# Patient Record
Sex: MALE | Race: WHITE | NOT HISPANIC OR LATINO | Employment: OTHER | URBAN - METROPOLITAN AREA
[De-identification: names, ages, dates, MRNs, and addresses within clinical notes are randomized per-mention and may not be internally consistent; named-entity substitution may affect disease eponyms.]

---

## 2017-01-04 DIAGNOSIS — F41.9 ANXIETY: ICD-10-CM

## 2017-01-04 RX ORDER — CITALOPRAM 20 MG/1
TABLET, FILM COATED ORAL
Qty: 90 TABLET | Refills: 3 | Status: SHIPPED | OUTPATIENT
Start: 2017-01-04 | End: 2017-07-17 | Stop reason: DRUGHIGH

## 2017-03-20 ENCOUNTER — OFFICE VISIT (OUTPATIENT)
Dept: CARDIOLOGY | Facility: CLINIC | Age: 62
End: 2017-03-20
Payer: COMMERCIAL

## 2017-03-20 VITALS
WEIGHT: 194 LBS | HEART RATE: 61 BPM | BODY MASS INDEX: 31.18 KG/M2 | DIASTOLIC BLOOD PRESSURE: 80 MMHG | SYSTOLIC BLOOD PRESSURE: 125 MMHG | HEIGHT: 66 IN

## 2017-03-20 DIAGNOSIS — Z95.5 HISTORY OF CORONARY ARTERY STENT PLACEMENT: ICD-10-CM

## 2017-03-20 DIAGNOSIS — R53.83 FATIGUE, UNSPECIFIED TYPE: ICD-10-CM

## 2017-03-20 DIAGNOSIS — G47.33 OSA (OBSTRUCTIVE SLEEP APNEA): ICD-10-CM

## 2017-03-20 DIAGNOSIS — Z79.82 LONG-TERM USE OF ASPIRIN THERAPY: ICD-10-CM

## 2017-03-20 DIAGNOSIS — R07.9 CHEST PAIN, UNSPECIFIED TYPE: ICD-10-CM

## 2017-03-20 DIAGNOSIS — I25.10 CORONARY ARTERY DISEASE INVOLVING NATIVE CORONARY ARTERY OF NATIVE HEART WITHOUT ANGINA PECTORIS: Primary | ICD-10-CM

## 2017-03-20 DIAGNOSIS — F17.201 TOBACCO DEPENDENCE IN REMISSION: ICD-10-CM

## 2017-03-20 PROCEDURE — 1160F RVW MEDS BY RX/DR IN RCRD: CPT | Mod: S$GLB,,, | Performed by: INTERNAL MEDICINE

## 2017-03-20 PROCEDURE — 99213 OFFICE O/P EST LOW 20 MIN: CPT | Mod: S$GLB,,, | Performed by: INTERNAL MEDICINE

## 2017-03-20 PROCEDURE — 99999 PR PBB SHADOW E&M-EST. PATIENT-LVL III: CPT | Mod: PBBFAC,,, | Performed by: INTERNAL MEDICINE

## 2017-03-20 NOTE — PATIENT INSTRUCTIONS
Heart Disease Education    The heart beats 60 to 100 times per minute, 24 hours a day. This equals almost 1000,000 times a day. It pumps blood with oxygen and nutrients to the tissues and organs of the body. But the heart is a muscle and needs its own supply of blood. Blood flow to the heart is supplied by the coronary arteries. Coronary artery disease (atherosclerosis) is a result of cholesterol, saturated fat, and calcium deposits (plaques) that build up inside the walls. This causes inflammation within the coronary arteries. These plaques narrow the artery and reduce blood flow to the heart muscle. The reduction in blood flow to the heart muscle decreases oxygen supply to the heart. If the narrowing is significant enough, the oxygen supply to one or more regions of the heart can be temporarily or permanently shut down. This can cause chest pain, and possibly death of heart tissue (heart attack).  Types of chest pain  Angina is the name for pain in the heart muscle. Angina is a warning sign of serious heart disease. When untreated it can lead to a heart attack, also known as acute myocardial infarction, or AMI. Angina occurs when there is not enough blood and oxygen flowing to the heart for the amount of work it is doing. This most often happens during physical exertion, when the heart is working hardest. It is usually relieved by rest or nitroglycerin. Angina may also occur after a large meal when extra blood is sent to the digestive organs and less goes to the heart. In the case of advanced or unstable heart disease, angina can occur at rest or awaken you from sleep. Angina usually lasts from a few minutes up to 20 minutes or more. When treated early, the effects of angina can be reversed without permanent damage to the heart. Angina is a serious condition and needs to be evaluated by a medical professional immediately.  There are two types of angina -- stable and unstable:  · Stable angina usually occurs  with a predictable level of activity. Being stable, its character, severity, and occurrence do not change much over time. It usually starts with activity, and resolves with rest or taking your medicine as instructed by your doctor. The symptoms usually do not last long.  · Unstable angina changes or gets worse over time. It is different from whatever you are used to. It may feel different or worse, begin without cause, occur with exercise or exertion, wake you up from sleep, and last longer. It may not respond in the same way as it does when you take your usual medicines for an attack. This type of angina can be a warning sign of an impending heart attack.     A heart attack is usually the result of a blood clot that suddenly forms in a coronary artery that has been narrowed with plaque. When this occurs, blood flow may be cut off to a part of the heart muscle, causing the cells to die. This weakens the pumping action of the heart, which affects the delivery of blood to all the other organs in the body including the brain. This damage is not reversible. However, early treatment can limit the amount of damage.  The pain you feel with angina and a heart attack may have a similar quality. However, it is usually different in intensity and duration. Here are some typical descriptions of a heart attack:  · It is most often experienced as a squeezing, crushing, pressure-like sensation in the center of the chest.  · It is sometimes described as something heavy sitting on my chest.  · It may feel more like a bad case of indigestion.  · The pain may spread from the chest to the arm, shoulder, throat or jaw.  · Sometimes the pain is not felt in the chest at all, but only in the arm, shoulder, throat or jaw.  · There may also be nausea, vomiting, dizziness or light-headedness, sweating and trouble breathing.  · Palpitations, or your heart beating rapidly  · A new, irregular heart beat  · Unexplained weakness  You may not be  "able to tell the difference between "bad" angina and a heart attack at home. Seek help if your symptoms are different than usual. Do not be in denial or just try to "tough it out."  Call 911  This is the fastest and safest way to get to the emergency department. The paramedics can also start treatment on the way to the hospital, saving valuable time for your heart.  · If the angina gets worse, if it continues, or if it stops and returns, call 911 immediately. Do not delay. You may be having a heart attack.  · After you call 911, take a second tablet or spray unless instructed otherwise. When repeating doses, sit down if possible, because it can make you feel lightheaded or dizzy. Wait another 5 minutes. If the angina still does not go away, take a third tablet or spray. Do not take more than 3 tablets or sprays within 15 minutes. Stay on the phone with 911 for further instruction.  · Your healthcare provider may give you slightly different instructions than those above. If so, follow them carefully.  Do not wait until symptoms become severe to call 911.  Other reasons to call 911 include:  · Trouble breathing  · Feeling lightheaded, faint, or dizzy  · Rapid heart beat  · Slower than usual heart rate compared to your normal  · Angina with weakness, dizziness, fainting, heavy sweating, nausea, or vomiting  · Extreme drowsiness, confusion  · Weakness of an arm or leg or one side of the face  · Difficulty with speech or vision  When to seek medical care  Remember, the signs and symptoms of a heart attack are not always like they are on TV. Sometimes they are not so obvious. You may only feel weak, or just not right. If it is not clear or if you have any doubt, call for advice.  · Seek help if there is a change in the type of pain, if it feels different, or if your symptoms are mild.  · Do not drive yourself. Have someone else drive you. If no one can drive, call 911.  · Do not delay. Fast diagnosis and treatment can " "prevent or limit the amount of heart damage during a heart attack.  · Do not go to your doctor's office or a clinic as they may not be able to provide all the testing and treatment required for this condition.  · If your doctor has given you medicine to take when symptoms occur, take them but don't delay getting help trying to locate medicines.  What happens in the emergency department  The emergency department is connected to your local emergency medical system (EMS) through 911. That's why during a cardiac emergency, calling 911 is the fastest way to get help. The goal of the emergency department is to rapidly screen, evaluate, and treat people.  Once you are there, an electrocardiogram (ECG or heart tracing) will be done. Blood samples may be taken to look for the presence of heart enzymes that leak from damaged heart cells and show if a heart attack is occurring. You will often be evaluated by a heart specialist (cardiologist) who decides the best course of action. In the case of severe angina or early heart attack, and depending on the circumstances, powerful "clot busting" medicines can be used to dissolve blood clots in the coronary artery. In other cases, you may be taken to a cardiac catheterization lab. Here, a tiny balloon-tipped catheter is advanced through blood vessels to the heart. There the balloon is inflated pushing open the blood vessel restoring blood flow.  Risk factors for heart disease  Risk factors for heart disease are a combination of genetic and lifestyle. Many risk factors work by either directly or indirectly damaging the blood vessels of the heart, or by increasing the risk of forming blood or cholesterol clots, which then clog up and block the arteries.     Examples of physical lifestyle risk factors:  · Cigarette smoking  · High blood pressure  · High blood cholesterol  · Use of stimulant drugs such as cocaine, crack, and amphetamines  · Eating a high-fat, high-cholesterol " meal  · Diabetes   · Obesity which increases risk for diabetes and high blood pressure  · Lack of regular physical activity     Examples of emotional lifestyle factors:  · Chronic high stress levels release stress hormones. These raise blood pressure and cholesterol level and makes blood clot more easily.  · Held-in anger, hostile or cynical attitude  · Social and emotional isolation, lack of intimacy  · Loss of relationship  · Depression  Other factors that increase the risk of heart attack that you cannot control :  · Age. The older you get beyond 40, the greater is your risk of significant coronary artery disease.  · Gender. More men than women get heart disease; but once past menopause, women who are not taking estrogen replacement have the same risk as men for a heart attack.  · Family history. If your mother, father, brother or sister has coronary artery disease, your risk of having it is higher than a person your age without this family history.  What can you do to decrease your risk  To reduce your risk of heart disease:  · Get regular checkups with your doctor.  · Take your medicines for blood pressure, cholesterol or diabetes as directed.  · Watch your diet. Eat a heart healthy diet choosing fresh foods, less salt, cholesterol, and fat  · Stop smoking. Get help if needed.  · Get regular exercise.  · Manage stress.  · Carry a list of medicines and doses in your wallet.  Date Last Reviewed: 12/30/2015  © 7902-4428 [employer]. [address] [place] [address], [place] [address]. All rights reserved. This information is not intended as a substitute for professional medical care. Always follow your healthcare professional's instructions.

## 2017-03-20 NOTE — PROGRESS NOTES
Subjective:   Patient ID:  Masoud Osorio is a 61 y.o. male who presents for follow up of Coronary Artery Disease; Hyperlipidemia; and Hypertension      HPI:     Masoud Osorio 61 y.o. male is here follow up and feeling well without any new complaints. He was admitted for NSTEMI that required PCI of RCA, PDA, and POBA of PLB with a preserved EF. He quit smoking. He is on aspirin and brilinta for DAPT witjh a DAPT score of 2. He recalls at least 2 episodes of chest discomfort of similar nature but less intensity, 3/10. Resolved spontaneously.         Patient Active Problem List    Diagnosis Date Noted    Fatigue 03/20/2017    History of colon polyps 10/24/2016    Overweight (BMI 25.0-29.9) 09/21/2016    Long-term use of aspirin therapy 09/21/2016    History of coronary artery stent placement 09/21/2016    Antiplatelet or antithrombotic long-term use 09/21/2016    Anxiety 09/21/2016     trial of ctialopram and follow up in 8 weeks      Coronary artery disease involving native coronary artery of native heart without angina pectoris 07/26/2016 7/26/2016 NSTEMI at [facility]     PCI of RCA with 4.0 x 15, 4.0 x 38, and 3.5 x 38 mm Resolute JACOB    PCI of PDA 2.75 x 15 mm JACOB dilated to 3.0  POBA of proximal PLB 2.0 x 12 mm balloon      Normal EF with LVEDP 15 mmHg                    History of heart attack 07/25/2016     NSTEMI      Tobacco dependence in remission 07/25/2016           Right Arm BP - Sitting: (P) 125/80  Left Arm BP - Sitting: (P) 124/80        LABS    LAST HbA1c  Lab Results   Component Value Date    HGBA1C 5.7 07/26/2016       Lipid panel  Lab Results   Component Value Date    CHOL 121 09/23/2016    CHOL 174 07/26/2016     Lab Results   Component Value Date    HDL 41 09/23/2016    HDL 32 (L) 07/26/2016     Lab Results   Component Value Date    LDLCALC 60.2 (L) 09/23/2016    LDLCALC 112.4 07/26/2016     Lab Results   Component Value Date    TRIG 99 09/23/2016    TRIG 148 07/26/2016     Lab  Results   Component Value Date    CHOLHDL 33.9 09/23/2016    CHOLHDL 18.4 (L) 07/26/2016            Review of Systems   Constitution: Negative for diaphoresis, weakness, night sweats, weight gain and weight loss.   HENT: Negative for congestion.    Eyes: Negative for blurred vision, discharge and double vision.   Cardiovascular: Negative for chest pain, claudication, cyanosis, dyspnea on exertion, irregular heartbeat, leg swelling, near-syncope, orthopnea, palpitations, paroxysmal nocturnal dyspnea and syncope.   Respiratory: Negative for cough, shortness of breath and wheezing.    Endocrine: Negative for cold intolerance, heat intolerance and polyphagia.   Hematologic/Lymphatic: Negative for adenopathy and bleeding problem. Does not bruise/bleed easily.   Skin: Negative for dry skin and nail changes.   Musculoskeletal: Negative for arthritis, back pain, falls, joint pain, myalgias and neck pain.   Gastrointestinal: Negative for bloating, abdominal pain, change in bowel habit and constipation.   Genitourinary: Negative for bladder incontinence, dysuria, flank pain, genital sores and missed menses.   Neurological: Negative for aphonia, brief paralysis, difficulty with concentration and dizziness.   Psychiatric/Behavioral: Negative for altered mental status and memory loss. The patient does not have insomnia.    Allergic/Immunologic: Negative for environmental allergies.       Objective:   Physical Exam   Constitutional: He is oriented to person, place, and time. He appears well-developed and well-nourished. He is not intubated.   HENT:   Head: Normocephalic and atraumatic.   Right Ear: External ear normal.   Left Ear: External ear normal.   Mouth/Throat: Oropharynx is clear and moist.   Eyes: Conjunctivae and EOM are normal. Pupils are equal, round, and reactive to light. Right eye exhibits no discharge. Left eye exhibits no discharge. No scleral icterus.   Neck: Normal range of motion. Neck supple. Normal carotid  pulses, no hepatojugular reflux and no JVD present. Carotid bruit is not present. No tracheal deviation present. No thyromegaly present.   Cardiovascular: Normal rate, regular rhythm, S1 normal and S2 normal.   No extrasystoles are present. PMI is not displaced.  Exam reveals no gallop, no S3, no distant heart sounds, no friction rub and no midsystolic click.    No murmur heard.  Pulses:       Carotid pulses are 2+ on the right side, and 2+ on the left side.       Radial pulses are 2+ on the right side, and 2+ on the left side.        Femoral pulses are 2+ on the right side, and 2+ on the left side.       Popliteal pulses are 2+ on the right side, and 2+ on the left side.        Dorsalis pedis pulses are 2+ on the right side, and 2+ on the left side.        Posterior tibial pulses are 2+ on the right side, and 2+ on the left side.   Pulmonary/Chest: Effort normal and breath sounds normal. No accessory muscle usage or stridor. No apnea, no tachypnea and no bradypnea. He is not intubated. No respiratory distress. He has no decreased breath sounds. He has no wheezes. He has no rales. He exhibits no tenderness and no bony tenderness.   Abdominal: He exhibits no distension, no pulsatile liver, no abdominal bruit, no ascites, no pulsatile midline mass and no mass. There is no tenderness. There is no rebound and no guarding.   Musculoskeletal: Normal range of motion. He exhibits no edema or tenderness.   Lymphadenopathy:     He has no cervical adenopathy.   Neurological: He is alert and oriented to person, place, and time. He has normal reflexes. No cranial nerve deficit. Coordination normal.   Skin: Skin is warm. No rash noted. No erythema. No pallor.   Psychiatric: He has a normal mood and affect. His behavior is normal. Judgment and thought content normal.       Assessment:     1. Coronary artery disease involving native coronary artery of native heart without angina pectoris    2. Tobacco dependence in remission    3.  History of coronary artery stent placement    4. Long-term use of aspirin therapy    5. Fatigue, unspecified type    6. NATALIE (obstructive sleep apnea)        Plan:             Continue with current medical plan and lifestyle changes.  Return sooner for concerns or questions. If symptoms persist go to the ED  I have reviewed all pertinent data on this patient         Stress test to rule out ischemia        DAPT for another year because of DAPT score of 2      Temporarily stop toprol xl in case it is causing fatigue   If in 2 weeks he is still tired he resume it    If there is in an improvement he will switch to another beta-blocker      Sleep study to rule out NATALIE          I have reviewed the patient's medical history in detail and updated the computerized patient record.    Orders Placed This Encounter   Procedures    Polysomnogram (CPAP will be added if patient meets diagnostic criteria.)     Standing Status:   Future     Standing Expiration Date:   3/20/2018       Follow up as scheduled. Return sooner for concerns or questions          He expressed verbal understanding and agreed with the plan        Patient's Medications   New Prescriptions    No medications on file   Previous Medications    ASPIRIN (ECOTRIN) 81 MG EC TABLET    Take 1 tablet (81 mg total) by mouth once daily.    ATORVASTATIN (LIPITOR) 80 MG TABLET    Take 1 tablet (80 mg total) by mouth once daily.    AZELASTINE (ASTELIN) 137 MCG (0.1 %) NASAL SPRAY    INSTILL 2 SPRAYS IN EACH NOSTRIL TWICE A DAY    CITALOPRAM (CELEXA) 20 MG TABLET    TAKE 1 TABLET BY MOUTH DAILY    IPRATROPIUM (ATROVENT) 0.06 % NASAL SPRAY    INSTILL 2 SPRAYS IN EACH NOSTRIL EVERY 12 HOURS    MULTIVITAMIN CAPSULE    Take 1 capsule by mouth once daily.    NASAL ALLERGY 55 MCG NASAL INHALER    INSTILL 2 SPRAYS IN [facility] NOSTRIL EVERY DAY    NITROGLYCERIN (NITROSTAT) 0.4 MG SL TABLET    Place 1 tablet (0.4 mg total) under the tongue every 5 (five) minutes as needed for Chest pain.     TICAGRELOR (BRILINTA) 90 MG TABLET    Take 1 tablet (90 mg total) by mouth 2 (two) times daily.   Modified Medications    No medications on file   Discontinued Medications    METOPROLOL SUCCINATE (TOPROL-XL) 25 MG 24 HR TABLET    Take 1 tablet (25 mg total) by mouth once daily.

## 2017-03-27 ENCOUNTER — HOSPITAL ENCOUNTER (OUTPATIENT)
Dept: CARDIOLOGY | Facility: HOSPITAL | Age: 62
Discharge: HOME | End: 2017-03-27
Attending: INTERNAL MEDICINE
Payer: COMMERCIAL

## 2017-03-27 ENCOUNTER — HOSPITAL ENCOUNTER (OUTPATIENT)
Dept: RADIOLOGY | Facility: HOSPITAL | Age: 62
Discharge: HOME | End: 2017-03-27
Attending: INTERNAL MEDICINE
Payer: COMMERCIAL

## 2017-03-27 DIAGNOSIS — R07.9 CHEST PAIN, UNSPECIFIED TYPE: ICD-10-CM

## 2017-03-27 DIAGNOSIS — I25.10 CORONARY ARTERY DISEASE INVOLVING NATIVE CORONARY ARTERY OF NATIVE HEART WITHOUT ANGINA PECTORIS: ICD-10-CM

## 2017-03-27 PROCEDURE — 78452 HT MUSCLE IMAGE SPECT MULT: CPT | Mod: TC

## 2017-03-27 PROCEDURE — 93018 CV STRESS TEST I&R ONLY: CPT | Mod: ,,, | Performed by: INTERNAL MEDICINE

## 2017-03-27 PROCEDURE — 78452 HT MUSCLE IMAGE SPECT MULT: CPT | Mod: 26,,, | Performed by: RADIOLOGY

## 2017-03-27 PROCEDURE — 93016 CV STRESS TEST SUPVJ ONLY: CPT | Mod: ,,, | Performed by: INTERNAL MEDICINE

## 2017-03-30 ENCOUNTER — TELEPHONE (OUTPATIENT)
Dept: CARDIOLOGY | Facility: CLINIC | Age: 62
End: 2017-03-30

## 2017-03-30 LAB — DIASTOLIC DYSFUNCTION: NO

## 2017-03-30 NOTE — PROGRESS NOTES
Stress test came back overall ok      No reversible ischemia noted      If you have any discomfort despite this result we should proceed with angiography      Thanks      ZN

## 2017-03-30 NOTE — TELEPHONE ENCOUNTER
----- Message from Nicky Mello sent at 3/30/2017  1:36 PM CDT -----  Contact: self/[phone]  Patient would like the results of his Nuclear stress test.  Please advise

## 2017-03-30 NOTE — TELEPHONE ENCOUNTER
----- Message from Nicky Mello sent at 3/30/2017  1:36 PM CDT -----  Contact: self/[phone]  Patient would like the results of his Nuclear stress test.  Please advise

## 2017-04-10 ENCOUNTER — TELEPHONE (OUTPATIENT)
Dept: SMOKING CESSATION | Facility: CLINIC | Age: 62
End: 2017-04-10

## 2017-04-11 ENCOUNTER — TELEPHONE (OUTPATIENT)
Dept: SMOKING CESSATION | Facility: CLINIC | Age: 62
End: 2017-04-11

## 2017-04-12 NOTE — TELEPHONE ENCOUNTER
----- Message from Natali Mattson sent at 4/12/2017 11:24 AM CDT -----  Contact: [phone]/ self   Pr its requesting a refill on rx ticagrelor (BRILINTA) 90 mg tablet sent to [place] [facility] [phone]. Please advise

## 2017-04-13 RX ORDER — TICAGRELOR 90 MG/1
TABLET ORAL
Qty: 60 TABLET | Refills: 11 | Status: SHIPPED | OUTPATIENT
Start: 2017-04-13 | End: 2017-07-17 | Stop reason: ALTCHOICE

## 2017-04-19 ENCOUNTER — TELEPHONE (OUTPATIENT)
Dept: SMOKING CESSATION | Facility: CLINIC | Age: 62
End: 2017-04-19

## 2017-04-19 RX ORDER — ATORVASTATIN CALCIUM 80 MG/1
80 TABLET, FILM COATED ORAL DAILY
Qty: 90 TABLET | Refills: 3 | Status: SHIPPED | OUTPATIENT
Start: 2017-04-19 | End: 2017-07-17 | Stop reason: SDUPTHER

## 2017-04-27 ENCOUNTER — TELEPHONE (OUTPATIENT)
Dept: SMOKING CESSATION | Facility: CLINIC | Age: 62
End: 2017-04-27

## 2017-05-30 ENCOUNTER — TELEPHONE (OUTPATIENT)
Dept: CARDIOLOGY | Facility: CLINIC | Age: 62
End: 2017-05-30

## 2017-05-30 ENCOUNTER — OFFICE VISIT (OUTPATIENT)
Dept: FAMILY MEDICINE | Facility: CLINIC | Age: 62
End: 2017-05-30
Payer: COMMERCIAL

## 2017-05-30 VITALS
HEART RATE: 61 BPM | BODY MASS INDEX: 31.34 KG/M2 | TEMPERATURE: 99 F | OXYGEN SATURATION: 97 % | WEIGHT: 195 LBS | DIASTOLIC BLOOD PRESSURE: 76 MMHG | HEIGHT: 66 IN | SYSTOLIC BLOOD PRESSURE: 128 MMHG | RESPIRATION RATE: 18 BRPM

## 2017-05-30 DIAGNOSIS — M25.462 SWELLING OF JOINT OF LEFT KNEE: Primary | ICD-10-CM

## 2017-05-30 PROCEDURE — 20605 DRAIN/INJ JOINT/BURSA W/O US: CPT | Mod: S$GLB,,, | Performed by: FAMILY MEDICINE

## 2017-05-30 PROCEDURE — 99999 PR PBB SHADOW E&M-EST. PATIENT-LVL III: CPT | Mod: PBBFAC,,, | Performed by: FAMILY MEDICINE

## 2017-05-30 PROCEDURE — 99215 OFFICE O/P EST HI 40 MIN: CPT | Mod: 25,S$GLB,, | Performed by: FAMILY MEDICINE

## 2017-05-30 NOTE — TELEPHONE ENCOUNTER
Spoke to patient, he states his knee has been swelling during the day, but decreases at night.     He will schedule an appt with his PCP, as he is going out of town tomorrow.

## 2017-05-30 NOTE — TELEPHONE ENCOUNTER
----- Message from Lia Villalobos sent at 5/29/2017  4:11 PM CDT -----  Contact: Greta/[phone]  Greta said patient is having trouble with his left leg with fluid, it is like the last time but worst. Please advise

## 2017-05-30 NOTE — PROCEDURES
Intermediate Joint Aspiration/Injection  Date/Time: 5/30/2017 6:44 PM  Performed by: WHITNEY HERNANDEZ  Authorized by: WHITNEY HERNANDEZ     Consent Done?:  Yes (Written)  Indications:  Joint swelling and diagnostic evaluation  Site marked: The procedure site was marked    Timeout: Prior to procedure the correct patient, procedure, and site was verified      Location: left knee.  Prep: Patient was prepped and draped in usual sterile fashion    Ultrasonic Guidance for needle placement: No  Needle size:  18 G  Approach:  Anteromedial  Aspirate amount (ml):  0  Patient tolerance:  Patient tolerated the procedure well with no immediate complications     Additional Comments: No fluid was found .

## 2017-05-30 NOTE — PROGRESS NOTES
HPI:  Masoud Osorio is a 62 y.o. year old male that  presents with concern about his left knee swelling for the last few days with no history of trauma. 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 He states that his knee does not hurt but feel very full. He states that he notices that it is decreasing in size but will not go al the way down when he goes to sleep at night. He is about to go on vacation with his wife tomorrow on a road trip in a camper.  Chief Complaint   Patient presents with    Knee Pain     left   .     HPI    Past Medical History:   Diagnosis Date    Depression     Gout      Social History     Social History    Marital status:      Spouse name: N/A    Number of children: N/A    Years of education: N/A     Occupational History    Not on file.     Social History Main Topics    Smoking status: Former Smoker     Packs/day: 0.50     Types: Cigarettes     Quit date: 7/25/2016    Smokeless tobacco: Not on file    Alcohol use Yes      Comment: rarely    Drug use: No    Sexual activity: Not on file     Other Topics Concern    Not on file     Social History Narrative    No narrative on file     Past Surgical History:   Procedure Laterality Date    CORONARY ANGIOPLASTY WITH STENT PLACEMENT      ROTATOR CUFF REPAIR       Family History   Problem Relation Age of Onset    Heart disease Mother     Arthritis Mother     Parkinsonism Mother     Congenital heart disease Father     No Known Problems Sister     No Known Problems Brother     No Known Problems Daughter     No Known Problems Sister     No Known Problems Sister            Review of Systems  General ROS: negative for chills, fever or weight loss  Psychological ROS: negative for hallucination, depression or suicidal ideation  Ophthalmic ROS: negative for blurry vision, photophobia or eye pain  ENT ROS: negative for epistaxis, sore throat or rhinorrhea  Respiratory ROS: no cough, shortness of  "breath, or wheezing  Cardiovascular ROS: no chest pain or dyspnea on exertion  Gastrointestinal ROS: no abdominal pain, change in bowel habits, or black/ bloody stools  Genito-Urinary ROS: no dysuria, trouble voiding, or hematuria  Musculoskeletal ROS: negative for gait disturbance or muscular weakness, left knee swelling  Neurological ROS: no syncope or seizures; no ataxia  Dermatological ROS: negative for pruritis, rash and jaundice      Physical Exam:  /76 (BP Location: Left arm, Patient Position: Sitting, BP Method: Manual)   Pulse 61   Temp 98.5 °F (36.9 °C) (Oral)   Resp 18   Ht 5' 6" (1.676 m)   Wt 88.5 kg (195 lb)   SpO2 97%   BMI 31.47 kg/m²   General appearance: alert, cooperative, no distress  Constitutional:Oriented to person, place, and time.appears well-developed and well-nourished.  HEENT: Normocephalic, atraumatic, neck symmetrical, no nasal discharge, TM - clear bilaterally   Eyes: conjunctivae/corneas clear, PERRL, EOM's intact  Lungs: clear to auscultation bilaterally, no dullness to percussion bilaterally  Heart: regular rate and rhythm without rub; no displacement of the PMI   Extremities: extremities symmetric; no clubbing, cyanosis, left knee swellinng with FROM, no point tenderness or warmth  Integument: Skin color, texture, turgor normal; no rashes; hair distrubution normal  Neurologic: Alert and oriented X 3, normal strength, normal coordination and gait  Psychiatric: no pressured speech; normal affect; no evidence of impaired cognition   Physical Exam     LABS:    Complete Blood Count  Lab Results   Component Value Date    RBC 5.48 09/23/2016    HGB 15.5 09/23/2016    HCT 46.2 09/23/2016    MCV 84 09/23/2016    MCH 28.3 09/23/2016    MCHC 33.5 09/23/2016    RDW 13.8 09/23/2016     09/23/2016    MPV 9.6 09/23/2016    GRAN 4.1 09/23/2016    GRAN 45.9 09/23/2016    LYMPH 3.5 09/23/2016    LYMPH 38.6 09/23/2016    MONO 1.1 (H) 09/23/2016    MONO 11.6 09/23/2016    EOS 0.3 " 09/23/2016    BASO 0.04 09/23/2016    EOSINOPHIL 3.1 09/23/2016    BASOPHIL 0.4 09/23/2016    DIFFMETHOD Automated 09/23/2016       Comprehensive Metabolic Panel  Lab Results   Component Value Date     09/23/2016    BUN 19 09/23/2016    CREATININE 0.9 09/23/2016     09/23/2016    K 3.7 09/23/2016     09/23/2016    PROT 6.8 09/23/2016    ALBUMIN 4.2 09/23/2016    BILITOT 1.0 09/23/2016    AST 34 09/23/2016    ALKPHOS 91 09/23/2016    CO2 28 09/23/2016    ALT 27 09/23/2016    ANIONGAP 5 (L) 09/23/2016    EGFRNONAA >60 09/23/2016    ESTGFRAFRICA >60 09/23/2016       LIPID  Lab Results   Component Value Date    CHOL 121 09/23/2016    HDL 41 09/23/2016       TSH  Lab Results   Component Value Date    TSH 1.447 09/23/2016       Current Outpatient Prescriptions   Medication Sig Dispense Refill    aspirin (ECOTRIN) 81 MG EC tablet Take 1 tablet (81 mg total) by mouth once daily.  0    atorvastatin (LIPITOR) 80 MG tablet Take 1 tablet (80 mg total) by mouth once daily. 90 tablet 3    BRILINTA 90 mg tablet Take 1 tablet by mouth 2 times daily. 60 tablet 11    citalopram (CELEXA) 20 MG tablet TAKE 1 TABLET BY MOUTH DAILY 90 tablet 3    ipratropium (ATROVENT) 0.06 % nasal spray INSTILL 2 SPRAYS IN EACH NOSTRIL EVERY 12 HOURS  5    multivitamin capsule Take 1 capsule by mouth once daily.      NASAL ALLERGY 55 mcg nasal inhaler INSTILL 2 SPRAYS IN [facility] NOSTRIL EVERY DAY  5    nitroGLYCERIN (NITROSTAT) 0.4 MG SL tablet Place 1 tablet (0.4 mg total) under the tongue every 5 (five) minutes as needed for Chest pain. 25 tablet 11    azelastine (ASTELIN) 137 mcg (0.1 %) nasal spray INSTILL 2 SPRAYS IN EACH NOSTRIL TWICE A DAY  5     No current facility-administered medications for this visit.        Assessment:    ICD-10-CM ICD-9-CM    1. Swelling of joint of left knee M25.462 719.06 Intermediate Joint Aspiration/Injection      Arthrocentesis         Plan:  Pt instructed that his knee was just inflamed and  he should use rest and ice and elevatio when possible.  Return if symptoms worsen or fail to improve.          Emilia Maria MD

## 2017-06-29 ENCOUNTER — TELEPHONE (OUTPATIENT)
Dept: CARDIOLOGY | Facility: CLINIC | Age: 62
End: 2017-06-29

## 2017-06-29 NOTE — TELEPHONE ENCOUNTER
----- Message from Lia Villalobos sent at 6/28/2017  3:05 PM CDT -----  Contact: [place]/[phone] or [phone]  [place] said patient needs to be scheduled for his yearly and the next available is on 9/25/17. He is on blood thinner medication. Please advise

## 2017-07-17 ENCOUNTER — OFFICE VISIT (OUTPATIENT)
Dept: CARDIOLOGY | Facility: CLINIC | Age: 62
End: 2017-07-17
Payer: COMMERCIAL

## 2017-07-17 ENCOUNTER — OFFICE VISIT (OUTPATIENT)
Dept: FAMILY MEDICINE | Facility: CLINIC | Age: 62
End: 2017-07-17
Payer: COMMERCIAL

## 2017-07-17 VITALS
WEIGHT: 198 LBS | BODY MASS INDEX: 30.01 KG/M2 | HEIGHT: 68 IN | DIASTOLIC BLOOD PRESSURE: 80 MMHG | HEART RATE: 76 BPM | SYSTOLIC BLOOD PRESSURE: 120 MMHG

## 2017-07-17 VITALS
HEIGHT: 69 IN | HEART RATE: 61 BPM | OXYGEN SATURATION: 97 % | DIASTOLIC BLOOD PRESSURE: 87 MMHG | SYSTOLIC BLOOD PRESSURE: 145 MMHG | WEIGHT: 197.06 LBS | BODY MASS INDEX: 29.19 KG/M2

## 2017-07-17 DIAGNOSIS — Z79.02 VISIT FOR MONITORING PLAVIX THERAPY: ICD-10-CM

## 2017-07-17 DIAGNOSIS — I25.10 CORONARY ARTERY DISEASE INVOLVING NATIVE CORONARY ARTERY OF NATIVE HEART WITHOUT ANGINA PECTORIS: Primary | ICD-10-CM

## 2017-07-17 DIAGNOSIS — Z95.5 HISTORY OF CORONARY ARTERY STENT PLACEMENT: ICD-10-CM

## 2017-07-17 DIAGNOSIS — Z12.5 ENCOUNTER FOR SCREENING FOR MALIGNANT NEOPLASM OF PROSTATE: ICD-10-CM

## 2017-07-17 DIAGNOSIS — Z86.010 HISTORY OF COLON POLYPS: ICD-10-CM

## 2017-07-17 DIAGNOSIS — Z11.59 NEED FOR HEPATITIS C SCREENING TEST: ICD-10-CM

## 2017-07-17 DIAGNOSIS — E78.2 MIXED HYPERLIPIDEMIA: ICD-10-CM

## 2017-07-17 DIAGNOSIS — Z51.81 VISIT FOR MONITORING PLAVIX THERAPY: ICD-10-CM

## 2017-07-17 DIAGNOSIS — F41.9 ANXIETY: ICD-10-CM

## 2017-07-17 DIAGNOSIS — Z12.11 COLON CANCER SCREENING: ICD-10-CM

## 2017-07-17 DIAGNOSIS — Z79.82 LONG-TERM USE OF ASPIRIN THERAPY: ICD-10-CM

## 2017-07-17 DIAGNOSIS — Z79.899 MEDICATION MANAGEMENT: ICD-10-CM

## 2017-07-17 DIAGNOSIS — F17.201 TOBACCO DEPENDENCE IN REMISSION: ICD-10-CM

## 2017-07-17 PROCEDURE — 99214 OFFICE O/P EST MOD 30 MIN: CPT | Mod: S$GLB,,, | Performed by: FAMILY MEDICINE

## 2017-07-17 PROCEDURE — 99999 PR PBB SHADOW E&M-EST. PATIENT-LVL IV: CPT | Mod: PBBFAC,,, | Performed by: FAMILY MEDICINE

## 2017-07-17 PROCEDURE — 99999 PR PBB SHADOW E&M-EST. PATIENT-LVL II: CPT | Mod: PBBFAC,,, | Performed by: INTERNAL MEDICINE

## 2017-07-17 PROCEDURE — 99214 OFFICE O/P EST MOD 30 MIN: CPT | Mod: S$GLB,,, | Performed by: INTERNAL MEDICINE

## 2017-07-17 RX ORDER — CITALOPRAM 40 MG/1
40 TABLET, FILM COATED ORAL DAILY
Qty: 90 TABLET | Refills: 3 | Status: SHIPPED | OUTPATIENT
Start: 2017-07-17 | End: 2017-10-11 | Stop reason: ALTCHOICE

## 2017-07-17 RX ORDER — ALPRAZOLAM 1 MG/1
1 TABLET ORAL DAILY PRN
Qty: 30 TABLET | Refills: 2 | Status: SHIPPED | OUTPATIENT
Start: 2017-07-17 | End: 2018-02-28 | Stop reason: SDUPTHER

## 2017-07-17 RX ORDER — CLOPIDOGREL BISULFATE 75 MG/1
75 TABLET ORAL DAILY
Qty: 90 TABLET | Refills: 3 | Status: SHIPPED | OUTPATIENT
Start: 2017-07-17 | End: 2018-05-05 | Stop reason: SDUPTHER

## 2017-07-17 RX ORDER — ATORVASTATIN CALCIUM 80 MG/1
80 TABLET, FILM COATED ORAL DAILY
Qty: 90 TABLET | Refills: 3 | Status: SHIPPED | OUTPATIENT
Start: 2017-07-17 | End: 2018-03-29 | Stop reason: SDUPTHER

## 2017-07-17 NOTE — PATIENT INSTRUCTIONS
Heart Disease Education    The heart beats 60 to 100 times per minute, 24 hours a day. This equals almost 1000,000 times a day. It pumps blood with oxygen and nutrients to the tissues and organs of the body. But the heart is a muscle and needs its own supply of blood. Blood flow to the heart is supplied by the coronary arteries. Coronary artery disease (atherosclerosis) is a result of cholesterol, saturated fat, and calcium deposits (plaques) that build up inside the walls. This causes inflammation within the coronary arteries. These plaques narrow the artery and reduce blood flow to the heart muscle. The reduction in blood flow to the heart muscle decreases oxygen supply to the heart. If the narrowing is significant enough, the oxygen supply to one or more regions of the heart can be temporarily or permanently shut down. This can cause chest pain, and possibly death of heart tissue (heart attack).  Types of chest pain  Angina is the name for pain in the heart muscle. Angina is a warning sign of serious heart disease. When untreated it can lead to a heart attack, also known as acute myocardial infarction, or AMI. Angina occurs when there is not enough blood and oxygen flowing to the heart for the amount of work it is doing. This most often happens during physical exertion, when the heart is working hardest. It is usually relieved by rest or nitroglycerin. Angina may also occur after a large meal when extra blood is sent to the digestive organs and less goes to the heart. In the case of advanced or unstable heart disease, angina can occur at rest or awaken you from sleep. Angina usually lasts from a few minutes up to 20 minutes or more. When treated early, the effects of angina can be reversed without permanent damage to the heart. Angina is a serious condition and needs to be evaluated by a medical professional immediately.  There are two types of angina -- stable and unstable:  · Stable angina usually occurs  with a predictable level of activity. Being stable, its character, severity, and occurrence do not change much over time. It usually starts with activity, and resolves with rest or taking your medicine as instructed by your doctor. The symptoms usually do not last long.  · Unstable angina changes or gets worse over time. It is different from whatever you are used to. It may feel different or worse, begin without cause, occur with exercise or exertion, wake you up from sleep, and last longer. It may not respond in the same way as it does when you take your usual medicines for an attack. This type of angina can be a warning sign of an impending heart attack.     A heart attack is usually the result of a blood clot that suddenly forms in a coronary artery that has been narrowed with plaque. When this occurs, blood flow may be cut off to a part of the heart muscle, causing the cells to die. This weakens the pumping action of the heart, which affects the delivery of blood to all the other organs in the body including the brain. This damage is not reversible. However, early treatment can limit the amount of damage.  The pain you feel with angina and a heart attack may have a similar quality. However, it is usually different in intensity and duration. Here are some typical descriptions of a heart attack:  · It is most often experienced as a squeezing, crushing, pressure-like sensation in the center of the chest.  · It is sometimes described as something heavy sitting on my chest.  · It may feel more like a bad case of indigestion.  · The pain may spread from the chest to the arm, shoulder, throat or jaw.  · Sometimes the pain is not felt in the chest at all, but only in the arm, shoulder, throat or jaw.  · There may also be nausea, vomiting, dizziness or light-headedness, sweating and trouble breathing.  · Palpitations, or your heart beating rapidly  · A new, irregular heart beat  · Unexplained weakness  You may not be  "able to tell the difference between "bad" angina and a heart attack at home. Seek help if your symptoms are different than usual. Do not be in denial or just try to "tough it out."  Call 911  This is the fastest and safest way to get to the emergency department. The paramedics can also start treatment on the way to the hospital, saving valuable time for your heart.  · If the angina gets worse, if it continues, or if it stops and returns, call 911 immediately. Do not delay. You may be having a heart attack.  · After you call 911, take a second tablet or spray unless instructed otherwise. When repeating doses, sit down if possible, because it can make you feel lightheaded or dizzy. Wait another 5 minutes. If the angina still does not go away, take a third tablet or spray. Do not take more than 3 tablets or sprays within 15 minutes. Stay on the phone with 911 for further instruction.  · Your healthcare provider may give you slightly different instructions than those above. If so, follow them carefully.  Do not wait until symptoms become severe to call 911.  Other reasons to call 911 include:  · Trouble breathing  · Feeling lightheaded, faint, or dizzy  · Rapid heart beat  · Slower than usual heart rate compared to your normal  · Angina with weakness, dizziness, fainting, heavy sweating, nausea, or vomiting  · Extreme drowsiness, confusion  · Weakness of an arm or leg or one side of the face  · Difficulty with speech or vision  When to seek medical care  Remember, the signs and symptoms of a heart attack are not always like they are on TV. Sometimes they are not so obvious. You may only feel weak, or just not right. If it is not clear or if you have any doubt, call for advice.  · Seek help if there is a change in the type of pain, if it feels different, or if your symptoms are mild.  · Do not drive yourself. Have someone else drive you. If no one can drive, call 911.  · Do not delay. Fast diagnosis and treatment can " "prevent or limit the amount of heart damage during a heart attack.  · Do not go to your doctor's office or a clinic as they may not be able to provide all the testing and treatment required for this condition.  · If your doctor has given you medicine to take when symptoms occur, take them but don't delay getting help trying to locate medicines.  What happens in the emergency department  The emergency department is connected to your local emergency medical system (EMS) through 911. That's why during a cardiac emergency, calling 911 is the fastest way to get help. The goal of the emergency department is to rapidly screen, evaluate, and treat people.  Once you are there, an electrocardiogram (ECG or heart tracing) will be done. Blood samples may be taken to look for the presence of heart enzymes that leak from damaged heart cells and show if a heart attack is occurring. You will often be evaluated by a heart specialist (cardiologist) who decides the best course of action. In the case of severe angina or early heart attack, and depending on the circumstances, powerful "clot busting" medicines can be used to dissolve blood clots in the coronary artery. In other cases, you may be taken to a cardiac catheterization lab. Here, a tiny balloon-tipped catheter is advanced through blood vessels to the heart. There the balloon is inflated pushing open the blood vessel restoring blood flow.  Risk factors for heart disease  Risk factors for heart disease are a combination of genetic and lifestyle. Many risk factors work by either directly or indirectly damaging the blood vessels of the heart, or by increasing the risk of forming blood or cholesterol clots, which then clog up and block the arteries.     Examples of physical lifestyle risk factors:  · Cigarette smoking  · High blood pressure  · High blood cholesterol  · Use of stimulant drugs such as cocaine, crack, and amphetamines  · Eating a high-fat, high-cholesterol " meal  · Diabetes   · Obesity which increases risk for diabetes and high blood pressure  · Lack of regular physical activity     Examples of emotional lifestyle factors:  · Chronic high stress levels release stress hormones. These raise blood pressure and cholesterol level and makes blood clot more easily.  · Held-in anger, hostile or cynical attitude  · Social and emotional isolation, lack of intimacy  · Loss of relationship  · Depression  Other factors that increase the risk of heart attack that you cannot control :  · Age. The older you get beyond 40, the greater is your risk of significant coronary artery disease.  · Gender. More men than women get heart disease; but once past menopause, women who are not taking estrogen replacement have the same risk as men for a heart attack.  · Family history. If your mother, father, brother or sister has coronary artery disease, your risk of having it is higher than a person your age without this family history.  What can you do to decrease your risk  To reduce your risk of heart disease:  · Get regular checkups with your doctor.  · Take your medicines for blood pressure, cholesterol or diabetes as directed.  · Watch your diet. Eat a heart healthy diet choosing fresh foods, less salt, cholesterol, and fat  · Stop smoking. Get help if needed.  · Get regular exercise.  · Manage stress.  · Carry a list of medicines and doses in your wallet.  Date Last Reviewed: 12/30/2015  © 1701-9517 [employer]. [address] [place] [address], [place] [address]. All rights reserved. This information is not intended as a substitute for professional medical care. Always follow your healthcare professional's instructions.

## 2017-07-17 NOTE — PROGRESS NOTES
Subjective:   Patient ID:  Masoud Osorio is a 62 y.o. male who presents for follow up of Coronary Artery Disease; Hyperlipidemia; and s/p RCA with PDA PCI      HPI:     Masoud Osorio 62 y.o. male is here follow up and feeling well without any new complaints. He was admitted for NSTEMI that required PCI of RCA, PDA, and POBA of PLB with a preserved EF in 2016. He quit smoking. He is on aspirin and brilinta for DAPT witjh a DAPT score of 2. Stress test in 3/2017 for atypical chest pain revealed no reversible ischemia. He has been pain free since except for occasional reflux after evening meals.         Patient Active Problem List    Diagnosis Date Noted    Mixed hyperlipidemia 2017    Fatigue 2017    Chest pain 2017    History of colon polyps 10/24/2016    Overweight (BMI 25.0-29.9) 2016    Long-term use of aspirin therapy 2016    History of coronary artery stent placement 2016    Antiplatelet or antithrombotic long-term use 2016    Anxiety 2016     trial of ctialopram and follow up in 8 weeks      Coronary artery disease involving native coronary artery of native heart without angina pectoris 2016 [facility]I at [facility]     PCI of RCA with 4.0 x 15, 4.0 x 38, and 3.5 x 38 mm Resolute JACOB    PCI of PDA 2.75 x 15 mm JACOB dilated to 3.0  POBA of proximal PLB 2.0 x 12 mm balloon      Normal EF with LVEDP 15 mmHg  DAPT score of 2                        History of heart attack 2016     NSTEMI      Tobacco dependence in remission 2016           Left Arm BP - Sittin/80        LABS    LAST HbA1c  Lab Results   Component Value Date    HGBA1C 5.7 2016       Lipid panel  Lab Results   Component Value Date    CHOL 121 2016    CHOL 174 2016     Lab Results   Component Value Date    HDL 41 2016    HDL 32 (L) 2016     Lab Results   Component Value Date    LDLCALC 60.2 (L) 2016    LDLCALC 112.4  07/26/2016     Lab Results   Component Value Date    TRIG 99 09/23/2016    TRIG 148 07/26/2016     Lab Results   Component Value Date    CHOLHDL 33.9 09/23/2016    CHOLHDL 18.4 (L) 07/26/2016            Review of Systems   Constitution: Negative for diaphoresis, weakness, night sweats, weight gain and weight loss.   HENT: Negative for congestion.    Eyes: Negative for blurred vision, discharge and double vision.   Cardiovascular: Negative for chest pain, claudication, cyanosis, dyspnea on exertion, irregular heartbeat, leg swelling, near-syncope, orthopnea, palpitations, paroxysmal nocturnal dyspnea and syncope.   Respiratory: Negative for cough, shortness of breath and wheezing.    Endocrine: Negative for cold intolerance, heat intolerance and polyphagia.   Hematologic/Lymphatic: Negative for adenopathy and bleeding problem. Does not bruise/bleed easily.   Skin: Negative for dry skin and nail changes.   Musculoskeletal: Negative for arthritis, back pain, falls, joint pain, myalgias and neck pain.   Gastrointestinal: Negative for bloating, abdominal pain, change in bowel habit and constipation.   Genitourinary: Negative for bladder incontinence, dysuria, flank pain, genital sores and missed menses.   Neurological: Negative for aphonia, brief paralysis, difficulty with concentration and dizziness.   Psychiatric/Behavioral: Negative for altered mental status and memory loss. The patient does not have insomnia.    Allergic/Immunologic: Negative for environmental allergies.       Objective:   Physical Exam   Constitutional: He is oriented to person, place, and time. He appears well-developed and well-nourished. He is not intubated.   HENT:   Head: Normocephalic and atraumatic.   Right Ear: External ear normal.   Left Ear: External ear normal.   Mouth/Throat: Oropharynx is clear and moist.   Eyes: Conjunctivae and EOM are normal. Pupils are equal, round, and reactive to light. Right eye exhibits no discharge. Left eye  exhibits no discharge. No scleral icterus.   Neck: Normal range of motion. Neck supple. Normal carotid pulses, no hepatojugular reflux and no JVD present. Carotid bruit is not present. No tracheal deviation present. No thyromegaly present.   Cardiovascular: Normal rate, regular rhythm, S1 normal and S2 normal.   No extrasystoles are present. PMI is not displaced.  Exam reveals no gallop, no S3, no distant heart sounds, no friction rub and no midsystolic click.    No murmur heard.  Pulses:       Carotid pulses are 2+ on the right side, and 2+ on the left side.       Radial pulses are 2+ on the right side, and 2+ on the left side.        Femoral pulses are 2+ on the right side, and 2+ on the left side.       Popliteal pulses are 2+ on the right side, and 2+ on the left side.        Dorsalis pedis pulses are 2+ on the right side, and 2+ on the left side.        Posterior tibial pulses are 2+ on the right side, and 2+ on the left side.   Pulmonary/Chest: Effort normal and breath sounds normal. No accessory muscle usage or stridor. No apnea, no tachypnea and no bradypnea. He is not intubated. No respiratory distress. He has no decreased breath sounds. He has no wheezes. He has no rales. He exhibits no tenderness and no bony tenderness.   Abdominal: He exhibits no distension, no pulsatile liver, no abdominal bruit, no ascites, no pulsatile midline mass and no mass. There is no tenderness. There is no rebound and no guarding.   Musculoskeletal: Normal range of motion. He exhibits no edema or tenderness.   Lymphadenopathy:     He has no cervical adenopathy.   Neurological: He is alert and oriented to person, place, and time. He has normal reflexes. No cranial nerve deficit. Coordination normal.   Skin: Skin is warm. No rash noted. No erythema. No pallor.   Psychiatric: He has a normal mood and affect. His behavior is normal. Judgment and thought content normal.       Assessment:     1. Coronary artery disease involving  native coronary artery of native heart without angina pectoris    2. History of coronary artery stent placement    3. Mixed hyperlipidemia        Plan:             Continue with current medical plan and lifestyle changes.  Return sooner for concerns or questions. If symptoms persist go to the ED  I have reviewed all pertinent data on this patient         Stress test to rule out ischemia        DAPT for another year because of DAPT score of 2   Plavix for the remaining 18 months         No beta-blocker because of fatigue        Follow up as scheduled. Return sooner for concerns or questions          He expressed verbal understanding and agreed with the plan        Patient's Medications   New Prescriptions    CLOPIDOGREL (PLAVIX) 75 MG TABLET    Take 1 tablet (75 mg total) by mouth once daily.   Previous Medications    ASPIRIN (ECOTRIN) 81 MG EC TABLET    Take 1 tablet (81 mg total) by mouth once daily.    AZELASTINE (ASTELIN) 137 MCG (0.1 %) NASAL SPRAY    INSTILL 2 SPRAYS IN EACH NOSTRIL TWICE A DAY    CITALOPRAM (CELEXA) 20 MG TABLET    TAKE 1 TABLET BY MOUTH DAILY    IPRATROPIUM (ATROVENT) 0.06 % NASAL SPRAY    INSTILL 2 SPRAYS IN EACH NOSTRIL EVERY 12 HOURS    MULTIVITAMIN CAPSULE    Take 1 capsule by mouth once daily.    NASAL ALLERGY 55 MCG NASAL INHALER    INSTILL 2 SPRAYS IN [facility] NOSTRIL EVERY DAY    NITROGLYCERIN (NITROSTAT) 0.4 MG SL TABLET    Place 1 tablet (0.4 mg total) under the tongue every 5 (five) minutes as needed for Chest pain.   Modified Medications    Modified Medication Previous Medication    ATORVASTATIN (LIPITOR) 80 MG TABLET atorvastatin (LIPITOR) 80 MG tablet       Take 1 tablet (80 mg total) by mouth once daily.    Take 1 tablet (80 mg total) by mouth once daily.   Discontinued Medications    BRILINTA 90 MG TABLET    Take 1 tablet by mouth 2 times daily.

## 2017-07-17 NOTE — PROGRESS NOTES
Office Visit    Patient Name: Masoud Osorio    : 1955  MRN: [id number]    Subjective:  Masoud is a 62 y.o. male who presents today for:    Follow-up (6mo f/u)    61 yo male with PMH of CAD, HTN, HLD, overweight last seen by me 2016 for new patient physical.  Started on citalopram 20 mg daily for treatment of anxiety/ depression. He has been on the medication since then and he reports increased motivation.  But the effects of the medications seem to be wearing off a little since when he first started it.  He and his wife report periods of increased anxiety, including thought racing of worrisome thoughts that do negatively impact his ability to function throughout the day.  He also gets on edge that time with some mild incidences of lashing out at coworkers and at home  This also was potentially a little bit better when he first started the Celexa, but they're no longer noticing benefit.  He also did previously have some Xanax that he uses very sparingly, and this helps on an as needed basis.      He was admitted 2016 for NSTEMI that required PCI with placement of 3 stents with a preserved EF. He quit smoking. He is on aspirin and plavix. He last saw Dr Bailey his cardiologist today.  He was advised to remain off of his beta blocker due to some fatigue and his blood pressure seems to be controlled off of it.  He had a stress test 3/2017 that was normal and was advised to obtain a sleep study to rule out sleep apnea as potential underlying causes for some of his fatigue.  He is also overdue for a repeat colonoscopy.  He has a history of colonic polyps.  He was not able to obtain his colonoscopy over the last year due to need for continuation of antiplatelet therapy for at least one year without cessation following placement of coronary artery stents.  He has been cleared by Dr. Bailey to stop his aspirin and Plavix for 5 days in order to obtain a follow-up colonoscopy at this time.        Past Medical  "History  Past Medical History:   Diagnosis Date    Depression     Gout        Past Surgical History  Past Surgical History:   Procedure Laterality Date    CORONARY ANGIOPLASTY WITH STENT PLACEMENT      ROTATOR CUFF REPAIR         Family History  Family History   Problem Relation Age of Onset    Heart disease Mother     Arthritis Mother     Parkinsonism Mother     Congenital heart disease Father     No Known Problems Sister     No Known Problems Brother     No Known Problems Daughter     No Known Problems Sister     No Known Problems Sister        Social History  Social History     Social History    Marital status:      Spouse name: N/A    Number of children: N/A    Years of education: N/A     Occupational History    Not on file.     Social History Main Topics    Smoking status: Former Smoker     Packs/day: 0.50     Types: Cigarettes     Quit date: 7/25/2016    Smokeless tobacco: Not on file    Alcohol use Yes      Comment: rarely    Drug use: No    Sexual activity: Not on file     Other Topics Concern    Not on file     Social History Narrative    No narrative on file       Current Medications  Medications reviewed and updated.     Allergies   Review of patient's allergies indicates:   Allergen Reactions    Penicillins Hives       Review of Systems (Pertinent positives)  Review of Systems   Constitutional: Positive for fatigue.   Cardiovascular: Negative for chest pain.   Psychiatric/Behavioral: Negative for dysphoric mood. The patient is nervous/anxious.        BP (!) 145/87   Pulse 61   Ht 5' 8.5" (1.74 m)   Wt 89.4 kg (197 lb 1.5 oz)   SpO2 97%   BMI 29.53 kg/m²     Physical Exam   Constitutional: He is oriented to person, place, and time. He appears well-developed and well-nourished. No distress.   HENT:   Head: Normocephalic and atraumatic.   Pulmonary/Chest: Effort normal.   Abdominal: Soft. No hernia.   Musculoskeletal: He exhibits no edema.   Neurological: He is alert and " oriented to person, place, and time.   Psychiatric: He has a normal mood and affect.   Vitals reviewed.        Assessment/Plan:  Masoud Osorio is a 62 y.o. male who presents today for :    Masoud was seen today for follow-up.    Diagnoses and all orders for this visit:    Coronary artery disease involving native coronary artery of native heart without angina pectoris  Comments:  continue medical managment, following with cardiolgy  Orders:  -     Comprehensive metabolic panel; Future  -     Lipid panel; Future  -     Hemoglobin A1c; Future  -     CBC auto differential; Future    History of coronary artery stent placement  -     CBC auto differential; Future    Visit for monitoring Plavix therapy  Comments:  Seen by cardiology 3 of 2017, advised on dual antiplatelet therapy with Plavix and aspirin for another year.    Long-term use of aspirin therapy    Mixed hyperlipidemia  -     Comprehensive metabolic panel; Future  -     Lipid panel; Future    Tobacco dependence in remission  Comments:  continuing not to smoke since heart attack    Medication management  -     Comprehensive metabolic panel; Future  -     Lipid panel; Future  -     Hemoglobin A1c; Future  -     CBC auto differential; Future  -     TSH; Future  -     Hepatitis C antibody; Future  -     Vitamin D; Future    Anxiety  -     TSH; Future  -     alprazolam (XANAX) 1 MG tablet; Take 1 tablet (1 mg total) by mouth daily as needed for Anxiety.  -     citalopram (CELEXA) 40 MG tablet; Take 1 tablet (40 mg total) by mouth once daily.    History of colon polyps  Comments:  Follow-up colonoscopy ordered.  Now clear to stop antiplatelet therapy for 5 days prior.  Orders:  -     Case request GI: COLONOSCOPY    Encounter for screening for malignant neoplasm of prostate    Need for hepatitis C screening test  -     Hepatitis C antibody; Future    Colon cancer screening  -     Case request GI: COLONOSCOPY    Anxiety  Comments:  Increase citalopram to 40 mg daily.   Continue Xanax as needed.  Follow-up in 8-10 weeks.  Orders:  -     TSH; Future  -     alprazolam (XANAX) 1 MG tablet; Take 1 tablet (1 mg total) by mouth daily as needed for Anxiety.  -     citalopram (CELEXA) 40 MG tablet; Take 1 tablet (40 mg total) by mouth once daily.            ICD-10-CM ICD-9-CM    1. Coronary artery disease involving native coronary artery of native heart without angina pectoris I25.10 414.01 Comprehensive metabolic panel      Lipid panel      Hemoglobin A1c      CBC auto differential    continue medical managment, following with cardiolgy   2. History of coronary artery stent placement Z95.5 V45.82 CBC auto differential   3. Visit for monitoring Plavix therapy Z51.81 V58.83     Z79.02 V58.63     Seen by cardiology 3 of 2017, advised on dual antiplatelet therapy with Plavix and aspirin for another year.   4. Long-term use of aspirin therapy Z79.82 V58.66    5. Mixed hyperlipidemia E78.2 272.2 Comprehensive metabolic panel      Lipid panel   6. Tobacco dependence in remission F17.201 V15.82     continuing not to smoke since heart attack   7. Medication management Z79.899 V58.69 Comprehensive metabolic panel      Lipid panel      Hemoglobin A1c      CBC auto differential      TSH      Hepatitis C antibody      Vitamin D   8. Anxiety F41.9 300.00 TSH      alprazolam (XANAX) 1 MG tablet      citalopram (CELEXA) 40 MG tablet   9. History of colon polyps Z86.010 V12.72 Case request GI: COLONOSCOPY    Follow-up colonoscopy ordered.  Now clear to stop antiplatelet therapy for 5 days prior.   10. Encounter for screening for malignant neoplasm of prostate Z12.5 V76.44    11. Need for hepatitis C screening test Z11.59 V73.89 Hepatitis C antibody   12. Colon cancer screening Z12.11 V76.51 Case request GI: COLONOSCOPY   13. Anxiety F41.9 300.00 TSH      alprazolam (XANAX) 1 MG tablet      citalopram (CELEXA) 40 MG tablet    Increase citalopram to 40 mg daily.  Continue Xanax as needed.  Follow-up in 8-10  weeks.       Return in about 10 weeks (around 9/25/2017) for Annual physical with fasting labs prior in about 10 weeks, follow up sooner if concerns.

## 2017-07-19 ENCOUNTER — TELEPHONE (OUTPATIENT)
Dept: CARDIOLOGY | Facility: CLINIC | Age: 62
End: 2017-07-19

## 2017-07-19 NOTE — TELEPHONE ENCOUNTER
----- Message from Brianna Linder sent at 7/19/2017  1:12 PM CDT -----  Contact: Pt's wife- Josefina Cole afternoon,    Pt's wife would like a call back regarding clopidogrel (PLAVIX) 75 mg tablet. Wife stated she needs to know how/when to take medication.    Wife can be reached at [phone]    Thank you!

## 2017-07-20 ENCOUNTER — TELEPHONE (OUTPATIENT)
Dept: GASTROENTEROLOGY | Facility: CLINIC | Age: 62
End: 2017-07-20

## 2017-07-20 NOTE — TELEPHONE ENCOUNTER
Referral was sent from Dr. Ayala to schedule an Colonoscopy. Patient was scheduled an OV with NP on 08/01/2017.

## 2017-08-01 ENCOUNTER — OFFICE VISIT (OUTPATIENT)
Dept: GASTROENTEROLOGY | Facility: CLINIC | Age: 62
End: 2017-08-01
Payer: COMMERCIAL

## 2017-08-01 VITALS
BODY MASS INDEX: 29.48 KG/M2 | DIASTOLIC BLOOD PRESSURE: 70 MMHG | SYSTOLIC BLOOD PRESSURE: 123 MMHG | HEIGHT: 69 IN | WEIGHT: 199.06 LBS

## 2017-08-01 DIAGNOSIS — Z12.11 COLON CANCER SCREENING: ICD-10-CM

## 2017-08-01 DIAGNOSIS — Z86.010 HISTORY OF COLON POLYPS: Primary | ICD-10-CM

## 2017-08-01 DIAGNOSIS — Z95.5 HISTORY OF CORONARY ARTERY STENT PLACEMENT: ICD-10-CM

## 2017-08-01 DIAGNOSIS — Z79.01 CURRENT USE OF LONG TERM ANTICOAGULATION: ICD-10-CM

## 2017-08-01 PROCEDURE — 99214 OFFICE O/P EST MOD 30 MIN: CPT | Mod: S$GLB,,, | Performed by: NURSE PRACTITIONER

## 2017-08-01 PROCEDURE — 99999 PR PBB SHADOW E&M-EST. PATIENT-LVL II: CPT | Mod: PBBFAC,,, | Performed by: NURSE PRACTITIONER

## 2017-08-01 NOTE — PROGRESS NOTES
Subjective:       Patient ID: Masoud Osorio is a 62 y.o. male.    Chief Complaint: Colonoscopy (On Blood Thinners Plavix)    HPI  Presents to discuss colonoscopy due to a history of colon polyps 8 years ago. Denies family history of colon cancer or colon polyps.  Denies blood with bowel movements, black stools or change in bowel habits.    He had cardiac stents in July 2016 and was placed on brilinta which was changed this year to Plavix.  His cardiologist has ok'd holding plavix for repeat colonoscopy.  Denies abdominal pain, nausea or vomiting.  Reports rare heartburn.      Review of Systems   Constitutional: Negative.  Negative for activity change, appetite change, fatigue, fever and unexpected weight change.   HENT: Negative.  Negative for sore throat and trouble swallowing.    Respiratory: Negative.  Negative for cough, choking and shortness of breath.    Cardiovascular: Negative.  Negative for chest pain.   Gastrointestinal: Negative for abdominal distention, abdominal pain, blood in stool, constipation, diarrhea, nausea and vomiting.   Genitourinary: Negative.  Negative for difficulty urinating, dysuria and hematuria.   Musculoskeletal: Negative.  Negative for neck pain and neck stiffness.   Skin: Negative.    Neurological: Negative.  Negative for dizziness, syncope, weakness and light-headedness.   Psychiatric/Behavioral: Negative.        Objective:      Physical Exam   Constitutional: He is oriented to person, place, and time. He appears well-developed and well-nourished. No distress.   HENT:   Head: Normocephalic.   Eyes: No scleral icterus.   Neck: Neck supple.   Cardiovascular: Normal rate.    Pulmonary/Chest: Effort normal.   Musculoskeletal: Normal range of motion.   Neurological: He is alert and oriented to person, place, and time.   Skin: Skin is warm and dry. He is not diaphoretic.   Psychiatric: He has a normal mood and affect. His behavior is normal. Judgment and thought content normal.   Vitals  reviewed.      Assessment:       1. History of colon polyps    2. Colon cancer screening    3. History of coronary artery stent placement    4. Current use of long term anticoagulation        Plan:         Masoud was seen today for colonoscopy.    Diagnoses and all orders for this visit:    History of colon polyps  -     Case request GI: COLONOSCOPY Split Prep    Colon cancer screening  -     Case request GI: COLONOSCOPY Split Prep    History of coronary artery stent placement    Current use of long term anticoagulation         I have explained the planned procedures to the patient.The risks, benefits and alternatives of the procedure were also explained in detail. Patient verbalized understanding, all questions were answered. The patient agrees to proceed as planned.

## 2017-08-16 ENCOUNTER — ANESTHESIA EVENT (OUTPATIENT)
Dept: ENDOSCOPY | Facility: HOSPITAL | Age: 62
End: 2017-08-16
Payer: COMMERCIAL

## 2017-08-16 ENCOUNTER — HOSPITAL ENCOUNTER (OUTPATIENT)
Facility: HOSPITAL | Age: 62
Discharge: HOME | End: 2017-08-16
Attending: INTERNAL MEDICINE | Admitting: INTERNAL MEDICINE
Payer: COMMERCIAL

## 2017-08-16 ENCOUNTER — ANESTHESIA (OUTPATIENT)
Dept: ENDOSCOPY | Facility: HOSPITAL | Age: 62
End: 2017-08-16
Payer: COMMERCIAL

## 2017-08-16 VITALS
OXYGEN SATURATION: 95 % | SYSTOLIC BLOOD PRESSURE: 109 MMHG | WEIGHT: 190 LBS | DIASTOLIC BLOOD PRESSURE: 69 MMHG | HEIGHT: 69 IN | BODY MASS INDEX: 28.14 KG/M2 | RESPIRATION RATE: 15 BRPM | TEMPERATURE: 98 F | HEART RATE: 54 BPM

## 2017-08-16 DIAGNOSIS — Z12.11 SCREENING FOR COLORECTAL CANCER: ICD-10-CM

## 2017-08-16 DIAGNOSIS — Z86.010 HISTORY OF COLON POLYPS: Primary | ICD-10-CM

## 2017-08-16 DIAGNOSIS — Z12.12 SCREENING FOR COLORECTAL CANCER: ICD-10-CM

## 2017-08-16 PROCEDURE — 25000003 PHARM REV CODE 250: Performed by: INTERNAL MEDICINE

## 2017-08-16 PROCEDURE — 37000009 HC ANESTHESIA EA ADD 15 MINS: Performed by: INTERNAL MEDICINE

## 2017-08-16 PROCEDURE — 63600175 PHARM REV CODE 636 W HCPCS: Performed by: NURSE ANESTHETIST, CERTIFIED REGISTERED

## 2017-08-16 PROCEDURE — 45385 COLONOSCOPY W/LESION REMOVAL: CPT | Mod: 33,,, | Performed by: INTERNAL MEDICINE

## 2017-08-16 PROCEDURE — 88305 TISSUE EXAM BY PATHOLOGIST: CPT | Mod: 26,,, | Performed by: PATHOLOGY

## 2017-08-16 PROCEDURE — 88305 TISSUE EXAM BY PATHOLOGIST: CPT | Performed by: PATHOLOGY

## 2017-08-16 PROCEDURE — 37000008 HC ANESTHESIA 1ST 15 MINUTES: Performed by: INTERNAL MEDICINE

## 2017-08-16 PROCEDURE — 27201089 HC SNARE, DISP (ANY): Performed by: INTERNAL MEDICINE

## 2017-08-16 PROCEDURE — 45385 COLONOSCOPY W/LESION REMOVAL: CPT | Performed by: INTERNAL MEDICINE

## 2017-08-16 RX ORDER — PROPOFOL 10 MG/ML
VIAL (ML) INTRAVENOUS
Status: DISCONTINUED | OUTPATIENT
Start: 2017-08-16 | End: 2017-08-16

## 2017-08-16 RX ORDER — PROPOFOL 10 MG/ML
VIAL (ML) INTRAVENOUS CONTINUOUS PRN
Status: DISCONTINUED | OUTPATIENT
Start: 2017-08-16 | End: 2017-08-16

## 2017-08-16 RX ORDER — LIDOCAINE HCL/PF 100 MG/5ML
SYRINGE (ML) INTRAVENOUS
Status: DISCONTINUED | OUTPATIENT
Start: 2017-08-16 | End: 2017-08-16

## 2017-08-16 RX ORDER — SODIUM CHLORIDE 9 MG/ML
INJECTION, SOLUTION INTRAVENOUS CONTINUOUS
Status: DISCONTINUED | OUTPATIENT
Start: 2017-08-16 | End: 2017-08-16 | Stop reason: HOSPADM

## 2017-08-16 RX ADMIN — PROPOFOL 150 MCG/KG/MIN: 10 INJECTION, EMULSION INTRAVENOUS at 08:08

## 2017-08-16 RX ADMIN — SODIUM CHLORIDE: 0.9 INJECTION, SOLUTION INTRAVENOUS at 08:08

## 2017-08-16 RX ADMIN — PROPOFOL 60 MG: 10 INJECTION, EMULSION INTRAVENOUS at 08:08

## 2017-08-16 RX ADMIN — LIDOCAINE HYDROCHLORIDE 50 MG: 20 INJECTION, SOLUTION INTRAVENOUS at 08:08

## 2017-08-16 NOTE — ANESTHESIA POSTPROCEDURE EVALUATION
"Anesthesia Post Evaluation    Patient: Masoud Osorio    Procedure(s) Performed: Procedure(s) (LRB):  COLONOSCOPY Split Prep (N/A)    Final Anesthesia Type: MAC  Patient location during evaluation: GI PACU  Patient participation: Yes- Able to Participate  Level of consciousness: awake and alert and oriented  Post-procedure vital signs: reviewed and stable  Pain management: adequate  Airway patency: patent  PONV status at discharge: No PONV  Anesthetic complications: no      Cardiovascular status: blood pressure returned to baseline, hemodynamically stable and stable  Respiratory status: room air, unassisted and spontaneous ventilation  Hydration status: euvolemic  Follow-up not needed.        Visit Vitals  /81 (BP Location: Right arm, Patient Position: Lying)   Pulse (!) 57   Temp 36.9 °C (98.4 °F) (Oral)   Resp 17   Ht 5' 8.5" (1.74 m)   Wt 86.2 kg (190 lb)   SpO2 97%   BMI 28.47 kg/m²       Pain/Luna Score: Pain Assessment Performed: Yes (8/16/2017  8:34 AM)  Presence of Pain: denies (8/16/2017  8:34 AM)      "

## 2017-08-16 NOTE — LETTER
August 16, 2017         [address] [place] [address] [place] [address]  Phone: [phone]  Fax: [phone]       Patient: Masoud Osorio   YOB: 1955  Date of Visit: 08/16/2017    To Whom It May Concern:    Devonte Osorio  was at [facility] on 08/16/2017. He may return to work on  August 18, 2017 with no restrictions. Please excuse him for August 14-18, 2017.     If you have any questions or concerns, or if I can be of further assistance, please do not hesitate to contact me.    Sincerely,    Gwen Ugarte RN

## 2017-08-16 NOTE — TRANSFER OF CARE
"Anesthesia Transfer of Care Note    Patient: Masoud Osorio    Procedure(s) Performed: Procedure(s) (LRB):  COLONOSCOPY Split Prep (N/A)    Patient location: GI    Anesthesia Type: MAC    Transport from OR: Transported from OR on room air with adequate spontaneous ventilation    Post pain: adequate analgesia    Post assessment: no apparent anesthetic complications    Post vital signs: stable    Level of consciousness: awake, alert and oriented    Nausea/Vomiting: no nausea/vomiting    Complications: none          Last vitals:   Visit Vitals  /81 (BP Location: Right arm, Patient Position: Lying)   Pulse (!) 57   Temp 36.9 °C (98.4 °F) (Oral)   Resp 17   Ht 5' 8.5" (1.74 m)   Wt 86.2 kg (190 lb)   SpO2 97%   BMI 28.47 kg/m²     "

## 2017-08-16 NOTE — DISCHARGE INSTRUCTIONS
Discharge Summary/Instructions for after Colonoscopy with Biopsy/Polypectomy    Masoud Osorio  8/16/2017  Maykel Carcamo Jr., MD    Restrictions on Activity:    - Do not drive car or operate machinery until the day after the procedure.  - The following day: return to full activity including work.  - For 3 days: No heavy lifting, straining or running.  - Diet: Eat and drink normally unless instructed otherwise.    Treatment for Common Side Effects:  - Mild abdominal pain and bloating or excessive gas: rest, eat lightly and use a heating pad.     Symptoms to watch for and report to your physician:  1. Severe abdominal pain.  2. Fever within 24 hours after a procedure.  3. A large amount of rectal bleeding. (A small amount of blood from the rectum is not serious, especially if hemorrhoids are present.)  4. Because air was put into your colon during the procedure, expelling large amount of air from your rectum is normal.  5. You may not have a bowel movement for 1-3 days because of the colonoscopy prep. This is normal.  6. Go directly to the emergency room if you notice any of the following:     Chills and/or fever over 101   Persistent vomiting   Severe abdominal pain, other than gas cramps   Severe chest pain   Black, tarry stools   Any bleeding - exceeding one tablespoon    If you have any questions or problems, please call your Physician:    Maykel Carcamo Jr., MD      Lab Results: Contact Physician's Office      If a complication or emergency situation arises and you are unable to reach your Physician - GO TO THE EMERGENCY ROOM.

## 2017-08-16 NOTE — TRANSFER OF CARE
"Anesthesia Transfer of Care Note    Patient: Masoud Osorio    Procedure(s) Performed: Procedure(s) (LRB):  COLONOSCOPY Split Prep (N/A)    Patient location: GI    Anesthesia Type: MAC    Transport from OR: Transported from OR on room air with adequate spontaneous ventilation    Post pain: adequate analgesia    Post assessment: no apparent anesthetic complications    Post vital signs: stable    Level of consciousness: awake, alert and oriented    Nausea/Vomiting: no nausea/vomiting    Complications: none    Transfer of care protocol was followed      Last vitals:   Visit Vitals  /81 (BP Location: Right arm, Patient Position: Lying)   Pulse (!) 57   Temp 36.9 °C (98.4 °F) (Oral)   Resp 17   Ht 5' 8.5" (1.74 m)   Wt 86.2 kg (190 lb)   SpO2 97%   BMI 28.47 kg/m²     "

## 2017-08-16 NOTE — H&P (VIEW-ONLY)
Subjective:       Patient ID: Masoud sOorio is a 62 y.o. male.    Chief Complaint: Colonoscopy (On Blood Thinners Plavix)    HPI  Presents to discuss colonoscopy due to a history of colon polyps 8 years ago. Denies family history of colon cancer or colon polyps.  Denies blood with bowel movements, black stools or change in bowel habits.    He had cardiac stents in July 2016 and was placed on brilinta which was changed this year to Plavix.  His cardiologist has ok'd holding plavix for repeat colonoscopy.  Denies abdominal pain, nausea or vomiting.  Reports rare heartburn.      Review of Systems   Constitutional: Negative.  Negative for activity change, appetite change, fatigue, fever and unexpected weight change.   HENT: Negative.  Negative for sore throat and trouble swallowing.    Respiratory: Negative.  Negative for cough, choking and shortness of breath.    Cardiovascular: Negative.  Negative for chest pain.   Gastrointestinal: Negative for abdominal distention, abdominal pain, blood in stool, constipation, diarrhea, nausea and vomiting.   Genitourinary: Negative.  Negative for difficulty urinating, dysuria and hematuria.   Musculoskeletal: Negative.  Negative for neck pain and neck stiffness.   Skin: Negative.    Neurological: Negative.  Negative for dizziness, syncope, weakness and light-headedness.   Psychiatric/Behavioral: Negative.        Objective:      Physical Exam   Constitutional: He is oriented to person, place, and time. He appears well-developed and well-nourished. No distress.   HENT:   Head: Normocephalic.   Eyes: No scleral icterus.   Neck: Neck supple.   Cardiovascular: Normal rate.    Pulmonary/Chest: Effort normal.   Musculoskeletal: Normal range of motion.   Neurological: He is alert and oriented to person, place, and time.   Skin: Skin is warm and dry. He is not diaphoretic.   Psychiatric: He has a normal mood and affect. His behavior is normal. Judgment and thought content normal.   Vitals  reviewed.      Assessment:       1. History of colon polyps    2. Colon cancer screening    3. History of coronary artery stent placement    4. Current use of long term anticoagulation        Plan:         Masoud was seen today for colonoscopy.    Diagnoses and all orders for this visit:    History of colon polyps  -     Case request GI: COLONOSCOPY Split Prep    Colon cancer screening  -     Case request GI: COLONOSCOPY Split Prep    History of coronary artery stent placement    Current use of long term anticoagulation         I have explained the planned procedures to the patient.The risks, benefits and alternatives of the procedure were also explained in detail. Patient verbalized understanding, all questions were answered. The patient agrees to proceed as planned.

## 2017-08-16 NOTE — ANESTHESIA PREPROCEDURE EVALUATION
08/16/2017  Masoud Osorio is a 62 y.o., male.    Anesthesia Evaluation     I have reviewed the Nursing Notes.   I have reviewed the Medications.     Review of Systems  Anesthesia Hx:  No problems with previous Anesthesia Denies Hx of Anesthetic complications Denies Family Hx of Anesthesia complications.    Social:  Non-Smoker, No Alcohol Use    Hematology/Oncology:  Hematology Normal   Oncology Normal     EENT/Dental:EENT/Dental Normal   Cardiovascular:  Cardiovascular Normal Exercise tolerance: good CAD     Functional Capacity good / => 4 METS    Pulmonary:  Pulmonary Normal    Renal/:  Renal/ Normal     Hepatic/GI:  Hepatic/GI Normal    Musculoskeletal:  Musculoskeletal Normal    Neurological:  Neurology Normal    Endocrine:  Endocrine Normal    Psych:   Psychiatric History          Physical Exam  General:  Well nourished    Airway/Jaw/Neck:  Airway Findings: Mouth Opening: Normal Tongue: Normal  General Airway Assessment: Adult  Mallampati: II  TM Distance: Normal, at least 6 cm  Jaw/Neck Findings:     Neck ROM: Normal ROM      Dental:  Dental Findings: In tact        Mental Status:  Mental Status Findings:  Cooperative, Alert and Oriented         Anesthesia Plan  Type of Anesthesia, risks & benefits discussed:  Anesthesia Type:  MAC  Patient's Preference: MAC  Intra-op Monitoring Plan:   Intra-op Monitoring Plan Comments:   Post Op Pain Control Plan:   Post Op Pain Control Plan Comments:   Induction:   IV  Beta Blocker:         Informed Consent: Patient understands risks and agrees with Anesthesia plan.  Questions answered. Anesthesia consent signed with patient.  ASA Score: 2     Day of Surgery Review of History & Physical:        Anesthesia Plan Notes: Stents placed 7/2016. At baseline no CP, SOB        Ready For Surgery From Anesthesia Perspective.

## 2017-08-23 ENCOUNTER — TELEPHONE (OUTPATIENT)
Dept: GASTROENTEROLOGY | Facility: CLINIC | Age: 62
End: 2017-08-23

## 2017-08-23 NOTE — TELEPHONE ENCOUNTER
----- Message from Liv Ayala sent at 8/23/2017 11:54 AM CDT -----  Contact: [phone]/self  Patient called in returning your call. Please advise.

## 2017-08-23 NOTE — TELEPHONE ENCOUNTER
----- Message from Maykel Carcamo Jr., MD sent at 8/23/2017  7:41 AM CDT -----  Polyps are adenoma.  Follow-up colonoscopy in 3 years.

## 2017-08-24 ENCOUNTER — PATIENT MESSAGE (OUTPATIENT)
Dept: GASTROENTEROLOGY | Facility: CLINIC | Age: 62
End: 2017-08-24

## 2017-08-24 ENCOUNTER — TELEPHONE (OUTPATIENT)
Dept: GASTROENTEROLOGY | Facility: CLINIC | Age: 62
End: 2017-08-24

## 2017-08-29 ENCOUNTER — TELEPHONE (OUTPATIENT)
Dept: GASTROENTEROLOGY | Facility: CLINIC | Age: 62
End: 2017-08-29

## 2017-08-29 NOTE — TELEPHONE ENCOUNTER
Spoke with patient in regards to Pathology Report results, recall was put in for 3 years follow up Colonoscopy.

## 2017-09-12 ENCOUNTER — TELEPHONE (OUTPATIENT)
Dept: SMOKING CESSATION | Facility: CLINIC | Age: 62
End: 2017-09-12

## 2017-09-13 ENCOUNTER — CLINICAL SUPPORT (OUTPATIENT)
Dept: SMOKING CESSATION | Facility: CLINIC | Age: 62
End: 2017-09-13
Payer: COMMERCIAL

## 2017-09-13 DIAGNOSIS — F17.200 NICOTINE DEPENDENCE: Primary | ICD-10-CM

## 2017-09-13 PROCEDURE — 99407 BEHAV CHNG SMOKING > 10 MIN: CPT | Mod: S$GLB,,,

## 2017-09-28 ENCOUNTER — LAB VISIT (OUTPATIENT)
Dept: LAB | Facility: HOSPITAL | Age: 62
End: 2017-09-28
Attending: FAMILY MEDICINE
Payer: COMMERCIAL

## 2017-09-28 DIAGNOSIS — E78.2 MIXED HYPERLIPIDEMIA: ICD-10-CM

## 2017-09-28 DIAGNOSIS — Z79.899 MEDICATION MANAGEMENT: ICD-10-CM

## 2017-09-28 DIAGNOSIS — I25.10 CORONARY ARTERY DISEASE INVOLVING NATIVE CORONARY ARTERY OF NATIVE HEART WITHOUT ANGINA PECTORIS: ICD-10-CM

## 2017-09-28 DIAGNOSIS — Z11.59 NEED FOR HEPATITIS C SCREENING TEST: ICD-10-CM

## 2017-09-28 DIAGNOSIS — F41.9 ANXIETY: ICD-10-CM

## 2017-09-28 DIAGNOSIS — Z95.5 HISTORY OF CORONARY ARTERY STENT PLACEMENT: ICD-10-CM

## 2017-09-28 LAB
25(OH)D3+25(OH)D2 SERPL-MCNC: 34 NG/ML
ALBUMIN SERPL BCP-MCNC: 3.6 G/DL
ALP SERPL-CCNC: 85 U/L
ALT SERPL W/O P-5'-P-CCNC: 23 U/L
ANION GAP SERPL CALC-SCNC: 8 MMOL/L
AST SERPL-CCNC: 33 U/L
BASOPHILS # BLD AUTO: 0.02 K/UL
BASOPHILS NFR BLD: 0.3 %
BILIRUB SERPL-MCNC: 0.5 MG/DL
BUN SERPL-MCNC: 18 MG/DL
CALCIUM SERPL-MCNC: 9.4 MG/DL
CHLORIDE SERPL-SCNC: 110 MMOL/L
CHOLEST SERPL-MCNC: 121 MG/DL
CHOLEST/HDLC SERPL: 3.4 {RATIO}
CO2 SERPL-SCNC: 26 MMOL/L
CREAT SERPL-MCNC: 0.9 MG/DL
DIFFERENTIAL METHOD: NORMAL
EOSINOPHIL # BLD AUTO: 0.3 K/UL
EOSINOPHIL NFR BLD: 4.3 %
ERYTHROCYTE [DISTWIDTH] IN BLOOD BY AUTOMATED COUNT: 13.5 %
EST. GFR  (AFRICAN AMERICAN): >60 ML/MIN/1.73 M^2
EST. GFR  (NON AFRICAN AMERICAN): >60 ML/MIN/1.73 M^2
ESTIMATED AVG GLUCOSE: 114 MG/DL
GLUCOSE SERPL-MCNC: 103 MG/DL
HBA1C MFR BLD HPLC: 5.6 %
HCT VFR BLD AUTO: 44.6 %
HCV AB SERPL QL IA: NEGATIVE
HDLC SERPL-MCNC: 36 MG/DL
HDLC SERPL: 29.8 %
HGB BLD-MCNC: 14.9 G/DL
LDLC SERPL CALC-MCNC: 62.8 MG/DL
LYMPHOCYTES # BLD AUTO: 2.7 K/UL
LYMPHOCYTES NFR BLD: 37.3 %
MCH RBC QN AUTO: 28.4 PG
MCHC RBC AUTO-ENTMCNC: 33.4 G/DL
MCV RBC AUTO: 85 FL
MONOCYTES # BLD AUTO: 0.8 K/UL
MONOCYTES NFR BLD: 10.9 %
NEUTROPHILS # BLD AUTO: 3.3 K/UL
NEUTROPHILS NFR BLD: 46.6 %
NONHDLC SERPL-MCNC: 85 MG/DL
PLATELET # BLD AUTO: 252 K/UL
PMV BLD AUTO: 9.3 FL
POTASSIUM SERPL-SCNC: 4.4 MMOL/L
PROT SERPL-MCNC: 6.6 G/DL
RBC # BLD AUTO: 5.24 M/UL
SODIUM SERPL-SCNC: 144 MMOL/L
TRIGL SERPL-MCNC: 111 MG/DL
TSH SERPL DL<=0.005 MIU/L-ACNC: 1.57 UIU/ML
WBC # BLD AUTO: 7.14 K/UL

## 2017-09-28 PROCEDURE — 80061 LIPID PANEL: CPT

## 2017-09-28 PROCEDURE — 85025 COMPLETE CBC W/AUTO DIFF WBC: CPT

## 2017-09-28 PROCEDURE — 36415 COLL VENOUS BLD VENIPUNCTURE: CPT

## 2017-09-28 PROCEDURE — 86803 HEPATITIS C AB TEST: CPT

## 2017-09-28 PROCEDURE — 83036 HEMOGLOBIN GLYCOSYLATED A1C: CPT

## 2017-09-28 PROCEDURE — 82306 VITAMIN D 25 HYDROXY: CPT

## 2017-09-28 PROCEDURE — 80053 COMPREHEN METABOLIC PANEL: CPT

## 2017-09-28 PROCEDURE — 84443 ASSAY THYROID STIM HORMONE: CPT

## 2017-10-11 ENCOUNTER — OFFICE VISIT (OUTPATIENT)
Dept: FAMILY MEDICINE | Facility: CLINIC | Age: 62
End: 2017-10-11
Payer: COMMERCIAL

## 2017-10-11 ENCOUNTER — TELEPHONE (OUTPATIENT)
Dept: CARDIOLOGY | Facility: CLINIC | Age: 62
End: 2017-10-11

## 2017-10-11 VITALS
BODY MASS INDEX: 30.14 KG/M2 | HEIGHT: 68 IN | OXYGEN SATURATION: 96 % | DIASTOLIC BLOOD PRESSURE: 78 MMHG | HEART RATE: 62 BPM | SYSTOLIC BLOOD PRESSURE: 124 MMHG | WEIGHT: 198.88 LBS

## 2017-10-11 DIAGNOSIS — Z00.00 ROUTINE GENERAL MEDICAL EXAMINATION AT A HEALTH CARE FACILITY: Primary | ICD-10-CM

## 2017-10-11 DIAGNOSIS — M25.562 PAIN AND SWELLING OF LEFT KNEE: ICD-10-CM

## 2017-10-11 DIAGNOSIS — Z79.02 VISIT FOR MONITORING PLAVIX THERAPY: ICD-10-CM

## 2017-10-11 DIAGNOSIS — Z86.010 HISTORY OF COLON POLYPS: ICD-10-CM

## 2017-10-11 DIAGNOSIS — I25.2 HISTORY OF HEART ATTACK: ICD-10-CM

## 2017-10-11 DIAGNOSIS — Z79.82 LONG-TERM USE OF ASPIRIN THERAPY: ICD-10-CM

## 2017-10-11 DIAGNOSIS — Z51.81 VISIT FOR MONITORING PLAVIX THERAPY: ICD-10-CM

## 2017-10-11 DIAGNOSIS — E66.3 OVERWEIGHT (BMI 25.0-29.9): ICD-10-CM

## 2017-10-11 DIAGNOSIS — Z79.899 MEDICATION MANAGEMENT: ICD-10-CM

## 2017-10-11 DIAGNOSIS — F32.A ANXIETY AND DEPRESSION: ICD-10-CM

## 2017-10-11 DIAGNOSIS — M25.462 PAIN AND SWELLING OF LEFT KNEE: ICD-10-CM

## 2017-10-11 DIAGNOSIS — F41.9 ANXIETY AND DEPRESSION: ICD-10-CM

## 2017-10-11 DIAGNOSIS — E78.2 MIXED HYPERLIPIDEMIA: ICD-10-CM

## 2017-10-11 DIAGNOSIS — I25.10 CORONARY ARTERY DISEASE INVOLVING NATIVE CORONARY ARTERY OF NATIVE HEART WITHOUT ANGINA PECTORIS: ICD-10-CM

## 2017-10-11 DIAGNOSIS — Z95.5 HISTORY OF CORONARY ARTERY STENT PLACEMENT: ICD-10-CM

## 2017-10-11 DIAGNOSIS — F17.201 TOBACCO DEPENDENCE IN REMISSION: ICD-10-CM

## 2017-10-11 PROBLEM — R07.9 CHEST PAIN: Status: RESOLVED | Noted: 2017-03-20 | Resolved: 2017-10-11

## 2017-10-11 PROCEDURE — 99396 PREV VISIT EST AGE 40-64: CPT | Mod: S$GLB,,, | Performed by: FAMILY MEDICINE

## 2017-10-11 PROCEDURE — 99999 PR PBB SHADOW E&M-EST. PATIENT-LVL III: CPT | Mod: PBBFAC,,, | Performed by: FAMILY MEDICINE

## 2017-10-11 RX ORDER — SERTRALINE HYDROCHLORIDE 100 MG/1
100 TABLET, FILM COATED ORAL DAILY
Qty: 180 TABLET | Refills: 5 | Status: SHIPPED | OUTPATIENT
Start: 2017-10-11 | End: 2018-04-24 | Stop reason: ALTCHOICE

## 2017-10-11 NOTE — TELEPHONE ENCOUNTER
----- Message from Tomas Salazar sent at 10/11/2017 11:05 AM CDT -----  Contact: patient [phone]  Patient needs clearance for dental surgery in November, 2017. He needs to get stop taking his rx for Plavix. Please call.

## 2017-10-11 NOTE — PATIENT INSTRUCTIONS
direct switch from Citalopram to Zoloft 200 mg daily.  Check records for date of tetanus (ie TDaP) booster.

## 2017-10-12 NOTE — TELEPHONE ENCOUNTER
He can stop aspirin and plavix 5 days before his procedure        He can proceed with his dental procedure with minimal cardiac risks        Thanks          ZN

## 2017-10-19 ENCOUNTER — HOSPITAL ENCOUNTER (OUTPATIENT)
Facility: HOSPITAL | Age: 62
Discharge: HOME | End: 2017-10-20
Attending: EMERGENCY MEDICINE | Admitting: INTERNAL MEDICINE
Payer: COMMERCIAL

## 2017-10-19 DIAGNOSIS — I25.10 CORONARY ARTERY DISEASE INVOLVING NATIVE CORONARY ARTERY OF NATIVE HEART WITHOUT ANGINA PECTORIS: Primary | ICD-10-CM

## 2017-10-19 DIAGNOSIS — R07.9 CHEST PAIN: ICD-10-CM

## 2017-10-19 DIAGNOSIS — F17.201 TOBACCO DEPENDENCE IN REMISSION: ICD-10-CM

## 2017-10-19 DIAGNOSIS — I25.10 CAD (CORONARY ARTERY DISEASE): ICD-10-CM

## 2017-10-19 DIAGNOSIS — E78.2 MIXED HYPERLIPIDEMIA: ICD-10-CM

## 2017-10-19 LAB
ALBUMIN SERPL BCP-MCNC: 3.8 G/DL
ALP SERPL-CCNC: 84 U/L
ALT SERPL W/O P-5'-P-CCNC: 28 U/L
ANION GAP SERPL CALC-SCNC: 7 MMOL/L
AST SERPL-CCNC: 41 U/L
BASOPHILS # BLD AUTO: 0.01 K/UL
BASOPHILS NFR BLD: 0.1 %
BILIRUB SERPL-MCNC: 0.7 MG/DL
BILIRUB UR QL STRIP: NEGATIVE
BUN SERPL-MCNC: 18 MG/DL
CALCIUM SERPL-MCNC: 9.2 MG/DL
CHLORIDE SERPL-SCNC: 108 MMOL/L
CLARITY UR: CLEAR
CO2 SERPL-SCNC: 25 MMOL/L
COLOR UR: YELLOW
CREAT SERPL-MCNC: 0.9 MG/DL
DIFFERENTIAL METHOD: NORMAL
EOSINOPHIL # BLD AUTO: 0.1 K/UL
EOSINOPHIL NFR BLD: 1.8 %
ERYTHROCYTE [DISTWIDTH] IN BLOOD BY AUTOMATED COUNT: 13.4 %
EST. GFR  (AFRICAN AMERICAN): >60 ML/MIN/1.73 M^2
EST. GFR  (NON AFRICAN AMERICAN): >60 ML/MIN/1.73 M^2
GLUCOSE SERPL-MCNC: 102 MG/DL
GLUCOSE UR QL STRIP: NEGATIVE
HCT VFR BLD AUTO: 42.2 %
HGB BLD-MCNC: 14.1 G/DL
HGB UR QL STRIP: NEGATIVE
INR PPP: 1
KETONES UR QL STRIP: NEGATIVE
LEUKOCYTE ESTERASE UR QL STRIP: NEGATIVE
LYMPHOCYTES # BLD AUTO: 2.6 K/UL
LYMPHOCYTES NFR BLD: 33.3 %
MAGNESIUM SERPL-MCNC: 2.1 MG/DL
MCH RBC QN AUTO: 28.3 PG
MCHC RBC AUTO-ENTMCNC: 33.4 G/DL
MCV RBC AUTO: 85 FL
MONOCYTES # BLD AUTO: 0.6 K/UL
MONOCYTES NFR BLD: 7.9 %
NEUTROPHILS # BLD AUTO: 4.4 K/UL
NEUTROPHILS NFR BLD: 56.5 %
NITRITE UR QL STRIP: NEGATIVE
PH UR STRIP: 6 [PH] (ref 5–8)
PLATELET # BLD AUTO: 239 K/UL
PMV BLD AUTO: 9.3 FL
POTASSIUM SERPL-SCNC: 3.8 MMOL/L
PROT SERPL-MCNC: 6.4 G/DL
PROT UR QL STRIP: NEGATIVE
PROTHROMBIN TIME: 10.8 SEC
RBC # BLD AUTO: 4.98 M/UL
SODIUM SERPL-SCNC: 140 MMOL/L
SP GR UR STRIP: >=1.03 (ref 1–1.03)
TROPONIN I SERPL DL<=0.01 NG/ML-MCNC: 0.01 NG/ML
TROPONIN I SERPL DL<=0.01 NG/ML-MCNC: 0.01 NG/ML
URN SPEC COLLECT METH UR: ABNORMAL
UROBILINOGEN UR STRIP-ACNC: NEGATIVE EU/DL
WBC # BLD AUTO: 7.86 K/UL

## 2017-10-19 PROCEDURE — 84484 ASSAY OF TROPONIN QUANT: CPT

## 2017-10-19 PROCEDURE — 85025 COMPLETE CBC W/AUTO DIFF WBC: CPT

## 2017-10-19 PROCEDURE — 93005 ELECTROCARDIOGRAM TRACING: CPT

## 2017-10-19 PROCEDURE — 36415 COLL VENOUS BLD VENIPUNCTURE: CPT

## 2017-10-19 PROCEDURE — 99220 PR INITIAL OBSERVATION CARE,LEVL III: CPT | Mod: ,,, | Performed by: INTERNAL MEDICINE

## 2017-10-19 PROCEDURE — 99284 EMERGENCY DEPT VISIT MOD MDM: CPT

## 2017-10-19 PROCEDURE — G0378 HOSPITAL OBSERVATION PER HR: HCPCS

## 2017-10-19 PROCEDURE — 85610 PROTHROMBIN TIME: CPT

## 2017-10-19 PROCEDURE — 80053 COMPREHEN METABOLIC PANEL: CPT

## 2017-10-19 PROCEDURE — 81003 URINALYSIS AUTO W/O SCOPE: CPT

## 2017-10-19 PROCEDURE — 25000003 PHARM REV CODE 250: Performed by: INTERNAL MEDICINE

## 2017-10-19 PROCEDURE — 83735 ASSAY OF MAGNESIUM: CPT

## 2017-10-19 PROCEDURE — 94761 N-INVAS EAR/PLS OXIMETRY MLT: CPT

## 2017-10-19 RX ORDER — NITROGLYCERIN 0.4 MG/1
0.4 TABLET SUBLINGUAL EVERY 5 MIN PRN
Status: DISCONTINUED | OUTPATIENT
Start: 2017-10-19 | End: 2017-10-20 | Stop reason: HOSPADM

## 2017-10-19 RX ORDER — DIPHENHYDRAMINE HCL 25 MG
50 CAPSULE ORAL ONCE
Status: DISCONTINUED | OUTPATIENT
Start: 2017-10-20 | End: 2017-10-20 | Stop reason: HOSPADM

## 2017-10-19 RX ORDER — CLOPIDOGREL BISULFATE 75 MG/1
75 TABLET ORAL DAILY
Status: DISCONTINUED | OUTPATIENT
Start: 2017-10-20 | End: 2017-10-20 | Stop reason: HOSPADM

## 2017-10-19 RX ORDER — ASPIRIN 81 MG/1
81 TABLET ORAL DAILY
Status: DISCONTINUED | OUTPATIENT
Start: 2017-10-20 | End: 2017-10-20 | Stop reason: HOSPADM

## 2017-10-19 RX ORDER — CLOPIDOGREL BISULFATE 75 MG/1
75 TABLET ORAL ONCE
Status: DISCONTINUED | OUTPATIENT
Start: 2017-10-19 | End: 2017-10-20 | Stop reason: HOSPADM

## 2017-10-19 RX ORDER — SERTRALINE HYDROCHLORIDE 100 MG/1
100 TABLET, FILM COATED ORAL DAILY
Status: DISCONTINUED | OUTPATIENT
Start: 2017-10-20 | End: 2017-10-20 | Stop reason: HOSPADM

## 2017-10-19 RX ORDER — AZELASTINE 1 MG/ML
2 SPRAY, METERED NASAL 2 TIMES DAILY
Status: DISCONTINUED | OUTPATIENT
Start: 2017-10-19 | End: 2017-10-20 | Stop reason: HOSPADM

## 2017-10-19 RX ORDER — ATORVASTATIN CALCIUM 40 MG/1
80 TABLET, FILM COATED ORAL ONCE
Status: COMPLETED | OUTPATIENT
Start: 2017-10-19 | End: 2017-10-19

## 2017-10-19 RX ORDER — MORPHINE SULFATE ORAL SOLUTION 10 MG/5ML
2.5 SOLUTION ORAL EVERY 4 HOURS PRN
Status: DISCONTINUED | OUTPATIENT
Start: 2017-10-19 | End: 2017-10-20 | Stop reason: HOSPADM

## 2017-10-19 RX ORDER — ZOLPIDEM TARTRATE 5 MG/1
5 TABLET ORAL NIGHTLY PRN
Status: DISCONTINUED | OUTPATIENT
Start: 2017-10-19 | End: 2017-10-20 | Stop reason: HOSPADM

## 2017-10-19 RX ORDER — SERTRALINE HYDROCHLORIDE 100 MG/1
100 TABLET, FILM COATED ORAL ONCE
Status: COMPLETED | OUTPATIENT
Start: 2017-10-19 | End: 2017-10-19

## 2017-10-19 RX ORDER — ASPIRIN 81 MG/1
81 TABLET ORAL ONCE
Status: DISCONTINUED | OUTPATIENT
Start: 2017-10-19 | End: 2017-10-20 | Stop reason: HOSPADM

## 2017-10-19 RX ORDER — IPRATROPIUM BROMIDE 42 UG/1
2 SPRAY, METERED NASAL DAILY
Status: DISCONTINUED | OUTPATIENT
Start: 2017-10-20 | End: 2017-10-20 | Stop reason: HOSPADM

## 2017-10-19 RX ORDER — SODIUM CHLORIDE 9 MG/ML
3 INJECTION, SOLUTION INTRAVENOUS CONTINUOUS
Status: ACTIVE | OUTPATIENT
Start: 2017-10-20 | End: 2017-10-20

## 2017-10-19 RX ORDER — ATORVASTATIN CALCIUM 40 MG/1
80 TABLET, FILM COATED ORAL DAILY
Status: DISCONTINUED | OUTPATIENT
Start: 2017-10-20 | End: 2017-10-20 | Stop reason: HOSPADM

## 2017-10-19 RX ADMIN — ATORVASTATIN CALCIUM 80 MG: 40 TABLET, FILM COATED ORAL at 08:10

## 2017-10-19 RX ADMIN — SERTRALINE HYDROCHLORIDE 100 MG: 100 TABLET, FILM COATED ORAL at 08:10

## 2017-10-19 NOTE — HPI
Masoud Osorio is a 62 y.o. male with CAD, HLD, anxiety, and gout who presented to the ED with complaints of chest pain. Pain began last night and is located mid sternally, worse with deep breath and relieved by nitro. Pain does not radiate and has no associated SOB, diaphoresis, NV, lightheadedness or palpitations. Chest pain recurred this morning and was relieved by nitro. Each episode lasting less than 10 minutes.  He describes the pain as similar to previous MI pain. He has been compliant with DAPT and smoking cessation.  He has a history of  NSTEMI that required PCI of RCA, PDA, and POBA of PLB with a preserved EF in 7/2016.  Stress test in 3/2017 for atypical chest pain revealed no reversible ischemia.  Initial troponin negative. No acute ischemic changes on ECG.

## 2017-10-19 NOTE — HOSPITAL COURSE
Patient with history of CAD presented to the ED with complaints of chest pain similar to previous MI. DAPT continued. Initial troponin 0.006, ECG without acute ischemic changes. Given his history and symptoms he will be admitted to telemetry and NPO after MN for [facility]. Trend troponin, ECG, and Echo.   Date of Procedure:  10/20/2017    A. Indication/Pre-Operative Diagnosis     The patient is a 62 year old male that was referred for catheterization by Aaareferral Self for angina (CCS III) and chest discomfort.     B. Summary/Post-Operative Diagnosis       Patent RCA and PDA stents.    60% distal RCA lesion.    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    Normal EF with LVEDP 15 mmHg.    C. HPI     I have reviewed the history and physical completed on 10/20/2017. The patient has been examined and the following changes have been noted:        Patient ID:  Masoud Osorio is a 62 y.o. male who presents for follow up of Coronary Artery Disease; Hyperlipidemia; and s/p RCA with PDA PCI        HPI:      Masoud Osorio 62 y.o. male is here follow up and feeling well without any new complaints. He was admitted for NSTEMI that required PCI of RCA, PDA, and POBA of PLB with a preserved EF in 7/2016. He quit smoking. He is on aspirin and brilinta for DAPT   witjh a DAPT score of 2. Stress test in 3/2017 for atypical chest pain revealed no reversible ischemia. He has been pain free since except for occasional reflux after evening meals.     Patient history was obtained from the patient.     Counseling: The patient was counseled regarding the potential risks and benefits of this procedure, as well as possible alternative approaches to the problem and gave informed consent.    The ASA Score was Class II.     [facility] Risk Score for MACE is 1.20%. [facility] Risk Score for Death is 0.10%.     The patient had a previous angiogram at an outside facility. The films were available for review.     Height: 68 in.      Weight: 196 lbs.       BMI: 29.80 kg/m2    OUTPATIENT MEDICATIONS: Medications were reviewed.  ALLERGIES: Allergies were reviewed.    Laboratory data revealed:     10/20/2017 BUN  16, CREAT  0.8, GLU  91, HCT  43.6, HGB  14.4, INR  1.0, K  3.6, NA  139, PLT  247, PTI  10.7, WBCIR  9.14, APTT  26.4            ACS Enzymes:     10/19/2017 TROP  0.008   10/19/2017 TROP  0.006   10/20/2017 TROP  0.013              D. Hemodynamic Results     LVEDP (Pre): 15 mmHg  LVEDP (Post): 15 mmHg  Ejection Fraction: 55%  Global LV Function: normal    AIR REST:  LV: 112  LVEDP: 15       AIR REST (10/20/2017 13:22:33):  AO: 112/72 (91)    E. Angiographic Results     Diagnostic:          Patient has a right dominant coronary artery.        - Left Main Coronary Artery:             The LM has luminal irregularities. There is MILAGROS 3 flow.     - Left Anterior Descending Artery:             The LAD has luminal irregularities. There is MILAGROS 3 flow.     - Left Circumflex Artery:             The LCX has luminal irregularities. 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 There is MILAGROS 3 flow.     - Right Coronary Artery:             The RCA has luminal irregularities. There is MILAGROS 3 flow. Patent stents             The distal RCA has a 60% stenosis. There is MILAGROS 3 flow.     - Posterior Lateral Branch:             The PLB has luminal irregularities. There is MILAGROS 3 flow. The remaining portion of the vessel is of small caliber.     - Posterior Descending Artery:             The PDA has luminal irregularities. There is MILAGROS 3 flow. Patent stent     - Radial:             The radial is normal.      Intervention          Radial:              The following items were used: Us Probe Cover.    F. Details of Procedure     PROCEDURES PERFORMED: Coronary Angio, LHC and Left Ventriculogram    ANESTHESIA: Conscious sedation was achieved with 50 mcg of FENTANYL 100MCG/2ML and 2 mg of Versed. Local anesthesia was achieved with 20 ml of Lidocaine 1%. Moderate  conscious sedation was performed and cardiorespiratory functions were monitored the   entire procedure by Pratibha García RN. Sedation began at 01:38 PM and concluded at 02:00 PM, totalling 22.0 minutes.     PRIMARY SURGEON: Keon Bailey MD    COMPLICATIONS: There were no complications.    Medications given on sterile field: Lidocaine 1% (20 ml).    Medications given during procedure: Nitroglycerine (tridil) 5mg/ml (1250 mcg), Verapamil Inj 2ml / 5 Mg (1.25 mg), Heparin 1000u/500cc Bag (2 bags), Heparin 1000u/ml Inj (5750 units), Diphenhydramine 1ml Inj / 50 Mg (50 mg), Versed (2 mg) and Fentanyl   100mcg/2ml (50 mcg).    The patient was brought to the catheterization laboratory after premedication with oral Benadryl 50 mg. Bilateral groin prepped and draped. Right radial prepped and draped. The Kit, Manifold was flushed and connected to contrast. A Us Probe Cover was   applied to the right radial. After local anesthesia, a Sheath 5/6 Fr Glide Slender was inserted into the right Radial artery.     AORTA    A Wire Bentson 035 X 260 was inserted into the Aorta.     LM    A Cath 5fr Jorgito was inserted into the ostial LM. The Wire Bentson 035 X 260 was removed. Angiography performed in multiple views in the left coronary arteries.     RCA    The Cath 5fr Jorgito was repositioned into the ostial RCA. Angiography performed in multiple views in the right coronary arteries. 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 The Cath 5fr Jorgito was removed. A Cath 6fr Ar 1.0 was inserted   into the ostial RCA. Angiography performed in multiple views in the right coronary arteries. The Cath 6fr Ar 1.0 was removed.     Left  VENTRICLE    A Cath 5fr Pigtail 125cm was inserted into the left ventricle. Hemodynamics recorded in the left ventricle. Angiography performed in the left ventricle.     The Cath 5fr Pigtail 125cm was removed. The Sheath 5/6 Fr Glide Slender was removed. A Closure Vascband Radial R was applied to  the right radial. A Closure Vascband Radial R was deployed into the right radial. Total Visipaque injected was 190.0 ml. Total   Visipaque used was 300.0 ml.       Fluoroscopy Time 11 minutes   Radiation Dose 693.7 mGy   Contrast Injected 190 ml Visipaque   Contrast Used  300 ml Visipaque            Procedure log documented by Pratibha García RN and verified by Keon Bailey MD    ESTIMATED BLOOD LOSS is < 50 cc.    SPECIMEN: No specimen.     G. Recommendations     1. Routine post-cath care.  2. Maximize medical management.  3. Risk factor reductions.  4. ASA 81mg.  5. Evaluate and treat non cardiac chest pain    I certify that I was present for catheter insertion, catheter manipulation, angiography, angiographic interpretation, and all interventional procedures performed on this patient.     This document has been electronically    SIGNED BY: Keon Bailey MD On: 10/24/2017 10:32  Patient recovered without incident and found to be tolerating ADLs at baseline- appropriate for discharge.

## 2017-10-19 NOTE — NURSING
Pt on 4 floor, on tele box [id number].  Informed. Safety measures maintained. Bed is in the lowest position and locked. Call bell is within reach. Instructed pt to call for assistance. Pt verbalized understanding. Will continue to monitor.

## 2017-10-19 NOTE — H&P
[facility]  Cardiology  History and Physical     Patient Name: Masoud Osorio  MRN: [id number]  Admission Date: 10/19/2017  Code Status: Full Code   Attending Provider: Keon Bailey MD   Primary Care Physician: Rosemarie Ayala MD  Principal Problem:Chest pain    Patient information was obtained from patient, past medical records and ER records.     Subjective:     Chief Complaint:  Chest pain      HPI:  Masoud Osorio  is a 62 y.o. male  with CAD, HLD, anxiety, and gout who presented to the ED with complaints of chest pain. Pain began last night and is located mid sternally, worse with deep breath and relieved by nitro. Pain does not radiate and has no associated SOB, diaphoresis, NV, lightheadedness or palpitations. Chest pain recurred this morning and was relieved by nitro. Each episode lasting less than 10 minutes.  He describes the pain as similar to previous MI pain. He has been compliant with DAPT and smoking cessation.  He has a history of  NSTEMI that required PCI of RCA, PDA, and POBA of PLB with a preserved EF in 7/2016.  Stress test in 3/2017 for atypical chest pain revealed no reversible ischemia.  Initial troponin negative. No acute ischemic changes on ECG.     Past Medical History:   Diagnosis Date    Anxiety and depression 9/21/2016    trial of ctialopram and follow up in 8 weeks    Coronary artery disease involving native coronary artery of native heart without angina pectoris 7/26/2016 7/26/2016 [facility]   PCI of RCA with 4.0 x 15, 4.0 x 38, and 3.5 x 38 mm Resolute JACOB   PCI of PDA 2.75 x 15 mm JACOB dilated to 3.0 POBA of proximal PLB 2.0 x 12 mm balloon   Normal EF with LVEDP 15 mmHg DAPT score of 2               Gout     History of coronary artery stent placement 9/21/2016    History of heart attack 7/25/2016    NSTEMI    Mixed hyperlipidemia 7/17/2017    Overweight (BMI 25.0-29.9) 9/21/2016    Tobacco dependence in remission 7/25/2016       Past  Surgical History:   Procedure Laterality Date    COLONOSCOPY N/A 8/16/2017    Procedure: COLONOSCOPY Split Prep;  Surgeon: Maykel Carcamo Jr., MD;  Location[facility];  Service: Endoscopy;  Laterality: N/A;    CORONARY ANGIOPLASTY WITH STENT PLACEMENT      ROTATOR CUFF REPAIR         Review of patient's allergies indicates:   Allergen Reactions    Penicillins Hives       No current facility-administered medications on file prior to encounter.      Current Outpatient Prescriptions on File Prior to Encounter   Medication Sig    alprazolam (XANAX) 1 MG tablet Take 1 tablet (1 mg total) by mouth daily as needed for Anxiety.    aspirin (ECOTRIN) 81 MG EC tablet Take 1 tablet (81 mg total) by mouth once daily.    atorvastatin (LIPITOR) 80 MG tablet Take 1 tablet (80 mg total) by mouth once daily.    azelastine (ASTELIN) 137 mcg (0.1 %) nasal spray INSTILL 2 SPRAYS IN EACH NOSTRIL TWICE A DAY    clopidogrel (PLAVIX) 75 mg tablet Take 1 tablet (75 mg total) by mouth once daily.    ipratropium (ATROVENT) 0.06 % nasal spray INSTILL 2 SPRAYS IN EACH NOSTRIL EVERY 12 HOURS    multivitamin capsule Take 1 capsule by mouth once daily.    NASAL ALLERGY 55 mcg nasal inhaler INSTILL 2 SPRAYS IN [facility] NOSTRIL EVERY DAY    nitroGLYCERIN (NITROSTAT) 0.4 MG SL tablet Place 1 tablet (0.4 mg total) under the tongue every 5 (five) minutes as needed for Chest pain.    sertraline (ZOLOFT) 100 MG tablet Take 1 tablet (100 mg total) by mouth once daily.     Family History     Problem Relation (Age of Onset)    Arthritis Mother    Congenital heart disease Father    Heart disease Mother    No Known Problems Sister, Brother, Daughter, Sister, Sister    Parkinsonism Mother        Social History Main Topics    Smoking status: Former Smoker     Packs/day: 0.50     Types: Cigarettes     Quit date: 7/25/2016    Smokeless tobacco: Never Used    Alcohol use Yes      Comment: rarely    Drug use: No    Sexual activity: Not on file      Review of Systems   Constitution: Negative for diaphoresis, weakness and malaise/fatigue.   HENT: Negative.    Eyes: Negative.    Cardiovascular: Positive for chest pain. Negative for dyspnea on exertion, leg swelling, near-syncope, orthopnea, palpitations, paroxysmal nocturnal dyspnea and syncope.   Respiratory: Negative for cough, shortness of breath and sputum production.    Endocrine: Negative.    Hematologic/Lymphatic: Negative.    Skin: Negative.    Musculoskeletal: Negative.    Gastrointestinal: Negative.  Negative for heartburn, nausea and vomiting.   Genitourinary: Negative.    Neurological: Negative.  Negative for dizziness and light-headedness.   Psychiatric/Behavioral: Negative.    Allergic/Immunologic: Negative.      Objective:     Vital Signs (Most Recent):  Temp: 98 °F (36.7 °C) (10/19/17 1200)  Pulse: 65 (10/19/17 1200)  Resp: 12 (10/19/17 1200)  BP: 127/81 (10/19/17 1200)  SpO2: 97 % (10/19/17 1543) Vital Signs (24h Range):  Temp:  [98 °F (36.7 °C)] 98 °F (36.7 °C)  Pulse:  [56-71] 65  Resp:  [12-19] 12  SpO2:  [93 %-99 %] 97 %  BP: (118-133)/(72-81) 127/81     Weight: 89 kg (196 lb 3.4 oz)  Body mass index is 29.83 kg/m².    SpO2: 97 %  O2 Device (Oxygen Therapy): room air    No intake or output data in the 24 hours ending 10/19/17 1633    Lines/Drains/Airways          No matching active lines, drains, or airways          Physical Exam   Constitutional: He is oriented to person, place, and time. He appears well-developed and well-nourished. No distress.   HENT:   Head: Normocephalic and atraumatic.   Eyes: Right eye exhibits no discharge. Left eye exhibits no discharge.   Neck: No JVD present.   Cardiovascular: Regular rhythm.  Bradycardia present.  Exam reveals no gallop.    No murmur heard.  Pulmonary/Chest: Effort normal and breath sounds normal. No respiratory distress. He has no wheezes. He has no rales. He exhibits no tenderness.   Abdominal: Soft. Bowel sounds are normal.    Musculoskeletal: He exhibits no edema.   Neurological: He is alert and oriented to person, place, and time.   Skin: Skin is warm and dry. He is not diaphoretic.   Psychiatric: He has a normal mood and affect. His behavior is normal. Judgment and thought content normal.       Significant Labs:   BMP:   Recent Labs  Lab 10/19/17  1004         K 3.8      CO2 25   BUN 18   CREATININE 0.9   CALCIUM 9.2   MG 2.1   , CMP   Recent Labs  Lab 10/19/17  1004      K 3.8      CO2 25      BUN 18   CREATININE 0.9   CALCIUM 9.2   PROT 6.4   ALBUMIN 3.8   BILITOT 0.7   ALKPHOS 84   AST 41*   ALT 28   ANIONGAP 7*   ESTGFRAFRICA >60   EGFRNONAA >60   , CBC   Recent Labs  Lab 10/19/17  1004   WBC 7.86   HGB 14.1   HCT 42.2      , INR   Recent Labs  Lab 10/19/17  1004   INR 1.0   , Lipid Panel No results for input(s): CHOL, HDL, LDLCALC, TRIG, CHOLHDL in the last 48 hours., Troponin   Recent Labs  Lab 10/19/17  1004 10/19/17  1355   TROPONINI 0.008 0.006    and All pertinent lab results from the last 24 hours have been reviewed.    Significant Imaging: Echocardiogram:   2D echo with color flow doppler:   Results for orders placed or performed during the hospital encounter of 07/25/16   2D echo with color flow doppler   Result Value Ref Range    EF 60 55 - 65    Diastolic Dysfunction No     Mitral Valve Mobility NORMAL      Assessment and Plan:     * Chest pain    Per CAD         Mixed hyperlipidemia    FLP in AM; Continue high intensity statin        Coronary artery disease involving native coronary artery of native heart without angina pectoris    NSTEMI that required PCI of RCA, PDA, and POBA of PLB with a preserved EF in 7/2016.  Stress test in 3/2017 for atypical chest pain revealed no reversible ischemia  Presented to the ED with chest pain relieved by NTG with recurrence this AM again relieved by NTG  Symptoms concerning for ischemia  Initial troponin 0.006, 0.008  ECG SR with q waves  to inferior leads; no acute ischemic changes   DAPT continued; no BB 2/2 bradycardia- will monitor and initiate if HR and BP tolerate  No ACEI 2/2 marginal BP- will monitor and initiate if BP will tolerate  LVEF normal per 2016 echo; repeat pending  NPO p MN for LHC in AM  Statin continued; FLP in AM            VTE Risk Mitigation         Ordered     Low Risk of VTE  Once      10/19/17 1236          Benjamin Monsalve NP  Cardiology   [facility]

## 2017-10-19 NOTE — ASSESSMENT & PLAN NOTE
NSTEMI that required PCI of RCA, PDA, and POBA of PLB with a preserved EF in 7/2016.  Stress test in 3/2017 for atypical chest pain revealed no reversible ischemia  Presented to the ED with chest pain relieved by NTG with recurrence this AM again relieved by NTG  Symptoms concerning for ischemia  Initial troponin 0.006, 0.008  ECG SR with q waves to inferior leads; no acute ischemic changes   DAPT continued; no BB 2/2 bradycardia- will monitor and initiate if HR and BP tolerate  No ACEI 2/2 marginal BP- will monitor and initiate if BP will tolerate  LVEF normal per 2016 echo; repeat pending  NPO p MN for LHC in AM  Statin continued; FLP in AM

## 2017-10-19 NOTE — SUBJECTIVE & OBJECTIVE
Past Medical History:   Diagnosis Date    Anxiety and depression 9/21/2016    trial of ctialopram and follow up in 8 weeks    Coronary artery disease involving native coronary artery of native heart without angina pectoris 7/26/2016 7/26/2016 NSTEMI at [facility]   PCI of RCA with 4.0 x 15, 4.0 x 38, and 3.5 x 38 mm Resolute JACOB   PCI of PDA 2.75 x 15 mm JACOB dilated to 3.0 POBA of proximal PLB 2.0 x 12 mm balloon   Normal EF with LVEDP 15 mmHg DAPT score of 2               Gout     History of coronary artery stent placement 9/21/2016    History of heart attack 7/25/2016    NSTEMI    Mixed hyperlipidemia 7/17/2017    Overweight (BMI 25.0-29.9) 9/21/2016    Tobacco dependence in remission 7/25/2016       Past Surgical History:   Procedure Laterality Date    COLONOSCOPY N/A 8/16/2017    Procedure: COLONOSCOPY Split Prep;  Surgeon: Maykel Carcamo Jr., MD;  Location[facility];  Service: Endoscopy;  Laterality: N/A;    CORONARY ANGIOPLASTY WITH STENT PLACEMENT      ROTATOR CUFF REPAIR         Review of patient's allergies indicates:   Allergen Reactions    Penicillins Hives       No current facility-administered medications on file prior to encounter.      Current Outpatient Prescriptions on File Prior to Encounter   Medication Sig    alprazolam (XANAX) 1 MG tablet Take 1 tablet (1 mg total) by mouth daily as needed for Anxiety.    aspirin (ECOTRIN) 81 MG EC tablet Take 1 tablet (81 mg total) by mouth once daily.    atorvastatin (LIPITOR) 80 MG tablet Take 1 tablet (80 mg total) by mouth once daily.    azelastine (ASTELIN) 137 mcg (0.1 %) nasal spray INSTILL 2 SPRAYS IN EACH NOSTRIL TWICE A DAY    clopidogrel (PLAVIX) 75 mg tablet Take 1 tablet (75 mg total) by mouth once daily.    ipratropium (ATROVENT) 0.06 % nasal spray INSTILL 2 SPRAYS IN EACH NOSTRIL EVERY 12 HOURS    multivitamin capsule Take 1 capsule by mouth once daily.    NASAL ALLERGY 55 mcg nasal inhaler INSTILL 2 SPRAYS IN EA  NOSTRIL EVERY DAY    nitroGLYCERIN (NITROSTAT) 0.4 MG SL tablet Place 1 tablet (0.4 mg total) under the tongue every 5 (five) minutes as needed for Chest pain.    sertraline (ZOLOFT) 100 MG tablet Take 1 tablet (100 mg total) by mouth once daily.     Family History     Problem Relation (Age of Onset)    Arthritis Mother    Congenital heart disease Father    Heart disease Mother    No Known Problems Sister, Brother, Daughter, Sister, Sister    Parkinsonism Mother        Social History Main Topics    Smoking status: Former Smoker     Packs/day: 0.50     Types: Cigarettes     Quit date: 7/25/2016    Smokeless tobacco: Never Used    Alcohol use Yes      Comment: rarely    Drug use: No    Sexual activity: Not on file     Review of Systems   Constitution: Negative for diaphoresis, weakness and malaise/fatigue.   HENT: Negative.    Eyes: Negative.    Cardiovascular: Positive for chest pain. Negative for dyspnea on exertion, leg swelling, near-syncope, orthopnea, palpitations, paroxysmal nocturnal dyspnea and syncope.   Respiratory: Negative for cough, shortness of breath and sputum production.    Endocrine: Negative.    Hematologic/Lymphatic: Negative.    Skin: Negative.    Musculoskeletal: Negative.    Gastrointestinal: Negative.  Negative for heartburn, nausea and vomiting.   Genitourinary: Negative.    Neurological: Negative.  Negative for dizziness and light-headedness.   Psychiatric/Behavioral: Negative.    Allergic/Immunologic: Negative.      Objective:     Vital Signs (Most Recent):  Temp: 98 °F (36.7 °C) (10/19/17 1200)  Pulse: 65 (10/19/17 1200)  Resp: 12 (10/19/17 1200)  BP: 127/81 (10/19/17 1200)  SpO2: 97 % (10/19/17 1543) Vital Signs (24h Range):  Temp:  [98 °F (36.7 °C)] 98 °F (36.7 °C)  Pulse:  [56-71] 65  Resp:  [12-19] 12  SpO2:  [93 %-99 %] 97 %  BP: (118-133)/(72-81) 127/81     Weight: 89 kg (196 lb 3.4 oz)  Body mass index is 29.83 kg/m².    SpO2: 97 %  O2 Device (Oxygen Therapy): room air    No  intake or output data in the 24 hours ending 10/19/17 1633    Lines/Drains/Airways          No matching active lines, drains, or airways          Physical Exam   Constitutional: He is oriented to person, place, and time. He appears well-developed and well-nourished. No distress.   HENT:   Head: Normocephalic and atraumatic.   Eyes: Right eye exhibits no discharge. Left eye exhibits no discharge.   Neck: No JVD present.   Cardiovascular: Regular rhythm.  Bradycardia present.  Exam reveals no gallop.    No murmur heard.  Pulmonary/Chest: Effort normal and breath sounds normal. No respiratory distress. He has no wheezes. He has no rales. He exhibits no tenderness.   Abdominal: Soft. Bowel sounds are normal.   Musculoskeletal: He exhibits no edema.   Neurological: He is alert and oriented to person, place, and time.   Skin: Skin is warm and dry. He is not diaphoretic.   Psychiatric: He has a normal mood and affect. His behavior is normal. Judgment and thought content normal.       Significant Labs:   BMP:   Recent Labs  Lab 10/19/17  1004         K 3.8      CO2 25   BUN 18   CREATININE 0.9   CALCIUM 9.2   MG 2.1   , CMP   Recent Labs  Lab 10/19/17  1004      K 3.8      CO2 25      BUN 18   CREATININE 0.9   CALCIUM 9.2   PROT 6.4   ALBUMIN 3.8   BILITOT 0.7   ALKPHOS 84   AST 41*   ALT 28   ANIONGAP 7*   ESTGFRAFRICA >60   EGFRNONAA >60   , CBC   Recent Labs  Lab 10/19/17  1004   WBC 7.86   HGB 14.1   HCT 42.2      , INR   Recent Labs  Lab 10/19/17  1004   INR 1.0   , Lipid Panel No results for input(s): CHOL, HDL, LDLCALC, TRIG, CHOLHDL in the last 48 hours., Troponin   Recent Labs  Lab 10/19/17  1004 10/19/17  1355   TROPONINI 0.008 0.006    and All pertinent lab results from the last 24 hours have been reviewed.    Significant Imaging: Echocardiogram:   2D echo with color flow doppler:   Results for orders placed or performed during the hospital encounter of 07/25/16   2D  echo with color flow doppler   Result Value Ref Range    EF 60 55 - 65    Diastolic Dysfunction No     Mitral Valve Mobility NORMAL

## 2017-10-19 NOTE — ED PROVIDER NOTES
Encounter Date: 10/19/2017       History     Chief Complaint   Patient presents with    Chest Pain     chest pain since last  night, went to work pain  started again , took 325 asa, 1 nitro pta, + relief. Pain similar to last heart attack.     CHIEF COMPLAINT: Patient presents with: chest pain     HISTORY OF PRESENT ILLNESS: Masoud Osorio who is a 62 y.o. presents to the emergency department today with complaint of chest pain.  The patient's chest pain started last night.  Went to work when away, came back this morning.  He reports the pain feels similar to his last heart attack.  The pain is in the midsternal region.  It's pressure-like.  The pain did not radiate.  He did not have any shortness of breath.  No palpitations.  No nausea no diaphoresis.  Each episode lasts about 10 minutes.  He has needed stents in the past.  No cough no sputum production.  No hemoptysis.  No unilateral leg swelling.  No recent long travel.    REVIEW OF SYSTEMS:  Constitutional: No fever, no chills.  Eyes: No discharge. No pain.  HENT: No nasal drainage. No ear ache. No sore throat.   Cardiovascular: See above.  Respiratory: No cough, no shortness of breath.  Gastrointestinal: No abdominal pain, no vomiting. No diarrhea  Genitourinary: No hematuria, dysuria, urgency.  Musculoskeletal: No back pain.  Skin: No rashes, no lesions.  Neurological: No headache, no focal weakness.    Otherwise remaining ROS negative     ALLERGIES REVIEWED  MEDICATIONS REVIEWED  PMH/PSH/SOC/FH REVIEWED     The history is provided by the patient.    Nursing/Ancillary staff note reviewed.                  Review of patient's allergies indicates:   Allergen Reactions    Penicillins Hives     Past Medical History:   Diagnosis Date    Anxiety and depression 9/21/2016    trial of ctialopram and follow up in 8 weeks    Coronary artery disease involving native coronary artery of native heart without angina pectoris 7/26/2016 [facility]    PCI of RCA with 4.0 x 15, 4.0 x 38, and 3.5 x 38 mm Resolute JACOB   PCI of PDA 2.75 x 15 mm JACBO dilated to 3.0 POBA of proximal PLB 2.0 x 12 mm balloon   Normal EF with LVEDP 15 mmHg DAPT score of 2               Gout     History of coronary artery stent placement 9/21/2016    History of heart attack 7/25/2016    NSTEMI    Mixed hyperlipidemia 7/17/2017    Overweight (BMI 25.0-29.9) 9/21/2016    Tobacco dependence in remission 7/25/2016     Past Surgical History:   Procedure Laterality Date    COLONOSCOPY N/A 8/16/2017    Procedure: COLONOSCOPY Split Prep;  Surgeon: Maykel Carcamo Jr., MD;  Location: [facility] ENDO;  Service: Endoscopy;  Laterality: N/A;    CORONARY ANGIOPLASTY WITH STENT PLACEMENT      ROTATOR CUFF REPAIR       Family History   Problem Relation Age of Onset    Heart disease Mother     Arthritis Mother     Parkinsonism Mother     Congenital heart disease Father     No Known Problems Sister     No Known Problems Brother     No Known Problems Daughter     No Known Problems Sister     No Known Problems Sister      Social History   Substance Use Topics    Smoking status: Former Smoker     Packs/day: 0.50     Types: Cigarettes     Quit date: 7/25/2016    Smokeless tobacco: Never Used    Alcohol use Yes      Comment: rarely     Review of Systems   All other systems reviewed and are negative.      Physical Exam     Initial Vitals   BP Pulse Resp Temp SpO2   -- -- -- -- --      MAP       --         Physical Exam    Nursing note and vitals reviewed.  Constitutional: He appears well-developed and well-nourished. He is not diaphoretic. No distress.   HENT:   Head: Normocephalic and atraumatic.   Nose: Nose normal.   Mouth/Throat: Oropharynx is clear and moist.   Eyes: Conjunctivae and EOM are normal. Pupils are equal, round, and reactive to light. No scleral icterus.   Neck: Normal range of motion. Neck supple. No JVD present.   Cardiovascular: Normal rate, regular rhythm and normal heart  sounds. Exam reveals no gallop and no friction rub.    No murmur heard.  Pulmonary/Chest: Breath sounds normal. No stridor. No respiratory distress. He has no wheezes. He exhibits no tenderness.   Abdominal: Soft. Bowel sounds are normal. He exhibits no distension and no mass. There is no tenderness. There is no rebound and no guarding.   Musculoskeletal: Normal range of motion. He exhibits no edema or tenderness.   Lymphadenopathy:     He has no cervical adenopathy.   Neurological: He is alert and oriented to person, place, and time. He has normal strength. No cranial nerve deficit.   Skin: Skin is warm and dry. No rash noted. No pallor.   Psychiatric: He has a normal mood and affect. Thought content normal.         ED Course   Procedures  Labs Reviewed   CBC W/ AUTO DIFFERENTIAL   COMPREHENSIVE METABOLIC PANEL   MAGNESIUM   PROTIME-INR   TROPONIN I   URINALYSIS     EKG Readings: (Independently Interpreted)   Vent. Rate : 063 BPM     Atrial Rate : 063 BPM     P-R Int : 206 ms          QRS Dur : 096 ms      QT Int : 438 ms       P-R-T Axes : 024 001 026 degrees     QTc Int : 448 ms    Normal sinus rhythm  Tiny q waves noted in inferior leads may represent an inferior scar  Borderline ECG  When compared with ECG of 27-MAR-2017 12:52,  No significant change was found            Medical Decision Making:   Differential Diagnosis:   ACS, aortic dissection, myocarditis, pericarditis, pulmonary embolism, pneumothorax, Boerhaave syndrome, biliary colic, GERD, peptic ulcer disease, pneumonia, pleurisy, musculoskeletal pain, costochondritis, thoracic radiculopathy, anxiety, trauma, fracture                   ED Course    Pt has remained CP free here in the ED.     I spoke with Cardiology re:the pts presentation and given his history they accept for admission.   Clinical Impression:   The encounter diagnosis was Chest pain.                           Darya Gabriel MD  11/03/17 0421

## 2017-10-20 VITALS
HEART RATE: 53 BPM | RESPIRATION RATE: 17 BRPM | HEIGHT: 68 IN | TEMPERATURE: 98 F | OXYGEN SATURATION: 95 % | DIASTOLIC BLOOD PRESSURE: 86 MMHG | BODY MASS INDEX: 29.73 KG/M2 | WEIGHT: 196.19 LBS | SYSTOLIC BLOOD PRESSURE: 157 MMHG

## 2017-10-20 LAB
ANION GAP SERPL CALC-SCNC: 4 MMOL/L
APTT BLDCRRT: 26.4 SEC
BASOPHILS # BLD AUTO: 0.02 K/UL
BASOPHILS NFR BLD: 0.2 %
BUN SERPL-MCNC: 16 MG/DL
CALCIUM SERPL-MCNC: 9 MG/DL
CHLORIDE SERPL-SCNC: 106 MMOL/L
CHOLEST SERPL-MCNC: 116 MG/DL
CHOLEST/HDLC SERPL: 3.1 {RATIO}
CO2 SERPL-SCNC: 29 MMOL/L
CREAT SERPL-MCNC: 0.8 MG/DL
DIFFERENTIAL METHOD: NORMAL
EOSINOPHIL # BLD AUTO: 0.2 K/UL
EOSINOPHIL NFR BLD: 2.5 %
ERYTHROCYTE [DISTWIDTH] IN BLOOD BY AUTOMATED COUNT: 13.4 %
EST. GFR  (AFRICAN AMERICAN): >60 ML/MIN/1.73 M^2
EST. GFR  (NON AFRICAN AMERICAN): >60 ML/MIN/1.73 M^2
GLUCOSE SERPL-MCNC: 91 MG/DL
HCT VFR BLD AUTO: 43.6 %
HDLC SERPL-MCNC: 38 MG/DL
HDLC SERPL: 32.8 %
HGB BLD-MCNC: 14.4 G/DL
INR PPP: 1
LDLC SERPL CALC-MCNC: 60.6 MG/DL
LYMPHOCYTES # BLD AUTO: 2.7 K/UL
LYMPHOCYTES NFR BLD: 29.5 %
MCH RBC QN AUTO: 28.2 PG
MCHC RBC AUTO-ENTMCNC: 33 G/DL
MCV RBC AUTO: 86 FL
MONOCYTES # BLD AUTO: 0.9 K/UL
MONOCYTES NFR BLD: 9.6 %
NEUTROPHILS # BLD AUTO: 5.3 K/UL
NEUTROPHILS NFR BLD: 58 %
NONHDLC SERPL-MCNC: 78 MG/DL
PLATELET # BLD AUTO: 247 K/UL
PMV BLD AUTO: 9.8 FL
POTASSIUM SERPL-SCNC: 3.6 MMOL/L
PROTHROMBIN TIME: 10.7 SEC
RBC # BLD AUTO: 5.1 M/UL
SODIUM SERPL-SCNC: 139 MMOL/L
TRIGL SERPL-MCNC: 87 MG/DL
TROPONIN I SERPL DL<=0.01 NG/ML-MCNC: 0.01 NG/ML
TSH SERPL DL<=0.005 MIU/L-ACNC: 0.89 UIU/ML
WBC # BLD AUTO: 9.14 K/UL

## 2017-10-20 PROCEDURE — 93306 TTE W/DOPPLER COMPLETE: CPT | Mod: 26,,, | Performed by: INTERNAL MEDICINE

## 2017-10-20 PROCEDURE — 99152 MOD SED SAME PHYS/QHP 5/>YRS: CPT | Mod: ,,, | Performed by: INTERNAL MEDICINE

## 2017-10-20 PROCEDURE — 36415 COLL VENOUS BLD VENIPUNCTURE: CPT

## 2017-10-20 PROCEDURE — 93005 ELECTROCARDIOGRAM TRACING: CPT

## 2017-10-20 PROCEDURE — 25000003 PHARM REV CODE 250: Performed by: NURSE PRACTITIONER

## 2017-10-20 PROCEDURE — 63600175 PHARM REV CODE 636 W HCPCS

## 2017-10-20 PROCEDURE — 84443 ASSAY THYROID STIM HORMONE: CPT

## 2017-10-20 PROCEDURE — 25000003 PHARM REV CODE 250

## 2017-10-20 PROCEDURE — 84484 ASSAY OF TROPONIN QUANT: CPT

## 2017-10-20 PROCEDURE — 94761 N-INVAS EAR/PLS OXIMETRY MLT: CPT | Mod: 59

## 2017-10-20 PROCEDURE — 80048 BASIC METABOLIC PNL TOTAL CA: CPT

## 2017-10-20 PROCEDURE — 85730 THROMBOPLASTIN TIME PARTIAL: CPT

## 2017-10-20 PROCEDURE — 93306 TTE W/DOPPLER COMPLETE: CPT

## 2017-10-20 PROCEDURE — 93458 L HRT ARTERY/VENTRICLE ANGIO: CPT | Mod: 26,,, | Performed by: INTERNAL MEDICINE

## 2017-10-20 PROCEDURE — 27200085 CATH LAB PROCEDURE

## 2017-10-20 PROCEDURE — 25500020 PHARM REV CODE 255

## 2017-10-20 PROCEDURE — 85610 PROTHROMBIN TIME: CPT

## 2017-10-20 PROCEDURE — G0378 HOSPITAL OBSERVATION PER HR: HCPCS

## 2017-10-20 PROCEDURE — 80061 LIPID PANEL: CPT

## 2017-10-20 PROCEDURE — 85025 COMPLETE CBC W/AUTO DIFF WBC: CPT

## 2017-10-20 RX ADMIN — SERTRALINE HYDROCHLORIDE 100 MG: 100 TABLET, FILM COATED ORAL at 09:10

## 2017-10-20 RX ADMIN — CLOPIDOGREL BISULFATE 75 MG: 75 TABLET ORAL at 09:10

## 2017-10-20 RX ADMIN — IPRATROPIUM BROMIDE 2 SPRAY: 42 SPRAY, METERED NASAL at 09:10

## 2017-10-20 RX ADMIN — AZELASTINE HYDROCHLORIDE 274 MCG: 137 SPRAY, METERED NASAL at 09:10

## 2017-10-20 RX ADMIN — ASPIRIN 81 MG: 81 TABLET, COATED ORAL at 09:10

## 2017-10-20 RX ADMIN — ATORVASTATIN CALCIUM 80 MG: 40 TABLET, FILM COATED ORAL at 09:10

## 2017-10-20 RX ADMIN — SODIUM CHLORIDE 3 ML/KG/HR: 0.9 INJECTION, SOLUTION INTRAVENOUS at 09:10

## 2017-10-20 NOTE — DISCHARGE INSTRUCTIONS
Cardiac Catheterization, Discharge Instructions for (English) View Edit Remove  Angina, Discharge Instructions for (English) View Edit Remove

## 2017-10-20 NOTE — PLAN OF CARE
Patient is independent and drives.  Spouse is in room with patient and she is also independent and drives, lives with patient.  Kenya Osorio Spouse [phone]     Patient is going for [facility] today and is able to make f/u appt that has been scheduled for him.  Future Appointments  Date Time Provider Department Center   11/9/2017 2:00 PM Benjamin Monsalve NP [facility] C[facility]IO [facility] broch[facility], patient would like bedside delivery on meds if this is possible given his likely DC tomorrow vs late today.     10/20/17 0922   Discharge Assessment   Assessment Type Discharge Planning Assessment   Confirmed/corrected address and phone number on facesheet? Yes   Assessment information obtained from? Patient   Expected Length of Stay (days) 1   Communicated expected length of stay with patient/caregiver yes   Prior to hospitilization cognitive status: Alert/Oriented   Prior to hospitalization functional status: Independent   Current cognitive status: Alert/Oriented   Current Functional Status: Independent   Facility Arrived From: n/a   Lives With spouse   Able to Return to Prior Arrangements yes   Is patient able to care for self after discharge? Yes   Who are your caregiver(s) and their phone number(s)? Kenya Osorio Spouse [phone]    Patient's perception of discharge disposition home or selfcare   Readmission Within The Last 30 Days no previous admission in last 30 days   Patient currently being followed by outpatient case management? No   Patient currently receives any other outside agency services? No   Equipment Currently Used at Home none   Do you have any problems affording any of your prescribed medications? No   Is the patient taking medications as prescribed? yes   Does the patient have transportation home? Yes   Transportation Available family or friend will provide   Dialysis Name and Scheduled days n/a   Does the patient receive services at the [facility]? No    Discharge Plan A Home with family   Patient/Family In Agreement With Plan yes

## 2017-10-20 NOTE — NURSING
3ml's of air removed from TR band. Percutaneous arterial site free from hemorrhage, hematoma, and ecchymosis.

## 2017-10-20 NOTE — NURSING
LAC PIV infiltrated. Edema noted to bicep, antecubital, and upper forearm areas. LAC PIV D/C'd intact, warm compresses applied and extremity elevated. Bilateral Radial Pulses 2+ Denies numbness/tingling, C/O mild tightness to the LUE. New PIV 22g inserted to RAC.

## 2017-10-20 NOTE — NURSING TRANSFER
Transferred back to room 455 via stretcher, AA&OX3, VSS W/O S/S of distress nor cardiopulmonary instability. Transported on portable monitor, SB, no ectopy no aberrancy noted. Denies pain, shortness of breath nor N/V. R radial percutaneous site clean, dry and intact free from hemorrhage, hematoma, and ecchymosis. Thus far 3ml's of air have been removed from the TR band. The LUE extremity remains elevated with warm compresses in place.

## 2017-10-20 NOTE — PLAN OF CARE
Discharge orders noted, no HH or HME ordered.    Future Appointments  Date Time Provider Department Center   11/9/2017 2:00 PM Benjamin Monsalve, [facility] CARDIO Evaristo Cai       Pt's nurse will go over medications/signs and symptoms prior to discharge       10/20/17 5037   Final Note   Assessment Type Final Discharge Note   Discharge Disposition Home   What phone number can be called within the next 1-3 days to see how you are doing after discharge? [phone]   Hospital Follow Up  Appt(s) scheduled? Yes   Right Care Referral Info   Post Acute Recommendation No Care

## 2017-10-20 NOTE — PLAN OF CARE
Problem: Patient Care Overview  Goal: Plan of Care Review  Outcome: Ongoing (interventions implemented as appropriate)  Plan of care reviewed with patient and family. Currently NPO for L. Heart cath today. Patient clipped and prepped. Patient denies chest pain or SOB throughout shift. Tele recap: NSR/Sinus leah HR 50-60s.

## 2017-10-20 NOTE — NURSING
Reviewed percutaneous R radial site with YUNIEL Culver, 3ml's of air total out of the TR band, site clean, dry, and intact w/o hemorrhage, hematoma, and ecchymosis. Mr. Osorio denies numbness/tingling to R hand. Reviewed presentation of LUE IV infiltration associated edema which appears a bit reduced now. Mr. Osorio states that the tight sensation of the LUE is reduced and the arm feels better. LUE continues to remain elevated on pillows with heat packs applied. Radial pulses bilaterally are 2+. He denies numbness & tingling to the LUE as well.

## 2017-10-20 NOTE — NURSING
Received from cath lab AA&OX3, VSS W/O S/S of distress nor cardiopulmonary instability. No ectopy nor abberancy noted on ECG. R radial percutaneous arterial site is clean, dry, and intact, with TR band inflated. Site free from hemorrhage, hematoma, and ecchymosis. Radial pulse to right hand is 2+, denies numbness and tingling to the hand.

## 2017-10-21 ENCOUNTER — NURSE TRIAGE (OUTPATIENT)
Dept: ADMINISTRATIVE | Facility: CLINIC | Age: 62
End: 2017-10-21

## 2017-10-21 NOTE — TELEPHONE ENCOUNTER
Reason for Disposition   Message left on identified answering machine.    Protocols used: ST NO CONTACT OR DUPLICATE CONTACT CALL-A-AH    No contact made x 2 called VM left on machine

## 2017-10-23 LAB
DIASTOLIC DYSFUNCTION: NO
ESTIMATED PA SYSTOLIC PRESSURE: 9.55
MITRAL VALVE MOBILITY: NORMAL
RETIRED EF AND QEF - SEE NOTES: 55 (ref 55–65)
TRICUSPID VALVE REGURGITATION: NORMAL

## 2017-10-24 ENCOUNTER — TELEPHONE (OUTPATIENT)
Dept: CARDIOLOGY | Facility: CLINIC | Age: 62
End: 2017-10-24

## 2017-10-24 LAB — CORONARY STENOSIS: ABNORMAL

## 2017-10-24 NOTE — TELEPHONE ENCOUNTER
----- Message from Marcia Dawson sent at 10/24/2017  2:05 PM CDT -----  Contact: [phone]  Patient is requesting to speak lana he has questions

## 2017-10-24 NOTE — DISCHARGE SUMMARY
[facility]  Cardiology  Discharge Summary      Patient Name: Masoud Osorio  MRN: [id number]  Admission Date: 10/19/2017  Hospital Length of Stay: 0 days  Discharge Date and Time: 10/20/2017  6:45 PM  Attending Physician: No att. providers found    Discharging Provider: Benjamin Monsalve NP  Primary Care Physician: Rosemarie Ayala MD    HPI:   Masoud Osorio  is a 62 y.o. male  with CAD, HLD, anxiety, and gout who presented to the ED with complaints of chest pain. Pain began last night and is located mid sternally, worse with deep breath and relieved by nitro. Pain does not radiate and has no associated SOB, diaphoresis, NV, lightheadedness or palpitations. Chest pain recurred this morning and was relieved by nitro. Each episode lasting less than 10 minutes.  He describes the pain as similar to previous MI pain. He has been compliant with DAPT and smoking cessation.  He has a history of  NSTEMI that required PCI of RCA, PDA, and POBA of PLB with a preserved EF in 7/2016.  Stress test in 3/2017 for atypical chest pain revealed no reversible ischemia.  Initial troponin negative. No acute ischemic changes on ECG.     Procedure(s) (LRB):  Left heart cath (Left)     Indwelling Lines/Drains at time of discharge:  Lines/Drains/Airways          No matching active lines, drains, or airways          Hospital Course:  Patient with history of CAD presented to the ED with complaints of chest pain similar to previous MI. DAPT continued. Initial troponin 0.006, ECG without acute ischemic changes. Given his history and symptoms he will be admitted to telemetry and NPO after MN for [facility]. Trend troponin, ECG, and Echo.   Date of Procedure:  10/20/2017    A. Indication/Pre-Operative Diagnosis     The patient is a 62 year old male that was referred for catheterization by Aaareferral Self for angina (CCS III) and chest discomfort.     B. Summary/Post-Operative Diagnosis       Patent RCA and PDA stents.    60% distal  RCA lesion.    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    Normal EF with LVEDP 15 mmHg.    C. HPI     I have reviewed the history and physical completed on 10/20/2017. The patient has been examined and the following changes have been noted:        Patient ID:  Masoud Osorio is a 62 y.o. male who presents for follow up of Coronary Artery Disease; Hyperlipidemia; and s/p RCA with PDA PCI        HPI:      Masoud Osorio 62 y.o. male is here follow up and feeling well without any new complaints. He was admitted for NSTEMI that required PCI of RCA, PDA, and POBA of PLB with a preserved EF in 7/2016. He quit smoking. He is on aspirin and brilinta for DAPT   witjh a DAPT score of 2. Stress test in 3/2017 for atypical chest pain revealed no reversible ischemia. He has been pain free since except for occasional reflux after evening meals.     Patient history was obtained from the patient.     Counseling: The patient was counseled regarding the potential risks and benefits of this procedure, as well as possible alternative approaches to the problem and gave informed consent.    The ASA Score was Class II.     [facility] Risk Score for MACE is 1.20%. [facility] Risk Score for Death is 0.10%.     The patient had a previous angiogram at an outside facility. The films were available for review.     Height: 68 in.      Weight: 196 lbs.      BMI: 29.80 kg/m2    OUTPATIENT MEDICATIONS: Medications were reviewed.  ALLERGIES: Allergies were reviewed.    Laboratory data revealed:     10/20/2017 BUN  16, CREAT  0.8, GLU  91, HCT  43.6, HGB  14.4, INR  1.0, K  3.6, NA  139, PLT  247, PTI  10.7, WBCIR  9.14, APTT  26.4            ACS Enzymes:     10/19/2017 TROP  0.008   10/19/2017 TROP  0.006   10/20/2017 TROP  0.013              D. Hemodynamic Results     LVEDP (Pre): 15 mmHg  LVEDP (Post): 15 mmHg  Ejection Fraction: 55%  Global LV Function: normal    AIR REST:  LV: 112  LVEDP: 15       AIR REST (10/20/2017 13:22:33):  AO:  112/72 (91)    E. Angiographic Results     Diagnostic:          Patient has a right dominant coronary artery.        - Left Main Coronary Artery:             The LM has luminal irregularities. There is MILAGROS 3 flow.     - Left Anterior Descending Artery:             The LAD has luminal irregularities. There is MILAGROS 3 flow.     - Left Circumflex Artery:             The LCX has luminal irregularities. 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 There is MILAGROS 3 flow.     - Right Coronary Artery:             The RCA has luminal irregularities. There is MILAGROS 3 flow. Patent stents             The distal RCA has a 60% stenosis. There is MILAGROS 3 flow.     - Posterior Lateral Branch:             The PLB has luminal irregularities. There is MILAGROS 3 flow. The remaining portion of the vessel is of small caliber.     - Posterior Descending Artery:             The PDA has luminal irregularities. There is MILAGROS 3 flow. Patent stent     - Radial:             The radial is normal.      Intervention          Radial:              The following items were used: Us Probe Cover.    F. Details of Procedure     PROCEDURES PERFORMED: Coronary Angio, LHC and Left Ventriculogram    ANESTHESIA: Conscious sedation was achieved with 50 mcg of FENTANYL 100MCG/2ML and 2 mg of Versed. Local anesthesia was achieved with 20 ml of Lidocaine 1%. Moderate conscious sedation was performed and cardiorespiratory functions were monitored the   entire procedure by Pratibha García RN. Sedation began at 01:38 PM and concluded at 02:00 PM, totalling 22.0 minutes.     PRIMARY SURGEON: Keon Bailey MD    COMPLICATIONS: There were no complications.    Medications given on sterile field: Lidocaine 1% (20 ml).    Medications given during procedure: Nitroglycerine (tridil) 5mg/ml (1250 mcg), Verapamil Inj 2ml / 5 Mg (1.25 mg), Heparin 1000u/500cc Bag (2 bags), Heparin 1000u/ml Inj (5750 units), Diphenhydramine 1ml Inj / 50 Mg (50 mg), Versed (2 mg) and  Fentanyl   100mcg/2ml (50 mcg).    The patient was brought to the catheterization laboratory after premedication with oral Benadryl 50 mg. Bilateral groin prepped and draped. Right radial prepped and draped. The Kit, Manifold was flushed and connected to contrast. A Us Probe Cover was   applied to the right radial. After local anesthesia, a Sheath 5/6 Fr Glide Slender was inserted into the right Radial artery.     AORTA    A Wire Bentson 035 X 260 was inserted into the Aorta.     LM    A Cath 5fr Jorgito was inserted into the ostial LM. The Wire Bentson 035 X 260 was removed. Angiography performed in multiple views in the left coronary arteries.     RCA    The Cath 5fr Jorgito was repositioned into the ostial RCA. Angiography performed in multiple views in the right coronary arteries. 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 The Cath 5fr Jorgito was removed. A Cath 6fr Ar 1.0 was inserted   into the ostial RCA. Angiography performed in multiple views in the right coronary arteries. The Cath 6fr Ar 1.0 was removed.     Left  VENTRICLE    A Cath 5fr Pigtail 125cm was inserted into the left ventricle. Hemodynamics recorded in the left ventricle. Angiography performed in the left ventricle.     The Cath 5fr Pigtail 125cm was removed. The Sheath 5/6 Fr Glide Slender was removed. A Closure Vascband Radial R was applied to the right radial. A Closure Vascband Radial R was deployed into the right radial. Total Visipaque injected was 190.0 ml. Total   Visipaque used was 300.0 ml.       Fluoroscopy Time 11 minutes   Radiation Dose 693.7 mGy   Contrast Injected 190 ml Visipaque   Contrast Used  300 ml Visipaque            Procedure log documented by Pratibha García RN and verified by Keon Bailey MD    ESTIMATED BLOOD LOSS is < 50 cc.    SPECIMEN: No specimen.     G. Recommendations     1. Routine post-cath care.  2. Maximize medical management.  3. Risk factor reductions.  4. ASA 81mg.  5. Evaluate and treat  non cardiac chest pain    I certify that I was present for catheter insertion, catheter manipulation, angiography, angiographic interpretation, and all interventional procedures performed on this patient.     This document has been electronically    SIGNED BY: Keon Bailey MD On: 10/24/2017 10:32  Patient recovered without incident and found to be tolerating ADLs at baseline- appropriate for discharge.     Consults:     Significant Diagnostic Studies: Labs:   BMP: No results for input(s): GLU, NA, K, CL, CO2, BUN, CREATININE, CALCIUM, MG in the last 48 hours., CMP No results for input(s): NA, K, CL, CO2, GLU, BUN, CREATININE, CALCIUM, PROT, ALBUMIN, BILITOT, ALKPHOS, AST, ALT, ANIONGAP, ESTGFRAFRICA, EGFRNONAA in the last 48 hours., CBC No results for input(s): WBC, HGB, HCT, PLT in the last 48 hours., INR   Lab Results   Component Value Date    INR 1.0 10/20/2017    INR 1.0 10/19/2017    INR 1.0 07/25/2016   , Lipid Panel   Lab Results   Component Value Date    CHOL 116 (L) 10/20/2017    HDL 38 (L) 10/20/2017    LDLCALC 60.6 (L) 10/20/2017    TRIG 87 10/20/2017    CHOLHDL 32.8 10/20/2017   , Troponin   Recent Labs  Lab 10/20/17  0414   TROPONINI 0.013   , A1C:   Recent Labs  Lab 09/28/17  0756   HGBA1C 5.6    and All labs within the past 24 hours have been reviewed  Cardiac Graphics: Echocardiogram:   2D echo with color flow doppler:   Results for orders placed or performed during the hospital encounter of 10/19/17   2D echo with color flow doppler   Result Value Ref Range    EF 55 55 - 65    Diastolic Dysfunction No     Est. PA Systolic Pressure 9.55     Mitral Valve Mobility NORMAL     Tricuspid Valve Regurgitation TRIVIAL        Pending Diagnostic Studies:     None          Final Active Diagnoses:    Diagnosis Date Noted POA    PRINCIPAL PROBLEM:  Chest pain [R07.9] 10/19/2017 Yes    Mixed hyperlipidemia [E78.2] 07/17/2017 Yes    Coronary artery disease involving native coronary artery of native heart  without angina pectoris [I25.10] 07/26/2016 Yes      Problems Resolved During this Admission:    Diagnosis Date Noted Date Resolved POA     No new Assessment & Plan notes have been filed under this hospital service since the last note was generated.  Service: Cardiology      Discharged Condition: good    Disposition: Home or Self Care    Follow Up:  Follow-up Information     Benjamin Monsalve NP. Go on 11/9/2017.    Specialty:  Internal Medicine  Why:  @ 2pm  Contact information:  [address] [place] [address] [phone]                 Patient Instructions:     Diet general   Order Specific Question Answer Comments   Additional restrictions: Cardiac (Low Na/Chol)      Activity as tolerated     Lifting restrictions   Order Comments: No lifting over 10 pounds for 1 week     Call MD for:  severe uncontrolled pain     Call MD for:  redness, tenderness, or signs of infection (pain, swelling, redness, odor or green/yellow discharge around incision site)       Medications:  Reconciled Home Medications:   Discharge Medication List as of 10/20/2017  4:20 PM      CONTINUE these medications which have NOT CHANGED    Details   alprazolam (XANAX) 1 MG tablet Take 1 tablet (1 mg total) by mouth daily as needed for Anxiety., Starting Mon 7/17/2017, Until Wed 10/11/2017, Print      aspirin (ECOTRIN) 81 MG EC tablet Take 1 tablet (81 mg total) by mouth once daily., Starting Wed 7/27/2016, Until Wed 10/11/2017, OTC      atorvastatin (LIPITOR) 80 MG tablet Take 1 tablet (80 mg total) by mouth once daily., Starting Mon 7/17/2017, Until Tue 7/17/2018, Normal      azelastine (ASTELIN) 137 mcg (0.1 %) nasal spray INSTILL 2 SPRAYS IN EACH NOSTRIL TWICE A DAY, Historical Med      clopidogrel (PLAVIX) 75 mg tablet Take 1 tablet (75 mg total) by mouth once daily., Starting Mon 7/17/2017, Normal      ipratropium (ATROVENT) 0.06 % nasal spray INSTILL 2 SPRAYS IN EACH NOSTRIL EVERY 12 HOURS, Historical Med      multivitamin capsule  Take 1 capsule by mouth once daily., Until Discontinued, Historical Med      NASAL ALLERGY 55 mcg nasal inhaler INSTILL 2 SPRAYS IN [facility] NOSTRIL EVERY DAY, Historical Med      nitroGLYCERIN (NITROSTAT) 0.4 MG SL tablet Place 1 tablet (0.4 mg total) under the tongue every 5 (five) minutes as needed for Chest pain., Starting Wed 7/27/2016, Until Wed 10/11/2017, Normal      sertraline (ZOLOFT) 100 MG tablet Take 1 tablet (100 mg total) by mouth once daily., Starting Wed 10/11/2017, Until Thu 10/11/2018, Normal             Time spent on the discharge of patient: 30 minutes    Benjamin Monsalve NP  Cardiology  [facility]

## 2017-10-26 ENCOUNTER — TELEPHONE (OUTPATIENT)
Dept: CARDIOLOGY | Facility: CLINIC | Age: 62
End: 2017-10-26

## 2017-10-26 NOTE — TELEPHONE ENCOUNTER
----- Message from Natty Akbar sent at 10/26/2017  9:43 AM CDT -----  Contact: [phone] self  Patient called in requesting to speak with you. Patient prefers to speak with a nurse. Please advise.

## 2017-10-30 ENCOUNTER — TELEPHONE (OUTPATIENT)
Dept: FAMILY MEDICINE | Facility: CLINIC | Age: 62
End: 2017-10-30

## 2017-10-30 NOTE — TELEPHONE ENCOUNTER
----- Message from Milind Maria sent at 10/30/2017  8:33 AM CDT -----  Contact: self/[phone]  Patient called to speak with nurse about pains in his side.  Please call and advise.

## 2017-10-31 ENCOUNTER — TELEPHONE (OUTPATIENT)
Dept: FAMILY MEDICINE | Facility: CLINIC | Age: 62
End: 2017-10-31

## 2017-10-31 NOTE — TELEPHONE ENCOUNTER
Spoke with patient. He stated he is having lower abdominal pain and problems urinating. He would like to be seen today. He is now scheduled for an urgent visit at [place].

## 2017-10-31 NOTE — TELEPHONE ENCOUNTER
----- Message from Joyce Huang sent at 10/31/2017  9:26 AM CDT -----  Contact: Ms Rosa/   wife  Patient experiencing abdomen pain on left side need appointment today.   Please call Ms Rosa [phone]

## 2017-11-03 ENCOUNTER — OFFICE VISIT (OUTPATIENT)
Dept: FAMILY MEDICINE | Facility: CLINIC | Age: 62
End: 2017-11-03
Payer: COMMERCIAL

## 2017-11-03 ENCOUNTER — LAB VISIT (OUTPATIENT)
Dept: LAB | Facility: HOSPITAL | Age: 62
End: 2017-11-03
Attending: FAMILY MEDICINE
Payer: COMMERCIAL

## 2017-11-03 ENCOUNTER — TELEPHONE (OUTPATIENT)
Dept: FAMILY MEDICINE | Facility: CLINIC | Age: 62
End: 2017-11-03

## 2017-11-03 VITALS
DIASTOLIC BLOOD PRESSURE: 73 MMHG | SYSTOLIC BLOOD PRESSURE: 115 MMHG | BODY MASS INDEX: 29.64 KG/M2 | WEIGHT: 195.56 LBS | OXYGEN SATURATION: 98 % | HEART RATE: 70 BPM | TEMPERATURE: 98 F | HEIGHT: 68 IN

## 2017-11-03 DIAGNOSIS — R10.32 LEFT LOWER QUADRANT PAIN: ICD-10-CM

## 2017-11-03 DIAGNOSIS — R35.0 URINARY FREQUENCY: ICD-10-CM

## 2017-11-03 DIAGNOSIS — R35.0 URINARY FREQUENCY: Primary | ICD-10-CM

## 2017-11-03 DIAGNOSIS — N41.0 ACUTE PROSTATITIS: ICD-10-CM

## 2017-11-03 DIAGNOSIS — I25.10 CORONARY ARTERY DISEASE INVOLVING NATIVE CORONARY ARTERY OF NATIVE HEART WITHOUT ANGINA PECTORIS: ICD-10-CM

## 2017-11-03 LAB
BILIRUB UR QL STRIP: NEGATIVE
CLARITY UR: CLEAR
COLOR UR: YELLOW
GLUCOSE UR QL STRIP: NEGATIVE
HGB UR QL STRIP: ABNORMAL
KETONES UR QL STRIP: NEGATIVE
LEUKOCYTE ESTERASE UR QL STRIP: NEGATIVE
NITRITE UR QL STRIP: NEGATIVE
PH UR STRIP: 6 [PH] (ref 5–8)
PROT UR QL STRIP: NEGATIVE
SP GR UR STRIP: 1.01 (ref 1–1.03)
URN SPEC COLLECT METH UR: ABNORMAL
UROBILINOGEN UR STRIP-ACNC: NEGATIVE EU/DL

## 2017-11-03 PROCEDURE — 99214 OFFICE O/P EST MOD 30 MIN: CPT | Mod: S$GLB,,, | Performed by: FAMILY MEDICINE

## 2017-11-03 PROCEDURE — 87086 URINE CULTURE/COLONY COUNT: CPT

## 2017-11-03 PROCEDURE — 99999 PR PBB SHADOW E&M-EST. PATIENT-LVL IV: CPT | Mod: PBBFAC,,, | Performed by: FAMILY MEDICINE

## 2017-11-03 PROCEDURE — 81003 URINALYSIS AUTO W/O SCOPE: CPT

## 2017-11-03 RX ORDER — CIPROFLOXACIN 500 MG/1
500 TABLET ORAL EVERY 12 HOURS
Qty: 28 TABLET | Refills: 1 | Status: SHIPPED | OUTPATIENT
Start: 2017-11-03 | End: 2018-04-24

## 2017-11-03 RX ORDER — HYDROXYZINE HYDROCHLORIDE 25 MG/1
TABLET, FILM COATED ORAL
Refills: 0 | COMMUNITY
Start: 2017-10-17 | End: 2018-04-24 | Stop reason: SDUPTHER

## 2017-11-03 NOTE — PROGRESS NOTES
Office Visit    Patient Name: Masoud Osorio    : 1955  MRN: [id number]    Subjective:  Masoud is a 62 y.o. male who presents today for:    Abdominal Pain (better now 2/10 pain, 4/10 beginning of weak) and Urinary Frequency    62-year-old male patient of mine recently seen by me 10/11/2017 for annual exam.  Has stress level history significant for coronary artery disease with history of previous heart attack.  At the time of his annual exam chronic medical conditions were deemed stable and there were no concerns on labs.    Admitted 10/19/-10/20/2017 for chest pain-- fortunately left heart cath overall unremarkable showing patent previous stents and 60%  max occlusion of the distal RCA.  He remains on aspirin and plavix and lipitor mor med management. His cardiologist is Dr Bailey. Had upcoming appointment 2017 with OMKAR Pena.     Today he complains of abdominal pain that has actually improved throughout the course of the week. Pain started about 6 days ago in the left lower abdomen-- came on around mid morning and no relation to food. Pain came and went through out the week and is now almost resolved.  No n/v/d/f/c. Also has some issues with urinary frequency-- waking now eery night to urinate which is new. Having trouble with bladder emptying. No burning or blood with urination. Using the bathroom frequently about every 2 hours then will have to go again.       Past Medical History  Past Medical History:   Diagnosis Date    Anxiety and depression 2016    trial of ctialopram and follow up in 8 weeks    Coronary artery disease involving native coronary artery of native heart without angina pectoris 2016 NSTEMI at [facility] [place] [place]   PCI of RCA with 4.0 x 15, 4.0 x 38, and 3.5 x 38 mm Resolute JACOB   PCI of PDA 2.75 x 15 mm JACOB dilated to 3.0 POBA of proximal PLB 2.0 x 12 mm balloon   Normal EF with LVEDP 15 mmHg DAPT score of 2               Gout     History of coronary artery  "stent placement 9/21/2016    History of heart attack 7/25/2016    NSTEMI    Mixed hyperlipidemia 7/17/2017    Overweight (BMI 25.0-29.9) 9/21/2016    Tobacco dependence in remission 7/25/2016       Past Surgical History  Past Surgical History:   Procedure Laterality Date    COLONOSCOPY N/A 8/16/2017    Procedure: COLONOSCOPY Split Prep;  Surgeon: Maykel Carcamo Jr., MD;  Location: [facility];  Service: Endoscopy;  Laterality: N/A;    CORONARY ANGIOPLASTY WITH STENT PLACEMENT      ROTATOR CUFF REPAIR         Family History  Family History   Problem Relation Age of Onset    Heart disease Mother     Arthritis Mother     Parkinsonism Mother     Congenital heart disease Father     No Known Problems Sister     No Known Problems Brother     No Known Problems Daughter     No Known Problems Sister     No Known Problems Sister        Social History  Social History     Social History    Marital status:      Spouse name: N/A    Number of children: N/A    Years of education: N/A     Occupational History    Not on file.     Social History Main Topics    Smoking status: Former Smoker     Packs/day: 0.50     Types: Cigarettes     Quit date: 7/25/2016    Smokeless tobacco: Never Used    Alcohol use Yes      Comment: rarely    Drug use: No    Sexual activity: Not on file     Other Topics Concern    Not on file     Social History Narrative    No narrative on file       Current Medications  Medications reviewed and updated.     Allergies   Review of patient's allergies indicates:   Allergen Reactions    Penicillins Hives       Review of Systems (Pertinent positives)  Review of Systems   Constitutional: Negative for chills and fever.   Gastrointestinal: Negative for constipation, diarrhea, nausea and vomiting.   Genitourinary: Positive for frequency and urgency. Negative for hematuria.       /73   Pulse 70   Temp 97.9 °F (36.6 °C)   Ht 5' 8" (1.727 m)   Wt 88.7 kg (195 lb 8.8 oz)   SpO2 " 98%   BMI 29.73 kg/m²     Physical Exam   Constitutional: He is oriented to person, place, and time. He appears well-developed and well-nourished. No distress.   HENT:   Head: Normocephalic and atraumatic.   Cardiovascular: Normal rate, regular rhythm and normal heart sounds.    Pulmonary/Chest: Effort normal and breath sounds normal.   Abdominal: Soft. Bowel sounds are normal. There is tenderness in the left lower quadrant. There is no rigidity, no rebound and no guarding.   Genitourinary: Prostate is enlarged (boggy =) and tender.   Musculoskeletal: He exhibits no edema.   Neurological: He is alert and oriented to person, place, and time.   Psychiatric: He has a normal mood and affect.   Vitals reviewed.        Assessment/Plan:  Masoud Osorio is a 62 y.o. male who presents today for :    Masoud was seen today for abdominal pain and urinary frequency.    Diagnoses and all orders for this visit:    Urinary frequency  -     Urinalysis; Future  -     Urine culture; Future    Acute prostatitis  -     ciprofloxacin HCl (CIPRO) 500 MG tablet; Take 1 tablet (500 mg total) by mouth every 12 (twelve) hours.    Left lower quadrant pain    Coronary artery disease involving native coronary artery of native heart without angina pectoris            ICD-10-CM ICD-9-CM    1. Urinary frequency R35.0 788.41 Urinalysis      Urine culture   2. Acute prostatitis N41.0 601.0 ciprofloxacin HCl (CIPRO) 500 MG tablet   3. Left lower quadrant pain R10.32 789.04    4. Coronary artery disease involving native coronary artery of native heart without angina pectoris I25.10 414.01        Patient Instructions   Acute issues with urinary frequency and trouble emptying bladder accompanied by dull left lower quadrant pain.  Prostate boggy on exam and tender, therefore suspect acute prostatitis.  Will treat with ciprofloxacin 500 mg twice daily for 2 weeks as this is an acute episode.  Send urine for urinalysis and culture.  Send my office or  message in one week with update on symptoms.  Drink plenty of fluids and empty bladder regularly.        Return for return as needed for new concerns.

## 2017-11-04 LAB — BACTERIA UR CULT: NO GROWTH

## 2017-11-07 ENCOUNTER — TELEPHONE (OUTPATIENT)
Dept: CARDIOLOGY | Facility: CLINIC | Age: 62
End: 2017-11-07

## 2017-11-07 NOTE — TELEPHONE ENCOUNTER
He can proceed with his intervention with minimal cardiac risks      Hold plavix 5 days before the case      Thanks      ZN

## 2017-11-07 NOTE — TELEPHONE ENCOUNTER
Dr. Maria office is calling for Cardiac Clearance and instructions for him to hold Plavix prior to parodontal sx( upper right osseous )       Fax : [phone]

## 2017-11-07 NOTE — TELEPHONE ENCOUNTER
----- Message from Kimberly Dash sent at 11/7/2017  8:55 AM CST -----  Contact: Kelsey BAUTISTA/ Dr. Maria office  [phone]  Kelsey states she need a medical clearance faxed to [phone].  The pt is having a procedure on 11/9/2017.   Please call and advise

## 2017-11-10 ENCOUNTER — PATIENT MESSAGE (OUTPATIENT)
Dept: FAMILY MEDICINE | Facility: CLINIC | Age: 62
End: 2017-11-10

## 2017-11-14 ENCOUNTER — PATIENT MESSAGE (OUTPATIENT)
Dept: CARDIOLOGY | Facility: CLINIC | Age: 62
End: 2017-11-14

## 2017-11-14 ENCOUNTER — OFFICE VISIT (OUTPATIENT)
Dept: CARDIOLOGY | Facility: CLINIC | Age: 62
End: 2017-11-14
Payer: COMMERCIAL

## 2017-11-14 VITALS
OXYGEN SATURATION: 97 % | BODY MASS INDEX: 29.7 KG/M2 | HEIGHT: 68 IN | SYSTOLIC BLOOD PRESSURE: 134 MMHG | DIASTOLIC BLOOD PRESSURE: 88 MMHG | HEART RATE: 68 BPM | WEIGHT: 196 LBS

## 2017-11-14 DIAGNOSIS — I25.10 CORONARY ARTERY DISEASE INVOLVING NATIVE CORONARY ARTERY OF NATIVE HEART WITHOUT ANGINA PECTORIS: Primary | ICD-10-CM

## 2017-11-14 DIAGNOSIS — E78.2 MIXED HYPERLIPIDEMIA: ICD-10-CM

## 2017-11-14 DIAGNOSIS — R07.9 CHEST PAIN, UNSPECIFIED TYPE: ICD-10-CM

## 2017-11-14 PROCEDURE — 99999 PR PBB SHADOW E&M-EST. PATIENT-LVL III: CPT | Mod: PBBFAC,,, | Performed by: NURSE PRACTITIONER

## 2017-11-14 PROCEDURE — 99214 OFFICE O/P EST MOD 30 MIN: CPT | Mod: S$GLB,,, | Performed by: NURSE PRACTITIONER

## 2017-11-14 RX ORDER — DOXYCYCLINE HYCLATE 100 MG/1
100 TABLET, DELAYED RELEASE ORAL 3 TIMES DAILY
COMMUNITY
End: 2018-04-24

## 2017-11-14 RX ORDER — ISOSORBIDE MONONITRATE 30 MG/1
30 TABLET, EXTENDED RELEASE ORAL DAILY
Qty: 30 TABLET | Refills: 11 | Status: SHIPPED | OUTPATIENT
Start: 2017-11-14 | End: 2018-06-04 | Stop reason: ALTCHOICE

## 2017-11-14 NOTE — PROGRESS NOTES
Subjective:       Patient ID: Masoud Osorio is a 62 y.o. male  who presents for follow up of Coronary Artery Disease; Hyperlipidemia; and s/p RCA with PDA PCI    Chief Complaint: Hospital Follow up   Patient with history of CAD presented to the ED with complaints of chest pain similar to previous MI.   Date of Procedure:  10/20/2017  Summary/Post-Operative Diagnosis     Patent RCA and PDA stents.    60% distal RCA lesion.    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    Normal EF with LVEDP 15 mmHg.  Patient was recovered without incident and found appropriate for discharge to follow up in clinic.  He presents today for follow up. He continues to have chest pain from time to time. He is feeling fatigued. Recently changed antidepressants.       Review of Systems   Constitutional: Positive for fatigue. Negative for diaphoresis.   HENT: Negative.    Eyes: Negative.    Respiratory: Negative for cough, chest tightness, shortness of breath and wheezing.    Cardiovascular: Negative for chest pain, palpitations and leg swelling.   Gastrointestinal: Negative.    Endocrine: Negative.    Genitourinary: Negative.    Musculoskeletal: Negative.    Skin: Negative.    Allergic/Immunologic: Negative.    Neurological: Negative.    Hematological: Negative.    Psychiatric/Behavioral: Negative.        Objective:      Physical Exam   Constitutional: He is oriented to person, place, and time. He appears well-developed and well-nourished. No distress.   HENT:   Head: Normocephalic and atraumatic.   Eyes: Right eye exhibits no discharge. Left eye exhibits no discharge.   Neck: No JVD present.   Cardiovascular: Normal rate, regular rhythm, normal heart sounds and intact distal pulses.  Exam reveals no gallop and no friction rub.    No murmur heard.  Pulmonary/Chest: Effort normal and breath sounds normal. No respiratory distress. He has no wheezes. He has no rales. He exhibits no tenderness.   Abdominal: Soft. Bowel sounds are normal.  "  Musculoskeletal: He exhibits no edema or tenderness.   Neurological: He is alert and oriented to person, place, and time.   Skin: Skin is warm and dry. Capillary refill takes less than 2 seconds. He is not diaphoretic.   Psychiatric: He has a normal mood and affect. His behavior is normal. Judgment and thought content normal.       Assessment:       1. Coronary artery disease involving native coronary artery of native heart without angina pectoris    2. Mixed hyperlipidemia    3. Chest pain, unspecified type        Plan:       Diagnoses and all orders for this visit:    Coronary artery disease involving native coronary artery of native heart without angina pectoris  -     isosorbide mononitrate (IMDUR) 30 MG 24 hr tablet; Take 1 tablet (30 mg total) by mouth once daily.    Mixed hyperlipidemia    Chest pain, unspecified type      Recent C reviewed with patient and wife; all questions answered to satisfaction  Add imdur   Encouraged to talk to PCP re depression and fatigue  Continue current medications, cardiac diet and encouraged regular CV exercise  Stress reduction   Continued smoking cessation   RTC 6 mo, sooner for any concerns     Vitals:    11/14/17 1508   BP: 134/88   BP Location: Left arm   Patient Position: Sitting   BP Method: Medium (Automatic)   Pulse: 68   SpO2: 97%   Weight: 88.9 kg (196 lb)   Height: 5' 8" (1.727 m)     Current Outpatient Prescriptions on File Prior to Visit   Medication Sig Dispense Refill    alprazolam (XANAX) 1 MG tablet Take 1 tablet (1 mg total) by mouth daily as needed for Anxiety. 30 tablet 2    aspirin (ECOTRIN) 81 MG EC tablet Take 1 tablet (81 mg total) by mouth once daily.  0    azelastine (ASTELIN) 137 mcg (0.1 %) nasal spray INSTILL 2 SPRAYS IN EACH NOSTRIL TWICE A DAY  5    ciprofloxacin HCl (CIPRO) 500 MG tablet Take 1 tablet (500 mg total) by mouth every 12 (twelve) hours. 28 tablet 1    clopidogrel (PLAVIX) 75 mg tablet Take 1 tablet (75 mg total) by mouth " once daily. 90 tablet 3    hydrOXYzine HCl (ATARAX) 25 MG tablet TAKE 1/2 TO 1 TABLET BY MOUTH AT BEDTIME  0    ipratropium (ATROVENT) 0.06 % nasal spray INSTILL 2 SPRAYS IN EACH NOSTRIL EVERY 12 HOURS  5    multivitamin capsule Take 1 capsule by mouth once daily.      NASAL ALLERGY 55 mcg nasal inhaler INSTILL 2 SPRAYS IN [facility] NOSTRIL EVERY DAY  5    nitroGLYCERIN (NITROSTAT) 0.4 MG SL tablet Place 1 tablet (0.4 mg total) under the tongue every 5 (five) minutes as needed for Chest pain. 25 tablet 11    sertraline (ZOLOFT) 100 MG tablet Take 1 tablet (100 mg total) by mouth once daily. 180 tablet 5    atorvastatin (LIPITOR) 80 MG tablet Take 1 tablet (80 mg total) by mouth once daily. 90 tablet 3     No current facility-administered medications on file prior to visit.      Results for orders placed or performed in visit on 11/03/17   Urine culture   Result Value Ref Range    Urine Culture, Routine No growth    Urinalysis   Result Value Ref Range    Specimen UA Urine, Clean Catch     Color, UA Yellow Yellow, Straw, Aracelis    Appearance, UA Clear Clear    pH, UA 6.0 5.0 - 8.0    Specific Gravity, UA 1.015 1.005 - 1.030    Protein, UA Negative Negative    Glucose, UA Negative Negative    Ketones, UA Negative Negative    Bilirubin (UA) Negative Negative    Occult Blood UA Trace (A) Negative    Nitrite, UA Negative Negative    Urobilinogen, UA Negative <2.0 EU/dL    Leukocytes, UA Negative Negative

## 2017-11-16 ENCOUNTER — PATIENT MESSAGE (OUTPATIENT)
Dept: FAMILY MEDICINE | Facility: CLINIC | Age: 62
End: 2017-11-16

## 2017-12-27 ENCOUNTER — PATIENT MESSAGE (OUTPATIENT)
Dept: FAMILY MEDICINE | Facility: CLINIC | Age: 62
End: 2017-12-27

## 2018-01-15 ENCOUNTER — TELEPHONE (OUTPATIENT)
Dept: CARDIOLOGY | Facility: CLINIC | Age: 63
End: 2018-01-15

## 2018-01-15 NOTE — TELEPHONE ENCOUNTER
----- Message from Birdie Peters sent at 1/15/2018  3:32 PM CST -----  Contact: [facility] [phone]  Pharmacy would like to speak with you about getting prescription isosorbide mononitrate (IMDUR) 30 MG 24 hr tablet changed to 90 day supply because the insurance is not covering it. Please call and advise.

## 2018-02-09 ENCOUNTER — PATIENT MESSAGE (OUTPATIENT)
Dept: FAMILY MEDICINE | Facility: CLINIC | Age: 63
End: 2018-02-09

## 2018-02-12 ENCOUNTER — PATIENT MESSAGE (OUTPATIENT)
Dept: FAMILY MEDICINE | Facility: CLINIC | Age: 63
End: 2018-02-12

## 2018-02-28 DIAGNOSIS — F41.9 ANXIETY: ICD-10-CM

## 2018-03-02 RX ORDER — ALPRAZOLAM 1 MG/1
TABLET ORAL
Qty: 30 TABLET | Refills: 2 | Status: SHIPPED | OUTPATIENT
Start: 2018-03-02 | End: 2018-12-07 | Stop reason: SDUPTHER

## 2018-03-29 DIAGNOSIS — I25.10 CORONARY ARTERY DISEASE INVOLVING NATIVE CORONARY ARTERY OF NATIVE HEART WITHOUT ANGINA PECTORIS: ICD-10-CM

## 2018-03-29 RX ORDER — ATORVASTATIN CALCIUM 80 MG/1
TABLET, FILM COATED ORAL
Qty: 90 TABLET | Refills: 3 | Status: SHIPPED | OUTPATIENT
Start: 2018-03-29 | End: 2019-04-15

## 2018-04-24 ENCOUNTER — LAB VISIT (OUTPATIENT)
Dept: LAB | Facility: HOSPITAL | Age: 63
End: 2018-04-24
Attending: FAMILY MEDICINE
Payer: COMMERCIAL

## 2018-04-24 ENCOUNTER — OFFICE VISIT (OUTPATIENT)
Dept: FAMILY MEDICINE | Facility: CLINIC | Age: 63
End: 2018-04-24
Payer: COMMERCIAL

## 2018-04-24 VITALS
SYSTOLIC BLOOD PRESSURE: 131 MMHG | TEMPERATURE: 98 F | WEIGHT: 197.56 LBS | BODY MASS INDEX: 29.94 KG/M2 | HEART RATE: 69 BPM | HEIGHT: 68 IN | DIASTOLIC BLOOD PRESSURE: 86 MMHG | OXYGEN SATURATION: 96 %

## 2018-04-24 DIAGNOSIS — F32.A ANXIETY AND DEPRESSION: ICD-10-CM

## 2018-04-24 DIAGNOSIS — M54.50 ACUTE RIGHT-SIDED LOW BACK PAIN WITHOUT SCIATICA: ICD-10-CM

## 2018-04-24 DIAGNOSIS — E66.3 OVERWEIGHT (BMI 25.0-29.9): ICD-10-CM

## 2018-04-24 DIAGNOSIS — E78.2 MIXED HYPERLIPIDEMIA: ICD-10-CM

## 2018-04-24 DIAGNOSIS — Z79.82 LONG-TERM USE OF ASPIRIN THERAPY: ICD-10-CM

## 2018-04-24 DIAGNOSIS — I25.2 HISTORY OF HEART ATTACK: ICD-10-CM

## 2018-04-24 DIAGNOSIS — R45.4 IRRITABILITY: Primary | ICD-10-CM

## 2018-04-24 DIAGNOSIS — F41.9 ANXIETY AND DEPRESSION: ICD-10-CM

## 2018-04-24 DIAGNOSIS — I25.10 CORONARY ARTERY DISEASE INVOLVING NATIVE CORONARY ARTERY OF NATIVE HEART WITHOUT ANGINA PECTORIS: ICD-10-CM

## 2018-04-24 DIAGNOSIS — Z51.81 VISIT FOR MONITORING PLAVIX THERAPY: ICD-10-CM

## 2018-04-24 DIAGNOSIS — Z79.02 VISIT FOR MONITORING PLAVIX THERAPY: ICD-10-CM

## 2018-04-24 LAB
ALBUMIN SERPL BCP-MCNC: 3.8 G/DL
ALP SERPL-CCNC: 84 U/L
ALT SERPL W/O P-5'-P-CCNC: 27 U/L
ANION GAP SERPL CALC-SCNC: 6 MMOL/L
AST SERPL-CCNC: 38 U/L
BILIRUB SERPL-MCNC: 0.6 MG/DL
BUN SERPL-MCNC: 16 MG/DL
CALCIUM SERPL-MCNC: 9.4 MG/DL
CHLORIDE SERPL-SCNC: 111 MMOL/L
CHOLEST SERPL-MCNC: 116 MG/DL
CHOLEST/HDLC SERPL: 2.8 {RATIO}
CO2 SERPL-SCNC: 26 MMOL/L
CREAT SERPL-MCNC: 0.8 MG/DL
EST. GFR  (AFRICAN AMERICAN): >60 ML/MIN/1.73 M^2
EST. GFR  (NON AFRICAN AMERICAN): >60 ML/MIN/1.73 M^2
GLUCOSE SERPL-MCNC: 99 MG/DL
HDLC SERPL-MCNC: 41 MG/DL
HDLC SERPL: 35.3 %
LDLC SERPL CALC-MCNC: 64.8 MG/DL
NONHDLC SERPL-MCNC: 75 MG/DL
POTASSIUM SERPL-SCNC: 4.6 MMOL/L
PROT SERPL-MCNC: 6.7 G/DL
SODIUM SERPL-SCNC: 143 MMOL/L
TRIGL SERPL-MCNC: 51 MG/DL

## 2018-04-24 PROCEDURE — 36415 COLL VENOUS BLD VENIPUNCTURE: CPT

## 2018-04-24 PROCEDURE — 99214 OFFICE O/P EST MOD 30 MIN: CPT | Mod: S$GLB,,, | Performed by: FAMILY MEDICINE

## 2018-04-24 PROCEDURE — 80053 COMPREHEN METABOLIC PANEL: CPT

## 2018-04-24 PROCEDURE — 99999 PR PBB SHADOW E&M-EST. PATIENT-LVL V: CPT | Mod: PBBFAC,,, | Performed by: FAMILY MEDICINE

## 2018-04-24 PROCEDURE — 80061 LIPID PANEL: CPT

## 2018-04-24 RX ORDER — VENLAFAXINE HYDROCHLORIDE 75 MG/1
75 CAPSULE, EXTENDED RELEASE ORAL
Qty: 30 CAPSULE | Refills: 2 | Status: SHIPPED | OUTPATIENT
Start: 2018-04-24 | End: 2018-08-02 | Stop reason: SDUPTHER

## 2018-04-24 RX ORDER — HYDROXYZINE HYDROCHLORIDE 25 MG/1
TABLET, FILM COATED ORAL
Qty: 45 TABLET | Refills: 5 | Status: SHIPPED | OUTPATIENT
Start: 2018-04-24 | End: 2019-12-20 | Stop reason: ALTCHOICE

## 2018-04-24 RX ORDER — CYCLOBENZAPRINE HCL 10 MG
10 TABLET ORAL 3 TIMES DAILY PRN
Qty: 30 TABLET | Refills: 5 | Status: SHIPPED | OUTPATIENT
Start: 2018-04-24 | End: 2018-05-04

## 2018-04-24 NOTE — PROGRESS NOTES
Office Visit    Patient Name: Masoud Osorio    : 1955  MRN: [id number]    Subjective:  Masoud is a 63 y.o. male who presents today for:    Back Pain (lower back) and Anxiety    64 yo male with h/o NSTEMI and anxiety and depression here today to discuss some worsening irritability.  He has previously been on citalopram and then more recently on Zoloft. Atarax and Xanax as needed. He has actually weaned himself off of Zoloft, feeling that it has not been recently effective.  He feels that it does improve his overall mood but he did not notice any improvement in his irritability.  He has Xanax the most helpful in terms of improving this issue, however, he knows that it can be addictive and therefore does not take anything more than about 1/2 mg daily.  He feels that in addition to improving irritability, the Xanax decreases his anxiety and also helps him to sleep.  He has in the past used Atarax for this, previously prescribed by his ENT Dr. Christie.  It has helped him in the past but he has not taken it recently.  In addition to increased irritability, he has noticed that he does feel down and depressed on more days than not.  He is able to function despite this and some decreased interest.  He is very active with house projects since retiring.  His irritability and anxiety are such that he would take more Xanax if it were a safer medication.  He is interested in resuming a daily medication to help with this that may help better than the citalopram and Zoloft he has previously tried.    Secondly, he c/o some increased low back pain in the last few weeks.  Has been doing more work around the house, including installing a fence.  Yesterday evening when he had finished his work for the day and taken a shower and was relaxing he noticed a tension in his right low back region.  The pain and tightness increases when he bends over to put on his shoes or pick something off the floor.  He has not had any radiating pain  into his legs, leg weakness, numbness or tingling.    Past Medical History  Past Medical History:   Diagnosis Date    Anxiety and depression 9/21/2016    trial of ctialopram and follow up in 8 weeks    Coronary artery disease involving native coronary artery of native heart without angina pectoris 7/26/2016 7/26/2016 NSTEMI at [facility]   PCI of RCA with 4.0 x 15, 4.0 x 38, and 3.5 x 38 mm Resolute JACOB   PCI of PDA 2.75 x 15 mm JACOB dilated to 3.0 POBA of proximal PLB 2.0 x 12 mm balloon   Normal EF with LVEDP 15 mmHg DAPT score of 2               Gout     History of coronary artery stent placement 9/21/2016    History of heart attack 7/25/2016    NSTEMI    Mixed hyperlipidemia 7/17/2017    Overweight (BMI 25.0-29.9) 9/21/2016    Tobacco dependence in remission 7/25/2016       Past Surgical History  Past Surgical History:   Procedure Laterality Date    COLONOSCOPY N/A 8/16/2017    Procedure: COLONOSCOPY Split Prep;  Surgeon: Maykel Carcamo Jr., MD;  Location: [facility];  Service: Endoscopy;  Laterality: N/A;    CORONARY ANGIOPLASTY WITH STENT PLACEMENT      ROTATOR CUFF REPAIR         Family History  Family History   Problem Relation Age of Onset    Heart disease Mother     Arthritis Mother     Parkinsonism Mother     Congenital heart disease Father     No Known Problems Sister     No Known Problems Brother     No Known Problems Daughter     No Known Problems Sister     No Known Problems Sister        Social History  Social History     Social History    Marital status:      Spouse name: N/A    Number of children: N/A    Years of education: N/A     Occupational History    Not on file.     Social History Main Topics    Smoking status: Former Smoker     Packs/day: 0.50     Types: Cigarettes     Quit date: 7/25/2016    Smokeless tobacco: Never Used    Alcohol use Yes      Comment: rarely    Drug use: No    Sexual activity: Not on file     Other Topics Concern    Not on file  "    Social History Narrative    No narrative on file       Current Medications  Medications reviewed and updated.     Allergies   Review of patient's allergies indicates:   Allergen Reactions    Penicillins Hives       Review of Systems (Pertinent positives)  Review of Systems   Constitutional: Negative for unexpected weight change.   Respiratory: Negative for shortness of breath.    Cardiovascular: Negative for chest pain.   Musculoskeletal: Positive for back pain.   Neurological: Negative for dizziness.       /86 (BP Location: Right arm, Patient Position: Sitting)   Pulse 69   Temp 98.1 °F (36.7 °C) (Oral)   Ht 5' 8" (1.727 m)   Wt 89.6 kg (197 lb 8.5 oz)   SpO2 96%   BMI 30.03 kg/m²     Physical Exam   Constitutional: He is oriented to person, place, and time. He appears well-developed and well-nourished. No distress.   Cardiovascular: Normal rate, regular rhythm and normal heart sounds.    Pulmonary/Chest: Effort normal and breath sounds normal.   Musculoskeletal: He exhibits no edema.        Lumbar back: He exhibits tenderness, pain and spasm. He exhibits no bony tenderness.        Back:    Neurological: He is alert and oriented to person, place, and time.   Psychiatric: He has a normal mood and affect.   Vitals reviewed.        Assessment/Plan:  Masoud Osorio is a 63 y.o. male who presents today for :    Masoud was seen today for back pain and anxiety.    Diagnoses and all orders for this visit:    Irritability    Anxiety and depression  -     venlafaxine (EFFEXOR-XR) 75 MG 24 hr capsule; Take 1 capsule (75 mg total) by mouth daily with breakfast.  -     hydrOXYzine HCl (ATARAX) 25 MG tablet; TAKE 1/2 TO 1 TABLET BY MOUTH AT BEDTIME and also during the day as needed for anxiety    Coronary artery disease involving native coronary artery of native heart without angina pectoris    Overweight (BMI 25.0-29.9)    Mixed hyperlipidemia  -     Lipid panel; Future  -     Comprehensive metabolic panel; " Future    Visit for monitoring Plavix therapy    Long-term use of aspirin therapy    History of heart attack    Acute right-sided low back pain without sciatica  -     cyclobenzaprine (FLEXERIL) 10 MG tablet; Take 1 tablet (10 mg total) by mouth 3 (three) times daily as needed for Muscle spasms.  -     Ambulatory Referral to Physical/Occupational Therapy            ICD-10-CM ICD-9-CM    1. Irritability R45.4 799.22    2. Anxiety and depression F41.9 300.00 venlafaxine (EFFEXOR-XR) 75 MG 24 hr capsule    F32.9 311 hydrOXYzine HCl (ATARAX) 25 MG tablet   3. Coronary artery disease involving native coronary artery of native heart without angina pectoris I25.10 414.01    4. Overweight (BMI 25.0-29.9) E66.3 278.02    5. Mixed hyperlipidemia E78.2 272.2 Lipid panel      Comprehensive metabolic panel   6. Visit for monitoring Plavix therapy Z51.81 V58.83     Z79.02 V58.63    7. Long-term use of aspirin therapy Z79.82 V58.66    8. History of heart attack I25.2 412    9. Acute right-sided low back pain without sciatica M54.5 724.2 cyclobenzaprine (FLEXERIL) 10 MG tablet      Ambulatory Referral to Physical/Occupational Therapy       Patient Instructions   MESSAGE IN 4 WEEKS WITH HOW THE EFFEXOR IS WORKING FOR MOOD/ANXIETY/ IRRITABILITY    TRY PHYSICAL THERAPY AND MUSCLE RELAXER FOR LOW BACK PAIN        Follow-up for MESSAGE IN 4 WEEKS WITH UPDATE, NEXT ANNUAL EXAM 10/2018.

## 2018-04-24 NOTE — PATIENT INSTRUCTIONS
MESSAGE IN 4 WEEKS WITH HOW THE EFFEXOR IS WORKING FOR MOOD/ANXIETY/ IRRITABILITY    TRY PHYSICAL THERAPY AND MUSCLE RELAXER FOR LOW BACK PAIN

## 2018-04-26 ENCOUNTER — PATIENT MESSAGE (OUTPATIENT)
Dept: FAMILY MEDICINE | Facility: CLINIC | Age: 63
End: 2018-04-26

## 2018-04-26 ENCOUNTER — TELEPHONE (OUTPATIENT)
Dept: FAMILY MEDICINE | Facility: CLINIC | Age: 63
End: 2018-04-26

## 2018-04-26 DIAGNOSIS — I25.10 CORONARY ARTERY DISEASE INVOLVING NATIVE CORONARY ARTERY OF NATIVE HEART WITHOUT ANGINA PECTORIS: Primary | ICD-10-CM

## 2018-04-26 DIAGNOSIS — Z79.899 MEDICATION MANAGEMENT: ICD-10-CM

## 2018-04-26 DIAGNOSIS — Z12.5 ENCOUNTER FOR PROSTATE CANCER SCREENING: ICD-10-CM

## 2018-04-26 DIAGNOSIS — E78.2 MIXED HYPERLIPIDEMIA: ICD-10-CM

## 2018-05-05 DIAGNOSIS — I25.10 CORONARY ARTERY DISEASE INVOLVING NATIVE CORONARY ARTERY OF NATIVE HEART WITHOUT ANGINA PECTORIS: ICD-10-CM

## 2018-05-08 RX ORDER — CLOPIDOGREL BISULFATE 75 MG/1
TABLET ORAL
Qty: 90 TABLET | Refills: 2 | Status: SHIPPED | OUTPATIENT
Start: 2018-05-08 | End: 2018-06-05 | Stop reason: SDUPTHER

## 2018-05-24 ENCOUNTER — PATIENT MESSAGE (OUTPATIENT)
Dept: FAMILY MEDICINE | Facility: CLINIC | Age: 63
End: 2018-05-24

## 2018-06-04 ENCOUNTER — OFFICE VISIT (OUTPATIENT)
Dept: CARDIOLOGY | Facility: CLINIC | Age: 63
End: 2018-06-04
Payer: COMMERCIAL

## 2018-06-04 VITALS
DIASTOLIC BLOOD PRESSURE: 77 MMHG | BODY MASS INDEX: 29.45 KG/M2 | SYSTOLIC BLOOD PRESSURE: 126 MMHG | WEIGHT: 194.31 LBS | HEART RATE: 75 BPM | HEIGHT: 68 IN

## 2018-06-04 DIAGNOSIS — F17.201 TOBACCO DEPENDENCE IN REMISSION: ICD-10-CM

## 2018-06-04 DIAGNOSIS — E78.2 MIXED HYPERLIPIDEMIA: ICD-10-CM

## 2018-06-04 DIAGNOSIS — I25.10 CORONARY ARTERY DISEASE INVOLVING NATIVE CORONARY ARTERY OF NATIVE HEART WITHOUT ANGINA PECTORIS: ICD-10-CM

## 2018-06-04 DIAGNOSIS — N52.9 ERECTILE DYSFUNCTION, UNSPECIFIED ERECTILE DYSFUNCTION TYPE: Primary | ICD-10-CM

## 2018-06-04 PROCEDURE — 99999 PR PBB SHADOW E&M-EST. PATIENT-LVL III: CPT | Mod: PBBFAC,,, | Performed by: INTERNAL MEDICINE

## 2018-06-04 PROCEDURE — 99214 OFFICE O/P EST MOD 30 MIN: CPT | Mod: S$GLB,,, | Performed by: INTERNAL MEDICINE

## 2018-06-04 PROCEDURE — 3008F BODY MASS INDEX DOCD: CPT | Mod: CPTII,S$GLB,, | Performed by: INTERNAL MEDICINE

## 2018-06-04 PROCEDURE — 93000 ELECTROCARDIOGRAM COMPLETE: CPT | Mod: S$GLB,,, | Performed by: INTERNAL MEDICINE

## 2018-06-04 NOTE — PATIENT INSTRUCTIONS
Heart Disease Education    The heart beats 60 to 100 times per minute, 24 hours a day. This equals almost 1000,000 times a day. It pumps blood with oxygen and nutrients to the tissues and organs of the body. But the heart is a muscle and needs its own supply of blood. Blood flow to the heart is supplied by the coronary arteries. Coronary artery disease (atherosclerosis) is a result of cholesterol, saturated fat, and calcium deposits (plaques) that build up inside the walls. This causes inflammation within the coronary arteries. These plaques narrow the artery and reduce blood flow to the heart muscle. The reduction in blood flow to the heart muscle decreases oxygen supply to the heart. If the narrowing is significant enough, the oxygen supply to one or more regions of the heart can be temporarily or permanently shut down. This can cause chest pain, and possibly death of heart tissue (heart attack).  Types of chest pain  Angina is the name for pain in the heart muscle. Angina is a warning sign of serious heart disease. When untreated it can lead to a heart attack, also known as acute myocardial infarction, or AMI. Angina occurs when there is not enough blood and oxygen flowing to the heart for the amount of work it is doing. This most often happens during physical exertion, when the heart is working hardest. It is usually relieved by rest or nitroglycerin. Angina may also occur after a large meal when extra blood is sent to the digestive organs and less goes to the heart. In the case of advanced or unstable heart disease, angina can occur at rest or awaken you from sleep. Angina usually lasts from a few minutes up to 20 minutes or more. When treated early, the effects of angina can be reversed without permanent damage to the heart. Angina is a serious condition and needs to be evaluated by a medical professional immediately.  There are two types of angina -- stable and unstable:  · Stable angina usually occurs  with a predictable level of activity. Being stable, its character, severity, and occurrence do not change much over time. It usually starts with activity, and resolves with rest or taking your medicine as instructed by your doctor. The symptoms usually do not last long.  · Unstable angina changes or gets worse over time. It is different from whatever you are used to. It may feel different or worse, begin without cause, occur with exercise or exertion, wake you up from sleep, and last longer. It may not respond in the same way as it does when you take your usual medicines for an attack. This type of angina can be a warning sign of an impending heart attack.     A heart attack is usually the result of a blood clot that suddenly forms in a coronary artery that has been narrowed with plaque. When this occurs, blood flow may be cut off to a part of the heart muscle, causing the cells to die. This weakens the pumping action of the heart, which affects the delivery of blood to all the other organs in the body including the brain. This damage is not reversible. However, early treatment can limit the amount of damage.  The pain you feel with angina and a heart attack may have a similar quality. However, it is usually different in intensity and duration. Here are some typical descriptions of a heart attack:  · It is most often experienced as a squeezing, crushing, pressure-like sensation in the center of the chest.  · It is sometimes described as something heavy sitting on my chest.  · It may feel more like a bad case of indigestion.  · The pain may spread from the chest to the arm, shoulder, throat or jaw.  · Sometimes the pain is not felt in the chest at all, but only in the arm, shoulder, throat or jaw.  · There may also be nausea, vomiting, dizziness or light-headedness, sweating and trouble breathing.  · Palpitations, or your heart beating rapidly  · A new, irregular heart beat  · Unexplained weakness  You may not be  "able to tell the difference between "bad" angina and a heart attack at home. Seek help if your symptoms are different than usual. Do not be in denial or just try to "tough it out."  Call 911  This is the fastest and safest way to get to the emergency department. The paramedics can also start treatment on the way to the hospital, saving valuable time for your heart.  · If the angina gets worse, if it continues, or if it stops and returns, call 911 immediately. Do not delay. You may be having a heart attack.  · After you call 911, take a second tablet or spray unless instructed otherwise. When repeating doses, sit down if possible, because it can make you feel lightheaded or dizzy. Wait another 5 minutes. If the angina still does not go away, take a third tablet or spray. Do not take more than 3 tablets or sprays within 15 minutes. Stay on the phone with 911 for further instruction.  · Your healthcare provider may give you slightly different instructions than those above. If so, follow them carefully.  Do not wait until symptoms become severe to call 911.  Other reasons to call 911 include:  · Trouble breathing  · Feeling lightheaded, faint, or dizzy  · Rapid heart beat  · Slower than usual heart rate compared to your normal  · Angina with weakness, dizziness, fainting, heavy sweating, nausea, or vomiting  · Extreme drowsiness, confusion  · Weakness of an arm or leg or one side of the face  · Difficulty with speech or vision  When to seek medical care  Remember, the signs and symptoms of a heart attack are not always like they are on TV. Sometimes they are not so obvious. You may only feel weak, or just not right. If it is not clear or if you have any doubt, call for advice.  · Seek help if there is a change in the type of pain, if it feels different, or if your symptoms are mild.  · Do not drive yourself. Have someone else drive you. If no one can drive, call 911.  · Do not delay. Fast diagnosis and treatment can " "prevent or limit the amount of heart damage during a heart attack.  · Do not go to your doctor's office or a clinic as they may not be able to provide all the testing and treatment required for this condition.  · If your doctor has given you medicine to take when symptoms occur, take them but don't delay getting help trying to locate medicines.  What happens in the emergency department  The emergency department is connected to your local emergency medical system (EMS) through 911. That's why during a cardiac emergency, calling 911 is the fastest way to get help. The goal of the emergency department is to rapidly screen, evaluate, and treat people.  Once you are there, an electrocardiogram (ECG or heart tracing) will be done. Blood samples may be taken to look for the presence of heart enzymes that leak from damaged heart cells and show if a heart attack is occurring. You will often be evaluated by a heart specialist (cardiologist) who decides the best course of action. In the case of severe angina or early heart attack, and depending on the circumstances, powerful "clot busting" medicines can be used to dissolve blood clots in the coronary artery. In other cases, you may be taken to a cardiac catheterization lab. Here, a tiny balloon-tipped catheter is advanced through blood vessels to the heart. There the balloon is inflated pushing open the blood vessel restoring blood flow.  Risk factors for heart disease  Risk factors for heart disease are a combination of genetic and lifestyle. Many risk factors work by either directly or indirectly damaging the blood vessels of the heart, or by increasing the risk of forming blood or cholesterol clots, which then clog up and block the arteries.     Examples of physical lifestyle risk factors:  · Cigarette smoking  · High blood pressure  · High blood cholesterol  · Use of stimulant drugs such as cocaine, crack, and amphetamines  · Eating a high-fat, high-cholesterol " meal  · Diabetes   · Obesity which increases risk for diabetes and high blood pressure  · Lack of regular physical activity     Examples of emotional lifestyle factors:  · Chronic high stress levels release stress hormones. These raise blood pressure and cholesterol level and makes blood clot more easily.  · Held-in anger, hostile or cynical attitude  · Social and emotional isolation, lack of intimacy  · Loss of relationship  · Depression  Other factors that increase the risk of heart attack that you cannot control :  · Age. The older you get beyond 40, the greater is your risk of significant coronary artery disease.  · Gender. More men than women get heart disease; but once past menopause, women who are not taking estrogen replacement have the same risk as men for a heart attack.  · Family history. If your mother, father, brother or sister has coronary artery disease, your risk of having it is higher than a person your age without this family history.  What can you do to decrease your risk  To reduce your risk of heart disease:  · Get regular checkups with your doctor.  · Take your medicines for blood pressure, cholesterol or diabetes as directed.  · Watch your diet. Eat a heart healthy diet choosing fresh foods, less salt, cholesterol, and fat  · Stop smoking. Get help if needed.  · Get regular exercise.  · Manage stress.  · Carry a list of medicines and doses in your wallet.  Date Last Reviewed: 12/30/2015  © 4681-0157 [employer]. [address] [place] [address], [place] [address]. All rights reserved. This information is not intended as a substitute for professional medical care. Always follow your healthcare professional's instructions.

## 2018-06-04 NOTE — PROGRESS NOTES
Subjective:   Patient ID:  Masoud Osorio is a 63 y.o. male who presents for follow up of Coronary Artery Disease; Hyperlipidemia; and Hypertension      HPI:     Masoud Osorio 63 y.o. male is here follow up and feeling well without any new complaints. He was admitted for NSTEMI that required PCI of RCA, PDA, and POBA of PLB with a preserved EF in 2016. He quit smoking. He is on aspirin and brilinta for DAPT witjh a DAPT score of 2. Stress test in 3/2017 for atypical chest pain revealed no reversible ischemia. He has been pain free since except for occasional reflux after evening meals. Admitted in 10/2017 with CP again. 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 Normal EF. ECG: NSR.               Patient Active Problem List    Diagnosis Date Noted    Chest pain 10/19/2017    Pain and swelling of left knee 10/11/2017    Mixed hyperlipidemia 2017    History of colon polyps 10/24/2016    Overweight (BMI 25.0-29.9) 2016    Long-term use of aspirin therapy 2016    History of coronary artery stent placement 2016    Visit for monitoring Plavix therapy 2016    Anxiety and depression 2016     trial of ctialopram and follow up in 8 weeks      Coronary artery disease involving native coronary artery of native heart without angina pectoris 2016 NSTEMI at [facility]     PCI of RCA with 4.0 x 15, 4.0 x 38, and 3.5 x 38 mm Resolute JACOB    PCI of PDA 2.75 x 15 mm JACOB dilated to 3.0  POBA of proximal PLB 2.0 x 12 mm balloon      Normal EF with LVEDP 15 mmHg  DAPT score of 2                        History of heart attack 2016     NSTEMI      Tobacco dependence in remission 2016           Right Arm BP - Sittin/77  Left Arm BP - Sittin/77        LABS    LAST HbA1c  Lab Results   Component Value Date    HGBA1C 5.6 2017       Lipid panel  Lab Results   Component Value Date    CHOL 116 (L)  04/24/2018    CHOL 116 (L) 10/20/2017    CHOL 121 09/28/2017     Lab Results   Component Value Date    HDL 41 04/24/2018    HDL 38 (L) 10/20/2017    HDL 36 (L) 09/28/2017     Lab Results   Component Value Date    LDLCALC 64.8 04/24/2018    LDLCALC 60.6 (L) 10/20/2017    LDLCALC 62.8 (L) 09/28/2017     Lab Results   Component Value Date    TRIG 51 04/24/2018    TRIG 87 10/20/2017    TRIG 111 09/28/2017     Lab Results   Component Value Date    CHOLHDL 35.3 04/24/2018    CHOLHDL 32.8 10/20/2017    CHOLHDL 29.8 09/28/2017            Review of Systems   Constitution: Negative for diaphoresis, weakness, night sweats, weight gain and weight loss.   HENT: Negative for congestion.    Eyes: Negative for blurred vision, discharge and double vision.   Cardiovascular: Negative for chest pain, claudication, cyanosis, dyspnea on exertion, irregular heartbeat, leg swelling, near-syncope, orthopnea, palpitations, paroxysmal nocturnal dyspnea and syncope.   Respiratory: Negative for cough, shortness of breath and wheezing.    Endocrine: Negative for cold intolerance, heat intolerance and polyphagia.   Hematologic/Lymphatic: Negative for adenopathy and bleeding problem. Does not bruise/bleed easily.   Skin: Negative for dry skin and nail changes.   Musculoskeletal: Negative for arthritis, back pain, falls, joint pain, myalgias and neck pain.   Gastrointestinal: Negative for bloating, abdominal pain, change in bowel habit and constipation.   Genitourinary: Negative for bladder incontinence, dysuria, flank pain, genital sores and missed menses.   Neurological: Negative for aphonia, brief paralysis, difficulty with concentration and dizziness.   Psychiatric/Behavioral: Negative for altered mental status and memory loss. The patient does not have insomnia.    Allergic/Immunologic: Negative for environmental allergies.       Objective:   Physical Exam   Constitutional: He is oriented to person, place, and time. He appears well-developed  and well-nourished. He is not intubated.   HENT:   Head: Normocephalic and atraumatic.   Right Ear: External ear normal.   Left Ear: External ear normal.   Mouth/Throat: Oropharynx is clear and moist.   Eyes: Conjunctivae and EOM are normal. Pupils are equal, round, and reactive to light. Right eye exhibits no discharge. Left eye exhibits no discharge. No scleral icterus.   Neck: Normal range of motion. Neck supple. Normal carotid pulses, no hepatojugular reflux and no JVD present. Carotid bruit is not present. No tracheal deviation present. No thyromegaly present.   Cardiovascular: Normal rate, regular rhythm, S1 normal and S2 normal.   No extrasystoles are present. PMI is not displaced.  Exam reveals no gallop, no S3, no distant heart sounds, no friction rub and no midsystolic click.    No murmur heard.  Pulses:       Carotid pulses are 2+ on the right side, and 2+ on the left side.       Radial pulses are 2+ on the right side, and 2+ on the left side.        Femoral pulses are 2+ on the right side, and 2+ on the left side.       Popliteal pulses are 2+ on the right side, and 2+ on the left side.        Dorsalis pedis pulses are 2+ on the right side, and 2+ on the left side.        Posterior tibial pulses are 2+ on the right side, and 2+ on the left side.   Pulmonary/Chest: Effort normal and breath sounds normal. No accessory muscle usage or stridor. No apnea, no tachypnea and no bradypnea. He is not intubated. No respiratory distress. He has no decreased breath sounds. He has no wheezes. He has no rales. He exhibits no tenderness and no bony tenderness.   Abdominal: He exhibits no distension, no pulsatile liver, no abdominal bruit, no ascites, no pulsatile midline mass and no mass. There is no tenderness. There is no rebound and no guarding.   Musculoskeletal: Normal range of motion. He exhibits no edema or tenderness.   Lymphadenopathy:     He has no cervical adenopathy.   Neurological: He is alert and oriented  to person, place, and time. He has normal reflexes. No cranial nerve deficit. Coordination normal.   Skin: Skin is warm. No rash noted. No erythema. No pallor.   Psychiatric: He has a normal mood and affect. His behavior is normal. Judgment and thought content normal.       Assessment:     1. Erectile dysfunction, unspecified erectile dysfunction type    2. Coronary artery disease involving native coronary artery of native heart without angina pectoris    3. Mixed hyperlipidemia    4. Tobacco dependence in remission        Plan:             Continue with current medical plan and lifestyle changes.  Return sooner for concerns or questions. If symptoms persist go to the ED  I have reviewed all pertinent data on this patient             DAPT for another year because of DAPT score of 2\  Plavix for the remaining 18 months   No beta-blocker because of fatigue  He can stop imdur for now  He can take medications for ED safely  If he has recurrent angina he will start low dose norvasc        Referral to urology for ED        Follow up as scheduled. Return sooner for concerns or questions        I have reviewed the patient's medical history in detail and updated the computerized patient record.    Orders Placed This Encounter   Procedures    US Abdominal Aorta     Standing Status:   Future     Standing Expiration Date:   6/4/2019     Order Specific Question:   May the Radiologist modify the order per protocol to meet the clinical needs of the patient?     Answer:   Yes    IN OFFICE EKG 12-LEAD (to Muse)     Order Specific Question:   Diagnosis     Answer:   CAD (coronary artery disease) [380330]       Follow up as scheduled. Return sooner for concerns or questions            He expressed verbal understanding and agreed with the plan        Patient's Medications   New Prescriptions    No medications on file   Previous Medications    ALPRAZOLAM (XANAX) 1 MG TABLET    TAKE 1 TABLET BY MOUTH DAILY AS NEEDED FOR ANXIETY     ASPIRIN (ECOTRIN) 81 MG EC TABLET    Take 1 tablet (81 mg total) by mouth once daily.    ATORVASTATIN (LIPITOR) 80 MG TABLET    TAKE 1 TABLET BY MOUTH DAILY    AZELASTINE (ASTELIN) 137 MCG (0.1 %) NASAL SPRAY    INSTILL 2 SPRAYS IN EACH NOSTRIL TWICE A DAY    CLOPIDOGREL (PLAVIX) 75 MG TABLET    TAKE 1 TABLET BY MOUTH DAILY    HYDROXYZINE HCL (ATARAX) 25 MG TABLET    TAKE 1/2 TO 1 TABLET BY MOUTH AT BEDTIME and also during the day as needed for anxiety    IPRATROPIUM (ATROVENT) 0.06 % NASAL SPRAY    INSTILL 2 SPRAYS IN EACH NOSTRIL EVERY 12 HOURS    MULTIVITAMIN CAPSULE    Take 1 capsule by mouth once daily.    NASAL ALLERGY 55 MCG NASAL INHALER    INSTILL 2 SPRAYS IN [facility] NOSTRIL EVERY DAY    NITROGLYCERIN (NITROSTAT) 0.4 MG SL TABLET    Place 1 tablet (0.4 mg total) under the tongue every 5 (five) minutes as needed for Chest pain.    VENLAFAXINE (EFFEXOR-XR) 75 MG 24 HR CAPSULE    Take 1 capsule (75 mg total) by mouth daily with breakfast.   Modified Medications    No medications on file   Discontinued Medications    ISOSORBIDE MONONITRATE (IMDUR) 30 MG 24 HR TABLET    Take 1 tablet (30 mg total) by mouth once daily.

## 2018-06-05 ENCOUNTER — OFFICE VISIT (OUTPATIENT)
Dept: UROLOGY | Facility: CLINIC | Age: 63
End: 2018-06-05
Payer: COMMERCIAL

## 2018-06-05 VITALS
HEART RATE: 74 BPM | HEIGHT: 68 IN | BODY MASS INDEX: 29.4 KG/M2 | WEIGHT: 194 LBS | DIASTOLIC BLOOD PRESSURE: 82 MMHG | SYSTOLIC BLOOD PRESSURE: 147 MMHG

## 2018-06-05 DIAGNOSIS — I25.10 CORONARY ARTERY DISEASE INVOLVING NATIVE CORONARY ARTERY OF NATIVE HEART WITHOUT ANGINA PECTORIS: ICD-10-CM

## 2018-06-05 DIAGNOSIS — I25.2 HISTORY OF HEART ATTACK: ICD-10-CM

## 2018-06-05 DIAGNOSIS — N13.8 BPH WITH OBSTRUCTION/LOWER URINARY TRACT SYMPTOMS: ICD-10-CM

## 2018-06-05 DIAGNOSIS — Z95.5 HISTORY OF CORONARY ARTERY STENT PLACEMENT: ICD-10-CM

## 2018-06-05 DIAGNOSIS — N40.1 BPH WITH OBSTRUCTION/LOWER URINARY TRACT SYMPTOMS: ICD-10-CM

## 2018-06-05 DIAGNOSIS — N52.9 ERECTILE DYSFUNCTION, UNSPECIFIED ERECTILE DYSFUNCTION TYPE: Primary | ICD-10-CM

## 2018-06-05 DIAGNOSIS — E66.3 OVERWEIGHT (BMI 25.0-29.9): ICD-10-CM

## 2018-06-05 PROCEDURE — 99999 PR PBB SHADOW E&M-EST. PATIENT-LVL III: CPT | Mod: PBBFAC,,, | Performed by: STUDENT IN AN ORGANIZED HEALTH CARE EDUCATION/TRAINING PROGRAM

## 2018-06-05 PROCEDURE — 99204 OFFICE O/P NEW MOD 45 MIN: CPT | Mod: 25,S$GLB,, | Performed by: STUDENT IN AN ORGANIZED HEALTH CARE EDUCATION/TRAINING PROGRAM

## 2018-06-05 PROCEDURE — 3008F BODY MASS INDEX DOCD: CPT | Mod: CPTII,S$GLB,, | Performed by: STUDENT IN AN ORGANIZED HEALTH CARE EDUCATION/TRAINING PROGRAM

## 2018-06-05 PROCEDURE — 81002 URINALYSIS NONAUTO W/O SCOPE: CPT | Mod: S$GLB,,, | Performed by: STUDENT IN AN ORGANIZED HEALTH CARE EDUCATION/TRAINING PROGRAM

## 2018-06-05 RX ORDER — TAMSULOSIN HYDROCHLORIDE 0.4 MG/1
0.4 CAPSULE ORAL DAILY
Qty: 90 CAPSULE | Refills: 3 | Status: SHIPPED | OUTPATIENT
Start: 2018-06-05 | End: 2019-06-06 | Stop reason: SDUPTHER

## 2018-06-05 NOTE — PROGRESS NOTES
"Subjective:       Patient ID: Masoud Osorio is a 63 y.o. male.    Chief Complaint:  Urinary symptoms and erections  This is a 63 y.o.  male patient that is new to me.  The patient is referred to me by his cardiologist Dr. Bailey  for erectile dysfunction. He does have a history of kidney stones which he passed kidney stones x2 about 10 years ago. He currently denies flank pain. He is here today mostly to discuss two issues - lower urinary tract symptoms and erectile dysfunction. He has a significant cardiologic history - he has a history of CAD as well as an NSTEMI 7/2016 requiring PCI. He takes plavix and also Nitroglycerin PRN.  Urinary symptoms-  About a few months ago he began to notice more straining with urination. He also notes that he has to strain more in order to "keep the urination going." he also notes occasional +intermittency of his urinary stream. He does feel like he is emptying well.  He has never been started on any medications for his prostate before.   Erections-  He notes that since he has been on cholesterol medications, plavix over the past year he has noticed a decrease in the quality of his erections. He does experience spontaneous erections. He state he is not sure if it is in his head but he was interested in options for erectile dysfunction. He is currently , he states they have a good relationship and her health is overall very good. After his MI in 2016 he did quit smoking in 2016.   Job - retired from [employer] earlier this year    POCT urinalysis today completely benign    LAST PSA  Lab Results   Component Value Date    PSA 1.5 09/23/2016     Lab Results   Component Value Date    CREATININE 0.8 04/24/2018     ---  Past Medical History:   Diagnosis Date    Anxiety and depression 9/21/2016    trial of ctialopram and follow up in 8 weeks    Coronary artery disease involving native coronary artery of native heart without angina pectoris 7/26/2016 7/26/2016 NSTEMI at [facility] " [facility]   PCI of RCA with 4.0 x 15, 4.0 x 38, and 3.5 x 38 mm Resolute JACOB   PCI of PDA 2.75 x 15 mm JACOB dilated to 3.0 POBA of proximal PLB 2.0 x 12 mm balloon   Normal EF with LVEDP 15 mmHg DAPT score of 2               Elevated PSA     Gout     History of coronary artery stent placement 9/21/2016    History of heart attack 7/25/2016    NSTEMI    Mixed hyperlipidemia 7/17/2017    Overweight (BMI 25.0-29.9) 9/21/2016    Tobacco dependence in remission 7/25/2016       Past Surgical History:   Procedure Laterality Date    COLONOSCOPY N/A 8/16/2017    Procedure: COLONOSCOPY Split Prep;  Surgeon: Maykel Carcamo Jr., MD;  Location: [facility];  Service: Endoscopy;  Laterality: N/A;    CORONARY ANGIOPLASTY WITH STENT PLACEMENT      HERNIA REPAIR      ROTATOR CUFF REPAIR      VASECTOMY         Family History   Problem Relation Age of Onset    Heart disease Mother     Arthritis Mother     Parkinsonism Mother     Congenital heart disease Father     No Known Problems Sister     No Known Problems Brother     No Known Problems Daughter     No Known Problems Sister     No Known Problems Sister     Prostate cancer Neg Hx     Kidney disease Neg Hx        Social History   Substance Use Topics    Smoking status: Former Smoker     Packs/day: 0.50     Types: Cigarettes     Quit date: 7/25/2016    Smokeless tobacco: Never Used    Alcohol use Yes      Comment: rarely       Current Outpatient Prescriptions on File Prior to Visit   Medication Sig Dispense Refill    ALPRAZolam (XANAX) 1 MG tablet TAKE 1 TABLET BY MOUTH DAILY AS NEEDED FOR ANXIETY 30 tablet 2    atorvastatin (LIPITOR) 80 MG tablet TAKE 1 TABLET BY MOUTH DAILY 90 tablet 3    azelastine (ASTELIN) 137 mcg (0.1 %) nasal spray INSTILL 2 SPRAYS IN EACH NOSTRIL TWICE A DAY  5    clopidogrel (PLAVIX) 75 mg tablet TAKE 1 TABLET BY MOUTH DAILY 90 tablet 2    hydrOXYzine HCl (ATARAX) 25 MG tablet TAKE 1/2 TO 1 TABLET BY MOUTH AT BEDTIME and also  during the day as needed for anxiety 45 tablet 5    ipratropium (ATROVENT) 0.06 % nasal spray INSTILL 2 SPRAYS IN EACH NOSTRIL EVERY 12 HOURS  5    multivitamin capsule Take 1 capsule by mouth once daily.      NASAL ALLERGY 55 mcg nasal inhaler INSTILL 2 SPRAYS IN [facility] NOSTRIL EVERY DAY  5    venlafaxine (EFFEXOR-XR) 75 MG 24 hr capsule Take 1 capsule (75 mg total) by mouth daily with breakfast. 30 capsule 2    aspirin (ECOTRIN) 81 MG EC tablet Take 1 tablet (81 mg total) by mouth once daily.  0    nitroGLYCERIN (NITROSTAT) 0.4 MG SL tablet Place 1 tablet (0.4 mg total) under the tongue every 5 (five) minutes as needed for Chest pain. 25 tablet 11     No current facility-administered medications on file prior to visit.        Review of patient's allergies indicates:   Allergen Reactions    Penicillins Hives       Review of Systems   Constitutional: Negative for chills.   HENT: Negative for congestion.    Eyes: Negative for visual disturbance.   Respiratory: Negative for shortness of breath.    Cardiovascular: Negative for chest pain.   Gastrointestinal: Negative for abdominal distention.   Musculoskeletal: Negative for gait problem.   Skin: Negative for color change.   Neurological: Negative for dizziness.   Psychiatric/Behavioral: Negative for agitation.       Objective:      Physical Exam   Constitutional: He appears well-developed and well-nourished.   HENT:   Head: Normocephalic.   Eyes: Pupils are equal, round, and reactive to light.   Neck: Normal range of motion.   Cardiovascular: Intact distal pulses.    Pulmonary/Chest: Effort normal.   Abdominal: Soft. He exhibits no distension. There is no tenderness.   Genitourinary:   Genitourinary Comments: Circumcised phallus, normal orthotopic meatus, bilaterally descended testicles.  SEBASTIAN - 35-40g smooth, no hard nodules   Musculoskeletal: Normal range of motion.   Neurological: He is alert.   Skin: Skin is warm and dry.   Psychiatric: He has a normal mood and  affect.       Assessment:       1. Erectile dysfunction, unspecified erectile dysfunction type    2. BPH with obstruction/lower urinary tract symptoms    3. History of coronary artery stent placement    4. Coronary artery disease involving native coronary artery of native heart without angina pectoris    5. History of heart attack    6. Overweight (BMI 25.0-29.9)        Plan:         1. BPH - LUTS - counseled about initiating a medication to help with his LUTS. He is amenable to starting flomax after discussing risks/benefits.    2. Erectile dysfunction - since he may need nitroglycerin PRN chest pain, PDE5 inhibitors are a contraindication. Also because of his blood thinner status, the external vacuum erection device would also not be an option. I counseled him that alprostadil is a medication that bypasses the phosphodiesterase inhibitor pathway and could be used. It comes in the form of alprostadil injections into the cavernosum of the penis or an intraurethral suppository. I counseled him that most men do not have successful results with the suppository. I would be very hesitant to recommend injections due to his blood thinner status. We will hold off on initiating any medications and concentrate on improving his voiding symptoms first.  3. RTC in 6 weeks - uroflow, PVR, AUA symptom score. PSA has been ordered by his PCP Dr. Ayala. Last PSA stable in 2016.      Erectile dysfunction, unspecified erectile dysfunction type    BPH with obstruction/lower urinary tract symptoms  -     tamsulosin (FLOMAX) 0.4 mg Cp24; Take 1 capsule (0.4 mg total) by mouth once daily.  Dispense: 90 capsule; Refill: 3    History of coronary artery stent placement    Coronary artery disease involving native coronary artery of native heart without angina pectoris    History of heart attack    Overweight (BMI 25.0-29.9)

## 2018-06-05 NOTE — LETTER
June 5, 2018      Keon Bailey MD  [address]  [address]  [facility] [address]           [place] [facility]  [address] [facility] [address]  Phone: [phone]          Patient: Masoud Osorio   MR Number: [id number]   YOB: 1955   Date of Visit: 6/5/2018       Dear Dr. Keon Bailey:    Thank you for referring Masoud Osorio to me for evaluation. Attached you will find relevant portions of my assessment and plan of care.    If you have questions, please do not hesitate to call me. I look forward to following Masoud Osorio along with you.    Sincerely,    Anny Kapoor MD    Enclosure  CC:  No Recipients    If you would like to receive this communication electronically, please contact [email or web address] or [phone] to request more information on [facility] access.    For providers and/or their staff who would like to refer a patient to [facility], please contact us through our one-stop-shop provider referral line, [facility] , at [phone].    If you feel you have received this communication in error or would no longer like to receive these types of communications, please e-mail [email or web address]

## 2018-06-05 NOTE — TELEPHONE ENCOUNTER
----- Message from Kajal Maria sent at 6/4/2018  4:40 PM CDT -----  Contact: [phone]/ self  Patient would like refill of clopidogrel (PLAVIX) 75 mg tablet sent to [facility] - [place], [place] - [address] . Please advise.

## 2018-06-08 RX ORDER — CLOPIDOGREL BISULFATE 75 MG/1
75 TABLET ORAL DAILY
Qty: 90 TABLET | Refills: 2 | Status: SHIPPED | OUTPATIENT
Start: 2018-06-08 | End: 2018-12-07 | Stop reason: SDUPTHER

## 2018-07-05 ENCOUNTER — PATIENT MESSAGE (OUTPATIENT)
Dept: UROLOGY | Facility: CLINIC | Age: 63
End: 2018-07-05

## 2018-07-05 ENCOUNTER — PATIENT MESSAGE (OUTPATIENT)
Dept: CARDIOLOGY | Facility: CLINIC | Age: 63
End: 2018-07-05

## 2018-08-02 DIAGNOSIS — F41.9 ANXIETY AND DEPRESSION: ICD-10-CM

## 2018-08-02 DIAGNOSIS — F32.A ANXIETY AND DEPRESSION: ICD-10-CM

## 2018-08-02 RX ORDER — VENLAFAXINE HYDROCHLORIDE 75 MG/1
75 CAPSULE, EXTENDED RELEASE ORAL
Qty: 30 CAPSULE | Refills: 0 | Status: SHIPPED | OUTPATIENT
Start: 2018-08-02 | End: 2018-08-24 | Stop reason: SDUPTHER

## 2018-08-09 ENCOUNTER — OFFICE VISIT (OUTPATIENT)
Dept: UROLOGY | Facility: CLINIC | Age: 63
End: 2018-08-09
Payer: COMMERCIAL

## 2018-08-09 VITALS
DIASTOLIC BLOOD PRESSURE: 95 MMHG | TEMPERATURE: 98 F | HEART RATE: 71 BPM | SYSTOLIC BLOOD PRESSURE: 156 MMHG | HEIGHT: 68 IN

## 2018-08-09 DIAGNOSIS — N13.8 BPH WITH OBSTRUCTION/LOWER URINARY TRACT SYMPTOMS: Primary | ICD-10-CM

## 2018-08-09 DIAGNOSIS — N40.1 BPH WITH OBSTRUCTION/LOWER URINARY TRACT SYMPTOMS: Primary | ICD-10-CM

## 2018-08-09 PROCEDURE — 51736 URINE FLOW MEASUREMENT: CPT | Mod: S$GLB,,, | Performed by: STUDENT IN AN ORGANIZED HEALTH CARE EDUCATION/TRAINING PROGRAM

## 2018-08-09 PROCEDURE — 99213 OFFICE O/P EST LOW 20 MIN: CPT | Mod: S$GLB,,, | Performed by: STUDENT IN AN ORGANIZED HEALTH CARE EDUCATION/TRAINING PROGRAM

## 2018-08-09 PROCEDURE — 51798 US URINE CAPACITY MEASURE: CPT | Mod: S$GLB,,, | Performed by: STUDENT IN AN ORGANIZED HEALTH CARE EDUCATION/TRAINING PROGRAM

## 2018-08-09 NOTE — PROGRESS NOTES
"Subjective:       Patient ID: Masoud Osorio is a 63 y.o. male.    Chief Complaint:  followup  This is a 63 y.o.  male patient that is an established patient of mine.  The patient is referred to me by his cardiologist Dr. Bailey  for erectile dysfunction. He does have a history of kidney stones which he passed kidney stones x2 about 10 years ago. He currently denies flank pain. He is here today mostly to discuss two issues - lower urinary tract symptoms and erectile dysfunction. He has a significant cardiologic history - he has a history of CAD as well as an NSTEMI 7/2016 requiring PCI. He takes plavix and also Nitroglycerin PRN.  Urinary symptoms-  About a few months ago he began to notice more straining with urination. He also notes that he has to strain more in order to "keep the urination going." he also notes occasional +intermittency of his urinary stream. He does feel like he is emptying well.  He has never been started on any medications for his prostate before.   Erections-  He notes that since he has been on cholesterol medications, plavix over the past year he has noticed a decrease in the quality of his erections. He does experience spontaneous erections. He state he is not sure if it is in his head but he was interested in options for erectile dysfunction. He is currently , he states they have a good relationship and her health is overall very good. After his MI in 2016 he did quit smoking in 2016.   Job - retired from [employer] earlier this year  POCT urinalysis at last visit was completely benign    8/9/18  At our last visit on 6/5/18 we started flomax daily to help with his straining, intermittency. He states he has been very happy with the flomax therapy. He has been taking it every morning without any adverse consequence. He and his wife are enjoying [employer]. They recently returned back from a roadtrip in their RV to multiple states in the [facility].   Uroflow today - low volume study " 87cc; qmax 5.9ml/s, qavg 2.7ml/s.  PVR 59cc    His AUA symptom score today was 2/1 pleased  Incomplete emptying 0  Frequency:  1  Intermittency:  0  Urgency:  0  Weak stream:  0  Strainin  Nocturia:  1  Bother:   1, Pleased    LAST PSA  Lab Results   Component Value Date    PSA 1.5 2016       Lab Results   Component Value Date    CREATININE 0.8 2018        ---  Past Medical History:   Diagnosis Date    Anxiety and depression 2016    trial of ctialopram and follow up in 8 weeks    Coronary artery disease involving native coronary artery of native heart without angina pectoris 2016 [facility]STEMI at [facility]   PCI of RCA with 4.0 x 15, 4.0 x 38, and 3.5 x 38 mm Resolute JACOB   PCI of PDA 2.75 x 15 mm JACOB dilated to 3.0 POBA of proximal PLB 2.0 x 12 mm balloon   Normal EF with LVEDP 15 mmHg DAPT score of 2               Elevated PSA     Gout     History of coronary artery stent placement 2016    History of heart attack 2016    NSTEMI    Mixed hyperlipidemia 2017    Overweight (BMI 25.0-29.9) 2016    Tobacco dependence in remission 2016     Past Surgical History:   Procedure Laterality Date    COLONOSCOPY N/A 2017    Procedure: COLONOSCOPY Split Prep;  Surgeon: Maykel Carcamo Jr., MD;  Location: [facility];  Service: Endoscopy;  Laterality: N/A;    CORONARY ANGIOPLASTY WITH STENT PLACEMENT      HERNIA REPAIR      ROTATOR CUFF REPAIR      VASECTOMY         Family History   Problem Relation Age of Onset    Heart disease Mother     Arthritis Mother     Parkinsonism Mother     Congenital heart disease Father     No Known Problems Sister     No Known Problems Brother     No Known Problems Daughter     No Known Problems Sister     No Known Problems Sister     Prostate cancer Neg Hx     Kidney disease Neg Hx        Social History   Substance Use Topics    Smoking status: Former Smoker     Packs/day: 0.50     Types: Cigarettes     Quit  date: 7/25/2016    Smokeless tobacco: Never Used    Alcohol use Yes      Comment: rarely       Current Outpatient Prescriptions on File Prior to Visit   Medication Sig Dispense Refill    ALPRAZolam (XANAX) 1 MG tablet TAKE 1 TABLET BY MOUTH DAILY AS NEEDED FOR ANXIETY 30 tablet 2    aspirin (ECOTRIN) 81 MG EC tablet Take 1 tablet (81 mg total) by mouth once daily.  0    atorvastatin (LIPITOR) 80 MG tablet TAKE 1 TABLET BY MOUTH DAILY 90 tablet 3    azelastine (ASTELIN) 137 mcg (0.1 %) nasal spray INSTILL 2 SPRAYS IN EACH NOSTRIL TWICE A DAY  5    clopidogrel (PLAVIX) 75 mg tablet Take 1 tablet (75 mg total) by mouth once daily. 90 tablet 2    hydrOXYzine HCl (ATARAX) 25 MG tablet TAKE 1/2 TO 1 TABLET BY MOUTH AT BEDTIME and also during the day as needed for anxiety 45 tablet 5    ipratropium (ATROVENT) 0.06 % nasal spray INSTILL 2 SPRAYS IN EACH NOSTRIL EVERY 12 HOURS  5    multivitamin capsule Take 1 capsule by mouth once daily.      NASAL ALLERGY 55 mcg nasal inhaler INSTILL 2 SPRAYS IN [facility] NOSTRIL EVERY DAY  5    nitroGLYCERIN (NITROSTAT) 0.4 MG SL tablet Place 1 tablet (0.4 mg total) under the tongue every 5 (five) minutes as needed for Chest pain. 25 tablet 11    tamsulosin (FLOMAX) 0.4 mg Cp24 Take 1 capsule (0.4 mg total) by mouth once daily. 90 capsule 3    venlafaxine (EFFEXOR-XR) 75 MG 24 hr capsule Take 1 capsule (75 mg total) by mouth daily with breakfast. 30 capsule 0     No current facility-administered medications on file prior to visit.        Review of patient's allergies indicates:   Allergen Reactions    Penicillins Hives       Review of Systems   Constitutional: Negative for chills.   HENT: Negative for congestion.    Eyes: Negative for visual disturbance.   Respiratory: Negative for shortness of breath.    Cardiovascular: Negative for chest pain.   Gastrointestinal: Negative for abdominal distention.   Musculoskeletal: Negative for gait problem.   Skin: Negative for color change.    Neurological: Negative for dizziness.   Psychiatric/Behavioral: Negative for agitation.       Objective:      Physical Exam   Constitutional: He appears well-developed and well-nourished.   HENT:   Head: Normocephalic.   Eyes: Pupils are equal, round, and reactive to light.   Neck: Normal range of motion.   Cardiovascular: Intact distal pulses.    Pulmonary/Chest: Effort normal.   Abdominal: Soft. He exhibits no distension.   Musculoskeletal: Normal range of motion.   Neurological: He is alert.   Skin: Skin is warm and dry.   Psychiatric: He has a normal mood and affect.       Assessment:       1. BPH with obstruction/lower urinary tract symptoms        Plan:         1. Patient is very satisfied with flomax therapy for BPH. His voiding patterns are very satisfactory to him now. Continue on flomax therapy, have 90 day supply with refills.   2. Patient is enjoying jail, getting to travel. I look forward to seeing him in 6 months for a uroflow urine study, bladder scan, symptom score survey, and more stories about his travels.      BPH with obstruction/lower urinary tract symptoms

## 2018-08-23 ENCOUNTER — TELEPHONE (OUTPATIENT)
Dept: FAMILY MEDICINE | Facility: CLINIC | Age: 63
End: 2018-08-23

## 2018-08-24 ENCOUNTER — OFFICE VISIT (OUTPATIENT)
Dept: FAMILY MEDICINE | Facility: CLINIC | Age: 63
End: 2018-08-24
Payer: COMMERCIAL

## 2018-08-24 VITALS
HEIGHT: 68 IN | TEMPERATURE: 99 F | HEART RATE: 76 BPM | WEIGHT: 195.13 LBS | OXYGEN SATURATION: 96 % | SYSTOLIC BLOOD PRESSURE: 117 MMHG | BODY MASS INDEX: 29.57 KG/M2 | DIASTOLIC BLOOD PRESSURE: 70 MMHG

## 2018-08-24 DIAGNOSIS — S61.239A PUNCTURE WOUND OF FINGER OF LEFT HAND, INITIAL ENCOUNTER: ICD-10-CM

## 2018-08-24 DIAGNOSIS — M77.12 LEFT LATERAL EPICONDYLITIS: Primary | ICD-10-CM

## 2018-08-24 DIAGNOSIS — F32.A ANXIETY AND DEPRESSION: ICD-10-CM

## 2018-08-24 DIAGNOSIS — F41.9 ANXIETY AND DEPRESSION: ICD-10-CM

## 2018-08-24 PROCEDURE — 99214 OFFICE O/P EST MOD 30 MIN: CPT | Mod: S$GLB,,, | Performed by: FAMILY MEDICINE

## 2018-08-24 PROCEDURE — 99999 PR PBB SHADOW E&M-EST. PATIENT-LVL IV: CPT | Mod: PBBFAC,,, | Performed by: FAMILY MEDICINE

## 2018-08-24 PROCEDURE — 3008F BODY MASS INDEX DOCD: CPT | Mod: CPTII,S$GLB,, | Performed by: FAMILY MEDICINE

## 2018-08-24 RX ORDER — LEVOFLOXACIN 500 MG/1
500 TABLET, FILM COATED ORAL DAILY
Refills: 0 | COMMUNITY
Start: 2018-08-16 | End: 2018-08-24

## 2018-08-24 RX ORDER — VENLAFAXINE HYDROCHLORIDE 75 MG/1
75 CAPSULE, EXTENDED RELEASE ORAL
Qty: 30 CAPSULE | Refills: 11 | Status: SHIPPED | OUTPATIENT
Start: 2018-08-24 | End: 2018-12-05

## 2018-08-24 NOTE — PATIENT INSTRUCTIONS
"Over the counter pain therapies (creams/patch):    1. Lidocaine patch    2. aspercreme    3. "2 old goats"    4. "blue emu"    5. salonpas     7. biofreeze      Treating Tennis Elbow    Your treatment will depend on how inflamed your tendon is. The goal is to relieve your symptoms and help you regain full use of your elbow.  Rest and medicine  Wearing a tennis elbow splint allows the inflamed tendon to rest. It must be worn properly. It should be placed down the arm past the painful area of the elbow. If it is directly over the inflamed tendon, it can worsen the symptoms. This brace can help the tendon heal. Using your other hand or changing your  also takes stress off the tendon. Oral nonsteroidal anti-inflammatory medicines (NSAIDs) and/or ice can relieve pain and reduce swelling.  Exercises and therapy  Your healthcare provider may give you an exercise program. He or she may refer you to a therapist. The therapist will teach you to gently stretch and then strengthen the muscles around your elbow.  Anti-inflammatory injections  Your healthcare provider may give you injections of an anti-inflammatory, such as cortisone. This helps reduce swelling. You may have more pain at first. But in a few days, your elbow should feel better.  If surgery is needed  If your symptoms persist for a long time, or other treatments dont work, your healthcare provider may recommend surgery. Surgery repairs the inflamed tendon.   Date Last Reviewed: 9/26/2015  © 2786-9358 [employer]. [address] [place] [address], [place] [address]. All rights reserved. This information is not intended as a substitute for professional medical care. Always follow your healthcare professional's instructions.        "

## 2018-08-24 NOTE — PROGRESS NOTES
(Portions of this note were dictated using voice recognition software and may contain dictation related errors in spelling/grammar/syntax not found on text review)    CC:   Chief Complaint   Patient presents with    Elbow Pain     Left Elbow    Medication Refill       HPI: 63 y.o. male   Pt of Rosemarie Ayala MD here for urgent care visit for elbow pain    Going on for the last couple of weeks.  He thinks he accidentally hit his left elbow on the surface and may have hit it again.  Pain on the lateral elbow, no other joint pain.  There is some bruising noted here.  He does take aspirin and Plavix secondary to CAD and JACOB placement.  He has not tried any medications or other therapies for the above pains.  He is right handed.    Also was doing some go to work yesterday and has a puncture wound left index finger distal aspect.  There was some swelling there which is going down.  He did have some very slight serous drainage. He states that he had tried superficially explore the area but did not see any foreign body.  Denies any severe pain or paresthesias.    Also has anxiety, had failed citalopram and Zoloft.  Dr. Ayala post recently start him on Effexor 75 mg daily.  He is satisfied with this medication and would like a refill.            Past Medical History:   Diagnosis Date    Anxiety and depression 9/21/2016    trial of ctialopram and follow up in 8 weeks    Coronary artery disease involving native coronary artery of native heart without angina pectoris 7/26/2016 7/26/2016 [facility]I at [facility]   PCI of RCA with 4.0 x 15, 4.0 x 38, and 3.5 x 38 mm Resolute JACOB   PCI of PDA 2.75 x 15 mm JACOB dilated to 3.0 POBA of proximal PLB 2.0 x 12 mm balloon   Normal EF with LVEDP 15 mmHg DAPT score of 2               Elevated PSA     Gout     History of coronary artery stent placement 9/21/2016    History of heart attack 7/25/2016    NSTEMI    Mixed hyperlipidemia 7/17/2017    Overweight (BMI 25.0-29.9)  2016    Tobacco dependence in remission 2016       Past Surgical History:   Procedure Laterality Date    CORONARY ANGIOPLASTY WITH STENT PLACEMENT      HERNIA REPAIR      ROTATOR CUFF REPAIR      VASECTOMY         Family History   Problem Relation Age of Onset    Heart disease Mother     Arthritis Mother     Parkinsonism Mother     Congenital heart disease Father     No Known Problems Sister     No Known Problems Brother     No Known Problems Daughter     No Known Problems Sister     No Known Problems Sister     Prostate cancer Neg Hx     Kidney disease Neg Hx        Social History     Socioeconomic History    Marital status:      Spouse name: Not on file    Number of children: Not on file    Years of education: Not on file    Highest education level: Not on file   Social Needs    Financial resource strain: Not on file    Food insecurity - worry: Not on file    Food insecurity - inability: Not on file    Transportation needs - medical: Not on file    Transportation needs - non-medical: Not on file   Occupational History    Not on file   Tobacco Use    Smoking status: Former Smoker     Packs/day: 0.50     Types: Cigarettes     Last attempt to quit: 2016     Years since quittin.0    Smokeless tobacco: Never Used   Substance and Sexual Activity    Alcohol use: Yes     Comment: rarely    Drug use: No    Sexual activity: Not on file   Other Topics Concern    Not on file   Social History Narrative    Not on file       ROS:  GENERAL: No fever, chills, fatigability or weight loss.  SKIN:  Above  HEAD: No headaches.  EYES: No visual changes  EARS: No ear pain or changes in hearing.  NOSE: No congestion or rhinorrhea.  MOUTH & THROAT: No hoarseness, change in voice, or sore throat.  NODES: Denies swollen glands.  CHEST: Denies MATTA, cyanosis, wheezing, cough and sputum production.  CARDIOVASCULAR: Denies chest pain, PND, orthopnea.  ABDOMEN: No nausea, vomiting, or  changes in bowel function.  URINARY: No flank pain, dysuria or hematuria.  PERIPHERAL VASCULAR: No claudication or cyanosis.  MUSCULOSKELETAL:  Above  NEUROLOGIC: No weakness or numbness.  PSYCHIATRIC:  Feels like his anxiety is stable    Vital signs reviewed  PE:   APPEARANCE: Well nourished, well developed, in no acute distress.    HEAD: Normocephalic, atraumatic.  EYES:    Conjunctivae noninjected.  MSK:  Pain at left lateral epicondyle, worse with hand gripping and resisted wrist extension but not flexion.  No other deformity although there are some very slight swelling noted at the lateral epicondyle.  There is some faint ecchymosis noted here as well.  SKIN:  As above some ecchymosis noted at the left lateral epicondyle.  Also puncture wound to left distal phalanx of index finger.  Slight swelling of the tuft but nontender to palpation.  Very sparse serous drainage on compression.  No exudate.  No induration.  No erythema  PSYCHIATRIC:  Affect appropriate, mood is euthymic    IMPRESSION  1. Left lateral epicondylitis     2. Puncture wound of finger of left hand, initial encounter     3. Anxiety and depression  venlafaxine (EFFEXOR-XR) 75 MG 24 hr capsule           PLAN  Left lateral epicondylitis:  Advise symptomatic management.  Avoid NSAIDs secondary to his health history and current medication usage.  Can try Tylenol as needed for pain control.  Advise local ice application.  Advise tennis elbow brace.  Advise slow wrist extensor muscle stretching.  Notify symptoms are significantly worsening.  Given trauma, could x-ray to rule out any bony injury.  Could consider trial of directed corticosteroid injection for persistent symptoms    Puncture wound of index finger on left hand.  No complicating factors at this time such as infection noted.  His symptoms do seem to be getting better.  He is using topical antibiotic ointment on this and can continue to do so.  Can also entertain ice application locally.  For  any worsening redness, drainage, swelling, pain of the index finger, he may need further evaluation    Anxiety:  Improved on venlafaxine XR 75 mg daily.  Medication has been refilled    Tdap ordered but not available in clinic today.  He will be sent down to pharmacy to have this administered

## 2018-11-08 ENCOUNTER — PATIENT MESSAGE (OUTPATIENT)
Dept: CARDIOLOGY | Facility: CLINIC | Age: 63
End: 2018-11-08

## 2018-11-08 ENCOUNTER — CLINICAL SUPPORT (OUTPATIENT)
Dept: FAMILY MEDICINE | Facility: CLINIC | Age: 63
End: 2018-11-08
Payer: COMMERCIAL

## 2018-11-08 PROCEDURE — 90686 IIV4 VACC NO PRSV 0.5 ML IM: CPT | Mod: S$GLB,,, | Performed by: FAMILY MEDICINE

## 2018-11-08 PROCEDURE — 90471 IMMUNIZATION ADMIN: CPT | Mod: S$GLB,,, | Performed by: FAMILY MEDICINE

## 2018-12-04 ENCOUNTER — PATIENT MESSAGE (OUTPATIENT)
Dept: FAMILY MEDICINE | Facility: CLINIC | Age: 63
End: 2018-12-04

## 2018-12-05 ENCOUNTER — OFFICE VISIT (OUTPATIENT)
Dept: FAMILY MEDICINE | Facility: CLINIC | Age: 63
End: 2018-12-05
Payer: COMMERCIAL

## 2018-12-05 ENCOUNTER — LAB VISIT (OUTPATIENT)
Dept: LAB | Facility: HOSPITAL | Age: 63
End: 2018-12-05
Attending: FAMILY MEDICINE
Payer: COMMERCIAL

## 2018-12-05 ENCOUNTER — TELEPHONE (OUTPATIENT)
Dept: FAMILY MEDICINE | Facility: CLINIC | Age: 63
End: 2018-12-05

## 2018-12-05 VITALS
OXYGEN SATURATION: 97 % | HEIGHT: 69 IN | BODY MASS INDEX: 29.55 KG/M2 | HEART RATE: 67 BPM | WEIGHT: 199.5 LBS | TEMPERATURE: 98 F | SYSTOLIC BLOOD PRESSURE: 151 MMHG | DIASTOLIC BLOOD PRESSURE: 87 MMHG

## 2018-12-05 DIAGNOSIS — N40.1 BENIGN PROSTATIC HYPERPLASIA (BPH) WITH STRAINING ON URINATION: ICD-10-CM

## 2018-12-05 DIAGNOSIS — Z79.899 MEDICATION MANAGEMENT: ICD-10-CM

## 2018-12-05 DIAGNOSIS — M54.50 CHRONIC BILATERAL LOW BACK PAIN WITHOUT SCIATICA: ICD-10-CM

## 2018-12-05 DIAGNOSIS — G89.29 CHRONIC BILATERAL LOW BACK PAIN WITHOUT SCIATICA: ICD-10-CM

## 2018-12-05 DIAGNOSIS — Z51.81 VISIT FOR MONITORING PLAVIX THERAPY: ICD-10-CM

## 2018-12-05 DIAGNOSIS — F41.9 ANXIETY AND DEPRESSION: ICD-10-CM

## 2018-12-05 DIAGNOSIS — F17.201 TOBACCO DEPENDENCE IN REMISSION: Primary | ICD-10-CM

## 2018-12-05 DIAGNOSIS — J30.89 NON-SEASONAL ALLERGIC RHINITIS, UNSPECIFIED TRIGGER: ICD-10-CM

## 2018-12-05 DIAGNOSIS — L57.8 CHRONIC ACTINIC DERMATITIS: ICD-10-CM

## 2018-12-05 DIAGNOSIS — I25.2 HISTORY OF HEART ATTACK: ICD-10-CM

## 2018-12-05 DIAGNOSIS — E66.3 OVERWEIGHT (BMI 25.0-29.9): ICD-10-CM

## 2018-12-05 DIAGNOSIS — Z79.82 LONG-TERM USE OF ASPIRIN THERAPY: ICD-10-CM

## 2018-12-05 DIAGNOSIS — E78.2 MIXED HYPERLIPIDEMIA: ICD-10-CM

## 2018-12-05 DIAGNOSIS — F17.201 TOBACCO DEPENDENCE IN REMISSION: ICD-10-CM

## 2018-12-05 DIAGNOSIS — I25.10 CORONARY ARTERY DISEASE INVOLVING NATIVE CORONARY ARTERY OF NATIVE HEART WITHOUT ANGINA PECTORIS: ICD-10-CM

## 2018-12-05 DIAGNOSIS — Z00.00 ROUTINE GENERAL MEDICAL EXAMINATION AT A HEALTH CARE FACILITY: Primary | ICD-10-CM

## 2018-12-05 DIAGNOSIS — Z86.010 HISTORY OF COLON POLYPS: ICD-10-CM

## 2018-12-05 DIAGNOSIS — Z79.02 VISIT FOR MONITORING PLAVIX THERAPY: ICD-10-CM

## 2018-12-05 DIAGNOSIS — F32.A ANXIETY AND DEPRESSION: ICD-10-CM

## 2018-12-05 DIAGNOSIS — R39.16 BENIGN PROSTATIC HYPERPLASIA (BPH) WITH STRAINING ON URINATION: ICD-10-CM

## 2018-12-05 DIAGNOSIS — Z95.5 HISTORY OF CORONARY ARTERY STENT PLACEMENT: ICD-10-CM

## 2018-12-05 DIAGNOSIS — Z12.5 ENCOUNTER FOR PROSTATE CANCER SCREENING: ICD-10-CM

## 2018-12-05 LAB
ALBUMIN SERPL BCP-MCNC: 3.9 G/DL
ALP SERPL-CCNC: 84 U/L
ALT SERPL W/O P-5'-P-CCNC: 31 U/L
ANION GAP SERPL CALC-SCNC: 8 MMOL/L
AST SERPL-CCNC: 43 U/L
BASOPHILS # BLD AUTO: 0.02 K/UL
BASOPHILS NFR BLD: 0.3 %
BILIRUB SERPL-MCNC: 0.8 MG/DL
BUN SERPL-MCNC: 19 MG/DL
CALCIUM SERPL-MCNC: 9.6 MG/DL
CHLORIDE SERPL-SCNC: 107 MMOL/L
CHOLEST SERPL-MCNC: 132 MG/DL
CHOLEST/HDLC SERPL: 3.1 {RATIO}
CO2 SERPL-SCNC: 26 MMOL/L
COMPLEXED PSA SERPL-MCNC: 1.3 NG/ML
CREAT SERPL-MCNC: 0.9 MG/DL
DIFFERENTIAL METHOD: NORMAL
EOSINOPHIL # BLD AUTO: 0.2 K/UL
EOSINOPHIL NFR BLD: 2.9 %
ERYTHROCYTE [DISTWIDTH] IN BLOOD BY AUTOMATED COUNT: 13.5 %
EST. GFR  (AFRICAN AMERICAN): >60 ML/MIN/1.73 M^2
EST. GFR  (NON AFRICAN AMERICAN): >60 ML/MIN/1.73 M^2
ESTIMATED AVG GLUCOSE: 114 MG/DL
GLUCOSE SERPL-MCNC: 96 MG/DL
HBA1C MFR BLD HPLC: 5.6 %
HCT VFR BLD AUTO: 48.1 %
HDLC SERPL-MCNC: 43 MG/DL
HDLC SERPL: 32.6 %
HGB BLD-MCNC: 15.8 G/DL
LDLC SERPL CALC-MCNC: 68 MG/DL
LYMPHOCYTES # BLD AUTO: 2.8 K/UL
LYMPHOCYTES NFR BLD: 40.3 %
MCH RBC QN AUTO: 28.4 PG
MCHC RBC AUTO-ENTMCNC: 32.8 G/DL
MCV RBC AUTO: 87 FL
MONOCYTES # BLD AUTO: 0.8 K/UL
MONOCYTES NFR BLD: 11.6 %
NEUTROPHILS # BLD AUTO: 3.1 K/UL
NEUTROPHILS NFR BLD: 44.6 %
NONHDLC SERPL-MCNC: 89 MG/DL
PLATELET # BLD AUTO: 279 K/UL
PMV BLD AUTO: 9.7 FL
POTASSIUM SERPL-SCNC: 3.7 MMOL/L
PROT SERPL-MCNC: 6.9 G/DL
RBC # BLD AUTO: 5.56 M/UL
SODIUM SERPL-SCNC: 141 MMOL/L
TRIGL SERPL-MCNC: 105 MG/DL
TSH SERPL DL<=0.005 MIU/L-ACNC: 2.37 UIU/ML
WBC # BLD AUTO: 6.89 K/UL

## 2018-12-05 PROCEDURE — 84153 ASSAY OF PSA TOTAL: CPT

## 2018-12-05 PROCEDURE — 80053 COMPREHEN METABOLIC PANEL: CPT

## 2018-12-05 PROCEDURE — 85025 COMPLETE CBC W/AUTO DIFF WBC: CPT

## 2018-12-05 PROCEDURE — 80061 LIPID PANEL: CPT

## 2018-12-05 PROCEDURE — 83036 HEMOGLOBIN GLYCOSYLATED A1C: CPT

## 2018-12-05 PROCEDURE — 84443 ASSAY THYROID STIM HORMONE: CPT

## 2018-12-05 PROCEDURE — 36415 COLL VENOUS BLD VENIPUNCTURE: CPT

## 2018-12-05 PROCEDURE — 99999 PR PBB SHADOW E&M-EST. PATIENT-LVL IV: CPT | Mod: PBBFAC,,, | Performed by: FAMILY MEDICINE

## 2018-12-05 PROCEDURE — 99396 PREV VISIT EST AGE 40-64: CPT | Mod: S$GLB,,, | Performed by: FAMILY MEDICINE

## 2018-12-05 RX ORDER — ESCITALOPRAM OXALATE 20 MG/1
20 TABLET ORAL DAILY
Qty: 90 TABLET | Refills: 3 | Status: SHIPPED | OUTPATIENT
Start: 2018-12-05 | End: 2019-12-24

## 2018-12-05 RX ORDER — ASCORBIC ACID 500 MG
500 TABLET ORAL DAILY
Status: ON HOLD | COMMUNITY
End: 2021-06-15 | Stop reason: CLARIF

## 2018-12-05 NOTE — PATIENT INSTRUCTIONS
"Keep blood pressure log and notify if readings consistently > 140/90.     Wear sun protection and advise on "ABCDEs." yearly skin eval.     Trial of Lexapro 20 mg daily for management of anxiety/outbursts.  Try 1/2 pill daily for 2 weeks then increase to 1 whole pill daily as tolerated  "

## 2018-12-05 NOTE — PROGRESS NOTES
Office Visit    Patient Name: Masoud Osorio    : 1955  MRN: [id number]    Subjective:  Masoud is a 63 y.o. male who presents today for:    Annual Exam    Masoud Osorio presents today for annual wellness exam and monitoring of chronic conditions: CAD s/p MI and stent, overweight BMI, h/o tobacco abuse, HLD, BPH improved with Flomax, anxiety/ depression managed with Effexor. Had labs this morning and all look good except very mildly AST 43-- all others WNL.     He also follows with Dr. Bailey cardiology (last seen 2018) due to history of NSTEMI in 2016.  Long-term medical management includes Lipitor 80/ aspirin 81 mg daily/ Plavix 75 mg daily      They have been feeling overall well.  Saw Dr Roa 18 for L lateral epicondylitis and these symptoms improved.  He continues to have significant outbursts along with intermittent feelings of anxiety-- would like to try something in place of Effexor. Also some mild low back-- worse with recent weight gain and decreased activity-- may be interested in muscle relaxer if symptoms persist.      General lifestyle habits are as follows:  Diet is described as heart healthy-- low sodium with plenty of fresh fruits and veggies, exercise is described as poor-- needs to try to ride bikes more like he was previously, sleep is described as fair-- overall improved with jail but still with some restlessness and on a better sleep schedule. Weight is up about 5 lbs in the last year.      Immunizations: TDaP 2018, shingles vaccine 2017, FLU shot 2018     Screening Tests: colonoscopy 17- repeat 3-5 years, PSA 1.5 2016 & repeat ordered (had SEBASTIAN w/ urology 18), needs AAA screening by age 65 secondary to smoking history-- reordered (prev ordered by Dr Bailey but not done), Hep C (-) 17     Eye/Dental: up to date with both- though needs to schedule eye exam    Past Medical History  Past Medical History:   Diagnosis Date    Anxiety and  depression 9/21/2016    trial of ctialopram and follow up in 8 weeks    Benign prostatic hyperplasia (BPH) with straining on urination 12/5/2018    Coronary artery disease involving native coronary artery of native heart without angina pectoris 7/26/2016 7/26/2016 [facility]STEMI at [facility]   PCI of RCA with 4.0 x 15, 4.0 x 38, and 3.5 x 38 mm Resolute JACOB   PCI of PDA 2.75 x 15 mm JACOB dilated to 3.0 POBA of proximal PLB 2.0 x 12 mm balloon   Normal EF with LVEDP 15 mmHg DAPT score of 2               Elevated PSA     Gout     History of coronary artery stent placement 9/21/2016    History of heart attack 7/25/2016    NSTEMI    Mixed hyperlipidemia 7/17/2017    Overweight (BMI 25.0-29.9) 9/21/2016    Tobacco dependence in remission 7/25/2016       Past Surgical History  Past Surgical History:   Procedure Laterality Date    COLONOSCOPY N/A 8/16/2017    Procedure: COLONOSCOPY Split Prep;  Surgeon: Maykel Carcamo Jr., MD;  Location: [facility] ENDO;  Service: Endoscopy;  Laterality: N/A;    COLONOSCOPY Split Prep N/A 8/16/2017    Performed by Maykel Carcamo Jr., MD at [facility]    CORONARY ANGIOPLASTY WITH STENT PLACEMENT      HERNIA REPAIR      ROTATOR CUFF REPAIR      VASECTOMY         Family History  Family History   Problem Relation Age of Onset    Heart disease Mother     Arthritis Mother     Parkinsonism Mother     Congenital heart disease Father     No Known Problems Sister     No Known Problems Brother     No Known Problems Daughter     No Known Problems Sister     No Known Problems Sister     Prostate cancer Neg Hx     Kidney disease Neg Hx        Social History  Social History     Socioeconomic History    Marital status:      Spouse name: Not on file    Number of children: Not on file    Years of education: Not on file    Highest education level: Not on file   Social Needs    Financial resource strain: Not on file    Food insecurity - worry: Not on file    Food insecurity  "- inability: Not on file    Transportation needs - medical: Not on file    Transportation needs - non-medical: Not on file   Occupational History    Not on file   Tobacco Use    Smoking status: Former Smoker     Packs/day: 0.50     Types: Cigarettes     Last attempt to quit: 2016     Years since quittin.3    Smokeless tobacco: Never Used   Substance and Sexual Activity    Alcohol use: Yes     Comment: rarely    Drug use: No    Sexual activity: Not on file   Other Topics Concern    Not on file   Social History Narrative    Not on file       Current Medications  Medications reviewed and updated.     Allergies   Review of patient's allergies indicates:   Allergen Reactions    Penicillins Hives       Review of Systems (Pertinent positives)  Review of Systems   Constitutional: Positive for unexpected weight change (some weight gain).   HENT: Positive for dental problem (following with periodontist).    Eyes: Negative for visual disturbance.   Respiratory: Negative for shortness of breath.    Cardiovascular: Negative for chest pain.   Gastrointestinal: Negative for constipation and diarrhea.   Genitourinary: Negative for dysuria, flank pain and hematuria.   Musculoskeletal: Positive for back pain.   Neurological: Negative for dizziness.   Psychiatric/Behavioral: Positive for agitation. Negative for sleep disturbance. The patient is nervous/anxious.        BP (!) 151/87   Pulse 67   Temp 98.1 °F (36.7 °C)   Ht 5' 8.5" (1.74 m)   Wt 90.5 kg (199 lb 8.3 oz)   SpO2 97%   BMI 29.90 kg/m²     Physical Exam   Constitutional: He is oriented to person, place, and time. He appears well-developed and well-nourished. No distress.   HENT:   Head: Normocephalic and atraumatic.   Right Ear: External ear normal.   Left Ear: External ear normal.   Nose: Nose normal.   Mouth/Throat: Oropharynx is clear and moist. No oropharyngeal exudate.   Eyes: Conjunctivae are normal. No scleral icterus.   Neck: No tracheal " deviation present. No thyromegaly present.   Cardiovascular: Normal rate, regular rhythm, normal heart sounds and intact distal pulses.   Pulmonary/Chest: Effort normal and breath sounds normal.   Abdominal: Soft. Bowel sounds are normal. He exhibits no distension and no mass. There is no tenderness. No hernia.   Musculoskeletal: Normal range of motion.   Lymphadenopathy:     He has no cervical adenopathy.   Neurological: He is alert and oriented to person, place, and time. He has normal reflexes.   Skin: Skin is warm and dry. No rash noted.   Patient asked that I evaluate some different spots on his chest and scalp.  He does have some underlying sun damage/actinic type dermatitis on his scalp but no overt actinic keratosis seen.  No concerns for melanomas.  He does have several seborrheic keratoses some of which are inflamed, but again no suspicious lesions   Psychiatric: He has a normal mood and affect.   Vitals reviewed.        Assessment/Plan:  Masoud Osorio is a 63 y.o. male who presents today for :    Masoud was seen today for annual exam.    Diagnoses and all orders for this visit:    Routine general medical examination at a health care facility  Comments:  health maintenance reviewed: needs to schedule AAA screenign ordered by cardiology, OTW UTD. advised on diet, exercise, eye/dental exams    Overweight (BMI 25.0-29.9)    Coronary artery disease involving native coronary artery of native heart without angina pectoris  Comments:  follows with Cardiology Dr Bailey. Continue ASA/Plavix/Lipitor    History of coronary artery stent placement  Comments:  remains on dual antiplatelet therapy-asa, plavix, no complications    History of heart attack    Long-term use of aspirin therapy    Visit for monitoring Plavix therapy    Mixed hyperlipidemia  Comments:  continue lipitor, lipids ordered    Tobacco dependence in remission  -     AAA Screening (Cupid Only); Future    Anxiety and depression  Comments:  trial of  LEXAPRO instead of Effexor.   Orders:  -     escitalopram oxalate (LEXAPRO) 20 MG tablet; Take 1 tablet (20 mg total) by mouth once daily.    Medication management    Benign prostatic hyperplasia (BPH) with straining on urination  Comments:  s/p eval by urology Dr Kapoor, symptoms improved on Flomax     History of colon polyps  Comments:  due for repeat colonoscopy 8/2020-8/2022 per 3-5 yr recall recs    Chronic bilateral low back pain without sciatica    Chronic actinic dermatitis  Comments:  Primarily of scalp, advised on wearing a hat, consistent use of sun protection    Non-seasonal allergic rhinitis, unspecified trigger  Comments:  Managed by outside ENT doctor Raya.  Continue follow-up and regular nasal spray            ICD-10-CM ICD-9-CM    1. Routine general medical examination at a health care facility Z00.00 V70.0     health maintenance reviewed: needs to schedule AAA screenign ordered by cardiology, OTW UTD. advised on diet, exercise, eye/dental exams   2. Overweight (BMI 25.0-29.9) E66.3 278.02    3. Coronary artery disease involving native coronary artery of native heart without angina pectoris I25.10 414.01     follows with Cardiology Dr Bailey. Continue ASA/Plavix/Lipitor   4. History of coronary artery stent placement Z95.5 V45.82     remains on dual antiplatelet therapy-asa, plavix, no complications   5. History of heart attack I25.2 412    6. Long-term use of aspirin therapy Z79.82 V58.66    7. Visit for monitoring Plavix therapy Z51.81 V58.83     Z79.02 V58.63    8. Mixed hyperlipidemia E78.2 272.2     continue lipitor, lipids ordered   9. Tobacco dependence in remission F17.201 V15.82 AAA Screening (Cupid Only)   10. Anxiety and depression F41.9 300.00 escitalopram oxalate (LEXAPRO) 20 MG tablet    F32.9 311     trial of LEXAPRO instead of Effexor.    11. Medication management Z79.899 V58.69    12. Benign prostatic hyperplasia (BPH) with straining on urination N40.1 600.01     R39.16 788.65      "s/p eval by urology Dr Kapoor, symptoms improved on Flomax    13. History of colon polyps Z86.010 V12.72     due for repeat colonoscopy 8/2020-8/2022 per 3-5 yr recall recs   14. Chronic bilateral low back pain without sciatica M54.5 724.2     G89.29 338.29    15. Chronic actinic dermatitis L57.8 692.74     Primarily of scalp, advised on wearing a hat, consistent use of sun protection   16. Non-seasonal allergic rhinitis, unspecified trigger J30.89 477.8     Managed by outside ENT doctor Raya.  Continue follow-up and regular nasal spray       Patient Instructions   Keep blood pressure log and notify if readings consistently > 140/90.     Wear sun protection and advise on "ABCDEs." yearly skin eval.     Trial of Lexapro 20 mg daily for management of anxiety/outbursts.  Try 1/2 pill daily for 2 weeks then increase to 1 whole pill daily as tolerated        Follow-up in about 6 months (around 6/5/2019) for return as needed for new concerns.  "

## 2018-12-05 NOTE — TELEPHONE ENCOUNTER
----- Message from Johanny Bartlett sent at 12/5/2018  3:19 PM CST -----  Regarding: AAA Ultasound  Please change AAA Ultrasound (cupid only) orders to regular AAA ultrasound.

## 2018-12-06 ENCOUNTER — HOSPITAL ENCOUNTER (OUTPATIENT)
Dept: RADIOLOGY | Facility: HOSPITAL | Age: 63
Discharge: HOME | End: 2018-12-06
Attending: FAMILY MEDICINE
Payer: COMMERCIAL

## 2018-12-06 DIAGNOSIS — F17.201 TOBACCO DEPENDENCE IN REMISSION: ICD-10-CM

## 2018-12-06 PROCEDURE — 76775 US EXAM ABDO BACK WALL LIM: CPT | Mod: TC

## 2018-12-06 PROCEDURE — 76775 US EXAM ABDO BACK WALL LIM: CPT | Mod: 26,,, | Performed by: RADIOLOGY

## 2018-12-07 DIAGNOSIS — F41.9 ANXIETY: ICD-10-CM

## 2018-12-07 DIAGNOSIS — I25.10 CORONARY ARTERY DISEASE INVOLVING NATIVE CORONARY ARTERY OF NATIVE HEART WITHOUT ANGINA PECTORIS: ICD-10-CM

## 2018-12-10 RX ORDER — ALPRAZOLAM 1 MG/1
TABLET ORAL
Qty: 30 TABLET | Refills: 5 | Status: SHIPPED | OUTPATIENT
Start: 2018-12-10 | End: 2019-12-20 | Stop reason: ALTCHOICE

## 2018-12-10 RX ORDER — CLOPIDOGREL BISULFATE 75 MG/1
TABLET ORAL
Qty: 90 TABLET | Refills: 2 | Status: SHIPPED | OUTPATIENT
Start: 2018-12-10 | End: 2019-04-29 | Stop reason: SDUPTHER

## 2019-01-15 ENCOUNTER — PATIENT MESSAGE (OUTPATIENT)
Dept: CARDIOLOGY | Facility: CLINIC | Age: 64
End: 2019-01-15

## 2019-01-15 ENCOUNTER — PATIENT MESSAGE (OUTPATIENT)
Dept: UROLOGY | Facility: CLINIC | Age: 64
End: 2019-01-15

## 2019-01-15 ENCOUNTER — PATIENT MESSAGE (OUTPATIENT)
Dept: FAMILY MEDICINE | Facility: CLINIC | Age: 64
End: 2019-01-15

## 2019-01-22 ENCOUNTER — PATIENT MESSAGE (OUTPATIENT)
Dept: CARDIOLOGY | Facility: CLINIC | Age: 64
End: 2019-01-22

## 2019-03-26 ENCOUNTER — TELEPHONE (OUTPATIENT)
Dept: CARDIOLOGY | Facility: CLINIC | Age: 64
End: 2019-03-26

## 2019-03-26 ENCOUNTER — PATIENT MESSAGE (OUTPATIENT)
Dept: CARDIOLOGY | Facility: CLINIC | Age: 64
End: 2019-03-26

## 2019-03-26 NOTE — TELEPHONE ENCOUNTER
----- Message from Benjamin Monsalve NP sent at 3/26/2019 12:07 PM CDT -----  Please schedule him with Dr Bailey

## 2019-04-15 ENCOUNTER — PATIENT MESSAGE (OUTPATIENT)
Dept: CARDIOLOGY | Facility: CLINIC | Age: 64
End: 2019-04-15

## 2019-04-15 DIAGNOSIS — I25.10 CORONARY ARTERY DISEASE INVOLVING NATIVE CORONARY ARTERY OF NATIVE HEART WITHOUT ANGINA PECTORIS: ICD-10-CM

## 2019-04-15 RX ORDER — ATORVASTATIN CALCIUM 80 MG/1
TABLET, FILM COATED ORAL
Qty: 90 TABLET | Refills: 3 | Status: CANCELLED | OUTPATIENT
Start: 2019-04-15

## 2019-04-15 NOTE — TELEPHONE ENCOUNTER
----- Message from Milind Maria sent at 4/15/2019  9:09 AM CDT -----  Contact: self/[phone]  Type:  RX Refill Request    Who Called: Masoud  Refill or New Rx: refill  RX Name and Strength: atorvastatin (LIPITOR) 80 MG tablet  How is the patient currently taking it? (ex. 1XDay): 1Xday  Is this a 30 day or 90 day RX: 90  Preferred Pharmacy with phone number: [facility] [place] [facility]  Local or Mail Order: Local  Ordering Provider: Dr. Bailey  Would the patient rather a call back or a response via [facility]? Call back  Best Call Back Number: [phone]  Additional Information: none

## 2019-04-16 DIAGNOSIS — I25.10 CORONARY ARTERY DISEASE INVOLVING NATIVE CORONARY ARTERY OF NATIVE HEART WITHOUT ANGINA PECTORIS: ICD-10-CM

## 2019-04-16 RX ORDER — ATORVASTATIN CALCIUM 80 MG/1
TABLET, FILM COATED ORAL
Qty: 90 TABLET | Refills: 3 | Status: CANCELLED | OUTPATIENT
Start: 2019-04-15

## 2019-04-17 ENCOUNTER — PATIENT MESSAGE (OUTPATIENT)
Dept: CARDIOLOGY | Facility: CLINIC | Age: 64
End: 2019-04-17

## 2019-04-17 DIAGNOSIS — I25.10 CORONARY ARTERY DISEASE INVOLVING NATIVE CORONARY ARTERY OF NATIVE HEART WITHOUT ANGINA PECTORIS: ICD-10-CM

## 2019-04-17 RX ORDER — ATORVASTATIN CALCIUM 80 MG/1
TABLET, FILM COATED ORAL
Qty: 90 TABLET | Refills: 3 | Status: SHIPPED | OUTPATIENT
Start: 2019-04-17 | End: 2019-04-17 | Stop reason: SDUPTHER

## 2019-04-17 NOTE — TELEPHONE ENCOUNTER
----- Message from Constance Rangel sent at 4/17/2019 11:47 AM CDT -----  Contact: self / [phone]  Patient is requesting a refill on the below. Please advise        atorvastatin (LIPITOR) 80 MG tablet      atorvastatin (LIPITOR) 80 MG tablet

## 2019-04-18 RX ORDER — ATORVASTATIN CALCIUM 80 MG/1
TABLET, FILM COATED ORAL
Qty: 90 TABLET | Refills: 3 | Status: SHIPPED | OUTPATIENT
Start: 2019-04-18 | End: 2020-05-11 | Stop reason: SDUPTHER

## 2019-04-29 ENCOUNTER — OFFICE VISIT (OUTPATIENT)
Dept: CARDIOLOGY | Facility: CLINIC | Age: 64
End: 2019-04-29
Payer: COMMERCIAL

## 2019-04-29 VITALS
HEART RATE: 77 BPM | SYSTOLIC BLOOD PRESSURE: 126 MMHG | BODY MASS INDEX: 30.29 KG/M2 | WEIGHT: 199.88 LBS | DIASTOLIC BLOOD PRESSURE: 78 MMHG | HEIGHT: 68 IN

## 2019-04-29 DIAGNOSIS — I25.10 CORONARY ARTERY DISEASE INVOLVING NATIVE CORONARY ARTERY OF NATIVE HEART WITHOUT ANGINA PECTORIS: Primary | ICD-10-CM

## 2019-04-29 DIAGNOSIS — Z95.5 HISTORY OF CORONARY ARTERY STENT PLACEMENT: ICD-10-CM

## 2019-04-29 DIAGNOSIS — E78.2 MIXED HYPERLIPIDEMIA: ICD-10-CM

## 2019-04-29 PROCEDURE — 99999 PR PBB SHADOW E&M-EST. PATIENT-LVL III: CPT | Mod: PBBFAC,,, | Performed by: INTERNAL MEDICINE

## 2019-04-29 PROCEDURE — 3008F BODY MASS INDEX DOCD: CPT | Mod: CPTII,S$GLB,, | Performed by: INTERNAL MEDICINE

## 2019-04-29 PROCEDURE — 99999 PR PBB SHADOW E&M-EST. PATIENT-LVL III: ICD-10-PCS | Mod: PBBFAC,,, | Performed by: INTERNAL MEDICINE

## 2019-04-29 PROCEDURE — 3008F PR BODY MASS INDEX (BMI) DOCUMENTED: ICD-10-PCS | Mod: CPTII,S$GLB,, | Performed by: INTERNAL MEDICINE

## 2019-04-29 PROCEDURE — 99214 PR OFFICE/OUTPT VISIT, EST, LEVL IV, 30-39 MIN: ICD-10-PCS | Mod: S$GLB,,, | Performed by: INTERNAL MEDICINE

## 2019-04-29 PROCEDURE — 99214 OFFICE O/P EST MOD 30 MIN: CPT | Mod: S$GLB,,, | Performed by: INTERNAL MEDICINE

## 2019-04-29 RX ORDER — CLOPIDOGREL BISULFATE 75 MG/1
TABLET ORAL
Qty: 90 TABLET | Refills: 3 | Status: SHIPPED | OUTPATIENT
Start: 2019-04-29 | End: 2019-12-20 | Stop reason: SDUPTHER

## 2019-04-29 RX ORDER — NITROGLYCERIN 0.4 MG/1
0.4 TABLET SUBLINGUAL EVERY 5 MIN PRN
Qty: 20 TABLET | Refills: 11 | Status: SHIPPED | OUTPATIENT
Start: 2019-04-29 | End: 2019-12-24 | Stop reason: SDUPTHER

## 2019-04-29 NOTE — PROGRESS NOTES
Subjective:   Patient ID:  Masoud Osorio is a 64 y.o. male who presents for follow up of Coronary Artery Disease; Hyperlipidemia; and yearly cardiac follow up      HPI:     Masoud Osorio 64 y.o. male is here follow up and feeling well without any new complaints. He was admitted for NSTEMI that required PCI of RCA, PDA, and POBA of PLB with a preserved EF in 7/2016. He quit smoking. He is on aspirin and brilinta for DAPT witjh a DAPT score of 2. Stress test in 3/2017 for atypical chest pain revealed no reversible ischemia. He has been pain free since except for occasional reflux after evening meals. Admitted in 10/2017 with CP again. 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 Normal EF. ECG 6/2018: NSR.       Reviewed his medications and labs from 12/2018                  Patient Active Problem List    Diagnosis Date Noted    Benign prostatic hyperplasia (BPH) with straining on urination 12/05/2018    Chronic actinic dermatitis 12/05/2018    Chronic bilateral low back pain without sciatica 12/05/2018    Non-seasonal allergic rhinitis 12/05/2018     Managed by outside ENT doctor Raya.  Continue follow-up and regular nasal spray      Pain and swelling of left knee 10/11/2017    Mixed hyperlipidemia 07/17/2017    History of colon polyps 10/24/2016    Overweight (BMI 25.0-29.9) 09/21/2016    Long-term use of aspirin therapy 09/21/2016    History of coronary artery stent placement 09/21/2016    Visit for monitoring Plavix therapy 09/21/2016    Anxiety and depression 09/21/2016     trial of ctialopram and follow up in 8 weeks      Coronary artery disease involving native coronary artery of native heart without angina pectoris 07/26/2016 7/26/2016 NSTEMI at [facility]     PCI of RCA with 4.0 x 15, 4.0 x 38, and 3.5 x 38 mm Resolute JACOB    PCI of PDA 2.75 x 15 mm JACOB dilated to 3.0  POBA of proximal PLB 2.0 x 12 mm balloon      Normal EF with LVEDP 15  mmHg  DAPT score of 2                        History of heart attack 2016     NSTEMI      Tobacco dependence in remission 2016           Right Arm BP - Sittin/78  Left Arm BP - Sittin/78        LABS    LAST HbA1c  Lab Results   Component Value Date    HGBA1C 5.6 2018       Lipid panel  Lab Results   Component Value Date    CHOL 132 2018    CHOL 116 (L) 2018    CHOL 116 (L) 10/20/2017     Lab Results   Component Value Date    HDL 43 2018    HDL 41 2018    HDL 38 (L) 10/20/2017     Lab Results   Component Value Date    LDLCALC 68.0 2018    LDLCALC 64.8 2018    LDLCALC 60.6 (L) 10/20/2017     Lab Results   Component Value Date    TRIG 105 2018    TRIG 51 2018    TRIG 87 10/20/2017     Lab Results   Component Value Date    CHOLHDL 32.6 2018    CHOLHDL 35.3 2018    CHOLHDL 32.8 10/20/2017            Review of Systems   Constitution: Negative for diaphoresis, night sweats, weight gain and weight loss.   HENT: Negative for congestion.    Eyes: Negative for blurred vision, discharge and double vision.   Cardiovascular: Negative for chest pain, claudication, cyanosis, dyspnea on exertion, irregular heartbeat, leg swelling, near-syncope, orthopnea, palpitations, paroxysmal nocturnal dyspnea and syncope.   Respiratory: Negative for cough, shortness of breath and wheezing.    Endocrine: Negative for cold intolerance, heat intolerance and polyphagia.   Hematologic/Lymphatic: Negative for adenopathy and bleeding problem. Does not bruise/bleed easily.   Skin: Negative for dry skin and nail changes.   Musculoskeletal: Negative for arthritis, back pain, falls, joint pain, myalgias and neck pain.   Gastrointestinal: Negative for bloating, abdominal pain, change in bowel habit and constipation.   Genitourinary: Negative for bladder incontinence, dysuria, flank pain, genital sores and missed menses.   Neurological: Negative for aphonia, brief  paralysis, difficulty with concentration, dizziness and weakness.   Psychiatric/Behavioral: Negative for altered mental status and memory loss. The patient does not have insomnia.    Allergic/Immunologic: Negative for environmental allergies.       Objective:   Physical Exam   Constitutional: He is oriented to person, place, and time. He appears well-developed and well-nourished. He is not intubated.   HENT:   Head: Normocephalic and atraumatic.   Right Ear: External ear normal.   Left Ear: External ear normal.   Mouth/Throat: Oropharynx is clear and moist.   Eyes: Pupils are equal, round, and reactive to light. Conjunctivae and EOM are normal. Right eye exhibits no discharge. Left eye exhibits no discharge. No scleral icterus.   Neck: Normal range of motion. Neck supple. Normal carotid pulses, no hepatojugular reflux and no JVD present. Carotid bruit is not present. No tracheal deviation present. No thyromegaly present.   Cardiovascular: Normal rate, regular rhythm, S1 normal and S2 normal.  No extrasystoles are present. PMI is not displaced. Exam reveals no gallop, no S3, no distant heart sounds, no friction rub and no midsystolic click.   No murmur heard.  Pulses:       Carotid pulses are 2+ on the right side, and 2+ on the left side.       Radial pulses are 2+ on the right side, and 2+ on the left side.        Femoral pulses are 2+ on the right side, and 2+ on the left side.       Popliteal pulses are 2+ on the right side, and 2+ on the left side.        Dorsalis pedis pulses are 2+ on the right side, and 2+ on the left side.        Posterior tibial pulses are 2+ on the right side, and 2+ on the left side.   Pulmonary/Chest: Effort normal and breath sounds normal. No accessory muscle usage or stridor. No apnea, no tachypnea and no bradypnea. He is not intubated. No respiratory distress. He has no decreased breath sounds. He has no wheezes. He has no rales. He exhibits no tenderness and no bony tenderness.    Abdominal: He exhibits no distension, no pulsatile liver, no abdominal bruit, no ascites, no pulsatile midline mass and no mass. There is no tenderness. There is no rebound and no guarding.   Musculoskeletal: Normal range of motion. He exhibits no edema or tenderness.   Lymphadenopathy:     He has no cervical adenopathy.   Neurological: He is alert and oriented to person, place, and time. He has normal reflexes. No cranial nerve deficit. Coordination normal.   Skin: Skin is warm. No rash noted. No erythema. No pallor.   Psychiatric: He has a normal mood and affect. His behavior is normal. Judgment and thought content normal.       Assessment:     1. Coronary artery disease involving native coronary artery of native heart without angina pectoris    2. Mixed hyperlipidemia    3. History of coronary artery stent placement        Plan:             Continue with current medical plan and lifestyle changes.  Return sooner for concerns or questions. If symptoms persist go to the ED  I have reviewed all pertinent data on this patient         DAPT for another year because of DAPT score of 2  No beta-blocker because of fatigue    He can take medications for ED safely  If he has recurrent angina he will start low dose norvasc        Referral to urology for ED  Consider sleep study to rule out NATALIE  Adjust sleeping pattern  Reduce nocturnal stimuli-no phone or tv in the bedroom       Follow up as scheduled. Return sooner for concerns or questions        I have reviewed the patient's medical history in detail and updated the computerized patient record.      Follow up as scheduled. Return sooner for concerns or questions        He expressed verbal understanding and agreed with the plan        Patient's Medications   New Prescriptions    PREDNISONE (DELTASONE) 20 MG TABLET    On days 1-3 take 1 and 1/2  tablet by mouth once daily, on days 4 and 5 take 1 tablet once daily. On day 6 take 1/2 tablet once.   Previous Medications     ALPRAZOLAM (XANAX) 1 MG TABLET    TAKE 1 TABLET BY MOUTH DAILY AS NEEDED FOR ANXIETY    ASCORBIC ACID, VITAMIN C, (VITAMIN C) 500 MG TABLET    Take 500 mg by mouth once daily.    ASPIRIN (ECOTRIN) 81 MG EC TABLET    Take 1 tablet (81 mg total) by mouth once daily.    ATORVASTATIN (LIPITOR) 80 MG TABLET    TAKE 1 TABLET BY MOUTH DAILY    AZELASTINE (ASTELIN) 137 MCG (0.1 %) NASAL SPRAY    INSTILL 2 SPRAYS IN EACH NOSTRIL TWICE A DAY    AZELASTINE (ASTELIN) 137 MCG (0.1 %) NASAL SPRAY    ADMINISTER TWO SPRAYS IN EACH NOSTRIL TWICE A DAY    CEPHALEXIN (KEFLEX) 500 MG CAPSULE    Take 1 capsule (500 mg total) by mouth 2 (two) times daily.    ESCITALOPRAM OXALATE (LEXAPRO) 20 MG TABLET    Take 1 tablet (20 mg total) by mouth once daily.    GARLIC EXTRACT ORAL    Take by mouth.    HYDROCODONE-ACETAMINOPHEN (NORCO) 7.5-325 MG PER TABLET    Take 1 tablet by mouth every 6 (six) hours.    HYDROXYZINE HCL (ATARAX) 25 MG TABLET    TAKE 1/2 TO 1 TABLET BY MOUTH AT BEDTIME and also during the day as needed for anxiety    IPRATROPIUM (ATROVENT) 0.06 % NASAL SPRAY    INSTILL 2 SPRAYS IN EACH NOSTRIL EVERY 12 HOURS    IPRATROPIUM (ATROVENT) 42 MCG (0.06 %) NASAL SPRAY    ADMINISTER TWO SPRAYS IN EACH NOSTRIL EVERY 12 HOURS    LEVOFLOXACIN (LEVAQUIN) 500 MG TABLET    TAKE ONE TABLET BY MOUTH DAILY    LEVOFLOXACIN (LEVAQUIN) 500 MG TABLET    Take 1 tablet (500 mg total) by mouth once daily.    MULTIVITAMIN CAPSULE    Take 1 capsule by mouth once daily.    NASAL ALLERGY 55 MCG NASAL INHALER    INSTILL 2 SPRAYS IN [facility] NOSTRIL EVERY DAY    OSELTAMIVIR (TAMIFLU) 75 MG CAPSULE    Take 1 capsule by mouth twice daily.    PROMETHAZINE-CODEINE 6.25-10 MG/5 ML (PHENERGAN WITH CODEINE) 6.25-10 MG/5 ML SYRUP    Take 5 to 10 ml's by mouth esdras 8 hours    TAMSULOSIN (FLOMAX) 0.4 MG CAP    Take 1 capsule (0.4 mg total) by mouth once daily.   Modified Medications    Modified Medication Previous Medication    CLOPIDOGREL (PLAVIX) 75 MG TABLET  clopidogrel (PLAVIX) 75 mg tablet       TAKE ONE TABLET BY MOUTH DAILY    TAKE ONE TABLET BY MOUTH DAILY    NITROGLYCERIN (NITROSTAT) 0.4 MG SL TABLET nitroGLYCERIN (NITROSTAT) 0.4 MG SL tablet       Place 1 tablet (0.4 mg total) under the tongue every 5 (five) minutes as needed for Chest pain.    Place 1 tablet (0.4 mg total) under the tongue every 5 (five) minutes as needed for Chest pain.   Discontinued Medications

## 2019-06-06 ENCOUNTER — OFFICE VISIT (OUTPATIENT)
Dept: UROLOGY | Facility: CLINIC | Age: 64
End: 2019-06-06
Payer: COMMERCIAL

## 2019-06-06 VITALS
TEMPERATURE: 98 F | HEART RATE: 69 BPM | SYSTOLIC BLOOD PRESSURE: 144 MMHG | DIASTOLIC BLOOD PRESSURE: 84 MMHG | BODY MASS INDEX: 30.16 KG/M2 | HEIGHT: 68 IN | WEIGHT: 199 LBS

## 2019-06-06 DIAGNOSIS — N40.1 BPH WITH OBSTRUCTION/LOWER URINARY TRACT SYMPTOMS: Primary | ICD-10-CM

## 2019-06-06 DIAGNOSIS — Z12.5 ENCOUNTER FOR PROSTATE CANCER SCREENING: ICD-10-CM

## 2019-06-06 DIAGNOSIS — N13.8 BPH WITH OBSTRUCTION/LOWER URINARY TRACT SYMPTOMS: Primary | ICD-10-CM

## 2019-06-06 LAB
BILIRUB SERPL-MCNC: NORMAL MG/DL
BLOOD URINE, POC: NORMAL
COLOR, POC UA: YELLOW
GLUCOSE UR QL STRIP: NORMAL
KETONES UR QL STRIP: NORMAL
LEUKOCYTE ESTERASE URINE, POC: NORMAL
NITRITE, POC UA: NORMAL
PH, POC UA: 5
POC RESIDUAL URINE VOLUME: 84 ML (ref 0–100)
PROTEIN, POC: NORMAL
SPECIFIC GRAVITY, POC UA: 1
UROBILINOGEN, POC UA: NORMAL

## 2019-06-06 PROCEDURE — 81002 PR URINALYSIS NONAUTO W/O SCOPE: ICD-10-PCS | Mod: S$GLB,,, | Performed by: STUDENT IN AN ORGANIZED HEALTH CARE EDUCATION/TRAINING PROGRAM

## 2019-06-06 PROCEDURE — 99999 PR PBB SHADOW E&M-EST. PATIENT-LVL V: CPT | Mod: PBBFAC,,, | Performed by: STUDENT IN AN ORGANIZED HEALTH CARE EDUCATION/TRAINING PROGRAM

## 2019-06-06 PROCEDURE — 99214 OFFICE O/P EST MOD 30 MIN: CPT | Mod: 25,S$GLB,, | Performed by: STUDENT IN AN ORGANIZED HEALTH CARE EDUCATION/TRAINING PROGRAM

## 2019-06-06 PROCEDURE — 81002 URINALYSIS NONAUTO W/O SCOPE: CPT | Mod: S$GLB,,, | Performed by: STUDENT IN AN ORGANIZED HEALTH CARE EDUCATION/TRAINING PROGRAM

## 2019-06-06 PROCEDURE — 51741 ELECTRO-UROFLOWMETRY FIRST: CPT | Mod: S$GLB,,, | Performed by: STUDENT IN AN ORGANIZED HEALTH CARE EDUCATION/TRAINING PROGRAM

## 2019-06-06 PROCEDURE — 99999 PR PBB SHADOW E&M-EST. PATIENT-LVL V: ICD-10-PCS | Mod: PBBFAC,,, | Performed by: STUDENT IN AN ORGANIZED HEALTH CARE EDUCATION/TRAINING PROGRAM

## 2019-06-06 PROCEDURE — 99214 PR OFFICE/OUTPT VISIT, EST, LEVL IV, 30-39 MIN: ICD-10-PCS | Mod: 25,S$GLB,, | Performed by: STUDENT IN AN ORGANIZED HEALTH CARE EDUCATION/TRAINING PROGRAM

## 2019-06-06 PROCEDURE — 3008F PR BODY MASS INDEX (BMI) DOCUMENTED: ICD-10-PCS | Mod: CPTII,S$GLB,, | Performed by: STUDENT IN AN ORGANIZED HEALTH CARE EDUCATION/TRAINING PROGRAM

## 2019-06-06 PROCEDURE — 51798 PR MEAS,POST-VOID RES,US,NON-IMAGING: ICD-10-PCS | Mod: S$GLB,,, | Performed by: STUDENT IN AN ORGANIZED HEALTH CARE EDUCATION/TRAINING PROGRAM

## 2019-06-06 PROCEDURE — 3008F BODY MASS INDEX DOCD: CPT | Mod: CPTII,S$GLB,, | Performed by: STUDENT IN AN ORGANIZED HEALTH CARE EDUCATION/TRAINING PROGRAM

## 2019-06-06 PROCEDURE — 51741 PR UROFLOWMETRY, COMPLEX: ICD-10-PCS | Mod: S$GLB,,, | Performed by: STUDENT IN AN ORGANIZED HEALTH CARE EDUCATION/TRAINING PROGRAM

## 2019-06-06 PROCEDURE — 51798 US URINE CAPACITY MEASURE: CPT | Mod: S$GLB,,, | Performed by: STUDENT IN AN ORGANIZED HEALTH CARE EDUCATION/TRAINING PROGRAM

## 2019-06-06 RX ORDER — TAMSULOSIN HYDROCHLORIDE 0.4 MG/1
0.4 CAPSULE ORAL DAILY
Qty: 90 CAPSULE | Refills: 3 | Status: SHIPPED | OUTPATIENT
Start: 2019-06-06 | End: 2019-12-20 | Stop reason: ALTCHOICE

## 2019-06-06 NOTE — Clinical Note
Gregorio Patel, a mutual patient of ours was in the office today and wanted me to double check with you if you could refill his atorvastatin if it hasn't already been done. He prefers the mail order (it's input in the system as his preferred pharmacy). Sincerely,Anny

## 2019-06-06 NOTE — PROGRESS NOTES
"Subjective:       Patient ID: Masoud Osorio is a 64 y.o. male.    Chief Complaint: Benign Prostatic Hypertrophy  This is a 64 y.o.  male patient that is an established patient of mine.  .  The patient is referred to me by his cardiologist Dr. Bailey  for erectile dysfunction. He does have a history of kidney stones which he passed kidney stones x2 about 10 years ago. He currently denies flank pain. He is here today mostly to discuss two issues - lower urinary tract symptoms and erectile dysfunction. He has a significant cardiologic history - he has a history of CAD as well as an NSTEMI 7/2016 requiring PCI. He takes plavix and also Nitroglycerin PRN.  Urinary symptoms-  About a few months ago he began to notice more straining with urination. He also notes that he has to strain more in order to "keep the urination going." he also notes occasional +intermittency of his urinary stream. He does feel like he is emptying well.  He has never been started on any medications for his prostate before.   Erections-  He notes that since he has been on cholesterol medications, plavix over the past year he has noticed a decrease in the quality of his erections. He does experience spontaneous erections. He state he is not sure if it is in his head but he was interested in options for erectile dysfunction. He is currently , he states they have a good relationship and her health is overall very good. After his MI in 2016 he did quit smoking in 2016.   Job - retired from [employer] earlier this year  POCT urinalysis at last visit was completely benign    8/9/18  At our last visit on 6/5/18 we started flomax daily to help with his straining, intermittency. He states he has been very happy with the flomax therapy. He has been taking it every morning without any adverse consequence. He and his wife are enjoying [facility]. They recently returned back from a roadtrip in [place]   Uroflow " today - low volume study 87cc; qmax 5.9ml/s, qavg 2.7ml/s.  PVR 59cc    His AUA symptom score today was 2/1 pleased  Incomplete emptying 0  Frequency:  1  Intermittency:  0  Urgency:  0  Weak stream:  0  Strainin  Nocturia:  1  Bother:   1, Pleased    19  The patient returns back today for a followup. He is still satisfied with his medical management of BPH. He needs a flomax refill. He has been doing well during [facility], bought a house and he is doing some fixes in the house before he and his wife moves in. His PSAs are checked by his PCP Dr. Ayala. Last PSA 2018 1.3 stable.     Uroflow - low volume study 39cc  Bladder scan - 84cc  POCT urinalysis completely benign      LAST PSA  Lab Results   Component Value Date    PSA 1.3 2018    PSA 1.5 2016       Lab Results   Component Value Date    CREATININE 0.9 2018      ---  Past Medical History:   Diagnosis Date    Anxiety and depression 2016    trial of ctialopram and follow up in 8 weeks    Benign prostatic hyperplasia (BPH) with straining on urination 2018    Coronary artery disease involving native coronary artery of native heart without angina pectoris 2016 NSTEMI at [facility]   PCI of RCA with 4.0 x 15, 4.0 x 38, and 3.5 x 38 mm Resolute JACOB   PCI of PDA 2.75 x 15 mm JACOB dilated to 3.0 POBA of proximal PLB 2.0 x 12 mm balloon   Normal EF with LVEDP 15 mmHg DAPT score of 2               Elevated PSA     Gout     History of coronary artery stent placement 2016    History of heart attack 2016    NSTEMI    Mixed hyperlipidemia 2017    Overweight (BMI 25.0-29.9) 2016    Tobacco dependence in remission 2016       Past Surgical History:   Procedure Laterality Date    COLONOSCOPY Split Prep N/A 2017    Performed by [facility]kel [facility]cam[facility] Jr[facility] at [facility]    CORONARY ANGIOPLASTY WITH STENT PLACEMENT      HERNIA REPAIR      ROTATOR CUFF REPAIR      VASECTOMY          Family History   Problem Relation Age of Onset    Heart disease Mother     Arthritis Mother     Parkinsonism Mother     Congenital heart disease Father     No Known Problems Sister     No Known Problems Brother     No Known Problems Daughter     No Known Problems Sister     No Known Problems Sister     Prostate cancer Neg Hx     Kidney disease Neg Hx        Social History     Tobacco Use    Smoking status: Former Smoker     Packs/day: 0.50     Types: Cigarettes     Last attempt to quit: 2016     Years since quittin.8    Smokeless tobacco: Never Used   Substance Use Topics    Alcohol use: Yes     Comment: rarely    Drug use: No       Current Outpatient Medications on File Prior to Visit   Medication Sig Dispense Refill    ALPRAZolam (XANAX) 1 MG tablet TAKE 1 TABLET BY MOUTH DAILY AS NEEDED FOR ANXIETY 30 tablet 5    ascorbic acid, vitamin C, (VITAMIN C) 500 MG tablet Take 500 mg by mouth once daily.      atorvastatin (LIPITOR) 80 MG tablet TAKE 1 TABLET BY MOUTH DAILY 90 tablet 3    azelastine (ASTELIN) 137 mcg (0.1 %) nasal spray INSTILL 2 SPRAYS IN EACH NOSTRIL TWICE A DAY  5    azelastine (ASTELIN) 137 mcg (0.1 %) nasal spray ADMINISTER TWO SPRAYS IN EACH NOSTRIL TWICE A DAY 30 mL PRN    cephALEXin (KEFLEX) 500 MG capsule Take 1 capsule (500 mg total) by mouth 2 (two) times daily. 10 capsule 0    clopidogrel (PLAVIX) 75 mg tablet TAKE ONE TABLET BY MOUTH DAILY 90 tablet 2    clopidogrel (PLAVIX) 75 mg tablet TAKE ONE TABLET BY MOUTH DAILY 90 tablet 3    escitalopram oxalate (LEXAPRO) 20 MG tablet Take 1 tablet (20 mg total) by mouth once daily. 90 tablet 3    GARLIC EXTRACT ORAL Take by mouth.      HYDROcodone-acetaminophen (NORCO) 7.5-325 mg per tablet Take 1 tablet by mouth every 6 (six) hours. 15 tablet 0    hydrOXYzine HCl (ATARAX) 25 MG tablet TAKE 1/2 TO 1 TABLET BY MOUTH AT BEDTIME and also during the day as needed for anxiety 45 tablet 5    ipratropium (ATROVENT)  0.06 % nasal spray INSTILL 2 SPRAYS IN EACH NOSTRIL EVERY 12 HOURS  5    ipratropium (ATROVENT) 42 mcg (0.06 %) nasal spray ADMINISTER TWO SPRAYS IN EACH NOSTRIL EVERY 12 HOURS 15 mL PRN    levoFLOXacin (LEVAQUIN) 500 MG tablet TAKE ONE TABLET BY MOUTH DAILY 14 tablet 1    levoFLOXacin (LEVAQUIN) 500 MG tablet Take 1 tablet (500 mg total) by mouth once daily. 7 tablet 0    multivitamin capsule Take 1 capsule by mouth once daily.      NASAL ALLERGY 55 mcg nasal inhaler INSTILL 2 SPRAYS IN [facility] NOSTRIL EVERY DAY  5    nitroGLYCERIN (NITROSTAT) 0.4 MG SL tablet Place 1 tablet (0.4 mg total) under the tongue every 5 (five) minutes as needed for Chest pain. 20 tablet 11    oseltamivir (TAMIFLU) 75 MG capsule Take 1 capsule by mouth twice daily. 10 capsule 0    predniSONE (DELTASONE) 20 MG tablet On days 1-3 take 1 and 1/2  tablet by mouth once daily, on days 4 and 5 take 1 tablet once daily. On day 6 take 1/2 tablet once. 7 tablet 0    promethazine-codeine 6.25-10 mg/5 ml (PHENERGAN WITH CODEINE) 6.25-10 mg/5 mL syrup Take 5 to 10 ml's by mouth esdras 8 hours 240 mL 0    [DISCONTINUED] tamsulosin (FLOMAX) 0.4 mg Cap Take 1 capsule (0.4 mg total) by mouth once daily. 90 capsule 3    aspirin (ECOTRIN) 81 MG EC tablet Take 1 tablet (81 mg total) by mouth once daily.  0     No current facility-administered medications on file prior to visit.        Review of patient's allergies indicates:   Allergen Reactions    Penicillins Hives       Review of Systems   Constitutional: Negative for chills.   HENT: Negative for congestion.    Eyes: Negative for visual disturbance.   Respiratory: Negative for shortness of breath.    Cardiovascular: Negative for chest pain.   Gastrointestinal: Negative for abdominal distention.   Musculoskeletal: Negative for gait problem.   Skin: Negative for color change.   Neurological: Negative for dizziness.   Psychiatric/Behavioral: Negative for agitation.       Objective:      Physical Exam    Constitutional: He appears well-developed and well-nourished.   HENT:   Head: Normocephalic.   Eyes: Pupils are equal, round, and reactive to light.   Neck: Normal range of motion.   Cardiovascular: Intact distal pulses.   Pulmonary/Chest: Effort normal.   Abdominal: Soft. He exhibits no distension. There is no tenderness.   Genitourinary:   Genitourinary Comments: SEBASTIAN - 35g smooth, benign, no hard nodules, nonboggy. Benign gland.   Musculoskeletal: Normal range of motion.   Neurological: He is alert.   Skin: Skin is warm and dry.   Psychiatric: He has a normal mood and affect.       Assessment:       1. BPH with obstruction/lower urinary tract symptoms    2. Encounter for prostate cancer screening        Plan:       1. Flomax refilled today. SEBASTIAN benign.  2. Last PSA 12/2018 1.3 stable, can get checked with Dr. Ayaal yearly next check 12/2019  3. Will message Dr. Bailey about atorvastatin refill per patient request.  4. RTC in 1 year for uroflow, bladder scan, SEBASTIAN and checkup.    BPH with obstruction/lower urinary tract symptoms  -     POCT Bladder Scan  -     POCT urine dipstick without microscope  -     Complex uroflowmetry  -     tamsulosin (FLOMAX) 0.4 mg Cap; Take 1 capsule (0.4 mg total) by mouth once daily.  Dispense: 90 capsule; Refill: 3    Encounter for prostate cancer screening

## 2019-06-14 ENCOUNTER — TELEPHONE (OUTPATIENT)
Dept: UROLOGY | Facility: CLINIC | Age: 64
End: 2019-06-14

## 2019-06-14 ENCOUNTER — PATIENT MESSAGE (OUTPATIENT)
Dept: UROLOGY | Facility: CLINIC | Age: 64
End: 2019-06-14

## 2019-06-14 DIAGNOSIS — N40.1 BPH WITH OBSTRUCTION/LOWER URINARY TRACT SYMPTOMS: ICD-10-CM

## 2019-06-14 DIAGNOSIS — N13.8 BPH WITH OBSTRUCTION/LOWER URINARY TRACT SYMPTOMS: ICD-10-CM

## 2019-06-14 RX ORDER — TAMSULOSIN HYDROCHLORIDE 0.4 MG/1
0.4 CAPSULE ORAL DAILY
Qty: 90 CAPSULE | Refills: 3 | Status: SHIPPED | OUTPATIENT
Start: 2019-06-14 | End: 2020-05-19

## 2019-06-14 NOTE — TELEPHONE ENCOUNTER
----- Message from Navya Gómez sent at 6/14/2019  9:47 AM CDT -----  Contact: PT   Pt calling in regards to a prescription , PT stated that his presription was sent to   [employer] online, and they said that it would be 7-10 days. Pt is requesting a week supply of prescription listed below    tamsulosin (FLOMAX) 0.4 mg Cap      Please contact the patient at [phone]      thanks

## 2019-08-29 RX ORDER — CLOPIDOGREL BISULFATE 75 MG/1
TABLET ORAL
Qty: 90 TABLET | Refills: 2 | Status: SHIPPED | OUTPATIENT
Start: 2019-08-29 | End: 2020-05-11 | Stop reason: SDUPTHER

## 2019-09-16 ENCOUNTER — IMMUNIZATION (OUTPATIENT)
Dept: PHARMACY | Facility: CLINIC | Age: 64
End: 2019-09-16
Payer: COMMERCIAL

## 2019-09-17 ENCOUNTER — TELEPHONE (OUTPATIENT)
Dept: FAMILY MEDICINE | Facility: CLINIC | Age: 64
End: 2019-09-17

## 2019-09-17 DIAGNOSIS — N40.1 BENIGN PROSTATIC HYPERPLASIA (BPH) WITH STRAINING ON URINATION: ICD-10-CM

## 2019-09-17 DIAGNOSIS — Z12.5 SCREENING FOR MALIGNANT NEOPLASM OF PROSTATE: ICD-10-CM

## 2019-09-17 DIAGNOSIS — R39.16 BENIGN PROSTATIC HYPERPLASIA (BPH) WITH STRAINING ON URINATION: ICD-10-CM

## 2019-09-17 DIAGNOSIS — Z79.899 MEDICATION MANAGEMENT: ICD-10-CM

## 2019-09-17 DIAGNOSIS — E78.2 MIXED HYPERLIPIDEMIA: Primary | ICD-10-CM

## 2019-09-17 DIAGNOSIS — Z95.5 HISTORY OF CORONARY ARTERY STENT PLACEMENT: ICD-10-CM

## 2019-10-19 NOTE — PLAN OF CARE
Patient:   ANDREI GALLO            MRN: [id number]            FIN: [id number]              Age:   76 years     Sex:  FEMALE     :  [birth year]   Associated Diagnoses:   None   Author:   BAKARI GOINS     Basic Information   Time seen: Recorded times are estimated as accurate as possible.   History source: Patient.   Patient information:.   History of Present Illness   The patient presents with Diplopia lightheadedness dizziness difficulty walking.  Patient is a pleasant 76-year-old woman with a history of mitral valve repair in , history of an arrhythmia, hyperlipidemia, depression who takes only Paxil and Klonopin not on blood thinners currently who started feeling lightheaded yesterday around 2 PM.  When she was standing she felt lightheaded when she sat down she felt fine.  During the evening she  started noticing some double vision may be around 9 PM.  Patient went to bed when she woke this morning she had increased double vision and also difficulty walking.  She had no focal weakness in her arms or legs.  She has a very minimally slight slurred speech.  Patient apparently is thinking clearly according to her and her son.  No history of CVA.  No trauma..     Review of Systems   Constitutional symptoms:  No fever,    Skin symptoms:  Negative except as documented in HPI.   Eye symptoms:  Recent vision problems.   ENMT symptoms:  Negative except as documented in HPI.   Respiratory symptoms:  Negative except as documented in HPI.   Cardiovascular symptoms:  No chest pain,    Gastrointestinal symptoms:  No abdominal pain,    Genitourinary symptoms:  Negative except as documented in HPI.   Musculoskeletal symptoms:  No back pain,    Neurologic symptoms:  Dizziness, no headache, no altered level of consciousness.   Psychiatric symptoms:  Negative except as documented in HPI.   Allergy/immunologic symptoms:  Negative except as documented in HPI.             Additional review of systems information: All  Problem: Patient Care Overview  Goal: Plan of Care Review  Outcome: Ongoing (interventions implemented as appropriate)  Safety measures maintained. Bed is in the lowest position and locked. Call bell is within reach. Instructed pt to call for assistance. Pt verbalized understanding. On tele monitor Sinus Michael HR in 50-70's. No red alarms noted. Will continue to monitor.          other systems reviewed and otherwise negative.     Past Medical/ Family/ Social History     Medical history   See HPI.   Surgical history:    No active procedure history items have been selected or recorded..  Family history: Not significant.   Family history: Include Family History   No family history items have been selected or recorded..   Social history: Alcohol use: Denies, Tobacco use: Denies, Drug use: Denies.  Social history:    Social & Psychosocial Habits  No Data Available  .     Physical Examination   General:  Alert, no acute distress, elderly and very pleasant.    Skin:  Warm, dry.    Head:  Normocephalic, atraumatic.    Neck:  Supple.   Ears, nose, mouth and throat:  Oral mucosa moist.   Cardiovascular:  Regular with ectopic beats.   Respiratory:  Lungs are clear to auscultation.   Chest wall:  No tenderness.   Back:  Normal range of motion.   Musculoskeletal:  Normal ROM, no tenderness.    Gastrointestinal:  Soft, Nontender, Non distended.    Genitourinary   Neurological:  Alert and oriented to person, place, time, and situation, normal sensory observed, normal motor observed, normal coordination observed, Symmetric smile, normal sensation bilateral face, normal tongue protrusion and palatal elevation, normal hearing, shoulder shrug intact, normal Visual fields  Ppatient is not able to look to the left with her right eye.  Please see stroke nurses NIH score for full  NIHSS,   Slight slurred speech  Patient is not able to say the alphabet completely.    Lymphatics   Psychiatric:  Cooperative, appropriate mood & affect.      Medical Decision Making   Rationale:  76-year-old with symptoms concerning for CVA especially with her history of A. fib, possible posterior circulation.  She is delayed presentation so not TPA candidate since  symptoms started yesterday.  We will CTA her brain and check for large vessel occlusion and I will do serial perfusion analysis to assess for any salvageable brain if CVA..   Electrocardiogram:  Normal sinus rhythm, Nonspecific ST wave abnormalities.   Results review:  Lab results : LABORATORY   10/18/19 11:03 CDT WBC 6.3 THOUSAND/mcL    RBC 4.01 MILLION/mcL    Hemoglobin 12.9 gm/dL    Platelet 233 THOUSAND/mcL   , Lab results : LABORATORY   10/18/19 11:03 CDT Sodium 142 mmol/L    Potassium 3.9 mmol/L    BUN 18 mg/dL    Creatinine 0.89 mg/dL    GFR ESTIMATE NON  63    Calcium 8.5 mg/dL    Glucose Level 117 mg/dL  HI    GOT/AST 17 unit/L    GPT/ALT 23 unit/L    Alk Phosphatase 53 unit/L    Bilirubin Total 0.4 mg/dL    Protein, Total 7.0 gm/dL    Troponin I <0.02 ng/mL    Protime 10.2 seconds    INR (Lab) 1.0    PTT (Activated) 25 seconds   .    Chest X-Ray:   Radiology results:    Result title:  CT HEAD OR BRAIN WO CON  Result status:  Final  Verified by:  ANNE MARIE FIELDS on 10/18/2019 11:21  IMPRESSION:1.  No definite radiographic evidence of acute intracranial abnormality.2.  Moderate intracranial atherosclerosis and moderate intracranial small vessel ischemic disease.3.  Moderate left mastoid effusion.4.  Mild intracranial global volume lossDiscussed emergently with INGRID Hatch by Dr. Mascorro 10/18/2019, 1121 hours, results acknowledged/understood.  Result title:  CTA HEAD W CON/CTA NECK W CON/CT CEREBRAL PERFUSION ANALYSIS  Result status:  Final  Verified by:  ANNE MARIE FIELDS on 10/18/2019 11:47  IMPRESSION:1.  No intracranial branch vessel occlusion.2.  Mild stenosis left M1 middle cerebral artery.  On semiquantitative contrast enhanced perfusion images, there is trace matched depression of left hemispheric cerebral blood flow/volume within left frontoparietal deep white matter distribution, which may be secondary to left MCA stenosis.3.  No hemodynamically significant internal carotid artery stenosis.4.  Heterogeneously nodular  thyroid gland.  Result title:  XR CHEST PORTABLE 1V  Result status:  Final  Verified by:  ANNE MARIE FIELDS on 10/18/2019  12:29  IMPRESSION:No definite radiographic evidence of acute cardiac pulmonary disease..     Reexamination/ Reevaluation   Course: improving.     Procedure   Critical care note   Total time: 35 minutes spent engaged in work directly related to patient care and/ or available for direct patient care.  Critical condition(s) addressed for impending deterioration include: central nervous system.  Associated risk factors.   Management: bedside assessment, supervision of care, Interpretation (chest x-ray, electrocardiogram), Interventions hemodynamic management, Case review (primary care physician, medical specialist).  Performed by: self.     Impression and Plan   Diagnosis   Diplopia  Dizziness  Possible CVA     Calls-Consults   -  VIRAJ, AYUSH RAMIREZ, JEFF, D/w'd with admitting physician and patient will require 2 midnights inpt admission.   Plan   Condition: Stable.    Disposition: Admit to Inpatient Telemetry Unit.  NOT neurointeventional candidate since no large vessel occlusion.

## 2019-11-02 NOTE — OR NURSING
Discharge Instructions given to patient. Verbalized understanding. Alert and oriented x4, VSS. Discharge via wheelchair with staff @ side.      To get better and follow your care plan as instructed.

## 2019-12-20 ENCOUNTER — HOSPITAL ENCOUNTER (OUTPATIENT)
Facility: HOSPITAL | Age: 64
Discharge: HOME | End: 2019-12-21
Attending: EMERGENCY MEDICINE | Admitting: FAMILY MEDICINE
Payer: COMMERCIAL
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
## 2019-12-20 DIAGNOSIS — R07.9 CHEST PAIN: ICD-10-CM

## 2019-12-20 DIAGNOSIS — S32.010A COMPRESSION FRACTURE OF L1 VERTEBRA, INITIAL ENCOUNTER: Primary | ICD-10-CM

## 2019-12-20 PROBLEM — D72.829 LEUKOCYTOSIS: Status: ACTIVE | Noted: 2019-12-20

## 2019-12-20 LAB
ALBUMIN SERPL BCP-MCNC: 4 G/DL (ref 3.5–5.2)
ALP SERPL-CCNC: 85 U/L (ref 55–135)
ALT SERPL W/O P-5'-P-CCNC: 31 U/L (ref 10–44)
ANION GAP SERPL CALC-SCNC: 10 MMOL/L (ref 8–16)
AST SERPL-CCNC: 43 U/L (ref 10–40)
BASOPHILS # BLD AUTO: 0.02 K/UL (ref 0–0.2)
BASOPHILS NFR BLD: 0.1 % (ref 0–1.9)
BILIRUB SERPL-MCNC: 0.7 MG/DL (ref 0.1–1)
BNP SERPL-MCNC: 32 PG/ML (ref 0–99)
BUN SERPL-MCNC: 17 MG/DL (ref 8–23)
CALCIUM SERPL-MCNC: 9.4 MG/DL (ref 8.7–10.5)
CHLORIDE SERPL-SCNC: 105 MMOL/L (ref 95–110)
CO2 SERPL-SCNC: 28 MMOL/L (ref 23–29)
CREAT SERPL-MCNC: 0.9 MG/DL (ref 0.5–1.4)
DIFFERENTIAL METHOD: ABNORMAL
EOSINOPHIL # BLD AUTO: 0 K/UL (ref 0–0.5)
EOSINOPHIL NFR BLD: 0.2 % (ref 0–8)
ERYTHROCYTE [DISTWIDTH] IN BLOOD BY AUTOMATED COUNT: 13.5 % (ref 11.5–14.5)
EST. GFR  (AFRICAN AMERICAN): >60 ML/MIN/1.73 M^2
EST. GFR  (NON AFRICAN AMERICAN): >60 ML/MIN/1.73 M^2
GLUCOSE SERPL-MCNC: 99 MG/DL (ref 70–110)
HCT VFR BLD AUTO: 44.4 % (ref 40–54)
HGB BLD-MCNC: 14.8 G/DL (ref 14–18)
LYMPHOCYTES # BLD AUTO: 2.2 K/UL (ref 1–4.8)
LYMPHOCYTES NFR BLD: 13.4 % (ref 18–48)
MCH RBC QN AUTO: 28.7 PG (ref 27–31)
MCHC RBC AUTO-ENTMCNC: 33.3 G/DL (ref 32–36)
MCV RBC AUTO: 86 FL (ref 82–98)
MONOCYTES # BLD AUTO: 1.3 K/UL (ref 0.3–1)
MONOCYTES NFR BLD: 7.6 % (ref 4–15)
NEUTROPHILS # BLD AUTO: 12.8 K/UL (ref 1.8–7.7)
NEUTROPHILS NFR BLD: 78.7 % (ref 38–73)
PLATELET # BLD AUTO: 260 K/UL (ref 150–350)
PMV BLD AUTO: 9.7 FL (ref 9.2–12.9)
POTASSIUM SERPL-SCNC: 3.8 MMOL/L (ref 3.5–5.1)
PROT SERPL-MCNC: 6.5 G/DL (ref 6–8.4)
RBC # BLD AUTO: 5.15 M/UL (ref 4.6–6.2)
SODIUM SERPL-SCNC: 143 MMOL/L (ref 136–145)
TROPONIN I SERPL DL<=0.01 NG/ML-MCNC: <0.006 NG/ML (ref 0–0.03)
TROPONIN I SERPL DL<=0.01 NG/ML-MCNC: <0.006 NG/ML (ref 0–0.03)
WBC # BLD AUTO: 16.59 K/UL (ref 3.9–12.7)

## 2019-12-20 PROCEDURE — 84484 ASSAY OF TROPONIN QUANT: CPT

## 2019-12-20 PROCEDURE — 93005 ELECTROCARDIOGRAM TRACING: CPT

## 2019-12-20 PROCEDURE — G0378 HOSPITAL OBSERVATION PER HR: HCPCS

## 2019-12-20 PROCEDURE — 93010 ELECTROCARDIOGRAM REPORT: CPT | Mod: ,,, | Performed by: INTERNAL MEDICINE

## 2019-12-20 PROCEDURE — 99285 EMERGENCY DEPT VISIT HI MDM: CPT | Mod: 25

## 2019-12-20 PROCEDURE — 84484 ASSAY OF TROPONIN QUANT: CPT | Mod: 91

## 2019-12-20 PROCEDURE — 96374 THER/PROPH/DIAG INJ IV PUSH: CPT

## 2019-12-20 PROCEDURE — 96361 HYDRATE IV INFUSION ADD-ON: CPT

## 2019-12-20 PROCEDURE — 93010 EKG 12-LEAD: ICD-10-PCS | Mod: ,,, | Performed by: INTERNAL MEDICINE

## 2019-12-20 PROCEDURE — 80053 COMPREHEN METABOLIC PANEL: CPT

## 2019-12-20 PROCEDURE — 36415 COLL VENOUS BLD VENIPUNCTURE: CPT

## 2019-12-20 PROCEDURE — 63600175 PHARM REV CODE 636 W HCPCS: Performed by: EMERGENCY MEDICINE

## 2019-12-20 PROCEDURE — 25000003 PHARM REV CODE 250: Performed by: NURSE PRACTITIONER

## 2019-12-20 PROCEDURE — 85025 COMPLETE CBC W/AUTO DIFF WBC: CPT

## 2019-12-20 PROCEDURE — 83880 ASSAY OF NATRIURETIC PEPTIDE: CPT

## 2019-12-20 RX ORDER — CLOPIDOGREL BISULFATE 75 MG/1
75 TABLET ORAL DAILY
Status: DISCONTINUED | OUTPATIENT
Start: 2019-12-21 | End: 2019-12-21 | Stop reason: HOSPADM

## 2019-12-20 RX ORDER — TAMSULOSIN HYDROCHLORIDE 0.4 MG/1
0.4 CAPSULE ORAL DAILY
Status: DISCONTINUED | OUTPATIENT
Start: 2019-12-21 | End: 2019-12-21 | Stop reason: HOSPADM

## 2019-12-20 RX ORDER — ESCITALOPRAM OXALATE 10 MG/1
20 TABLET ORAL DAILY
Status: DISCONTINUED | OUTPATIENT
Start: 2019-12-21 | End: 2019-12-21 | Stop reason: HOSPADM

## 2019-12-20 RX ORDER — MORPHINE SULFATE 2 MG/ML
6 INJECTION, SOLUTION INTRAMUSCULAR; INTRAVENOUS
Status: COMPLETED | OUTPATIENT
Start: 2019-12-20 | End: 2019-12-20

## 2019-12-20 RX ORDER — ACETAMINOPHEN 325 MG/1
650 TABLET ORAL EVERY 4 HOURS PRN
Status: DISCONTINUED | OUTPATIENT
Start: 2019-12-20 | End: 2019-12-21 | Stop reason: HOSPADM

## 2019-12-20 RX ORDER — SODIUM CHLORIDE 0.9 % (FLUSH) 0.9 %
10 SYRINGE (ML) INJECTION
Status: DISCONTINUED | OUTPATIENT
Start: 2019-12-20 | End: 2019-12-21 | Stop reason: HOSPADM

## 2019-12-20 RX ORDER — ATORVASTATIN CALCIUM 40 MG/1
80 TABLET, FILM COATED ORAL DAILY
Status: DISCONTINUED | OUTPATIENT
Start: 2019-12-21 | End: 2019-12-21 | Stop reason: HOSPADM

## 2019-12-20 RX ORDER — ONDANSETRON 2 MG/ML
4 INJECTION INTRAMUSCULAR; INTRAVENOUS EVERY 8 HOURS PRN
Status: DISCONTINUED | OUTPATIENT
Start: 2019-12-20 | End: 2019-12-21 | Stop reason: HOSPADM

## 2019-12-20 RX ORDER — MORPHINE SULFATE 2 MG/ML
2 INJECTION, SOLUTION INTRAMUSCULAR; INTRAVENOUS EVERY 6 HOURS PRN
Status: DISCONTINUED | OUTPATIENT
Start: 2019-12-20 | End: 2019-12-21 | Stop reason: HOSPADM

## 2019-12-20 RX ORDER — NITROGLYCERIN 0.4 MG/1
0.4 TABLET SUBLINGUAL EVERY 5 MIN PRN
Status: DISCONTINUED | OUTPATIENT
Start: 2019-12-20 | End: 2019-12-21 | Stop reason: HOSPADM

## 2019-12-20 RX ORDER — TRAMADOL HYDROCHLORIDE 50 MG/1
50 TABLET ORAL EVERY 6 HOURS PRN
Status: DISCONTINUED | OUTPATIENT
Start: 2019-12-20 | End: 2019-12-21 | Stop reason: HOSPADM

## 2019-12-20 RX ORDER — SODIUM CHLORIDE 9 MG/ML
1000 INJECTION, SOLUTION INTRAVENOUS
Status: COMPLETED | OUTPATIENT
Start: 2019-12-20 | End: 2019-12-20

## 2019-12-20 RX ORDER — ASPIRIN 81 MG/1
81 TABLET ORAL DAILY
Status: DISCONTINUED | OUTPATIENT
Start: 2019-12-21 | End: 2019-12-21 | Stop reason: HOSPADM

## 2019-12-20 RX ADMIN — SODIUM CHLORIDE 1000 ML: 0.9 INJECTION, SOLUTION INTRAVENOUS at 06:12

## 2019-12-20 RX ADMIN — MORPHINE SULFATE 6 MG: 2 INJECTION, SOLUTION INTRAMUSCULAR; INTRAVENOUS at 06:12

## 2019-12-20 RX ADMIN — TRAMADOL HYDROCHLORIDE 50 MG: 50 TABLET, FILM COATED ORAL at 10:12

## 2019-12-20 NOTE — ED PROVIDER NOTES
Encounter Date: 12/20/2019    SCRIBE #1 NOTE: I, Zbigniew Hardy, am scribing for, and in the presence of,  Dr. Lee. I have scribed the entire note.       History     Chief Complaint   Patient presents with    Fall     Fall from 6-7ft ladder onto grass, landing on upper back and hitting head.  Reports took a shower then started having chest pain. Took 1 SL NTG PTA    Chest Pain     This is a 64 y.o. male who  has a past medical history of Anxiety and depression, Benign prostatic hyperplasia with straining on urination, Coronary artery disease involving native coronary artery of native heart without angina pectoris, Elevated PSA, Gout, History of coronary artery stent placement, History of heart attack, Mixed hyperlipidemia, Overweight, and Tobacco dependence in remission presents with chief complaint of chest and back pain s/p fall with head trauma which occurred 2 hours ago. The patient reports he fell backwards about 7 feet off a ladder onto his back, breaking the fall with his left arm and hitting his upper back and back of his head on the ground. He immediately had pain to the lower back and then developed chest pain while taking a shower shortly after. His chest pain has been constant since onset and unrelieved with 1 NTG SL. Of note, the patient has a history of chronic lower back pain and cardiac stenting on Plavix. Patient denies any SOB, headcahe, abdominal pain, neck pain, numbness, or tingling.    The history is provided by the patient.     Review of patient's allergies indicates:   Allergen Reactions    Penicillins Hives     Past Medical History:   Diagnosis Date    Anxiety and depression 9/21/2016    trial of ctialopram and follow up in 8 weeks    Benign prostatic hyperplasia (BPH) with straining on urination 12/5/2018    Coronary artery disease involving native coronary artery of native heart without angina pectoris 7/26/2016 7/26/2016 NSTEMI at [facility]   PCI of RCA with 4.0 x 15, 4.0 x  38, and 3.5 x 38 mm Resolute JACOB   PCI of PDA 2.75 x 15 mm JACOB dilated to 3.0 POBA of proximal PLB 2.0 x 12 mm balloon   Normal EF with LVEDP 15 mmHg DAPT score of 2               Elevated PSA     Gout     History of coronary artery stent placement 9/21/2016    History of heart attack 7/25/2016    NSTEMI    Mixed hyperlipidemia 7/17/2017    Overweight (BMI 25.0-29.9) 9/21/2016    Tobacco dependence in remission 7/25/2016     Past Surgical History:   Procedure Laterality Date    COLONOSCOPY N/A 8/16/2017    Procedure: COLONOSCOPY Split Prep;  Surgeon: Maykel Carcamo Jr., MD;  [facility];  Service: Endoscopy;  Laterality: N/A;    CORONARY ANGIOPLASTY WITH STENT PLACEMENT      HERNIA REPAIR      ROTATOR CUFF REPAIR      VASECTOMY       Family History   Problem Relation Age of Onset    Heart disease Mother     Arthritis Mother     Parkinsonism Mother     Congenital heart disease Father     No Known Problems Sister     No Known Problems Brother     No Known Problems Daughter     No Known Problems Sister     No Known Problems Sister     Prostate cancer Neg Hx     Kidney disease Neg Hx      Social History     Tobacco Use    Smoking status: Former Smoker     Packs/day: 0.50     Types: Cigarettes     Last attempt to quit: 7/25/2016     Years since quitting: 3.4    Smokeless tobacco: Never Used   Substance Use Topics    Alcohol use: Yes     Comment: rarely    Drug use: No     Review of Systems   Constitutional: Negative for chills and fever.   HENT: Negative for sore throat.    Eyes: Negative for redness.   Respiratory: Negative for shortness of breath.    Cardiovascular: Positive for chest pain.   Gastrointestinal: Negative for abdominal pain, diarrhea, nausea and vomiting.   Genitourinary: Negative for dysuria and hematuria.   Musculoskeletal: Positive for back pain.   Skin: Negative for rash.   Neurological: Negative for headaches.       Physical Exam     Initial Vitals [12/20/19  1722]   BP Pulse Resp Temp SpO2   (!) 143/83 77 19 98.2 °F (36.8 °C) 98 %      MAP       --         Physical Exam    Nursing note and vitals reviewed.  Constitutional: He appears well-developed and well-nourished. He is not diaphoretic. No distress.   HENT:   Head: Normocephalic and atraumatic.   Mouth/Throat: Oropharynx is clear and moist.   Eyes: Conjunctivae and EOM are normal.   Neck: Normal range of motion. Neck supple. No tracheal deviation present. No JVD present.   Trachea midline  No JVD   Cardiovascular: Normal rate, regular rhythm and normal heart sounds.   Pulmonary/Chest: Breath sounds normal. No respiratory distress.   Abdominal: Soft. There is no tenderness.   Musculoskeletal: Normal range of motion. He exhibits tenderness. He exhibits no edema.   Tenderness to the thoracic and lumbar spine; no stepoffs   Neurological: He is alert and oriented to person, place, and time. He has normal strength.   Skin: Skin is warm and dry.   Abrasions to the right lower extremity to the anterior tib/fib   Abrasion to left trapezial region         ED Course   Procedures  Labs Reviewed   CBC W/ AUTO DIFFERENTIAL - Abnormal; Notable for the following components:       Result Value    WBC 16.59 (*)     Gran # (ANC) 12.8 (*)     Mono # 1.3 (*)     Gran% 78.7 (*)     Lymph% 13.4 (*)     All other components within normal limits   COMPREHENSIVE METABOLIC PANEL - Abnormal; Notable for the following components:    AST 43 (*)     All other components within normal limits   TROPONIN I   B-TYPE NATRIURETIC PEPTIDE   LACTIC ACID, PLASMA     EKG Readings: (Independently Interpreted)   Sinus rhythm with occasional PVCs at 76 bpm, No significant ST elevation or depression, No T wave inversions, No STEMI (Interpreted by self)       Imaging Results          X-Ray Chest PA And Lateral (Final result)  Result time 12/20/19 18:42:47    Final result by Emma Strickland MD (12/20/19 18:42:47)                 Impression:      No acute  cardiopulmonary process identified.    Acute appearing fracture of the L1 vertebral body.      Electronically signed by: Emma Strickland MD  Date:    12/20/2019  Time:    18:42             Narrative:    EXAMINATION:  XR CHEST PA AND LATERAL    TECHNIQUE:  PA and lateral views of the chest were performed.    COMPARISON:  July 2016.    FINDINGS:  Cardiac silhouette is normal in size.  Lungs are symmetrically expanded.  No evidence of focal consolidative process, pneumothorax, or significant effusion.  No acute osseous abnormality identified.    Acute appearing fracture seen of the L1 vertebral body with correlating with concurrent L-spine radiographs.                               X-Ray Lumbar Spine Ap And Lateral (Final result)  Result time 12/20/19 18:45:20    Final result by mEma Strickland MD (12/20/19 18:45:20)                 Impression:      Acute appearing L1 vertebral body mild compression fracture.    No acute thoracic spine abnormalities identified.      Electronically signed by: Emma Strickland MD  Date:    12/20/2019  Time:    18:45             Narrative:    EXAMINATION:  XR THORACIC SPINE AP LATERAL; XR LUMBAR SPINE AP AND LATERAL    CLINICAL HISTORY:  pain;; T/L-spine trauma, minor-mod, low back pain;  Unspecified fall, initial encounter    TECHNIQUE:  AP and lateral views of the thoracic spine were performed.  Lumbar spine AP and lateral.    COMPARISON:  None    FINDINGS:  Thoracic spine alignment appears within normal limits.  No evidence of acute thoracic spine fracture or subluxation.    Mild acute compression fracture seen of the L1 vertebral body and superior endplate.  Lumbar spine alignment appears within normal limits.  No additional acute lumbar spine fracture or subluxation seen.  Multilevel degenerative changes with intervertebral disc space narrowing and spurring are seen throughout the lower lumbar levels.  Visualized sacrum is unremarkable.                               X-Ray Thoracic  Spine AP Lateral (Final result)  Result time 12/20/19 18:45:20   Procedure changed from X-Ray Spine 1 View Any Level     Final result by Emma Strickland MD (12/20/19 18:45:20)                 Impression:      Acute appearing L1 vertebral body mild compression fracture.    No acute thoracic spine abnormalities identified.      Electronically signed by: Emma Strickland MD  Date:    12/20/2019  Time:    18:45             Narrative:    EXAMINATION:  XR THORACIC SPINE AP LATERAL; XR LUMBAR SPINE AP AND LATERAL    CLINICAL HISTORY:  pain;; T/L-spine trauma, minor-mod, low back pain;  Unspecified fall, initial encounter    TECHNIQUE:  AP and lateral views of the thoracic spine were performed.  Lumbar spine AP and lateral.    COMPARISON:  None    FINDINGS:  Thoracic spine alignment appears within normal limits.  No evidence of acute thoracic spine fracture or subluxation.    Mild acute compression fracture seen of the L1 vertebral body and superior endplate.  Lumbar spine alignment appears within normal limits.  No additional acute lumbar spine fracture or subluxation seen.  Multilevel degenerative changes with intervertebral disc space narrowing and spurring are seen throughout the lower lumbar levels.  Visualized sacrum is unremarkable.                               CT Cervical Spine Without Contrast (Final result)  Result time 12/20/19 18:40:56    Final result by Emma Strickland MD (12/20/19 18:40:56)                 Impression:      No evidence of acute cervical spine fracture or dislocation.      Electronically signed by: Emma Strickland MD  Date:    12/20/2019  Time:    18:40             Narrative:    EXAMINATION:  CT CERVICAL SPINE WITHOUT CONTRAST    CLINICAL HISTORY:  C-spine trauma, NEXUS/CCR positive, low risk;    TECHNIQUE:  Low dose axial images, sagittal and coronal reformations were performed though the cervical spine.  Contrast was not administered.    COMPARISON:  None    FINDINGS:  No evidence of acute  cervical spine fracture or dislocation.  Odontoid process is intact.  Craniocervical junction is unremarkable.  Intervertebral disc space narrowing and degenerative changes are visualized from the C3-4 through C7-T1 levels.  Cervical spine alignment is within normal limits.    Surrounding soft tissues show no significant abnormalities.  Airway is patent.  Partially visualized lung apices are clear.                               CT Head Without Contrast (Final result)  Result time 12/20/19 18:26:00    Final result by Emma Strickland MD (12/20/19 18:26:00)                 Impression:      No acute intracranial abnormalities identified.      Electronically signed by: Emma Strickland MD  Date:    12/20/2019  Time:    18:26             Narrative:    EXAMINATION:  CT HEAD WITHOUT CONTRAST    CLINICAL HISTORY:  Head trauma, minor, GCS>=13, NOC/NEXUS/CCR positive, first study;    TECHNIQUE:  Low dose axial images were obtained through the head.  Coronal and sagittal reformations were also performed. Contrast was not administered.    COMPARISON:  None.    FINDINGS:  No evidence of acute/recent major vascular distribution cerebral infarction, intraparenchymal hemorrhage, or intra-axial space occupying lesion. The ventricular system is normal in size and configuration with no evidence of hydrocephalus. No effacement of the skull-base cisterns. No abnormal extra-axial fluid collections or blood products.  Mild bilateral maxillary sinus disease is noted.  Remaining visualized paranasal sinuses and mastoid air cells are clear. The calvarium shows no significant abnormality.                                 Medical Decision Making:   Clinical Tests:   Lab Tests: Ordered and Reviewed  Radiological Study: Ordered and Reviewed  Medical Tests: Ordered and Reviewed  ED Management:  - EKG with Sinus rhythm with occasional PVCs at 76 bpm, No significant ST elevation or depression, No T wave inversions, No STEMI (Interpreted by self)  -  CT Cervical Spine without acute fracture, dislocation or other abnormality per final radiology read   - CT Head WO negative for acute intracranial abnormality per final radiology read   - CXR without acute cardiopulmonary process per my interpretation, final radiology read   - Plain radiograph of T spine without acute fracture, dislocation of thoracic spine per my interpretation  - Plain radiograph of L spine with L1 compression fracture  - Discussed L1 fx with NSGY (Dr. Garcia) who recommends TLSO brace and f/u with NSGY as OP in next 2 weeks   - CBC w/diff WNL;  No significant leukocytosis; H/H stable   - CMP WNL; no significant electrolyte abnormality  - Lactic acid WNL   - Troponin WNL   - BNP WNL  - In light of pt's chest pain complaint, risk factors and recent event, will admit to [facility] medicine for further evaluation and management  - Pt in agreement with plan for observation                                    Clinical Impression:       ICD-10-CM ICD-9-CM   1. Compression fracture of L1 vertebra, initial encounter S32.010A 805.4   2. Fall W19.XXXA E888.9   3. Chest pain R07.9 786.50            I, Álvaro Lee,  personally performed the services described in this documentation. All medical record entries made by the scribe were at my direction and in my presence.  I have reviewed the chart and agree that the record reflects my personal performance and is accurate and complete. Álvaro Lee M.D. 8:19 AM12/21/2019       Álvaro Lee MD  12/21/19 0819

## 2019-12-20 NOTE — ED NOTES
Pt states he fell about 8 feet off of a ladder at home this afternoon, hitting his upper back and posterior head on the ground. Denies LOC. Pt states he takes plavix daily. Has small abrasion to upper back, but states pain is across his lower back. Pt is able to move all extremities on command and is able to stand and ambulate. No obvious swelling or bruising noted to back. Pain worsens with any movement, and patient is groaning in pain with any movement. Pt states after he fell he took a hot shower, and he began having left chest pain in the shower. Took 1 SL nitro, with no improvement in the pain. Pt also has abrasions to right lower leg, but denies pain to leg at this time. No neurologic deficits noted. Pt answers all questions appropriately. Abdomen is soft, nontender to palpation

## 2019-12-21 VITALS
HEIGHT: 69 IN | HEART RATE: 72 BPM | SYSTOLIC BLOOD PRESSURE: 146 MMHG | WEIGHT: 217.81 LBS | BODY MASS INDEX: 32.26 KG/M2 | RESPIRATION RATE: 18 BRPM | TEMPERATURE: 96 F | OXYGEN SATURATION: 99 % | DIASTOLIC BLOOD PRESSURE: 79 MMHG

## 2019-12-21 LAB
ANION GAP SERPL CALC-SCNC: 9 MMOL/L (ref 8–16)
BASOPHILS # BLD AUTO: 0.02 K/UL (ref 0–0.2)
BASOPHILS NFR BLD: 0.2 % (ref 0–1.9)
BUN SERPL-MCNC: 15 MG/DL (ref 8–23)
CALCIUM SERPL-MCNC: 8.7 MG/DL (ref 8.7–10.5)
CHLORIDE SERPL-SCNC: 107 MMOL/L (ref 95–110)
CO2 SERPL-SCNC: 25 MMOL/L (ref 23–29)
CREAT SERPL-MCNC: 0.8 MG/DL (ref 0.5–1.4)
DIFFERENTIAL METHOD: ABNORMAL
EOSINOPHIL # BLD AUTO: 0.1 K/UL (ref 0–0.5)
EOSINOPHIL NFR BLD: 0.7 % (ref 0–8)
ERYTHROCYTE [DISTWIDTH] IN BLOOD BY AUTOMATED COUNT: 13.7 % (ref 11.5–14.5)
EST. GFR  (AFRICAN AMERICAN): >60 ML/MIN/1.73 M^2
EST. GFR  (NON AFRICAN AMERICAN): >60 ML/MIN/1.73 M^2
GLUCOSE SERPL-MCNC: 105 MG/DL (ref 70–110)
HCT VFR BLD AUTO: 41.3 % (ref 40–54)
HGB BLD-MCNC: 13.5 G/DL (ref 14–18)
LYMPHOCYTES # BLD AUTO: 2.6 K/UL (ref 1–4.8)
LYMPHOCYTES NFR BLD: 23.8 % (ref 18–48)
MAGNESIUM SERPL-MCNC: 2.2 MG/DL (ref 1.6–2.6)
MCH RBC QN AUTO: 28.2 PG (ref 27–31)
MCHC RBC AUTO-ENTMCNC: 32.7 G/DL (ref 32–36)
MCV RBC AUTO: 86 FL (ref 82–98)
MONOCYTES # BLD AUTO: 1.4 K/UL (ref 0.3–1)
MONOCYTES NFR BLD: 12.6 % (ref 4–15)
NEUTROPHILS # BLD AUTO: 6.6 K/UL (ref 1.8–7.7)
NEUTROPHILS NFR BLD: 62.7 % (ref 38–73)
PHOSPHATE SERPL-MCNC: 3.6 MG/DL (ref 2.7–4.5)
PLATELET # BLD AUTO: 243 K/UL (ref 150–350)
PMV BLD AUTO: 9.2 FL (ref 9.2–12.9)
POTASSIUM SERPL-SCNC: 4 MMOL/L (ref 3.5–5.1)
RBC # BLD AUTO: 4.79 M/UL (ref 4.6–6.2)
SODIUM SERPL-SCNC: 141 MMOL/L (ref 136–145)
TROPONIN I SERPL DL<=0.01 NG/ML-MCNC: <0.006 NG/ML (ref 0–0.03)
WBC # BLD AUTO: 10.73 K/UL (ref 3.9–12.7)

## 2019-12-21 PROCEDURE — 99203 PR OFFICE/OUTPT VISIT, NEW, LEVL III, 30-44 MIN: ICD-10-PCS | Mod: ,,, | Performed by: NEUROLOGICAL SURGERY

## 2019-12-21 PROCEDURE — 36415 COLL VENOUS BLD VENIPUNCTURE: CPT

## 2019-12-21 PROCEDURE — 84484 ASSAY OF TROPONIN QUANT: CPT

## 2019-12-21 PROCEDURE — 96376 TX/PRO/DX INJ SAME DRUG ADON: CPT

## 2019-12-21 PROCEDURE — 25000003 PHARM REV CODE 250: Performed by: NURSE PRACTITIONER

## 2019-12-21 PROCEDURE — 83735 ASSAY OF MAGNESIUM: CPT

## 2019-12-21 PROCEDURE — 97161 PT EVAL LOW COMPLEX 20 MIN: CPT

## 2019-12-21 PROCEDURE — 99225 PR SUBSEQUENT OBSERVATION CARE,LEVEL II: ICD-10-PCS | Mod: ,,, | Performed by: INTERNAL MEDICINE

## 2019-12-21 PROCEDURE — 97165 OT EVAL LOW COMPLEX 30 MIN: CPT

## 2019-12-21 PROCEDURE — 80048 BASIC METABOLIC PNL TOTAL CA: CPT

## 2019-12-21 PROCEDURE — 99225 PR SUBSEQUENT OBSERVATION CARE,LEVEL II: CPT | Mod: ,,, | Performed by: INTERNAL MEDICINE

## 2019-12-21 PROCEDURE — 84100 ASSAY OF PHOSPHORUS: CPT

## 2019-12-21 PROCEDURE — 85025 COMPLETE CBC W/AUTO DIFF WBC: CPT

## 2019-12-21 PROCEDURE — 99203 OFFICE O/P NEW LOW 30 MIN: CPT | Mod: ,,, | Performed by: NEUROLOGICAL SURGERY

## 2019-12-21 PROCEDURE — G0378 HOSPITAL OBSERVATION PER HR: HCPCS

## 2019-12-21 PROCEDURE — 63600175 PHARM REV CODE 636 W HCPCS: Performed by: NURSE PRACTITIONER

## 2019-12-21 RX ORDER — NITROGLYCERIN 0.4 MG/1
0.4 TABLET SUBLINGUAL EVERY 5 MIN PRN
Qty: 20 TABLET | Refills: 12 | Status: SHIPPED | OUTPATIENT
Start: 2019-12-21 | End: 2019-12-24 | Stop reason: SDUPTHER

## 2019-12-21 RX ORDER — TRAMADOL HYDROCHLORIDE 50 MG/1
50 TABLET ORAL EVERY 8 HOURS PRN
Qty: 15 TABLET | Refills: 0 | Status: SHIPPED | OUTPATIENT
Start: 2019-12-21 | End: 2019-12-24 | Stop reason: SDUPTHER

## 2019-12-21 RX ORDER — NITROGLYCERIN 0.4 MG/1
0.4 TABLET SUBLINGUAL EVERY 5 MIN PRN
Qty: 20 TABLET | Refills: 12 | OUTPATIENT
Start: 2019-12-21 | End: 2020-01-20

## 2019-12-21 RX ADMIN — CLOPIDOGREL BISULFATE 75 MG: 75 TABLET ORAL at 08:12

## 2019-12-21 RX ADMIN — TAMSULOSIN HYDROCHLORIDE 0.4 MG: 0.4 CAPSULE ORAL at 08:12

## 2019-12-21 RX ADMIN — ATORVASTATIN CALCIUM 80 MG: 40 TABLET, FILM COATED ORAL at 08:12

## 2019-12-21 RX ADMIN — ASPIRIN 81 MG: 81 TABLET, COATED ORAL at 08:12

## 2019-12-21 RX ADMIN — MORPHINE SULFATE 2 MG: 2 INJECTION, SOLUTION INTRAMUSCULAR; INTRAVENOUS at 08:12

## 2019-12-21 RX ADMIN — TRAMADOL HYDROCHLORIDE 50 MG: 50 TABLET, FILM COATED ORAL at 01:12

## 2019-12-21 RX ADMIN — TRAMADOL HYDROCHLORIDE 50 MG: 50 TABLET, FILM COATED ORAL at 04:12

## 2019-12-21 NOTE — PT/OT/SLP EVAL
Physical Therapy Evaluation    Patient Name:  Masoud Osorio   MRN:  [id number]    Recommendations:     Discharge Recommendations:  home, outpatient PT   Discharge Equipment Recommendations: shower chair, walker, rolling   Barriers to discharge: None    Assessment:     Masoud Osorio is a 64 y.o. male admitted with a medical diagnosis of Chest pain.  He presents with the following impairments/functional limitations:  gait instability, impaired balance, decreased ROM, impaired functional mobilty, pain, orthopedic precautions, impaired self care skills . Patient found supine in bed wearing TLSO. Patient able to log roll supine <> sidelying <> sit Mod I, Sit <> stand with supervision and vc for posture. Gait with RW ~ 80 ft SBA .    Rehab Prognosis: Good; patient would benefit from acute skilled PT services to address these deficits and reach maximum level of function.    Recent Surgery: * No surgery found *      Plan:     During this hospitalization, patient to be seen 6 x/week to address the identified rehab impairments via gait training, therapeutic activities, therapeutic exercises and progress toward the following goals:    · Plan of Care Expires:  01/21/20    Subjective     Chief Complaint: pain  Patient/Family Comments/goals: go home  Pain/Comfort:  · Pain Rating 1: 2/10  · Location - Side 1: Bilateral  · Location - Orientation 1: lower  · Location 1: back    Patients cultural, spiritual, Shinto conflicts given the current situation:      Living Environment:  Lives with spouse 2 story home able to stay downstairs, WIS   Prior to admission, patients level of function was independent .  Equipment used at home: none.  DME owned (not currently used): none.  Upon discharge, patient will have assistance from family.    Objective:     Communicated with primary nurse prior to session.  Patient found HOB elevated with TLSO, telemetry  upon PT entry to room.    General Precautions: Standard, fall   Orthopedic  Precautions:spinal precautions   Braces:       Exams:  · RLE ROM: WFL  · RLE Strength: WFL  · LLE ROM: WFL  · LLE Strength: WFL    Functional Mobility:  · Bed Mobility:     · Supine to Sit: modified independence  · Sit to Supine: modified independence  · Transfers:     · Sit to Stand:  supervision and stand by assistance with no AD  · Gait: 80 ft with RW and SBA  · Balance: fair with RW      Therapeutic Activities and Exercises:   Reviewed proper posture and body mechanics along with spinal precautions    AM-PAC 6 CLICK MOBILITY  Total Score:22     Patient left HOB elevated with all lines intact, call button in reach and spouse present.    GOALS:   Multidisciplinary Problems     Physical Therapy Goals        Problem: Physical Therapy Goal    Goal Priority Disciplines Outcome Goal Variances Interventions   Physical Therapy Goal     PT, PT/OT Ongoing, Progressing     Description:  Goals to be met by: 2020     Patient will increase functional independence with mobility by performin. Supine to sit with Modified [place]  2. Sit to stand transfer with Modified [place]  3. Gait  x 150 feet with Modified [place] using Rolling Walker.                       History:     Past Medical History:   Diagnosis Date    Anxiety and depression 2016    trial of ctialopram and follow up in 8 weeks    Benign prostatic hyperplasia (BPH) with straining on urination 2018    Coronary artery disease involving native coronary artery of native heart without angina pectoris 2016 [facility]STEMI at [facility]   PCI of RCA with 4.0 x 15, 4.0 x 38, and 3.5 x 38 mm Resolute JACOB   PCI of PDA 2.75 x 15 mm JACOB dilated to 3.0 POBA of proximal PLB 2.0 x 12 mm balloon   Normal EF with LVEDP 15 mmHg DAPT score of 2               Elevated PSA     Gout     History of coronary artery stent placement 2016    History of heart attack 2016    NSTEMI    Mixed hyperlipidemia 2017    Overweight  (BMI 25.0-29.9) 9/21/2016    Tobacco dependence in remission 7/25/2016       Past Surgical History:   Procedure Laterality Date    COLONOSCOPY N/A 8/16/2017    Procedure: COLONOSCOPY Split Prep;  Surgeon: Maykel Carcamo Jr., MD;  Location[facility];  Service: Endoscopy;  Laterality: N/A;    CORONARY ANGIOPLASTY WITH STENT PLACEMENT      HERNIA REPAIR      ROTATOR CUFF REPAIR      VASECTOMY         Time Tracking:     PT Received On: 12/21/19  PT Start Time: 1004     PT Stop Time: 1025  PT Total Time (min): 21 min     Billable Minutes: Evaluation 20      Bienvenido Saucedo, PT  12/21/2019

## 2019-12-21 NOTE — HPI
64 y.o. male with past medical history of anxiety, depression, benign prostatic hyperplasia with straining on urination, coronary artery disease involving native coronary artery of native heart without angina pectoris, elevated PSA, gout, history of coronary artery stent placement, history of heart attack, mixed hyperlipidemia, who presents with  complaint of chest pain and back pain s/p fall with head trauma which occurred earlier today.  The patient reports he fell backwards about 7 feet off a ladder onto his back, breaking the fall with his left arm and hitting his upper back and back of his head on the ground, denies LOC. Back pain characterized as aching, intermittent pain, worse with movement.  He developed chest pain while taking a shower shortly after accident. Chest pain reported as midsternal, constant, throbbing, non-radiating since onset and unrelieved with 1 NTG SL. Of note, the patient has a history of chronic lower back pain and cardiac stenting on Plavix. Patient denies any SOB, headcahe, weakness, fatigue, radiating pain, nausea, vomiting, abdominal pain, neck pain, numbness, or tingling.  ED findings: WBC 16.59, troponin negative, EKG with no ischemic changes, CT of head negative for acute findings, Xray of spine showed acute appearing L1 vertebral body mild compression fracture.  Neurosurgery recommended TSLO brace which was ordered in ED.  Patient admitted to hospital medicine under observation status to r/o ACS.

## 2019-12-21 NOTE — ASSESSMENT & PLAN NOTE
Fall    Neuro sx has been consulted. We appreciate surgery. TLSO brace has been delivered and is on the patient. PT/OT consulted--will need PT outpatient. He can follow up with neurosx outpatient. He will also need to follow up with his cardiologist and PCP outpatient. Will send medications for pain control.

## 2019-12-21 NOTE — SUBJECTIVE & OBJECTIVE
Past Medical History:   Diagnosis Date    Anxiety and depression 9/21/2016    trial of ctialopram and follow up in 8 weeks    Benign prostatic hyperplasia (BPH) with straining on urination 12/5/2018    Coronary artery disease involving native coronary artery of native heart without angina pectoris 7/26/2016 7/26/2016 NSTEMI at [facility]   PCI of RCA with 4.0 x 15, 4.0 x 38, and 3.5 x 38 mm Resolute JACOB   PCI of PDA 2.75 x 15 mm JACOB dilated to 3.0 POBA of proximal PLB 2.0 x 12 mm balloon   Normal EF with LVEDP 15 mmHg DAPT score of 2               Elevated PSA     Gout     History of coronary artery stent placement 9/21/2016    History of heart attack 7/25/2016    NSTEMI    Mixed hyperlipidemia 7/17/2017    Overweight (BMI 25.0-29.9) 9/21/2016    Tobacco dependence in remission 7/25/2016       Past Surgical History:   Procedure Laterality Date    COLONOSCOPY N/A 8/16/2017    Procedure: COLONOSCOPY Split Prep;  Surgeon: Maykel Carcamo Jr., MD;  Location: [facility];  Service: Endoscopy;  Laterality: N/A;    CORONARY ANGIOPLASTY WITH STENT PLACEMENT      HERNIA REPAIR      ROTATOR CUFF REPAIR      VASECTOMY         Review of patient's allergies indicates:   Allergen Reactions    Penicillins Hives       No current facility-administered medications on file prior to encounter.      Current Outpatient Medications on File Prior to Encounter   Medication Sig    ascorbic acid, vitamin C, (VITAMIN C) 500 MG tablet Take 500 mg by mouth once daily.    aspirin (ECOTRIN) 81 MG EC tablet Take 1 tablet (81 mg total) by mouth once daily.    atorvastatin (LIPITOR) 80 MG tablet TAKE 1 TABLET BY MOUTH DAILY    azelastine (ASTELIN) 137 mcg (0.1 %) nasal spray INSTILL 2 SPRAYS IN EACH NOSTRIL TWICE A DAY    azelastine 0.15 % (205.5 mcg) Spry Instill two sprays in each nostril twice daily    clopidogrel (PLAVIX) 75 mg tablet TAKE ONE TABLET BY MOUTH DAILY    flu vacc [id number] 6mos up,PF, 60 mcg (15 mcg x  4)/0.5 mL Syrg To be admin by [facility].    GARLIC EXTRACT ORAL Take by mouth.    ipratropium (ATROVENT) 0.06 % nasal spray INSTILL 2 SPRAYS IN EACH NOSTRIL EVERY 12 HOURS    ipratropium (ATROVENT) 42 mcg (0.06 %) nasal spray ADMINISTER TWO SPRAYS IN EACH NOSTRIL EVERY 12 HOURS    ipratropium (ATROVENT) 42 mcg (0.06 %) nasal spray Instill two sprays in each nostril every 12 hours    multivitamin capsule Take 1 capsule by mouth once daily.    NASAL ALLERGY 55 mcg nasal inhaler INSTILL 2 SPRAYS IN [facility] NOSTRIL EVERY DAY    nitroGLYCERIN (NITROSTAT) 0.4 MG SL tablet Place 1 tablet (0.4 mg total) under the tongue every 5 (five) minutes as needed for Chest pain.    tamsulosin (FLOMAX) 0.4 mg Cap Take 1 capsule (0.4 mg total) by mouth once daily.    triamcinolone (NASACORT) 55 mcg nasal inhaler Instill two sprays in each nostril once daily    escitalopram oxalate (LEXAPRO) 20 MG tablet Take 1 tablet (20 mg total) by mouth once daily.    [DISCONTINUED] ALPRAZolam (XANAX) 0.5 MG tablet Take 1/2 to 1 tablet by mouth every 8 hours    [DISCONTINUED] ALPRAZolam (XANAX) 1 MG tablet TAKE 1 TABLET BY MOUTH DAILY AS NEEDED FOR ANXIETY    [DISCONTINUED] azelastine (ASTELIN) 137 mcg (0.1 %) nasal spray ADMINISTER TWO SPRAYS IN EACH NOSTRIL TWICE A DAY    [DISCONTINUED] cephALEXin (KEFLEX) 500 MG capsule Take 1 capsule (500 mg total) by mouth 2 (two) times daily.    [DISCONTINUED] clopidogrel (PLAVIX) 75 mg tablet TAKE ONE TABLET BY MOUTH DAILY    [DISCONTINUED] HYDROcodone-acetaminophen (NORCO) 7.5-325 mg per tablet Take 1 tablet by mouth every 6 (six) hours.    [DISCONTINUED] hydrOXYzine HCl (ATARAX) 25 MG tablet TAKE 1/2 TO 1 TABLET BY MOUTH AT BEDTIME and also during the day as needed for anxiety    [DISCONTINUED] levoFLOXacin (LEVAQUIN) 500 MG tablet TAKE ONE TABLET BY MOUTH DAILY    [DISCONTINUED] levoFLOXacin (LEVAQUIN) 500 MG tablet Take 1 tablet (500 mg total) by mouth once daily.    [DISCONTINUED] levoFLOXacin  (LEVAQUIN) 500 MG tablet Take 1 tablet by mouth everyday.    [DISCONTINUED] oseltamivir (TAMIFLU) 75 MG capsule Take 1 capsule by mouth twice daily.    [DISCONTINUED] predniSONE (DELTASONE) 20 MG tablet On days 1-3 take 1 and 1/2  tablet by mouth once daily, on days 4 and 5 take 1 tablet once daily. On day 6 take 1/2 tablet once.    [DISCONTINUED] predniSONE (DELTASONE) 20 MG tablet Take 2 tablets by mouth on day 1, then 1 and 1/2  tablets on day 2 and 3, then 1 tablet by mouth days 4 and 5, then 1/2 tablet by mouth on day 6.    [DISCONTINUED] promethazine-codeine 6.25-10 mg/5 ml (PHENERGAN WITH CODEINE) 6.25-10 mg/5 mL syrup Take 5 to 10 ml's by mouth esdras 8 hours    [DISCONTINUED] tamsulosin (FLOMAX) 0.4 mg Cap Take 1 capsule (0.4 mg total) by mouth once daily.     Family History     Problem Relation (Age of Onset)    Arthritis Mother    Congenital heart disease Father    Heart disease Mother    No Known Problems Sister, Brother, Daughter, Sister, Sister    Parkinsonism Mother        Tobacco Use    Smoking status: Former Smoker     Packs/day: 0.50     Types: Cigarettes     Last attempt to quit: 7/25/2016     Years since quitting: 3.4    Smokeless tobacco: Never Used   Substance and Sexual Activity    Alcohol use: Yes     Comment: rarely    Drug use: No    Sexual activity: Not on file     Review of Systems   Constitutional: Negative for chills and fever.   HENT: Negative for congestion, postnasal drip and rhinorrhea.    Respiratory: Negative for chest tightness and shortness of breath.    Cardiovascular: Positive for chest pain. Negative for palpitations.   Gastrointestinal: Negative for abdominal distention, abdominal pain, nausea and vomiting.   Genitourinary: Negative for difficulty urinating.   Musculoskeletal: Positive for arthralgias, back pain and myalgias.   Skin: Negative for color change.   Neurological: Negative for dizziness, weakness and headaches.   Hematological: Does not bruise/bleed  easily.   Psychiatric/Behavioral: Negative for agitation.     Objective:     Vital Signs (Most Recent):  Temp: 98.2 °F (36.8 °C) (12/20/19 1722)  Pulse: 80 (12/20/19 2103)  Resp: 18 (12/20/19 2103)  BP: 139/85 (12/20/19 2103)  SpO2: 99 % (12/20/19 2103) Vital Signs (24h Range):  Temp:  [98.2 °F (36.8 °C)] 98.2 °F (36.8 °C)  Pulse:  [74-80] 80  Resp:  [10-24] 18  SpO2:  [94 %-99 %] 99 %  BP: (125-156)/(72-88) 139/85     Weight: 86.2 kg (190 lb)  Body mass index is 28.47 kg/m².    Physical Exam   Constitutional: He is oriented to person, place, and time. He appears well-developed and well-nourished.   HENT:   Head: Normocephalic and atraumatic.   Eyes: Pupils are equal, round, and reactive to light. EOM are normal.   Neck: Normal range of motion. Neck supple.   Cardiovascular: Normal rate and regular rhythm.   Pulmonary/Chest: Effort normal and breath sounds normal.   Abdominal: Soft. Bowel sounds are normal.   Musculoskeletal: He exhibits no edema or deformity.   Neurological: He is alert and oriented to person, place, and time.   Skin: Skin is warm and dry.   Abrasions to left shoulder and right lower extremity   Psychiatric: He has a normal mood and affect. His behavior is normal.         CRANIAL NERVES     CN III, IV, VI   Pupils are equal, round, and reactive to light.  Extraocular motions are normal.        Significant Labs:   BMP:   Recent Labs   Lab 12/20/19  1805   GLU 99      K 3.8      CO2 28   BUN 17   CREATININE 0.9   CALCIUM 9.4     CBC:   Recent Labs   Lab 12/20/19  1805   WBC 16.59*   HGB 14.8   HCT 44.4        CMP:   Recent Labs   Lab 12/20/19  1805      K 3.8      CO2 28   GLU 99   BUN 17   CREATININE 0.9   CALCIUM 9.4   PROT 6.5   ALBUMIN 4.0   BILITOT 0.7   ALKPHOS 85   AST 43*   ALT 31   ANIONGAP 10   EGFRNONAA >60     Cardiac Markers:   Recent Labs   Lab 12/20/19  1805   BNP 32     Troponin:   Recent Labs   Lab 12/20/19  1805   TROPONINI <0.006       Significant  Imaging: I have reviewed all pertinent imaging results/findings within the past 24 hours.

## 2019-12-21 NOTE — H&P
[facility] Medicine  History & Physical    Patient Name: Masoud Osorio  MRN: [id number]  Admission Date: 12/20/2019  Attending Physician: Cassie Hewitt*   Primary Care Provider: Rosemarie Ayala MD         Patient information was obtained from patient, past medical records and ER records.     Subjective:     Principal Problem:Chest pain    Chief Complaint:   Chief Complaint   Patient presents with    Fall     Fall from 6-7ft ladder onto grass, landing on upper back and hitting head.  Reports took a shower then started having chest pain. Took 1 SL NTG PTA    Chest Pain        HPI: 64 y.o. male with past medical history of anxiety, depression, benign prostatic hyperplasia with straining on urination, coronary artery disease involving native coronary artery of native heart without angina pectoris, elevated PSA, gout, history of coronary artery stent placement, history of heart attack, mixed hyperlipidemia, who presents with  complaint of chest pain and back pain s/p fall with head trauma which occurred earlier today.  The patient reports he fell backwards about 7 feet off a ladder onto his back, breaking the fall with his left arm and hitting his upper back and back of his head on the ground, denies LOC. Back pain characterized as aching, intermittent pain, worse with movement.  He developed chest pain while taking a shower shortly after accident. Chest pain reported as midsternal, constant, throbbing, non-radiating since onset and unrelieved with 1 NTG SL. Of note, the patient has a history of chronic lower back pain and cardiac stenting on Plavix. Patient denies any SOB, headcahe, weakness, fatigue, radiating pain, nausea, vomiting, abdominal pain, neck pain, numbness, or tingling.  ED findings: WBC 16.59, troponin negative, EKG with no ischemic changes, CT of head negative for acute findings, Xray of spine showed acute appearing L1 vertebral body mild compression fracture.   Neurosurgery recommended TSLO brace which was ordered in ED.  Patient admitted to hospital medicine under observation status to r/o ACS.    Past Medical History:   Diagnosis Date    Anxiety and depression 9/21/2016    trial of ctialopram and follow up in 8 weeks    Benign prostatic hyperplasia (BPH) with straining on urination 12/5/2018    Coronary artery disease involving native coronary artery of native heart without angina pectoris 7/26/2016 7/26/2016 [facility]I at [facility]   PCI of RCA with 4.0 x 15, 4.0 x 38, and 3.5 x 38 mm Resolute JACOB   PCI of PDA 2.75 x 15 mm JACOB dilated to 3.0 POBA of proximal PLB 2.0 x 12 mm balloon   Normal EF with LVEDP 15 mmHg DAPT score of 2               Elevated PSA     Gout     History of coronary artery stent placement 9/21/2016    History of heart attack 7/25/2016    NSTEMI    Mixed hyperlipidemia 7/17/2017    Overweight (BMI 25.0-29.9) 9/21/2016    Tobacco dependence in remission 7/25/2016       Past Surgical History:   Procedure Laterality Date    COLONOSCOPY N/A 8/16/2017    Procedure: COLONOSCOPY Split Prep;  Surgeon: Maykel Carcamo Jr., MD;  Location: [facility];  Service: Endoscopy;  Laterality: N/A;    CORONARY ANGIOPLASTY WITH STENT PLACEMENT      HERNIA REPAIR      ROTATOR CUFF REPAIR      VASECTOMY         Review of patient's allergies indicates:   Allergen Reactions    Penicillins Hives       No current facility-administered medications on file prior to encounter.      Current Outpatient Medications on File Prior to Encounter   Medication Sig    ascorbic acid, vitamin C, (VITAMIN C) 500 MG tablet Take 500 mg by mouth once daily.    aspirin (ECOTRIN) 81 MG EC tablet Take 1 tablet (81 mg total) by mouth once daily.    atorvastatin (LIPITOR) 80 MG tablet TAKE 1 TABLET BY MOUTH DAILY    azelastine (ASTELIN) 137 mcg (0.1 %) nasal spray INSTILL 2 SPRAYS IN EACH NOSTRIL TWICE A DAY    azelastine 0.15 % (205.5 mcg) Spry Instill two sprays in each  nostril twice daily    clopidogrel (PLAVIX) 75 mg tablet TAKE ONE TABLET BY MOUTH DAILY    flu vacc [id number] 6mos up,PF, 60 mcg (15 mcg x 4)/0.5 mL Syrg To be admin by [facility].    GARLIC EXTRACT ORAL Take by mouth.    ipratropium (ATROVENT) 0.06 % nasal spray INSTILL 2 SPRAYS IN EACH NOSTRIL EVERY 12 HOURS    ipratropium (ATROVENT) 42 mcg (0.06 %) nasal spray ADMINISTER TWO SPRAYS IN EACH NOSTRIL EVERY 12 HOURS    ipratropium (ATROVENT) 42 mcg (0.06 %) nasal spray Instill two sprays in each nostril every 12 hours    multivitamin capsule Take 1 capsule by mouth once daily.    NASAL ALLERGY 55 mcg nasal inhaler INSTILL 2 SPRAYS IN [facility] NOSTRIL EVERY DAY    nitroGLYCERIN (NITROSTAT) 0.4 MG SL tablet Place 1 tablet (0.4 mg total) under the tongue every 5 (five) minutes as needed for Chest pain.    tamsulosin (FLOMAX) 0.4 mg Cap Take 1 capsule (0.4 mg total) by mouth once daily.    triamcinolone (NASACORT) 55 mcg nasal inhaler Instill two sprays in each nostril once daily    escitalopram oxalate (LEXAPRO) 20 MG tablet Take 1 tablet (20 mg total) by mouth once daily.    [DISCONTINUED] ALPRAZolam (XANAX) 0.5 MG tablet Take 1/2 to 1 tablet by mouth every 8 hours    [DISCONTINUED] ALPRAZolam (XANAX) 1 MG tablet TAKE 1 TABLET BY MOUTH DAILY AS NEEDED FOR ANXIETY    [DISCONTINUED] azelastine (ASTELIN) 137 mcg (0.1 %) nasal spray ADMINISTER TWO SPRAYS IN EACH NOSTRIL TWICE A DAY    [DISCONTINUED] cephALEXin (KEFLEX) 500 MG capsule Take 1 capsule (500 mg total) by mouth 2 (two) times daily.    [DISCONTINUED] clopidogrel (PLAVIX) 75 mg tablet TAKE ONE TABLET BY MOUTH DAILY    [DISCONTINUED] HYDROcodone-acetaminophen (NORCO) 7.5-325 mg per tablet Take 1 tablet by mouth every 6 (six) hours.    [DISCONTINUED] hydrOXYzine HCl (ATARAX) 25 MG tablet TAKE 1/2 TO 1 TABLET BY MOUTH AT BEDTIME and also during the day as needed for anxiety    [DISCONTINUED] levoFLOXacin (LEVAQUIN) 500 MG tablet TAKE ONE TABLET BY MOUTH  DAILY    [DISCONTINUED] levoFLOXacin (LEVAQUIN) 500 MG tablet Take 1 tablet (500 mg total) by mouth once daily.    [DISCONTINUED] levoFLOXacin (LEVAQUIN) 500 MG tablet Take 1 tablet by mouth everyday.    [DISCONTINUED] oseltamivir (TAMIFLU) 75 MG capsule Take 1 capsule by mouth twice daily.    [DISCONTINUED] predniSONE (DELTASONE) 20 MG tablet On days 1-3 take 1 and 1/2  tablet by mouth once daily, on days 4 and 5 take 1 tablet once daily. On day 6 take 1/2 tablet once.    [DISCONTINUED] predniSONE (DELTASONE) 20 MG tablet Take 2 tablets by mouth on day 1, then 1 and 1/2  tablets on day 2 and 3, then 1 tablet by mouth days 4 and 5, then 1/2 tablet by mouth on day 6.    [DISCONTINUED] promethazine-codeine 6.25-10 mg/5 ml (PHENERGAN WITH CODEINE) 6.25-10 mg/5 mL syrup Take 5 to 10 ml's by mouth esdras 8 hours    [DISCONTINUED] tamsulosin (FLOMAX) 0.4 mg Cap Take 1 capsule (0.4 mg total) by mouth once daily.     Family History     Problem Relation (Age of Onset)    Arthritis Mother    Congenital heart disease Father    Heart disease Mother    No Known Problems Sister, Brother, Daughter, Sister, Sister    Parkinsonism Mother        Tobacco Use    Smoking status: Former Smoker     Packs/day: 0.50     Types: Cigarettes     Last attempt to quit: 7/25/2016     Years since quitting: 3.4    Smokeless tobacco: Never Used   Substance and Sexual Activity    Alcohol use: Yes     Comment: rarely    Drug use: No    Sexual activity: Not on file     Review of Systems   Constitutional: Negative for chills and fever.   HENT: Negative for congestion, postnasal drip and rhinorrhea.    Respiratory: Negative for chest tightness and shortness of breath.    Cardiovascular: Positive for chest pain. Negative for palpitations.   Gastrointestinal: Negative for abdominal distention, abdominal pain, nausea and vomiting.   Genitourinary: Negative for difficulty urinating.   Musculoskeletal: Positive for arthralgias, back pain and  myalgias.   Skin: Negative for color change.   Neurological: Negative for dizziness, weakness and headaches.   Hematological: Does not bruise/bleed easily.   Psychiatric/Behavioral: Negative for agitation.     Objective:     Vital Signs (Most Recent):  Temp: 98.2 °F (36.8 °C) (12/20/19 1722)  Pulse: 80 (12/20/19 2103)  Resp: 18 (12/20/19 2103)  BP: 139/85 (12/20/19 2103)  SpO2: 99 % (12/20/19 2103) Vital Signs (24h Range):  Temp:  [98.2 °F (36.8 °C)] 98.2 °F (36.8 °C)  Pulse:  [74-80] 80  Resp:  [10-24] 18  SpO2:  [94 %-99 %] 99 %  BP: (125-156)/(72-88) 139/85     Weight: 86.2 kg (190 lb)  Body mass index is 28.47 kg/m².    Physical Exam   Constitutional: He is oriented to person, place, and time. He appears well-developed and well-nourished.   HENT:   Head: Normocephalic and atraumatic.   Eyes: Pupils are equal, round, and reactive to light. EOM are normal.   Neck: Normal range of motion. Neck supple.   Cardiovascular: Normal rate and regular rhythm.   Pulmonary/Chest: Effort normal and breath sounds normal.   Abdominal: Soft. Bowel sounds are normal.   Musculoskeletal: He exhibits no edema or deformity.   Neurological: He is alert and oriented to person, place, and time.   Skin: Skin is warm and dry.   Abrasions to left shoulder and right lower extremity   Psychiatric: He has a normal mood and affect. His behavior is normal.         CRANIAL NERVES     CN III, IV, VI   Pupils are equal, round, and reactive to light.  Extraocular motions are normal.        Significant Labs:   BMP:   Recent Labs   Lab 12/20/19  1805   GLU 99      K 3.8      CO2 28   BUN 17   CREATININE 0.9   CALCIUM 9.4     CBC:   Recent Labs   Lab 12/20/19  1805   WBC 16.59*   HGB 14.8   HCT 44.4        CMP:   Recent Labs   Lab 12/20/19  1805      K 3.8      CO2 28   GLU 99   BUN 17   CREATININE 0.9   CALCIUM 9.4   PROT 6.5   ALBUMIN 4.0   BILITOT 0.7   ALKPHOS 85   AST 43*   ALT 31   ANIONGAP 10   EGFRNONAA >60      Cardiac Markers:   Recent Labs   Lab 12/20/19  1805   BNP 32     Troponin:   Recent Labs   Lab 12/20/19 1805   TROPONINI <0.006       Significant Imaging: I have reviewed all pertinent imaging results/findings within the past 24 hours.    Assessment/Plan:     * Chest pain  Coronary artery disease involving native coronary artery of native heart without angina pectoris  History of coronary artery stent placement  Mixed hyperlipidemia    Constant, throbbing non-radiating chest pain, likely musculoskeletal but would like to r/o ACS 2/2 patients heart history   Continuous Cardiac Monitoring  Initial troponin negative, trend x3  EAspirin 81 mg daily  NPO p MN  NTG 0.4 mg SL PRN CP  Continue aspirin and statin  Prn pain medications      Leukocytosis  Monitor      Compression fracture of L1 lumbar vertebra  Fall    ED received neurosurgery recommendations  TLSO brace ordered  PT/OT eval and treat  Prn pain mediation      Benign prostatic hyperplasia (BPH) with straining on urination  Continue Flomax      Anxiety and depression  Continue Lexapro        VTE Risk Mitigation (From admission, onward)         Ordered     Place sequential compression device  Until discontinued      12/20/19 2034     IP VTE LOW RISK PATIENT  Once      12/20/19 2034                   Peggy Vaughan NP  Department of Hospital Medicine   [facility]

## 2019-12-21 NOTE — ED NOTES
People in charge of back braces has been contacted. Personnel to bring a TLSO ortho device, ETA 90 minutes.

## 2019-12-21 NOTE — PT/OT/SLP EVAL
Occupational Therapy   Evaluation    Name: Masoud Osorio  MRN: [id number]  Admitting Diagnosis:  Chest pain      Recommendations:     Discharge Recommendations: home, outpatient PT  Discharge Equipment Recommendations:  walker, rolling, shower chair  Barriers to discharge:  None    Assessment:     Masoud Osorio is a 64 y.o. male with a medical diagnosis of Chest pain, L1 compression fracture s/p mechanical fall from ladder.  He presents with the following performance deficits affecting function: impaired endurance, impaired functional mobilty, impaired self care skills, gait instability, pain, orthopedic precautions.      Rehab Prognosis: Good; patient would benefit from acute skilled OT services to address these deficits and reach maximum level of function.       Plan:     Patient to be seen 5 x/week to address the above listed problems via self-care/home management, therapeutic activities, therapeutic exercises  · Plan of Care Expires: 01/21/20  · Plan of Care Reviewed with: patient, spouse    Subjective     Chief Complaint: pain in lower back  Patient/Family Comments/goals: return home, return to PLOF    Occupational Profile:  Living Environment: Lives with wife in 2SH with THE, master bedroom/bathroom downstairs, WIS  Previous level of function: Independent with all ADLs/IADLs  Roles and Routines: retired, drives, does housework   Equipment Used at Home:  none  Assistance upon Discharge: wife    Pain/Comfort:  · Pain Rating 1: 2/10  · Location - Side 1: Bilateral  · Location - Orientation 1: lower  · Location 1: back  · Pain Addressed 1: Pre-medicate for activity, Reposition, Distraction    Patients cultural, spiritual, Druze conflicts given the current situation: no    Objective:     Communicated with: Nurse prior to session.  Patient found HOB elevated with TLSO, telemetry upon OT entry to room. Wife present.     General Precautions: Standard, fall   Orthopedic Precautions:spinal precautions   Braces:  TLSO     Occupational Performance:    Bed Mobility:    · Patient completed Rolling/Turning to Right with supervision and with side rail and log rolling technique  · Patient completed Supine to Sit with supervision and with side rail  · Patient completed Sit to Supine with supervision and with side rail    Functional Mobility/Transfers:  · Patient completed Sit <> Stand Transfer with supervision and stand by assistance  with  rolling walker   · Functional Mobility: Pt ambulated in hallway ~50' with RW and CGA initially, progressing to SBA.     Cognitive/Visual Perceptual:  AOx4    Physical Exam:  BUE AROM/strength WFL    AMPAC 6 Click ADL:  AMPAC Total Score: 18    Treatment & Education:  Pt educated on role of OT/POC. Pt and wife educated on spinal precautions (no bending/lifting/twisting), log rolling bed mobility technique, and usage of TLSO, both verbalized understanding and pt returned demonstration of log rolling technique with good carryover of instruction. Pt advised to not stay in one position too long while at home and to continue with light activities and walking with RW for stability during acute recovery phase. Pt would benefit from a RW and a shower chair. Pt would benefit from outpatient PT after follow-up with MD. Patient will benefit from continued skilled OT to address deficits and improve performance in functional ADL tasks. Cont OT per POC.  Education:    Patient left HOB elevated with all lines intact, call button in reach, nurse notified and wife present    GOALS:   Multidisciplinary Problems     Occupational Therapy Goals        Problem: Occupational Therapy Goal    Goal Priority Disciplines Outcome Interventions   Occupational Therapy Goal     OT, PT/OT Ongoing, Progressing    Description:  Goals to be met by: 1/21/2019    Patient will increase functional independence with ADLs by performing:    UE Dressing with Modified [place].  LE Dressing with Modified [place].  Grooming while  standing at sink with Modified [place].  Toileting from toilet with Modified [place] for hygiene and clothing management.                       History:     Past Medical History:   Diagnosis Date    Anxiety and depression 9/21/2016    trial of ctialopram and follow up in 8 weeks    Benign prostatic hyperplasia (BPH) with straining on urination 12/5/2018    Coronary artery disease involving native coronary artery of native heart without angina pectoris 7/26/2016 7/26/2016 [facility]I at [facility]   PCI of RCA with 4.0 x 15, 4.0 x 38, and 3.5 x 38 mm Resolute JACOB   PCI of PDA 2.75 x 15 mm JACOB dilated to 3.0 POBA of proximal PLB 2.0 x 12 mm balloon   Normal EF with LVEDP 15 mmHg DAPT score of 2               Elevated PSA     Gout     History of coronary artery stent placement 9/21/2016    History of heart attack 7/25/2016    NSTEMI    Mixed hyperlipidemia 7/17/2017    Overweight (BMI 25.0-29.9) 9/21/2016    Tobacco dependence in remission 7/25/2016       Past Surgical History:   Procedure Laterality Date    COLONOSCOPY N/A 8/16/2017    Procedure: COLONOSCOPY Split Prep;  Surgeon: Maykel Carcamo Jr., MD;  Location: [facility];  Service: Endoscopy;  Laterality: N/A;    CORONARY ANGIOPLASTY WITH STENT PLACEMENT      HERNIA REPAIR      ROTATOR CUFF REPAIR      VASECTOMY         Time Tracking:     OT Date of Treatment: 12/21/19  OT Start Time: 1004  OT Stop Time: 1025  OT Total Time (min): 21 min coeval with PT     Billable Minutes:Evaluation 10    Miranda Jurado, OT  12/21/2019

## 2019-12-21 NOTE — PLAN OF CARE
TN attempted to deliver RW to pt bedside, pt had already been d/c to home. TN contacted [facility], Mr Hitchcock [phone] to inform him that RW must be deiliverd to pt's home and sent orders via [employer] fax system    Future Appointments   Date Time Provider Department Center   1/6/2020  7:10 AM APPOINTMENT LAB, [facility]   1/13/2020 10:00 AM Rosemarie Ayala MD [facility]       Pt's nurse will go over medications/signs and symptoms prior to discharge       12/21/19 1520   Final Note   Assessment Type Final Discharge Note   Anticipated Discharge Disposition Home   What phone number can be called within the next 1-3 days to see how you are doing after discharge? [phone]   Hospital Follow Up  Appt(s) scheduled? No  (Offices closed for Weekend. Patient to schedule own follow up appointment. )   Right Care Referral Info   Post Acute Recommendation No Care     Cat Earl, RN Transitional Navigator  [phone]

## 2019-12-21 NOTE — PLAN OF CARE
Pt vitals were maintained, pt had some moderate pain relieved with prn tramadol, pt got fitted for TSLO brace, awaiting instructions on use, pt hasn't worked with pt/ot yet, pt did tolerated diet well. Pt has been Npo since midnight awaiting morning test. Also awaiting morning troponin level which was  0.006

## 2019-12-21 NOTE — HOSPITAL COURSE
The patient was admitted to the [facility] service for care. He was seen by Neuro sx for the compression fx as well as cardiology has seen the patient. Trended troponin. Cards is ok with d/c home. Consulted neuro sx--ok for d/c. Will d.c home with outpatient PT. He will need DME equipment as recommeded by PT/OT.

## 2019-12-21 NOTE — ASSESSMENT & PLAN NOTE
Coronary artery disease involving native coronary artery of native heart without angina pectoris  History of coronary artery stent placement  Mixed hyperlipidemia    Constant, throbbing non-radiating chest pain, likely musculoskeletal but would like to r/o ACS 2/2 patients heart history   Continuous Cardiac Monitoring  Initial troponin negative, trend x3  EAspirin 81 mg daily  NPO p MN  NTG 0.4 mg SL PRN CP  Continue aspirin and statin  Prn pain medications

## 2019-12-21 NOTE — ASSESSMENT & PLAN NOTE
Coronary artery disease involving native coronary artery of native heart without angina pectoris  History of coronary artery stent placement  Mixed hyperlipidemia    Consulted cardiology. Ok for d/c home. Trended troponin--negative x3. Will cont home meds.

## 2019-12-21 NOTE — PLAN OF CARE
Problem: Physical Therapy Goal  Goal: Physical Therapy Goal  Description  Goals to be met by: 2020     Patient will increase functional independence with mobility by performin. Supine to sit with Modified [place]  2. Sit to stand transfer with Modified [place]  3. Gait  x 150 feet with Modified [place] using Rolling Walker.      Outcome: Ongoing, Progressing   Recommend Home   Would benefit from RW  Outpatient PT after follow up with physician

## 2019-12-21 NOTE — DISCHARGE SUMMARY
[facility] Medicine  Discharge Summary      Patient Name: Masoud Osorio  MRN: [id number]  Admission Date: 12/20/2019  Hospital Length of Stay: 0 days  Discharge Date and Time:  12/21/2019 1:27 PM  Attending Physician: Cassie Hewitt*   Discharging Provider: Dallas Sanon NP  Primary Care Provider: Rosemarie Ayala MD      HPI:   64 y.o. male with past medical history of anxiety, depression, benign prostatic hyperplasia with straining on urination, coronary artery disease involving native coronary artery of native heart without angina pectoris, elevated PSA, gout, history of coronary artery stent placement, history of heart attack, mixed hyperlipidemia, who presents with  complaint of chest pain and back pain s/p fall with head trauma which occurred earlier today.  The patient reports he fell backwards about 7 feet off a ladder onto his back, breaking the fall with his left arm and hitting his upper back and back of his head on the ground, denies LOC. Back pain characterized as aching, intermittent pain, worse with movement.  He developed chest pain while taking a shower shortly after accident. Chest pain reported as midsternal, constant, throbbing, non-radiating since onset and unrelieved with 1 NTG SL. Of note, the patient has a history of chronic lower back pain and cardiac stenting on Plavix. Patient denies any SOB, headcahe, weakness, fatigue, radiating pain, nausea, vomiting, abdominal pain, neck pain, numbness, or tingling.  ED findings: WBC 16.59, troponin negative, EKG with no ischemic changes, CT of head negative for acute findings, Xray of spine showed acute appearing L1 vertebral body mild compression fracture.  Neurosurgery recommended TSLO brace which was ordered in ED.  Patient admitted to hospital medicine under observation status to r/o ACS.    * No surgery found *      Hospital Course:   The patient was admitted to the [facility]  service for care. He was seen by Neuro sx for the compression fx as well as cardiology has seen the patient. Trended troponin. Cards is ok with d/c home. Consulted neuro sx--ok for d/c. Will d.c home with outpatient PT. He will need DME equipment as recommeded by PT/OT.      Consults:   Consults (From admission, onward)        Status Ordering Provider     Inpatient consult to [facility]iology-[facility]  Once     Provider:  Cleveland Hinton MD    Acknowledged ECHO AMANDA     Inpatient consult to Neurosurgery  Once     Provider:  Bienvenido Garcia MD    Completed ECHO AMANDA          * Compression fracture of L1 lumbar vertebra  Fall    Neuro sx has been consulted. We appreciate surgery. TLSO brace has been delivered and is on the patient. PT/OT consulted--will need PT outpatient. He can follow up with neurosx outpatient. He will also need to follow up with his cardiologist and PCP outpatient. Will send medications for pain control.        Leukocytosis  Monitor      Chest pain  Coronary artery disease involving native coronary artery of native heart without angina pectoris  History of coronary artery stent placement  Mixed hyperlipidemia    Consulted cardiology. Ok for d/c home. Trended troponin--negative x3. Will cont home meds.     Benign prostatic hyperplasia (BPH) with straining on urination  Continue Flomax      Anxiety and depression  Continue Lexapro        Final Active Diagnoses:    Diagnosis Date Noted POA    PRINCIPAL PROBLEM:  Compression fracture of L1 lumbar vertebra [S32.010A] 12/20/2019 Yes    Chest pain [R07.9] 12/20/2019 Yes    Fall [W19.XXXA] 12/20/2019 Yes    Leukocytosis [D72.829] 12/20/2019 Yes    Benign prostatic hyperplasia (BPH) with straining on urination [N40.1, R39.16] 12/05/2018 Yes    Mixed hyperlipidemia [E78.2] 07/17/2017 Yes    History of coronary artery stent placement [Z95.5] 09/21/2016 Not Applicable    Anxiety and depression [F41.9, F32.9] 09/21/2016  "Yes    Coronary artery disease involving native coronary artery of native heart without angina pectoris [I25.10] 07/26/2016 Yes      Problems Resolved During this Admission:       Discharged Condition: stable    Disposition: Home or Self Care    Follow Up:  Follow-up Information     Rosemarie Ayala MD In 1 week.    Specialty:  Family Medicine  Why:  hospital follow up  Contact information:  [address]  [address]  [place] [address]  [phone]             Bienvenido Garcia MD In 3 weeks.    Specialty:  Neurosurgery  Why:  follow up  Contact information:  [address]  [address]  [place] [address]  [phone]             Keon Bailey MD In 2 weeks.    Specialties:  INTERVENTIONAL CARDIOLOGY, Cardiology  Why:  hospital follow up  Contact information:  [address]  [address]  [place] [id number] [phone]                 Patient Instructions:      Back/Cervical Brace For Home Use     Order Specific Question Answer Comments   Height: 5' 8.5" (1.74 m)    Weight: 86.2 kg (190 lb)    Length of need (1-99 months): 0.5    Product(s) ordered: TLSO BRACE      BATH/SHOWER CHAIR FOR HOME USE     Order Specific Question Answer Comments   Height: 5' 8.5" (1.74 m)    Weight: 98.8 kg (217 lb 13 oz)    Does patient have medical equipment at home? none    Length of need (1-99 months): 99    Type: With back      WALKER FOR HOME USE     Order Specific Question Answer Comments   Type of Walker: Adult (5'4"-6'6")    With wheels? Yes    Height: 5' 8.5" (1.74 m)    Weight: 98.8 kg (217 lb 13 oz)    Length of need (1-99 months): 99    Does patient have medical equipment at home? none    Please check all that apply: Patient's condition impairs ambulation.      Ambulatory Referral to Physical/Occupational Therapy   Referral Priority: Routine Referral Type: Physical Medicine   Referral Reason: Specialty Services Required   Number of Visits Requested: 1     Diet Cardiac     Notify your health care provider if you experience " any of the following:  temperature >100.4     Notify your health care provider if you experience any of the following:  persistent nausea and vomiting or diarrhea     Notify your health care provider if you experience any of the following:  severe uncontrolled pain     Notify your health care provider if you experience any of the following:  difficulty breathing or increased cough     Notify your health care provider if you experience any of the following:  severe persistent headache     Notify your health care provider if you experience any of the following:  worsening rash     Notify your health care provider if you experience any of the following:  persistent dizziness, light-headedness, or visual disturbances     Notify your health care provider if you experience any of the following:  increased confusion or weakness     Activity as tolerated       Significant Diagnostic Studies: Labs:   BMP:   Recent Labs   Lab 12/20/19  1805 12/21/19  0339   GLU 99 105    141   K 3.8 4.0    107   CO2 28 25   BUN 17 15   CREATININE 0.9 0.8   CALCIUM 9.4 8.7   MG  --  2.2    and CBC   Recent Labs   Lab 12/20/19  1805 12/21/19  0339   WBC 16.59* 10.73   HGB 14.8 13.5*   HCT 44.4 41.3    243       Pending Diagnostic Studies:     None         Medications:  Reconciled Home Medications:      Medication List      CHANGE how you take these medications    * nitroGLYCERIN 0.4 MG SL tablet  Commonly known as:  NITROSTAT  Place 1 tablet (0.4 mg total) under the tongue every 5 (five) minutes as needed for Chest pain.  What changed:  Another medication with the same name was added. Make sure you understand how and when to take each.     * nitroGLYCERIN 0.4 MG SL tablet  Commonly known as:  NITROSTAT  Place 1 tablet (0.4 mg total) under the tongue every 5 (five) minutes as needed for Chest pain.  What changed:  You were already taking a medication with the same name, and this prescription was added. Make sure you understand  how and when to take each.         * This list has 2 medication(s) that are the same as other medications prescribed for you. Read the directions carefully, and ask your doctor or other care provider to review them with you.            CONTINUE taking these medications    aspirin 81 MG EC tablet  Commonly known as:  ECOTRIN  Take 1 tablet (81 mg total) by mouth once daily.     atorvastatin 80 MG tablet  Commonly known as:  LIPITOR  TAKE 1 TABLET BY MOUTH DAILY     * azelastine 137 mcg (0.1 %) nasal spray  Commonly known as:  ASTELIN  INSTILL 2 SPRAYS IN EACH NOSTRIL TWICE A DAY     * azelastine 0.15 % (205.5 mcg) Spry  Instill two sprays in each nostril twice daily     clopidogrel 75 mg tablet  Commonly known as:  PLAVIX  TAKE ONE TABLET BY MOUTH DAILY     escitalopram oxalate 20 MG tablet  Commonly known as:  LEXAPRO  Take 1 tablet (20 mg total) by mouth once daily.     Fluarix Quad [id number] (PF) 60 mcg (15 mcg x 4)/0.5 mL Syrg  Generic drug:  flu vacc [id number] 6mos up(PF)  To be admin by [facility].     GARLIC EXTRACT ORAL  Take by mouth.     * ipratropium 42 mcg (0.06 %) nasal spray  Commonly known as:  ATROVENT  INSTILL 2 SPRAYS IN EACH NOSTRIL EVERY 12 HOURS     * ipratropium 42 mcg (0.06 %) nasal spray  Commonly known as:  ATROVENT  ADMINISTER TWO SPRAYS IN EACH NOSTRIL EVERY 12 HOURS     * ipratropium 42 mcg (0.06 %) nasal spray  Commonly known as:  ATROVENT  Instill two sprays in each nostril every 12 hours     multivitamin capsule  Take 1 capsule by mouth once daily.     * Nasal Allergy 55 mcg nasal inhaler  Generic drug:  triamcinolone  INSTILL 2 SPRAYS IN [facility] NOSTRIL EVERY DAY     * triamcinolone 55 mcg nasal inhaler  Commonly known as:  Nasacort  Instill two sprays in each nostril once daily     tamsulosin 0.4 mg Cap  Commonly known as:  FLOMAX  Take 1 capsule (0.4 mg total) by mouth once daily.     Vitamin C 500 MG tablet  Generic drug:  ascorbic acid (vitamin C)  Take 500 mg by mouth once daily.         *  This list has 7 medication(s) that are the same as other medications prescribed for you. Read the directions carefully, and ask your doctor or other care provider to review them with you.                Indwelling Lines/Drains at time of discharge:   Lines/Drains/Airways     None                 Time spent on the discharge of patient: 35 minutes  Patient was seen and examined on the date of discharge and determined to be suitable for discharge.         Dallas Sanon NP  Department of Hospital Medicine  [facility]

## 2019-12-21 NOTE — PLAN OF CARE
Problem: Occupational Therapy Goal  Goal: Occupational Therapy Goal  Description  Goals to be met by: 1/21/2019    Patient will increase functional independence with ADLs by performing:    UE Dressing with Modified [place].  LE Dressing with Modified [place].  Grooming while standing at sink with Modified [place].  Toileting from toilet with Modified [place] for hygiene and clothing management.      Outcome: Ongoing, Progressing     Initial OT eval performed on this date, report to follow.  DME needs: JAKE  D/c rec: home with family care, progress to outpatient PT after follow up with MD

## 2019-12-22 NOTE — HPI
Consult for chest pain  64 y.o. male with past medical history of anxiety, depression, benign prostatic hyperplasia with straining on urination, coronary artery disease s/p PCI presented with  complaint of chest pain and back pain s/p fall with head trauma.  The patient reports he fell backwards about 7 feet off a ladder onto his back, breaking the fall with his left arm and hitting his upper back and back of his head on the ground, denies LOC.  Xray of spine showed acute appearing L1 vertebral body mild compression fracture.  Neurosurgery recommended TSLO brace. Patient admitted and serial troponin were negative.   He stated that the chest pain is different than the prior cardiac pain. EKG with no acute ST-T wave changes.     NSTEMI that required PCI of RCA, PDA, and POBA of PLB with a preserved EF in 7/2016.  Stress test in 3/2017 for atypical chest pain revealed no reversible ischemia. Admitted in 10/2017 with CP again. 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 Normal EF.

## 2019-12-22 NOTE — ASSESSMENT & PLAN NOTE
Appears to be atypical. Mostly MSK from the fall.  ACS ruled out.   EKG with no acute changes  No further cardiac work up felt to be warranted at this time.

## 2019-12-22 NOTE — CONSULTS
[facility]  Cardiology  Consult Note    Patient Name: Masoud Osorio  MRN: [id number]  Admission Date: 12/20/2019  Hospital Length of Stay: 0 days  Code Status: Prior   Attending Provider: No att. providers found   Consulting Provider: Cleveland Hinton MD  Primary Care Physician: Rosemarie Ayala MD  Principal Problem:Compression fracture of L1 lumbar vertebra    Patient information was obtained from patient and ER records.     Consults  Subjective:     Chief Complaint:  Back pain/Fall      HPI:   Consult for chest pain  64 y.o. male with past medical history of anxiety, depression, benign prostatic hyperplasia with straining on urination, coronary artery disease s/p PCI presented with  complaint of chest pain and back pain s/p fall with head trauma.  The patient reports he fell backwards about 7 feet off a ladder onto his back, breaking the fall with his left arm and hitting his upper back and back of his head on the ground, denies LOC.  Xray of spine showed acute appearing L1 vertebral body mild compression fracture.  Neurosurgery recommended TSLO brace. Patient admitted and serial troponin were negative.   He stated that the chest pain is different than the prior cardiac pain. EKG with no acute ST-T wave changes.     NSTEMI that required PCI of RCA, PDA, and POBA of PLB with a preserved EF in 7/2016.  Stress test in 3/2017 for atypical chest pain revealed no reversible ischemia. Admitted in 10/2017 with CP again. 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 Normal EF.     Past Medical History:   Diagnosis Date    Anxiety and depression 9/21/2016    trial of ctialopram and follow up in 8 weeks    Benign prostatic hyperplasia (BPH) with straining on urination 12/5/2018    Coronary artery disease involving native coronary artery of native heart without angina pectoris 7/26/2016 7/26/2016 [facility]   PCI of RCA with 4.0 x 15, 4.0 x 38, and  3.5 x 38 mm Resolute JACOB   PCI of PDA 2.75 x 15 mm JACOB dilated to 3.0 POBA of proximal PLB 2.0 x 12 mm balloon   Normal EF with LVEDP 15 mmHg DAPT score of 2               Elevated PSA     Gout     History of coronary artery stent placement 9/21/2016    History of heart attack 7/25/2016    NSTEMI    Mixed hyperlipidemia 7/17/2017    Overweight (BMI 25.0-29.9) 9/21/2016    Tobacco dependence in remission 7/25/2016       Past Surgical History:   Procedure Laterality Date    COLONOSCOPY N/A 8/16/2017    Procedure: COLONOSCOPY Split Prep;  Surgeon: Maykel Carcamo Jr., MD;  Location[facility];  Service: Endoscopy;  Laterality: N/A;    CORONARY ANGIOPLASTY WITH STENT PLACEMENT      HERNIA REPAIR      ROTATOR CUFF REPAIR      VASECTOMY         Review of patient's allergies indicates:   Allergen Reactions    Penicillins Hives       No current facility-administered medications on file prior to encounter.      Current Outpatient Medications on File Prior to Encounter   Medication Sig    ascorbic acid, vitamin C, (VITAMIN C) 500 MG tablet Take 500 mg by mouth once daily.    aspirin (ECOTRIN) 81 MG EC tablet Take 1 tablet (81 mg total) by mouth once daily.    atorvastatin (LIPITOR) 80 MG tablet TAKE 1 TABLET BY MOUTH DAILY    azelastine (ASTELIN) 137 mcg (0.1 %) nasal spray INSTILL 2 SPRAYS IN EACH NOSTRIL TWICE A DAY    azelastine 0.15 % (205.5 mcg) Spry Instill two sprays in each nostril twice daily    clopidogrel (PLAVIX) 75 mg tablet TAKE ONE TABLET BY MOUTH DAILY    flu vacc [id number] 6mos up,PF, 60 mcg (15 mcg x 4)/0.5 mL Syrg To be admin by [facility].    GARLIC EXTRACT ORAL Take by mouth.    ipratropium (ATROVENT) 0.06 % nasal spray INSTILL 2 SPRAYS IN EACH NOSTRIL EVERY 12 HOURS    ipratropium (ATROVENT) 42 mcg (0.06 %) nasal spray ADMINISTER TWO SPRAYS IN EACH NOSTRIL EVERY 12 HOURS    ipratropium (ATROVENT) 42 mcg (0.06 %) nasal spray Instill two sprays in each nostril every 12 hours     multivitamin capsule Take 1 capsule by mouth once daily.    NASAL ALLERGY 55 mcg nasal inhaler INSTILL 2 SPRAYS IN [facility] NOSTRIL EVERY DAY    nitroGLYCERIN (NITROSTAT) 0.4 MG SL tablet Place 1 tablet (0.4 mg total) under the tongue every 5 (five) minutes as needed for Chest pain.    tamsulosin (FLOMAX) 0.4 mg Cap Take 1 capsule (0.4 mg total) by mouth once daily.    triamcinolone (NASACORT) 55 mcg nasal inhaler Instill two sprays in each nostril once daily    escitalopram oxalate (LEXAPRO) 20 MG tablet Take 1 tablet (20 mg total) by mouth once daily.     Family History     Problem Relation (Age of Onset)    Arthritis Mother    Congenital heart disease Father    Heart disease Mother    No Known Problems Sister, Brother, Daughter, Sister, Sister    Parkinsonism Mother        Tobacco Use    Smoking status: Former Smoker     Packs/day: 0.50     Types: Cigarettes     Last attempt to quit: 7/25/2016     Years since quitting: 3.4    Smokeless tobacco: Never Used   Substance and Sexual Activity    Alcohol use: Yes     Comment: rarely    Drug use: No    Sexual activity: Not on file     Review of Systems   Constitution: Negative for chills and fever.   HENT: Negative for hearing loss and nosebleeds.    Eyes: Negative for blurred vision.   Cardiovascular:        As in HPI   Respiratory: Negative for hemoptysis and shortness of breath.    Hematologic/Lymphatic: Negative for bleeding problem.   Skin: Negative for itching.   Musculoskeletal: Positive for back pain and falls.   Gastrointestinal: Negative for abdominal pain and hematochezia.   Genitourinary: Negative for hematuria.   Neurological: Negative for dizziness and loss of balance.   Psychiatric/Behavioral: Negative for altered mental status and depression.     Objective:     Vital Signs (Most Recent):  Temp: 96.4 °F (35.8 °C) (12/21/19 1222)  Pulse: 72 (12/21/19 1222)  Resp: 18 (12/21/19 1222)  BP: (!) 146/79 (12/21/19 1222)  SpO2: 99 % (12/20/19 2103) Vital  Signs (24h Range):  Temp:  [96.4 °F (35.8 °C)-98.4 °F (36.9 °C)] 96.4 °F (35.8 °C)  Pulse:  [71-87] 72  Resp:  [17-20] 18  SpO2:  [96 %-99 %] 99 %  BP: (116-160)/(61-92) 146/79     Weight: 98.8 kg (217 lb 13 oz)  Body mass index is 32.64 kg/m².    SpO2: 99 %  O2 Device (Oxygen Therapy): room air      Intake/Output Summary (Last 24 hours) at 12/21/2019 1953  Last data filed at 12/21/2019 0500  Gross per 24 hour   Intake 1375 ml   Output --   Net 1375 ml       Lines/Drains/Airways     Peripheral Intravenous Line                 Peripheral IV - Single Lumen 12/20/19 1805 20 G Right Antecubital 1 day                Physical Exam   Constitutional: He is oriented to person, place, and time. He appears well-developed and well-nourished.   HENT:   Head: Normocephalic and atraumatic.   Eyes: Conjunctivae are normal.   Neck: Neck supple. Carotid bruit is not present.   Cardiovascular: Normal rate, regular rhythm and normal heart sounds. Exam reveals no gallop and no friction rub.   No murmur heard.  Pulmonary/Chest: Effort normal and breath sounds normal. No stridor. No respiratory distress. He has no wheezes.   Musculoskeletal: He exhibits tenderness (back).   Neurological: He is alert and oriented to person, place, and time.   Skin: Skin is warm and dry.   Psychiatric: He has a normal mood and affect. His behavior is normal.       Significant Labs:   BMP:   Recent Labs   Lab 12/20/19  1805 12/21/19  0339   GLU 99 105    141   K 3.8 4.0    107   CO2 28 25   BUN 17 15   CREATININE 0.9 0.8   CALCIUM 9.4 8.7   MG  --  2.2   , CMP   Recent Labs   Lab 12/20/19  1805 12/21/19  0339    141   K 3.8 4.0    107   CO2 28 25   GLU 99 105   BUN 17 15   CREATININE 0.9 0.8   CALCIUM 9.4 8.7   PROT 6.5  --    ALBUMIN 4.0  --    BILITOT 0.7  --    ALKPHOS 85  --    AST 43*  --    ALT 31  --    ANIONGAP 10 9   ESTGFRAFRICA >60 >60   EGFRNONAA >60 >60    and Troponin   Recent Labs   Lab 12/20/19  6835 12/20/19  5639  12/21/19  0339   TROPONINI <0.006 <0.006 <0.006       Significant Imaging: .    Assessment and Plan:     * Compression fracture of L1 lumbar vertebra  Per 1ry team     Chest pain  Appears to be atypical. Mostly MSK from the fall.  ACS ruled out.   EKG with no acute changes  No further cardiac work up felt to be warranted at this time.       History of coronary artery stent placement  Stable CAD  Continue current therapy         VTE Risk Mitigation (From admission, onward)         Ordered     IP VTE LOW RISK PATIENT  Once      12/20/19 2034                Thank you for your consult. I will sign off. Please contact us if you have any additional questions.    Cleveland Hinton MD  Cardiology   [facility]

## 2019-12-22 NOTE — SUBJECTIVE & OBJECTIVE
Past Medical History:   Diagnosis Date    Anxiety and depression 9/21/2016    trial of ctialopram and follow up in 8 weeks    Benign prostatic hyperplasia (BPH) with straining on urination 12/5/2018    Coronary artery disease involving native coronary artery of native heart without angina pectoris 7/26/2016 7/26/2016 NSTEMI at [facility]   PCI of RCA with 4.0 x 15, 4.0 x 38, and 3.5 x 38 mm Resolute JACOB   PCI of PDA 2.75 x 15 mm JACOB dilated to 3.0 POBA of proximal PLB 2.0 x 12 mm balloon   Normal EF with LVEDP 15 mmHg DAPT score of 2               Elevated PSA     Gout     History of coronary artery stent placement 9/21/2016    History of heart attack 7/25/2016    NSTEMI    Mixed hyperlipidemia 7/17/2017    Overweight (BMI 25.0-29.9) 9/21/2016    Tobacco dependence in remission 7/25/2016       Past Surgical History:   Procedure Laterality Date    COLONOSCOPY N/A 8/16/2017    Procedure: COLONOSCOPY Split Prep;  Surgeon: Maykel Carcamo Jr., MD;  Location: [facility];  Service: Endoscopy;  Laterality: N/A;    CORONARY ANGIOPLASTY WITH STENT PLACEMENT      HERNIA REPAIR      ROTATOR CUFF REPAIR      VASECTOMY         Review of patient's allergies indicates:   Allergen Reactions    Penicillins Hives       No current facility-administered medications on file prior to encounter.      Current Outpatient Medications on File Prior to Encounter   Medication Sig    ascorbic acid, vitamin C, (VITAMIN C) 500 MG tablet Take 500 mg by mouth once daily.    aspirin (ECOTRIN) 81 MG EC tablet Take 1 tablet (81 mg total) by mouth once daily.    atorvastatin (LIPITOR) 80 MG tablet TAKE 1 TABLET BY MOUTH DAILY    azelastine (ASTELIN) 137 mcg (0.1 %) nasal spray INSTILL 2 SPRAYS IN EACH NOSTRIL TWICE A DAY    azelastine 0.15 % (205.5 mcg) Spry Instill two sprays in each nostril twice daily    clopidogrel (PLAVIX) 75 mg tablet TAKE ONE TABLET BY MOUTH DAILY    flu vacc [id number] 6mos up,PF, 60 mcg (15 mcg x  4)/0.5 mL Syrg To be admin by [facility].    GARLIC EXTRACT ORAL Take by mouth.    ipratropium (ATROVENT) 0.06 % nasal spray INSTILL 2 SPRAYS IN EACH NOSTRIL EVERY 12 HOURS    ipratropium (ATROVENT) 42 mcg (0.06 %) nasal spray ADMINISTER TWO SPRAYS IN EACH NOSTRIL EVERY 12 HOURS    ipratropium (ATROVENT) 42 mcg (0.06 %) nasal spray Instill two sprays in each nostril every 12 hours    multivitamin capsule Take 1 capsule by mouth once daily.    NASAL ALLERGY 55 mcg nasal inhaler INSTILL 2 SPRAYS IN [facility] NOSTRIL EVERY DAY    nitroGLYCERIN (NITROSTAT) 0.4 MG SL tablet Place 1 tablet (0.4 mg total) under the tongue every 5 (five) minutes as needed for Chest pain.    tamsulosin (FLOMAX) 0.4 mg Cap Take 1 capsule (0.4 mg total) by mouth once daily.    triamcinolone (NASACORT) 55 mcg nasal inhaler Instill two sprays in each nostril once daily    escitalopram oxalate (LEXAPRO) 20 MG tablet Take 1 tablet (20 mg total) by mouth once daily.     Family History     Problem Relation (Age of Onset)    Arthritis Mother    Congenital heart disease Father    Heart disease Mother    No Known Problems Sister, Brother, Daughter, Sister, Sister    Parkinsonism Mother        Tobacco Use    Smoking status: Former Smoker     Packs/day: 0.50     Types: Cigarettes     Last attempt to quit: 7/25/2016     Years since quitting: 3.4    Smokeless tobacco: Never Used   Substance and Sexual Activity    Alcohol use: Yes     Comment: rarely    Drug use: No    Sexual activity: Not on file     Review of Systems   Constitution: Negative for chills and fever.   HENT: Negative for hearing loss and nosebleeds.    Eyes: Negative for blurred vision.   Cardiovascular:        As in HPI   Respiratory: Negative for hemoptysis and shortness of breath.    Hematologic/Lymphatic: Negative for bleeding problem.   Skin: Negative for itching.   Musculoskeletal: Positive for back pain and falls.   Gastrointestinal: Negative for abdominal pain and hematochezia.    Genitourinary: Negative for hematuria.   Neurological: Negative for dizziness and loss of balance.   Psychiatric/Behavioral: Negative for altered mental status and depression.     Objective:     Vital Signs (Most Recent):  Temp: 96.4 °F (35.8 °C) (12/21/19 1222)  Pulse: 72 (12/21/19 1222)  Resp: 18 (12/21/19 1222)  BP: (!) 146/79 (12/21/19 1222)  SpO2: 99 % (12/20/19 2103) Vital Signs (24h Range):  Temp:  [96.4 °F (35.8 °C)-98.4 °F (36.9 °C)] 96.4 °F (35.8 °C)  Pulse:  [71-87] 72  Resp:  [17-20] 18  SpO2:  [96 %-99 %] 99 %  BP: (116-160)/(61-92) 146/79     Weight: 98.8 kg (217 lb 13 oz)  Body mass index is 32.64 kg/m².    SpO2: 99 %  O2 Device (Oxygen Therapy): room air      Intake/Output Summary (Last 24 hours) at 12/21/2019 1953  Last data filed at 12/21/2019 0500  Gross per 24 hour   Intake 1375 ml   Output --   Net 1375 ml       Lines/Drains/Airways     Peripheral Intravenous Line                 Peripheral IV - Single Lumen 12/20/19 1805 20 G Right Antecubital 1 day                Physical Exam   Constitutional: He is oriented to person, place, and time. He appears well-developed and well-nourished.   HENT:   Head: Normocephalic and atraumatic.   Eyes: Conjunctivae are normal.   Neck: Neck supple. Carotid bruit is not present.   Cardiovascular: Normal rate, regular rhythm and normal heart sounds. Exam reveals no gallop and no friction rub.   No murmur heard.  Pulmonary/Chest: Effort normal and breath sounds normal. No stridor. No respiratory distress. He has no wheezes.   Musculoskeletal: He exhibits tenderness (back).   Neurological: He is alert and oriented to person, place, and time.   Skin: Skin is warm and dry.   Psychiatric: He has a normal mood and affect. His behavior is normal.       Significant Labs:   BMP:   Recent Labs   Lab 12/20/19  1805 12/21/19  0339   GLU 99 105    141   K 3.8 4.0    107   CO2 28 25   BUN 17 15   CREATININE 0.9 0.8   CALCIUM 9.4 8.7   MG  --  2.2   , CMP   Recent  Labs   Lab 12/20/19  1805 12/21/19  0339    141   K 3.8 4.0    107   CO2 28 25   GLU 99 105   BUN 17 15   CREATININE 0.9 0.8   CALCIUM 9.4 8.7   PROT 6.5  --    ALBUMIN 4.0  --    BILITOT 0.7  --    ALKPHOS 85  --    AST 43*  --    ALT 31  --    ANIONGAP 10 9   ESTGFRAFRICA >60 >60   EGFRNONAA >60 >60    and Troponin   Recent Labs   Lab 12/20/19  1805 12/20/19  2211 12/21/19  0339   TROPONINI <0.006 <0.006 <0.006       Significant Imaging: .

## 2019-12-23 ENCOUNTER — PATIENT MESSAGE (OUTPATIENT)
Dept: FAMILY MEDICINE | Facility: CLINIC | Age: 64
End: 2019-12-23

## 2019-12-23 ENCOUNTER — PATIENT MESSAGE (OUTPATIENT)
Dept: CARDIOLOGY | Facility: CLINIC | Age: 64
End: 2019-12-23

## 2019-12-23 ENCOUNTER — PATIENT MESSAGE (OUTPATIENT)
Dept: NEUROSURGERY | Facility: CLINIC | Age: 64
End: 2019-12-23

## 2019-12-23 ENCOUNTER — TELEPHONE (OUTPATIENT)
Dept: NEUROSURGERY | Facility: CLINIC | Age: 64
End: 2019-12-23

## 2019-12-23 ENCOUNTER — TELEPHONE (OUTPATIENT)
Dept: FAMILY MEDICINE | Facility: CLINIC | Age: 64
End: 2019-12-23

## 2019-12-23 DIAGNOSIS — S32.010A COMPRESSION FRACTURE OF L1 VERTEBRA, INITIAL ENCOUNTER: Primary | ICD-10-CM

## 2019-12-23 DIAGNOSIS — S32.010S COMPRESSION FRACTURE OF L1 VERTEBRA, SEQUELA: Primary | ICD-10-CM

## 2019-12-23 RX ORDER — CYCLOBENZAPRINE HCL 5 MG
5 TABLET ORAL 3 TIMES DAILY PRN
Qty: 60 TABLET | Refills: 2 | Status: SHIPPED | OUTPATIENT
Start: 2019-12-23 | End: 2019-12-24

## 2019-12-23 NOTE — TELEPHONE ENCOUNTER
----- Message from Roya Reina sent at 12/23/2019 11:46 AM CST -----  Contact: Self [phone]  Patient is requesting to talk to nurse in regards to his appointment for tomorrow.

## 2019-12-23 NOTE — TELEPHONE ENCOUNTER
Returned pt phone call, he wanted to clarify his appt for tomorrow. I let him know I got him scheduled for tomorrow at 9:20

## 2019-12-23 NOTE — TELEPHONE ENCOUNTER
----- Message from Fanny Gabriel sent at 12/23/2019  9:58 AM CST -----  Contact: pt @ [phone] or    calling regarding Appointment with Dr. Garcia, per his ER discharge instructions , he is to follow up with Dr. Garcia(offered 1/20) patient wanted to speak with the nurse, please call.

## 2019-12-23 NOTE — TELEPHONE ENCOUNTER
----- Message from Bienvenido Garcia MD sent at 12/21/2019 11:20 AM CST -----  L1 compression fracture. FU in 3 weeks with standing XR AP lateral with Jeff.

## 2019-12-24 ENCOUNTER — TELEPHONE (OUTPATIENT)
Dept: CARDIOLOGY | Facility: CLINIC | Age: 64
End: 2019-12-24

## 2019-12-24 ENCOUNTER — OFFICE VISIT (OUTPATIENT)
Dept: FAMILY MEDICINE | Facility: CLINIC | Age: 64
End: 2019-12-24
Payer: COMMERCIAL

## 2019-12-24 ENCOUNTER — TELEPHONE (OUTPATIENT)
Dept: FAMILY MEDICINE | Facility: CLINIC | Age: 64
End: 2019-12-24

## 2019-12-24 ENCOUNTER — HOSPITAL ENCOUNTER (OUTPATIENT)
Dept: RADIOLOGY | Facility: HOSPITAL | Age: 64
Discharge: HOME | End: 2019-12-24
Attending: FAMILY MEDICINE
Payer: COMMERCIAL

## 2019-12-24 VITALS
HEART RATE: 72 BPM | DIASTOLIC BLOOD PRESSURE: 82 MMHG | WEIGHT: 202.81 LBS | SYSTOLIC BLOOD PRESSURE: 144 MMHG | BODY MASS INDEX: 30.74 KG/M2 | OXYGEN SATURATION: 96 % | HEIGHT: 68 IN

## 2019-12-24 DIAGNOSIS — I25.10 CORONARY ARTERY DISEASE INVOLVING NATIVE CORONARY ARTERY OF NATIVE HEART WITHOUT ANGINA PECTORIS: ICD-10-CM

## 2019-12-24 DIAGNOSIS — M62.830 BACK SPASM: Primary | ICD-10-CM

## 2019-12-24 DIAGNOSIS — K59.00 CONSTIPATION, UNSPECIFIED CONSTIPATION TYPE: ICD-10-CM

## 2019-12-24 DIAGNOSIS — M79.89 PAIN AND SWELLING OF RIGHT LOWER LEG: ICD-10-CM

## 2019-12-24 DIAGNOSIS — S32.010S COMPRESSION FRACTURE OF L1 VERTEBRA, SEQUELA: ICD-10-CM

## 2019-12-24 DIAGNOSIS — M79.661 PAIN AND SWELLING OF RIGHT LOWER LEG: ICD-10-CM

## 2019-12-24 DIAGNOSIS — I25.2 HISTORY OF HEART ATTACK: ICD-10-CM

## 2019-12-24 PROCEDURE — 3008F PR BODY MASS INDEX (BMI) DOCUMENTED: ICD-10-PCS | Mod: CPTII,S$GLB,, | Performed by: FAMILY MEDICINE

## 2019-12-24 PROCEDURE — 99214 PR OFFICE/OUTPT VISIT, EST, LEVL IV, 30-39 MIN: ICD-10-PCS | Mod: S$GLB,,, | Performed by: FAMILY MEDICINE

## 2019-12-24 PROCEDURE — 99999 PR PBB SHADOW E&M-EST. PATIENT-LVL III: ICD-10-PCS | Mod: PBBFAC,,, | Performed by: FAMILY MEDICINE

## 2019-12-24 PROCEDURE — 93971 EXTREMITY STUDY: CPT | Mod: 26,RT,, | Performed by: RADIOLOGY

## 2019-12-24 PROCEDURE — 99999 PR PBB SHADOW E&M-EST. PATIENT-LVL III: CPT | Mod: PBBFAC,,, | Performed by: FAMILY MEDICINE

## 2019-12-24 PROCEDURE — 3008F BODY MASS INDEX DOCD: CPT | Mod: CPTII,S$GLB,, | Performed by: FAMILY MEDICINE

## 2019-12-24 PROCEDURE — 99214 OFFICE O/P EST MOD 30 MIN: CPT | Mod: S$GLB,,, | Performed by: FAMILY MEDICINE

## 2019-12-24 PROCEDURE — 93971 EXTREMITY STUDY: CPT | Mod: TC,RT

## 2019-12-24 PROCEDURE — 93971 US LOWER EXTREMITY VEINS RIGHT: ICD-10-PCS | Mod: 26,RT,, | Performed by: RADIOLOGY

## 2019-12-24 RX ORDER — TRAMADOL HYDROCHLORIDE 50 MG/1
50 TABLET ORAL EVERY 8 HOURS PRN
Qty: 90 TABLET | Refills: 1 | Status: SHIPPED | OUTPATIENT
Start: 2019-12-24 | End: 2020-06-16 | Stop reason: ALTCHOICE

## 2019-12-24 RX ORDER — NITROGLYCERIN 0.4 MG/1
0.4 TABLET SUBLINGUAL EVERY 5 MIN PRN
Qty: 25 TABLET | Refills: 11 | Status: SHIPPED | OUTPATIENT
Start: 2019-12-24 | End: 2021-03-03 | Stop reason: SDUPTHER

## 2019-12-24 RX ORDER — CYCLOBENZAPRINE HCL 5 MG
5 TABLET ORAL 3 TIMES DAILY PRN
Qty: 60 TABLET | Refills: 2 | Status: SHIPPED | OUTPATIENT
Start: 2019-12-24 | End: 2020-01-13

## 2019-12-24 NOTE — PROGRESS NOTES
Office Visit    Patient Name: Masoud Osorio    : 1955  MRN: [id number]    Subjective:  Masoud is a 64 y.o. male who presents today for:    No chief complaint on file.    65 yo patient of mine with CAD s/p MI and stent, overweight BMI, h/o tobacco abuse, HLD, BPH improved with Flomax, anxiety/ depression, here for ER follow-up.     He was actually admitted to [facility]  after experiencing a fall from a ladder-she was found to have an acute L1 compression fracture.  Due to the fact that he was having chest pain he was admitted for observation.  Neurosurgery Dr. Garcia was consulted and recommended conservative management of compression fracture with a brace.  He is scheduled for neurosurgery follow-up on 1/15/2020.  He is in an immobilizing brace that helps-- not sleeping in the brace. Having some severe muscle spasms. He is having severe constipation. No sciatic pain. No incontinence or leg weakness. He is severely constipated, however, likely secondary to the muscle spasms and taking pain medications.    He does note some ongoing significant pain, swelling, warmth of his right lower extremity.  They did not do an ultrasound to rule out blood clot in the hospital.    Spine x-rays 2019: Acute appearing L1 vertebral body mild compression fracture. No acute thoracic spine abnormalities identified.    CT Head 2019: No acute intracranial abnormalities identified.    Labs on admission 2019 unremarkable-AST remains mildly elevated at 43.  Serial troponins, EKG unremarkable.  Past Medical History  Past Medical History:   Diagnosis Date    Anxiety and depression 2016    trial of ctialopram and follow up in 8 weeks    Benign prostatic hyperplasia (BPH) with straining on urination 2018    Coronary artery disease involving native coronary artery of native heart without angina pectoris 2016 NSTEMI at [facility]   PCI of RCA with 4.0 x 15, 4.0 x 38, and 3.5 x 38 mm Resolute  JACOB   PCI of PDA 2.75 x 15 mm JACOB dilated to 3.0 POBA of proximal PLB 2.0 x 12 mm balloon   Normal EF with LVEDP 15 mmHg DAPT score of 2               Elevated PSA     Gout     History of coronary artery stent placement 9/21/2016    History of heart attack 7/25/2016    NSTEMI    Mixed hyperlipidemia 7/17/2017    Overweight (BMI 25.0-29.9) 9/21/2016    Tobacco dependence in remission 7/25/2016       Past Surgical History  Past Surgical History:   Procedure Laterality Date    COLONOSCOPY N/A 8/16/2017    Procedure: COLONOSCOPY Split Prep;  Surgeon: Myakel Carcamo Jr., MD;  Location: [facility] END[facility];  Service: Endoscopy;  Laterality: N/A;    CORONARY ANGIOPLASTY WITH STENT PLACEMENT      HERNIA REPAIR      ROTATOR CUFF REPAIR      VASECTOMY         Family History  Family History   Problem Relation Age of Onset    Heart disease Mother     Arthritis Mother     Parkinsonism Mother     Congenital heart disease Father     No Known Problems Sister     No Known Problems Brother     No Known Problems Daughter     No Known Problems Sister     No Known Problems Sister     Prostate cancer Neg Hx     Kidney disease Neg Hx        Social History  Social History     Socioeconomic History    Marital status:      Spouse name: Not on file    Number of children: Not on file    Years of education: Not on file    Highest education level: Not on file   Occupational History    Not on file   Social Needs    Financial resource strain: Not on file    Food insecurity:     Worry: Not on file     Inability: Not on file    Transportation needs:     Medical: Not on file     Non-medical: Not on file   Tobacco Use    Smoking status: Former Smoker     Packs/day: 0.50     Types: Cigarettes     Last attempt to quit: 7/25/2016     Years since quitting: 3.4    Smokeless tobacco: Never Used   Substance and Sexual Activity    Alcohol use: Yes     Comment: rarely    Drug use: No    Sexual activity: Not on file  "  Lifestyle    Physical activity:     Days per week: Not on file     Minutes per session: Not on file    Stress: Not on file   Relationships    Social connections:     Talks on phone: Not on file     Gets together: Not on file     Attends Jew service: Not on file     Active member of club or organization: Not on file     Attends meetings of clubs or organizations: Not on file     Relationship status: Not on file   Other Topics Concern    Not on file   Social History Narrative    Not on file       Current Medications  Medications reviewed and updated.     Allergies   Review of patient's allergies indicates:   Allergen Reactions    Penicillins Hives       Review of Systems (Pertinent positives)  Review of Systems   Respiratory: Negative for chest tightness.    Cardiovascular: Positive for leg swelling. Negative for chest pain.   Gastrointestinal: Positive for constipation.   Neurological: Negative for headaches.       BP (!) 144/82   Pulse 72   Ht 5' 8" (1.727 m)   Wt 92 kg (202 lb 13.2 oz)   SpO2 96%   BMI 30.84 kg/m²     Physical Exam   Constitutional: He is oriented to person, place, and time. He appears well-developed and well-nourished. No distress.   Cardiovascular: Normal rate, regular rhythm and normal heart sounds.   Pulmonary/Chest: Effort normal and breath sounds normal.   Musculoskeletal: He exhibits no edema.        Legs:  Neurological: He is alert and oriented to person, place, and time.   Psychiatric: He has a normal mood and affect.   Vitals reviewed.        Assessment/Plan:  Masoud Osorio is a 64 y.o. male who presents today for :    Diagnoses and all orders for this visit:    Back spasm  Comments:  Flexeril sent in to down stairs pharmacy for p.r.n. use  Orders:  -     traMADol (ULTRAM) 50 mg tablet; Take 1 tablet (50 mg total) by mouth every 8 (eight) hours as needed for Pain.    Compression fracture of L1 vertebra, sequela  Comments:  Continue immobilizing brace per Neurosurgery " Recs, has Neurosurgery follow-up.  Improved pain control with muscle relaxer, oral analgesics & Tramadol PRN   Orders:  -     cyclobenzaprine (FLEXERIL) 5 MG tablet; Take 1 tablet (5 mg total) by mouth 3 (three) times daily as needed (back pain and muscle spasms).  -     traMADol (ULTRAM) 50 mg tablet; Take 1 tablet (50 mg total) by mouth every 8 (eight) hours as needed for Pain.    History of heart attack  Comments:  Status post obstetrician for chest pain rule out, status post negative cardiac enzymes and EKGs.  Has nitroglycerin p.r.n.    Constipation, unspecified constipation type  Comments:  has been severely constipated following fall in part due to muslce spasms and pain meds. ADVISE 10 OZ BOTTLE OF MAGNESIUM CITRATE FOLLWED BY 32 OZ WATER    Coronary artery disease involving native coronary artery of native heart without angina pectoris  Comments:  Continue Plavix, statin, aspirin  Orders:  -     nitroGLYCERIN (NITROSTAT) 0.4 MG SL tablet; Place 1 tablet (0.4 mg total) under the tongue every 5 (five) minutes as needed for Chest pain.    Pain and swelling of right lower leg  Comments:  Stat ultrasound ordered to rule out DVT, will advise based on results.  Orders:  -     US Lower Extremity Veins Right; Future            ICD-10-CM ICD-9-CM    1. Back spasm M62.830 724.8 traMADol (ULTRAM) 50 mg tablet    Flexeril sent in to down stairs pharmacy for p.r.n. use   2. Compression fracture of L1 vertebra, sequela S32.010S 905.1 cyclobenzaprine (FLEXERIL) 5 MG tablet      traMADol (ULTRAM) 50 mg tablet    Continue immobilizing brace per Neurosurgery Recs, has Neurosurgery follow-up.  Improved pain control with muscle relaxer, oral analgesics & Tramadol PRN    3. History of heart attack I25.2 412     Status post obstetrician for chest pain rule out, status post negative cardiac enzymes and EKGs.  Has nitroglycerin p.r.n.   4. Constipation, unspecified constipation type K59.00 564.00     has been severely constipated  following fall in part due to muslce spasms and pain meds. ADVISE 10 OZ BOTTLE OF MAGNESIUM CITRATE FOLLWED BY 32 OZ WATER   5. Coronary artery disease involving native coronary artery of native heart without angina pectoris I25.10 414.01 nitroGLYCERIN (NITROSTAT) 0.4 MG SL tablet    Continue Plavix, statin, aspirin   6. Pain and swelling of right lower leg M79.661 729.5 US Lower Extremity Veins Right    M79.89 729.81     Stat ultrasound ordered to rule out DVT, will advise based on results.       Patient Instructions   ADVISE 10 OZ BOTTLE OF MAGNESIUM CITRATE FOLLWED BY 32 OZ WATER FOR CONSTIPATION.     FOLLOWED BY MAGNESIUM CITRATE CLEAN OUT START DAILY FIBER SUPPLEMENT AND MIRALAX WHILE NEEDING PAIN MEDICATION.         Follow up for to follow up on lab results, to review results of diagnostic procedure.

## 2019-12-24 NOTE — TELEPHONE ENCOUNTER
I called patient regarding  His request  For more pain medication for the fall he had at the beginning of the week.    I spoke to  she stated that at this moment right now , they are at their Pcp  For patient follow up from his Hospital follow up and their request for refill of his Pain medication.    I also was calling  Patient to advise him per  .  If not Cardiac related then you must see Pcp.    joyce henry.

## 2019-12-24 NOTE — PATIENT INSTRUCTIONS
ADVISE 10 OZ BOTTLE OF MAGNESIUM CITRATE FOLLWED BY 32 OZ WATER FOR CONSTIPATION.     FOLLOWED BY MAGNESIUM CITRATE CLEAN OUT START DAILY FIBER SUPPLEMENT AND MIRALAX WHILE NEEDING PAIN MEDICATION.

## 2019-12-24 NOTE — TELEPHONE ENCOUNTER
----- Message from Rosemarie Ayala MD sent at 12/24/2019 12:39 PM CST -----  Please notify that fortunately ultrasound of the left leg was negative for blood clot.  It is likely that he just has localized pain and swelling from direct impact with his fall.  This should improve over the next few days but elevation of the leg throughout the day will help.  He can also use ice as needed for discomfort.

## 2019-12-27 ENCOUNTER — PATIENT MESSAGE (OUTPATIENT)
Dept: NEUROSURGERY | Facility: CLINIC | Age: 64
End: 2019-12-27

## 2020-01-06 ENCOUNTER — PATIENT MESSAGE (OUTPATIENT)
Dept: FAMILY MEDICINE | Facility: CLINIC | Age: 65
End: 2020-01-06

## 2020-01-13 ENCOUNTER — OFFICE VISIT (OUTPATIENT)
Dept: FAMILY MEDICINE | Facility: CLINIC | Age: 65
End: 2020-01-13
Payer: COMMERCIAL

## 2020-01-13 VITALS
HEIGHT: 68 IN | HEART RATE: 75 BPM | OXYGEN SATURATION: 97 % | DIASTOLIC BLOOD PRESSURE: 86 MMHG | WEIGHT: 195.31 LBS | SYSTOLIC BLOOD PRESSURE: 134 MMHG | BODY MASS INDEX: 29.6 KG/M2 | TEMPERATURE: 98 F

## 2020-01-13 DIAGNOSIS — Z79.82 LONG-TERM USE OF ASPIRIN THERAPY: ICD-10-CM

## 2020-01-13 DIAGNOSIS — E66.3 OVERWEIGHT (BMI 25.0-29.9): ICD-10-CM

## 2020-01-13 DIAGNOSIS — F32.A ANXIETY AND DEPRESSION: ICD-10-CM

## 2020-01-13 DIAGNOSIS — Z00.00 ROUTINE GENERAL MEDICAL EXAMINATION AT A HEALTH CARE FACILITY: Primary | ICD-10-CM

## 2020-01-13 DIAGNOSIS — R39.16 BENIGN PROSTATIC HYPERPLASIA (BPH) WITH STRAINING ON URINATION: ICD-10-CM

## 2020-01-13 DIAGNOSIS — Z86.010 HISTORY OF COLON POLYPS: ICD-10-CM

## 2020-01-13 DIAGNOSIS — F41.9 ANXIETY AND DEPRESSION: ICD-10-CM

## 2020-01-13 DIAGNOSIS — Z79.899 MEDICATION MANAGEMENT: ICD-10-CM

## 2020-01-13 DIAGNOSIS — F17.201 TOBACCO DEPENDENCE IN REMISSION: ICD-10-CM

## 2020-01-13 DIAGNOSIS — I25.2 HISTORY OF HEART ATTACK: ICD-10-CM

## 2020-01-13 DIAGNOSIS — E78.2 MIXED HYPERLIPIDEMIA: ICD-10-CM

## 2020-01-13 DIAGNOSIS — S32.010S COMPRESSION FRACTURE OF L1 VERTEBRA, SEQUELA: ICD-10-CM

## 2020-01-13 DIAGNOSIS — Z95.5 HISTORY OF CORONARY ARTERY STENT PLACEMENT: ICD-10-CM

## 2020-01-13 DIAGNOSIS — M54.50 CHRONIC BILATERAL LOW BACK PAIN WITHOUT SCIATICA: ICD-10-CM

## 2020-01-13 DIAGNOSIS — G89.29 CHRONIC BILATERAL LOW BACK PAIN WITHOUT SCIATICA: ICD-10-CM

## 2020-01-13 DIAGNOSIS — N40.1 BENIGN PROSTATIC HYPERPLASIA (BPH) WITH STRAINING ON URINATION: ICD-10-CM

## 2020-01-13 DIAGNOSIS — Z79.02 VISIT FOR MONITORING PLAVIX THERAPY: ICD-10-CM

## 2020-01-13 DIAGNOSIS — I25.10 CORONARY ARTERY DISEASE INVOLVING NATIVE CORONARY ARTERY OF NATIVE HEART WITHOUT ANGINA PECTORIS: ICD-10-CM

## 2020-01-13 DIAGNOSIS — Z51.81 VISIT FOR MONITORING PLAVIX THERAPY: ICD-10-CM

## 2020-01-13 PROBLEM — R07.9 CHEST PAIN: Status: RESOLVED | Noted: 2019-12-20 | Resolved: 2020-01-13

## 2020-01-13 PROCEDURE — 99999 PR PBB SHADOW E&M-EST. PATIENT-LVL V: ICD-10-PCS | Mod: PBBFAC,,, | Performed by: FAMILY MEDICINE

## 2020-01-13 PROCEDURE — 99396 PR PREVENTIVE VISIT,EST,40-64: ICD-10-PCS | Mod: S$GLB,,, | Performed by: FAMILY MEDICINE

## 2020-01-13 PROCEDURE — 99999 PR PBB SHADOW E&M-EST. PATIENT-LVL V: CPT | Mod: PBBFAC,,, | Performed by: FAMILY MEDICINE

## 2020-01-13 PROCEDURE — 99396 PREV VISIT EST AGE 40-64: CPT | Mod: S$GLB,,, | Performed by: FAMILY MEDICINE

## 2020-01-13 NOTE — PATIENT INSTRUCTIONS
ADVISE LOOKING INTO NEW 2-PART, NON-LIVE SHINGRIX SHINGLES VACCINE THROUGH YOUR LOCAL PHARMACY.

## 2020-01-13 NOTE — PROGRESS NOTES
Office Visit    Patient Name: Masoud Osorio    : 1955  MRN: [id number]    Subjective:  Masoud is a 64 y.o. male who presents today for:    Annual Exam    Masoud Osorio presents today for annual wellness exam and monitoring of chronic conditions: CAD s/p MI and stent, overweight BMI, h/o tobacco abuse, HLD, BPH improved with Flomax, anxiety/ depression currently stable off of medications.  He recently sustained a lumbar spine compression fracture last month and is being managed conservatively with a back brace.  Has started some physical therapy and has Neurosurgery follow-up with Dr. Garcia' office on 01/15/2020.    Had labs 2019 during recent admission following fall with compression fracture to spine showing AST of 43 but otherwise unremarkable including CBC/rest of CMP, electrolytes, troponin/BNP.      He also follows with Dr. Bailey cardiology (last seen 19) due to history of NSTEMI in 2016.  Long-term medical management includes Lipitor 80/ aspirin 81 mg daily/ Plavix 75 mg daily.      They have been feeling overall well.  He is recovering following his compression fracture.  He has been wearing his back brace as prescribed and his mobility is improving day by day.  Sometimes he does have some difficulty doing the home PT exercises due to pain and he will discuss this with Dr. Garcia.    General lifestyle habits are as follows:  Diet is described as heart healthy-- low sodium with plenty of fresh fruits and veggies, exercise is described as currently poor-limited secondary to lumbar compression fracture but has started PT, sleep is described as fair- still with some ongoing discomfort at night from back pain but no major concerns. Weight is down about 5 lbs in the last year- back to 195.      Immunizations: TDaP 2018, shingles vaccine 2017, FLU shot UTD 19     Screening Tests: colonoscopy 17- repeat 3-5 years, PSA 1.3 20186 & repeat ordered (had SEBASTIAN w/ urology  6/5/18), AAA 12/6/2018: negative (smoking history), Hep C (-) 9/28/17     Eye/Dental: up to date with both       Past Medical History  Past Medical History:   Diagnosis Date    Anxiety and depression 9/21/2016    trial of ctialopram and follow up in 8 weeks    Benign prostatic hyperplasia (BPH) with straining on urination 12/5/2018    Coronary artery disease involving native coronary artery of native heart without angina pectoris 7/26/2016 7/26/2016 [facility]I at [facility]   PCI of RCA with 4.0 x 15, 4.0 x 38, and 3.5 x 38 mm Resolute JACOB   PCI of PDA 2.75 x 15 mm JACOB dilated to 3.0 POBA of proximal PLB 2.0 x 12 mm balloon   Normal EF with LVEDP 15 mmHg DAPT score of 2               Elevated PSA     Gout     History of coronary artery stent placement 9/21/2016    History of heart attack 7/25/2016    NSTEMI    Mixed hyperlipidemia 7/17/2017    Overweight (BMI 25.0-29.9) 9/21/2016    Tobacco dependence in remission 7/25/2016       Past Surgical History  Past Surgical History:   Procedure Laterality Date    COLONOSCOPY N/A 8/16/2017    Procedure: COLONOSCOPY Split Prep;  Surgeon: Maykel Carcamo Jr., MD;  Location: [facility] ENDO;  Service: Endoscopy;  Laterality: N/A;    CORONARY ANGIOPLASTY WITH STENT PLACEMENT      HERNIA REPAIR      ROTATOR CUFF REPAIR      VASECTOMY         Family History  Family History   Problem Relation Age of Onset    Heart disease Mother     Arthritis Mother     Parkinsonism Mother     Congenital heart disease Father     No Known Problems Sister     No Known Problems Brother     No Known Problems Daughter     No Known Problems Sister     No Known Problems Sister     Prostate cancer Neg Hx     Kidney disease Neg Hx        Social History  Social History     Socioeconomic History    Marital status:      Spouse name: Not on file    Number of children: Not on file    Years of education: Not on file    Highest education level: Not on file   Occupational History     Not on file   Social Needs    Financial resource strain: Not very hard    Food insecurity:     Worry: Never true     Inability: Never true    Transportation needs:     Medical: No     Non-medical: No   Tobacco Use    Smoking status: Former Smoker     Packs/day: 0.50     Types: Cigarettes     Last attempt to quit: 7/25/2016     Years since quitting: 3.4    Smokeless tobacco: Never Used   Substance and Sexual Activity    Alcohol use: Yes     Frequency: Monthly or less     Drinks per session: 1 or 2     Binge frequency: Never     Comment: rarely    Drug use: No    Sexual activity: Not on file   Lifestyle    Physical activity:     Days per week: 0 days     Minutes per session: 0 min    Stress: Not at all   Relationships    Social connections:     Talks on phone: Three times a week     Gets together: Once a week     Attends Sabianist service: Not on file     Active member of club or organization: No     Attends meetings of clubs or organizations: 1 to 4 times per year     Relationship status:    Other Topics Concern    Not on file   Social History Narrative    Not on file       Current Medications  Medications reviewed and updated.     Allergies   Review of patient's allergies indicates:   Allergen Reactions    Penicillins Hives       Review of Systems (Pertinent positives)  Review of Systems   Constitutional: Positive for activity change. Negative for unexpected weight change.   HENT: Negative for hearing loss, rhinorrhea and trouble swallowing.    Eyes: Negative for discharge and visual disturbance.   Respiratory: Negative for wheezing.    Cardiovascular: Negative for chest pain and palpitations.   Gastrointestinal: Negative for blood in stool, constipation, diarrhea and vomiting.   Endocrine: Negative for polydipsia and polyuria.   Genitourinary: Negative for difficulty urinating, hematuria and urgency.   Musculoskeletal: Negative for arthralgias, joint swelling and neck pain.   Neurological:  "Negative for weakness and headaches.   Psychiatric/Behavioral: Negative for confusion and dysphoric mood.       /86 (BP Location: Right arm, Patient Position: Sitting, BP Method: Large (Manual))   Pulse 75   Temp 97.7 °F (36.5 °C) (Oral)   Ht 5' 8" (1.727 m)   Wt 88.6 kg (195 lb 5.2 oz)   SpO2 97%   BMI 29.70 kg/m²     Physical Exam   Constitutional: He is oriented to person, place, and time. He appears well-developed and well-nourished. No distress.   HENT:   Head: Normocephalic and atraumatic.   Right Ear: External ear normal.   Left Ear: External ear normal.   Nose: Nose normal.   Mouth/Throat: Oropharynx is clear and moist. No oropharyngeal exudate.   Eyes: Conjunctivae are normal. No scleral icterus.   Neck: No tracheal deviation present. No thyromegaly present.   Cardiovascular: Normal rate, regular rhythm, normal heart sounds and intact distal pulses.   Pulmonary/Chest: Effort normal and breath sounds normal.   Abdominal: Soft. Bowel sounds are normal. He exhibits no distension and no mass. There is no tenderness. No hernia.   Musculoskeletal: Normal range of motion.   Lymphadenopathy:     He has no cervical adenopathy.   Neurological: He is alert and oriented to person, place, and time. He has normal reflexes.   Skin: Skin is warm and dry. No rash noted.   Psychiatric: He has a normal mood and affect.   Vitals reviewed.        Assessment/Plan:  Masoud Osorio is a 64 y.o. male who presents today for :    Masoud was seen today for annual exam.    Diagnoses and all orders for this visit:    Routine general medical examination at a health care facility  Comments:  HEALTH MAINTENANCE REVIEWED: DUE FOR SHINGRIX BUT IMMUNIZATIONS OTW UTD, NEXT YR DUE FOR CSCOPE, PNEUMOVAX. ADDVISED ON DIET/EXERCISE/SLEEP, EYE/DENTAL    Overweight (BMI 25.0-29.9)    History of colon polyps  Comments:  Will be due for colonoscopy starting later this year-3-5 year recall advised based on polyps noted in 2017    Coronary " artery disease involving native coronary artery of native heart without angina pectoris  Comments:  Exercise tolerance stable-continue ASA 81, Plavix 75, Lipitor 80, Cardiology follow-up with Dr. Bailey  Orders:  -     Ambulatory referral to Cardiology    History of heart attack  -     Ambulatory referral to Cardiology    Tobacco dependence in remission    History of coronary artery stent placement  -     Ambulatory referral to Cardiology    Mixed hyperlipidemia  Comments:  Controlled on Lipitor with LDL less than 100(68).    Long-term use of aspirin therapy    Visit for monitoring Plavix therapy    Compression fracture of L1 vertebra, sequela  Comments:  Mobility improving, has started PT and is weaning out of back brace.  Has neurosurgery follow-up in 2 days.  Muscle relaxer p.r.n.    Chronic bilateral low back pain without sciatica    Benign prostatic hyperplasia (BPH) with straining on urination  Comments:  Status post urology eval with Dr. Kapoor 06/06/2019, stable on Flomax, PSA is ordered for next lab draw  Orders:  -     Ambulatory referral to Urology    Anxiety and depression  Comments:  Stable off of medication    Medication management            ICD-10-CM ICD-9-CM    1. Routine general medical examination at a health care facility Z00.00 V70.0     HEALTH MAINTENANCE REVIEWED: DUE FOR SHINGRIX BUT IMMUNIZATIONS OTW UTD, NEXT YR DUE FOR CSCOPE, PNEUMOVAX. ADDVISED ON DIET/EXERCISE/SLEEP, EYE/DENTAL   2. Overweight (BMI 25.0-29.9) E66.3 278.02    3. History of colon polyps Z86.010 V12.72     Will be due for colonoscopy starting later this year-3-5 year recall advised based on polyps noted in 2017   4. Coronary artery disease involving native coronary artery of native heart without angina pectoris I25.10 414.01 Ambulatory referral to Cardiology    Exercise tolerance stable-continue ASA 81, Plavix 75, Lipitor 80, Cardiology follow-up with Dr. Bailey   5. History of heart attack I25.2 412 Ambulatory referral to  Cardiology   6. Tobacco dependence in remission F17.201 V15.82    7. History of coronary artery stent placement Z95.5 V45.82 Ambulatory referral to Cardiology   8. Mixed hyperlipidemia E78.2 272.2     Controlled on Lipitor with LDL less than 100(68).   9. Long-term use of aspirin therapy Z79.82 V58.66    10. Visit for monitoring Plavix therapy Z51.81 V58.83     Z79.02 V58.63    11. Compression fracture of L1 vertebra, sequela S32.010S 905.1     Mobility improving, has started PT and is weaning out of back brace.  Has neurosurgery follow-up in 2 days.  Muscle relaxer p.r.n.   12. Chronic bilateral low back pain without sciatica M54.5 724.2     G89.29 338.29    13. Benign prostatic hyperplasia (BPH) with straining on urination N40.1 600.01 Ambulatory referral to Urology    R39.16 788.65     Status post urology eval with Dr. Kapoor 06/06/2019, stable on Flomax, PSA is ordered for next lab draw   14. Anxiety and depression F41.9 300.00     F32.9 311     Stable off of medication   15. Medication management Z79.899 V58.69        Patient Instructions   ADVISE LOOKING INTO NEW 2-PART, NON-LIVE SHINGRIX SHINGLES VACCINE THROUGH YOUR LOCAL PHARMACY.           Follow up in about 3 months (around 4/13/2020) for to follow up on lab results, return as needed for new concerns.

## 2020-01-14 ENCOUNTER — IMMUNIZATION (OUTPATIENT)
Dept: PHARMACY | Facility: CLINIC | Age: 65
End: 2020-01-14
Payer: COMMERCIAL

## 2020-01-14 NOTE — PROGRESS NOTES
Established Patient    SUBJECTIVE:     Note dictated with voice recognition software, please excuse any grammatical errors.    History of Present Illness:  Masoud Osorio is a 64 y.o. male with history of hypertension, hyperlipidemia, coronary artery disease, BPH, gout, anxiety/depression, status post coronary angioplasty with stent placement 2016 on aspirin 81 mg and Plavix and 5 mg daily, who presents for neurosurgical re-evaluation.  Patient was last seen as hospital consult on 12/21/2019 (see below).  At that time, patient was treated conservatively with TLSO brace for his L1 compression fracture.  Patient is here with his wife.  He continues to complain of midline lower back pain with associated muscle spasms.  He feels like the back pain has slightly improved since initial fall.  He start taking tramadol for 2 weeks since it caused him to have severe constipation.  He is taking Flexeril which significantly helps with his back pain and muscle spasms.  Denies any radicular leg pain, leg paresthesias, bladder/bowel incontinence, or gait instability.  He is very compliant was TLSO brace.        Interval History  (12/21/2019)  This is a very pleasant 64 y.o. male who has fallen from a ladder on his back about 7 feet tall. Had chest pain and thoracolumbar pain after the fall. No legs weakness, numbness or sphincter dysfunction. Since he has been here his pain has remained well controlled. He is wearing his TLSO brace. He would like to go home today.           Low Back Pain Scale  R Low Back-Pain Score: 3  R Low Back-Pain Intensity: I can tolerate the pain I have without having to use pain killers  R Low Back-Pain Score: I can look after myself normally but it causes extra pain  Low Back-Lifting: Pain prevents me from lifting heavy weights off the floor, but I can manage if they are conveniently positioned for example on a table   Low Back-Walking: Pain prevents me walking more than .25 mile   Low Back-Sitting: Pain  prevents me from sitting more than 1 hour   Low Back-Standing: I have some pain on standing but it does not increase with time   Low Back-Sleeping: Because of pain my normal nights sleep is reduced by less than one quarter   Low Back-Social Life: Pain has no significant effect on my social life apart from limiting my more en   Low Back-Traveling: I have extra pain while traveling but it does not compel me to seek alternate forms of travel   Low Back-Changing Degree of Pain: My pain seems to be getting better but improvement is slow             Review of patient's allergies indicates:   Allergen Reactions    Penicillins Hives       Current Outpatient Medications   Medication Sig Dispense Refill    ascorbic acid, vitamin C, (VITAMIN C) 500 MG tablet Take 500 mg by mouth once daily.      aspirin (ECOTRIN) 81 MG EC tablet Take 1 tablet (81 mg total) by mouth once daily.  0    atorvastatin (LIPITOR) 80 MG tablet TAKE 1 TABLET BY MOUTH DAILY 90 tablet 3    azelastine (ASTELIN) 137 mcg (0.1 %) nasal spray INSTILL 2 SPRAYS IN EACH NOSTRIL TWICE A DAY  5    clopidogrel (PLAVIX) 75 mg tablet TAKE ONE TABLET BY MOUTH DAILY 90 tablet 2    GARLIC EXTRACT ORAL Take by mouth.      ipratropium (ATROVENT) 0.06 % nasal spray INSTILL 2 SPRAYS IN EACH NOSTRIL EVERY 12 HOURS  5    multivitamin capsule Take 1 capsule by mouth once daily.      nitroGLYCERIN (NITROSTAT) 0.4 MG SL tablet Place 1 tablet (0.4 mg total) under the tongue every 5 (five) minutes as needed for Chest pain. 25 tablet 11    tamsulosin (FLOMAX) 0.4 mg Cap Take 1 capsule (0.4 mg total) by mouth once daily. 90 capsule 3    traMADol (ULTRAM) 50 mg tablet Take 1 tablet (50 mg total) by mouth every 8 (eight) hours as needed for Pain. 90 tablet 1    triamcinolone (NASACORT) 55 mcg nasal inhaler Instill two sprays in each nostril once daily 16.9 mL 5    varicella-zoster gE-AS01B, PF, (SHINGRIX, PF,) 50 mcg/0.5 mL injection Inject 0.5 mLs into the muscle once.  For one dose. for 1 dose 0.5 mL 0     No current facility-administered medications for this visit.        Past Medical History:   Diagnosis Date    Anxiety and depression 9/21/2016    trial of ctialopram and follow up in 8 weeks    Benign prostatic hyperplasia (BPH) with straining on urination 12/5/2018    Coronary artery disease involving native coronary artery of native heart without angina pectoris 7/26/2016 7/26/2016 NSTEMI at [facility]   PCI of RCA with 4.0 x 15, 4.0 x 38, and 3.5 x 38 mm Resolute JACOB   PCI of PDA 2.75 x 15 mm JACOB dilated to 3.0 POBA of proximal PLB 2.0 x 12 mm balloon   Normal EF with LVEDP 15 mmHg DAPT score of 2               Elevated PSA     Gout     History of coronary artery stent placement 9/21/2016    History of heart attack 7/25/2016    NSTEMI    Mixed hyperlipidemia 7/17/2017    Overweight (BMI 25.0-29.9) 9/21/2016    Tobacco dependence in remission 7/25/2016     Past Surgical History:   Procedure Laterality Date    COLONOSCOPY N/A 8/16/2017    Procedure: COLONOSCOPY Split Prep;  Surgeon: Maykel Carcamo Jr., MD;  Location: [facility] ENDO;  Service: Endoscopy;  Laterality: N/A;    CORONARY ANGIOPLASTY WITH STENT PLACEMENT      HERNIA REPAIR      ROTATOR CUFF REPAIR      VASECTOMY       Family History     Problem Relation (Age of Onset)    Arthritis Mother    Congenital heart disease Father    Heart disease Mother    No Known Problems Sister, Brother, Daughter, Sister, Sister    Parkinsonism Mother        Social History     Socioeconomic History    Marital status:      Spouse name: Not on file    Number of children: Not on file    Years of education: Not on file    Highest education level: Not on file   Occupational History    Not on file   Social Needs    Financial resource strain: Not very hard    Food insecurity:     Worry: Never true     Inability: Never true    Transportation needs:     Medical: No     Non-medical: No   Tobacco Use    Smoking  "status: Former Smoker     Packs/day: 0.50     Types: Cigarettes     Last attempt to quit: 7/25/2016     Years since quitting: 3.4    Smokeless tobacco: Never Used   Substance and Sexual Activity    Alcohol use: Yes     Frequency: Monthly or less     Drinks per session: 1 or 2     Binge frequency: Never     Comment: rarely    Drug use: No    Sexual activity: Not on file   Lifestyle    Physical activity:     Days per week: 0 days     Minutes per session: 0 min    Stress: Not at all   Relationships    Social connections:     Talks on phone: Three times a week     Gets together: Once a week     Attends Baptism service: Not on file     Active member of club or organization: No     Attends meetings of clubs or organizations: 1 to 4 times per year     Relationship status:    Other Topics Concern    Not on file   Social History Narrative    Not on file       Review of Systems:  Review of Systems    Constitutional: no fever, chills or night sweats. No changes in weight   Eyes: no visual changes   ENT: no nasal congestion or sore throat   Respiratory: no cough or shortness of breath   Cardiovascular: no chest pain or palpitations   Gastrointestinal: no nausea or vomiting   Genitourinary: no hematuria or dysuria   Integument/Breast: no rash or pruritis   Hematologic/Lymphatic: no easy bruising or lymphadenopathy   Musculoskeletal: no arthralgias or myalgias.  Positive for low back pain and muscle spasms.  Neurological: no seizures or tremors. No weakness or paresthesia.   Behavioral/Psych: no auditory or visual hallucinations   Endocrine: no heat or cold intolerance     OBJECTIVE:     Vital Signs  Pulse: 90  BP: (!) 141/95  Pain Score:   3  Height: 5' 8" (172.7 cm)  Weight: 88.5 kg (195 lb)  Body mass index is 29.65 kg/m².    Physical Exam:  Neurosurgery Physical Exam    General: well developed, well nourished, no distress.  Comfortable.  Neurologic: Awake, alert and oriented x3. Thought content " appropriate.  GCS: Motor: 6/Verbal: 5/Eyes: 4 GCS Total: 15  Cranial nerves: II-XII grossly intact. PERRLA. EOMI without nystagmus. Face symmetric and sensation intact to light touch, tongue midline, shoulder shrug symmetric bilaterally.  Hearing equal bilaterally to finger rub. Palate and uvula rise and fall normally in midline.    Language: no aphasia  Speech: no dysarthria   Neck: supple, without obvious masses    Sensory: intact to light touch B/L UE and LE  Motor Strength: Moves all extremities spontaneously with good tone. Full strength upper and lower extremities. No abnormal movements seen.    Strength  Deltoids Triceps Biceps Wrist Extension Wrist Flexion Hand    Upper: R 5/5 5/5 5/5 5/5 5/5 5/5    L 5/5 5/5 5/5 5/5 5/5 5/5     Iliopsoas Quadriceps Knee  Flexion Tibialis  anterior Gastro- cnemius EHL   Lower: R 5/5 5/5 5/5 5/5 5/5 5/5    L 5/5 5/5 5/5 5/5 5/5 5/5     Interossi muscle strength- intact    DTR's - 2 + and symmetric in UE and LE  Downing's - Negative        Lhermitte's - Negative  Spurlings-   Negative         Ankle Clonus - Negative           Babinski  - Negative     SLR - Negative   Gait - normal      Tandem Gait - No difficulty    Able to walk/stand on heels & toes  Cervical ROM and Lumbar ROM - full; no pain with all ROM  No focal numbness or weakness  No midline or paraspinal tenderness to palpation  No difficulty transitioning from seated to standing position or vice versa.  ENT: normal hearing with finger rub  Heart: RRR, no cyanosis, pallor, or edema.   Lungs- normal respiratory effort  Abdomen-  soft, symmetric and nontender  Skin: grossly intact in all 4 extremities without obvious rashes or lesions  Extremities: warm with no cyanosis or edema, or clubbing  Pulses: palpable distal pulses    SI Joint tenderness - Negative  Greater Trochanter Joint tenderness - Negative  Breezy's Test - Negative   Pain on Hip ROM - Negative      Posture-  No obvious kyphosis with standing  posture.  With bending posture, no obvious scapula wing        Diagnostic Results:  All imaging personally reviewed    Lumbar x-rays 01/15/2020 compared to previous on December 20, 2019  Multilevel lumbar spondylosis, anterior osteophyte formation, and disc disease.  L1 compression fracture that has slightly worsened when compared to previous imaging.  There is no retropulsion to canal.    ASSESSMENT/PLAN:     64-year-old male who has traumatic L1 compression fracture status post fall from ladder 4 weeks ago.     -patient is neurologically intact on exam.  He has continued low back pain and muscle spasm that is slowly improving from initial trauma.  -imaging showed slight worsening of L1 compression fracture with no retropulsion to canal.  -recommending continued TLSO brace and Flexeril p.r.n. muscle spasm.  He can continue tramadol p.r.n. severe pain; recommended OTC bowel regimen to prevent constipation.  -will obtain bone density scan to rule out any bone disease.  -recommended patient to supplement will OTC vitamin-D and calcium.  -no heavy lifting greater than 10 lb or over bending/twisting at lumbar spine.  -patient follow me in 6 weeks with repeat lumbar flexion-extension x-rays prior.        All imaging were reviewed with patient and wife. All questions were answered.  Patient verbalized understanding and agreed with the above plan of care.  Patient was encouraged to call clinic with any future concerns prior to follow up appt. If any worsening symptoms, patient should report to ED.     Please call with any questions.        [facility]          Note dictated with voice recognition software, please excuse any grammatical errors.

## 2020-01-15 ENCOUNTER — OFFICE VISIT (OUTPATIENT)
Dept: NEUROSURGERY | Facility: CLINIC | Age: 65
End: 2020-01-15
Payer: COMMERCIAL

## 2020-01-15 ENCOUNTER — HOSPITAL ENCOUNTER (OUTPATIENT)
Dept: RADIOLOGY | Facility: HOSPITAL | Age: 65
Discharge: HOME | End: 2020-01-15
Attending: NEUROLOGICAL SURGERY
Payer: COMMERCIAL

## 2020-01-15 VITALS
BODY MASS INDEX: 29.55 KG/M2 | HEIGHT: 68 IN | HEART RATE: 90 BPM | WEIGHT: 195 LBS | SYSTOLIC BLOOD PRESSURE: 141 MMHG | DIASTOLIC BLOOD PRESSURE: 95 MMHG

## 2020-01-15 DIAGNOSIS — S32.010D COMPRESSION FRACTURE OF L1 VERTEBRA WITH ROUTINE HEALING, SUBSEQUENT ENCOUNTER: Primary | ICD-10-CM

## 2020-01-15 DIAGNOSIS — S32.010A COMPRESSION FRACTURE OF L1 VERTEBRA, INITIAL ENCOUNTER: ICD-10-CM
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
## 2020-01-15 DIAGNOSIS — M62.830 MUSCLE SPASM OF BACK: ICD-10-CM

## 2020-01-15 PROCEDURE — 99999 PR PBB SHADOW E&M-EST. PATIENT-LVL IV: CPT | Mod: PBBFAC,,, | Performed by: PHYSICIAN ASSISTANT

## 2020-01-15 PROCEDURE — 99214 OFFICE O/P EST MOD 30 MIN: CPT | Mod: S$GLB,,, | Performed by: PHYSICIAN ASSISTANT

## 2020-01-15 PROCEDURE — 99999 PR PBB SHADOW E&M-EST. PATIENT-LVL IV: ICD-10-PCS | Mod: PBBFAC,,, | Performed by: PHYSICIAN ASSISTANT

## 2020-01-15 PROCEDURE — 3008F PR BODY MASS INDEX (BMI) DOCUMENTED: ICD-10-PCS | Mod: CPTII,S$GLB,, | Performed by: PHYSICIAN ASSISTANT

## 2020-01-15 PROCEDURE — 99214 PR OFFICE/OUTPT VISIT, EST, LEVL IV, 30-39 MIN: ICD-10-PCS | Mod: S$GLB,,, | Performed by: PHYSICIAN ASSISTANT

## 2020-01-15 PROCEDURE — 3008F BODY MASS INDEX DOCD: CPT | Mod: CPTII,S$GLB,, | Performed by: PHYSICIAN ASSISTANT

## 2020-01-15 PROCEDURE — 72100 X-RAY EXAM L-S SPINE 2/3 VWS: CPT | Mod: 26,,, | Performed by: RADIOLOGY

## 2020-01-15 PROCEDURE — 72100 XR LUMBAR SPINE AP AND LATERAL: ICD-10-PCS | Mod: 26,,, | Performed by: RADIOLOGY

## 2020-01-15 PROCEDURE — 72100 X-RAY EXAM L-S SPINE 2/3 VWS: CPT | Mod: TC,PN

## 2020-01-15 RX ORDER — CYCLOBENZAPRINE HCL 5 MG
5 TABLET ORAL 3 TIMES DAILY PRN
COMMUNITY
End: 2022-03-28

## 2020-01-22 ENCOUNTER — HOSPITAL ENCOUNTER (OUTPATIENT)
Dept: RADIOLOGY | Facility: HOSPITAL | Age: 65
Discharge: HOME | End: 2020-01-22
Attending: PHYSICIAN ASSISTANT
Payer: COMMERCIAL

## 2020-01-22 DIAGNOSIS — S32.010D COMPRESSION FRACTURE OF L1 VERTEBRA WITH ROUTINE HEALING, SUBSEQUENT ENCOUNTER: ICD-10-CM

## 2020-01-22 PROCEDURE — 77080 DXA BONE DENSITY AXIAL: CPT | Mod: TC

## 2020-01-22 PROCEDURE — 77080 DXA BONE DENSITY AXIAL: CPT | Mod: 26,,, | Performed by: RADIOLOGY

## 2020-01-22 PROCEDURE — 77080 DEXA BONE DENSITY SPINE HIP: ICD-10-PCS | Mod: 26,,, | Performed by: RADIOLOGY

## 2020-01-24 ENCOUNTER — TELEPHONE (OUTPATIENT)
Dept: FAMILY MEDICINE | Facility: CLINIC | Age: 65
End: 2020-01-24

## 2020-01-24 ENCOUNTER — PATIENT MESSAGE (OUTPATIENT)
Dept: NEUROSURGERY | Facility: CLINIC | Age: 65
End: 2020-01-24

## 2020-02-20 ENCOUNTER — TELEPHONE (OUTPATIENT)
Dept: FAMILY MEDICINE | Facility: CLINIC | Age: 65
End: 2020-02-20

## 2020-02-26 ENCOUNTER — HOSPITAL ENCOUNTER (OUTPATIENT)
Dept: RADIOLOGY | Facility: HOSPITAL | Age: 65
Discharge: HOME | End: 2020-02-26
Attending: PHYSICIAN ASSISTANT
Payer: COMMERCIAL

## 2020-02-26 ENCOUNTER — OFFICE VISIT (OUTPATIENT)
Dept: NEUROSURGERY | Facility: CLINIC | Age: 65
End: 2020-02-26
Payer: COMMERCIAL

## 2020-02-26 VITALS
WEIGHT: 195 LBS | SYSTOLIC BLOOD PRESSURE: 148 MMHG | DIASTOLIC BLOOD PRESSURE: 92 MMHG | HEART RATE: 72 BPM | HEIGHT: 68 IN | BODY MASS INDEX: 29.55 KG/M2

## 2020-02-26 DIAGNOSIS — M79.18 MYOFASCIAL PAIN DYSFUNCTION SYNDROME: ICD-10-CM

## 2020-02-26 DIAGNOSIS — S32.010D COMPRESSION FRACTURE OF L1 VERTEBRA WITH ROUTINE HEALING, SUBSEQUENT ENCOUNTER: Primary | ICD-10-CM

## 2020-02-26 DIAGNOSIS — S32.010D COMPRESSION FRACTURE OF L1 VERTEBRA WITH ROUTINE HEALING, SUBSEQUENT ENCOUNTER: ICD-10-CM

## 2020-02-26 PROCEDURE — 72110 X-RAY EXAM L-2 SPINE 4/>VWS: CPT | Mod: 26,,, | Performed by: RADIOLOGY

## 2020-02-26 PROCEDURE — 72110 X-RAY EXAM L-2 SPINE 4/>VWS: CPT | Mod: TC,PN

## 2020-02-26 PROCEDURE — 99999 PR PBB SHADOW E&M-EST. PATIENT-LVL V: CPT | Mod: PBBFAC,,, | Performed by: PHYSICIAN ASSISTANT

## 2020-02-26 PROCEDURE — 99999 PR PBB SHADOW E&M-EST. PATIENT-LVL V: ICD-10-PCS | Mod: PBBFAC,,, | Performed by: PHYSICIAN ASSISTANT

## 2020-02-26 PROCEDURE — 3008F PR BODY MASS INDEX (BMI) DOCUMENTED: ICD-10-PCS | Mod: CPTII,S$GLB,, | Performed by: PHYSICIAN ASSISTANT

## 2020-02-26 PROCEDURE — 99214 PR OFFICE/OUTPT VISIT, EST, LEVL IV, 30-39 MIN: ICD-10-PCS | Mod: S$GLB,,, | Performed by: PHYSICIAN ASSISTANT

## 2020-02-26 PROCEDURE — 3008F BODY MASS INDEX DOCD: CPT | Mod: CPTII,S$GLB,, | Performed by: PHYSICIAN ASSISTANT

## 2020-02-26 PROCEDURE — 72110 XR LUMBAR SPINE 5 VIEW WITH FLEX AND EXT: ICD-10-PCS | Mod: 26,,, | Performed by: RADIOLOGY

## 2020-02-26 PROCEDURE — 99214 OFFICE O/P EST MOD 30 MIN: CPT | Mod: S$GLB,,, | Performed by: PHYSICIAN ASSISTANT

## 2020-02-26 NOTE — PROGRESS NOTES
Established Patient    SUBJECTIVE:     Note dictated with voice recognition software, please excuse any grammatical errors.    History of Present Illness:  Masoud Osorio is a 64 y.o. male with history of hypertension, hyperlipidemia, coronary artery disease, BPH, gout, anxiety/depression, status post coronary angioplasty with stent placement 2016 on aspirin 81 mg and Plavix and 5 mg daily, who was found to have traumatic L1 compression fracture after fall from ladder on 12/21/2019.  He was initially treated conservatively with physical therapy and TLSO brace.  He presents today for  re-evaluation; he is 9 weeks status post initial injury.  He feels like his back pain has improved from initial fall.  He continues to have lower midline aching back pain with increased physical activity such as mowing the grass or doing things around the house.  Denies any radicular leg pain, leg weakness, saddle anesthesia, bladder/bowel incontinence, or gait instability.  He is compliant with TLSO brace and not take any pain meds except for OTC Tylenol.         Low Back Pain Scale  R Low Back-Pain Score: 2  R Low Back-Pain Intensity: I can tolerate the pain I have without having to use pain killers  Personal Care : I can look after myself normally without causing extra pain.  Lifting: I can lift heavy weights, but it gives extra pain.  Walking: Pain prevents me walking more than 1 mile.  Sitting: I can sit in any chair as long as I like.   Low Back-Standing: I have some pain on standing but it does not increase with time   Low Back-Sleeping: I have pain in bed but it does not prevent me from sleeping well  Social Life: Pain has no significant effect on my social life apart from limiting my energetic interests. (e.g. dancing, etc.).   Low Back-Traveling: I have some pain when traveling but lenard of my usual forms of travel make it any worse   Low Back-Changing Degree of Pain: My pain fluctuates but is definitely getting better              Review of patient's allergies indicates:   Allergen Reactions    Penicillins Hives       Current Outpatient Medications   Medication Sig Dispense Refill    ascorbic acid, vitamin C, (VITAMIN C) 500 MG tablet Take 500 mg by mouth once daily.      atorvastatin (LIPITOR) 80 MG tablet TAKE 1 TABLET BY MOUTH DAILY 90 tablet 3    clopidogrel (PLAVIX) 75 mg tablet TAKE ONE TABLET BY MOUTH DAILY 90 tablet 2    cyclobenzaprine (FLEXERIL) 5 MG tablet Take 5 mg by mouth 3 (three) times daily as needed for Muscle spasms.      GARLIC EXTRACT ORAL Take by mouth.      ipratropium (ATROVENT) 0.06 % nasal spray INSTILL 2 SPRAYS IN EACH NOSTRIL EVERY 12 HOURS  5    multivitamin capsule Take 1 capsule by mouth once daily.      tamsulosin (FLOMAX) 0.4 mg Cap Take 1 capsule (0.4 mg total) by mouth once daily. 90 capsule 3    traMADol (ULTRAM) 50 mg tablet Take 1 tablet (50 mg total) by mouth every 8 (eight) hours as needed for Pain. 90 tablet 1    aspirin (ECOTRIN) 81 MG EC tablet Take 1 tablet (81 mg total) by mouth once daily.  0    azelastine (ASTELIN) 137 mcg (0.1 %) nasal spray INSTILL 2 SPRAYS IN EACH NOSTRIL TWICE A DAY  5    nitroGLYCERIN (NITROSTAT) 0.4 MG SL tablet Place 1 tablet (0.4 mg total) under the tongue every 5 (five) minutes as needed for Chest pain. (Patient not taking: Reported on 2/26/2020) 25 tablet 11    triamcinolone (NASACORT) 55 mcg nasal inhaler Instill two sprays in each nostril once daily (Patient not taking: Reported on 2/26/2020) 16.9 mL 5     No current facility-administered medications for this visit.        Past Medical History:   Diagnosis Date    Anxiety and depression 9/21/2016    trial of ctialopram and follow up in 8 weeks    Benign prostatic hyperplasia (BPH) with straining on urination 12/5/2018    Coronary artery disease involving native coronary artery of native heart without angina pectoris 7/26/2016 7[place]26[place] [facility] [place]   PCI of RCA with 4.0 x 15, 4.0 x  38, and 3.5 x 38 mm Resolute JACOB   PCI of PDA 2.75 x 15 mm JACOB dilated to 3.0 POBA of proximal PLB 2.0 x 12 mm balloon   Normal EF with LVEDP 15 mmHg DAPT score of 2               Elevated PSA     Gout     History of coronary artery stent placement 9/21/2016    History of heart attack 7/25/2016    NSTEMI    Mixed hyperlipidemia 7/17/2017    Overweight (BMI 25.0-29.9) 9/21/2016    Tobacco dependence in remission 7/25/2016     Past Surgical History:   Procedure Laterality Date    COLONOSCOPY N/A 8/16/2017    Procedure: COLONOSCOPY Split Prep;  Surgeon: Maykel Carcamo Jr., MD;  Location: [facility];  Service: Endoscopy;  Laterality: N/A;    CORONARY ANGIOPLASTY WITH STENT PLACEMENT      HERNIA REPAIR      ROTATOR CUFF REPAIR      VASECTOMY       Family History     Problem Relation (Age of Onset)    Arthritis Mother    Congenital heart disease Father    Heart disease Mother    No Known Problems Sister, Brother, Daughter, Sister, Sister    Parkinsonism Mother        Social History     Socioeconomic History    Marital status:      Spouse name: Not on file    Number of children: Not on file    Years of education: Not on file    Highest education level: Not on file   Occupational History    Not on file   Social Needs    Financial resource strain: Not very hard    Food insecurity:     Worry: Never true     Inability: Never true    Transportation needs:     Medical: No     Non-medical: No   Tobacco Use    Smoking status: Former Smoker     Packs/day: 0.50     Types: Cigarettes     Last attempt to quit: 7/25/2016     Years since quitting: 3.5    Smokeless tobacco: Never Used   Substance and Sexual Activity    Alcohol use: Yes     Frequency: Monthly or less     Drinks per session: 1 or 2     Binge frequency: Never     Comment: rarely    Drug use: No    Sexual activity: Not on file   Lifestyle    Physical activity:     Days per week: 0 days     Minutes per session: 0 min    Stress: Not at all  "  Relationships    Social connections:     Talks on phone: Three times a week     Gets together: Once a week     Attends [employer] service: Not on file     Active member of club or organization: No     Attends meetings of clubs or organizations: 1 to 4 times per year     Relationship status:    Other Topics Concern    Not on file   Social History Narrative    Not on file       Review of Systems:  Review of Systems    Constitutional: no fever, chills or night sweats. No changes in weight   Eyes: no visual changes   ENT: no nasal congestion or sore throat   Respiratory: no cough or shortness of breath   Cardiovascular: no chest pain or palpitations   Gastrointestinal: no nausea or vomiting   Genitourinary: no hematuria or dysuria   Integument/Breast: no rash or pruritis   Hematologic/Lymphatic: no easy bruising or lymphadenopathy   Musculoskeletal: no arthralgias or myalgias.  Neurological: no seizures or tremors. No weakness or paresthesia.   Behavioral/Psych: no auditory or visual hallucinations   Endocrine: no heat or cold intolerance     OBJECTIVE:     Vital Signs  Pulse: 72  BP: (!) 148/92  Pain Score:   1  Height: 5' 8" (172.7 cm)  Weight: 88.5 kg (195 lb)  Body mass index is 29.65 kg/m².    Physical Exam:  Neurosurgery Physical Exam    General: well developed, well nourished, no distress.   Neurologic: Awake, alert and oriented x3. Thought content appropriate.  GCS: Motor: 6/Verbal: 5/Eyes: 4 GCS Total: 15  Cranial nerves: II-XII grossly intact. PERRLA. EOMI without nystagmus. Face symmetric and sensation intact to light touch, tongue midline, shoulder shrug symmetric bilaterally.  Hearing equal bilaterally to finger rub. Palate and uvula rise and fall normally in midline.    Language: no aphasia  Speech: no dysarthria   Neck: supple, without obvious masses    Sensory: intact to light touch B/L UE and LE  Motor Strength: Moves all extremities spontaneously with good tone. Full strength upper and lower " extremities. No abnormal movements seen.    Strength  Deltoids Triceps Biceps Wrist Extension Wrist Flexion Hand    Upper: R 5/5 5/5 5/5 5/5 5/5 5/5    L 5/5 5/5 5/5 5/5 5/5 5/5     Iliopsoas Quadriceps Knee  Flexion Tibialis  anterior Gastro- cnemius EHL   Lower: R 5/5 5/5 5/5 5/5 5/5 5/5    L 5/5 5/5 5/5 5/5 5/5 5/5     Interossi muscle strength- intact    DTR's - 2 + and symmetric in UE and LE  Ankle Clonus - Negative           Babinski  - Negative     SLR - Negative   Gait - normal      Tandem Gait - No difficulty    Able to walk/stand on heels & toes  Cervical ROM and Lumbar ROM - full; no pain with all ROM  No focal numbness or weakness  No midline or paraspinal tenderness to palpation  No difficulty transitioning from seated to standing position or vice versa.  ENT: normal hearing with finger rub  Heart: RRR, no cyanosis, pallor, or edema.   Lungs- normal respiratory effort  Abdomen-  soft, symmetric and nontender  Skin: grossly intact in all 4 extremities without obvious rashes or lesions  Extremities: warm with no cyanosis or edema, or clubbing  Pulses: palpable distal pulses    SI Joint tenderness - Negative  Greater Trochanter Joint tenderness - Negative  Breezy's Test - Negative   Pain on Hip ROM - Negative      Posture-  No obvious kyphosis with standing posture.  With bending posture, no obvious scapula wing        Diagnostic Results:  All imaging personally reviewed    dexa bone scan 1/22/2020  Osteopenia of left hip and normal bone density of spine    Lumbar x-rays 02/26/2020 compared to 01/15/2020 compared to previous on December 20, 2019  Multilevel lumbar spondylosis, anterior osteophyte formation, and disc disease.  L1 compression fracture that has slightly worsened anteriorly when compared to previous imaging.  There is very minimal posterior superior endplate retropulsion to canal.  Mild kyphosis above this level    ASSESSMENT/PLAN:        64-year-old male who has traumatic L1 compression  fracture status post fall from ladder 9 weeks ago.     -patient has low back pain that is slowly improving.  Pain is more in the lower lumbar areas which is likely secondary to myofascial pain dysfunction syndrome.  -imaging has may be slight worsening at anterior aspect of L1 superior endplate.  There is mild kyphosis above this level.  -he is neurologically intact on exam with no focal deficits.  -external referral given for extending lumbar physical therapy.  Recommending continuing LSO brace and Flexeril p.r.n. muscle spasms.  Patient not interested in tramadol at this time.  -refer patient to Dr. Medellin to consider kyphoplasty at this level if pain does not improve.  -patient will follow-up with me in 6 weeks with repeat thoracolumbar x-rays.  -we reiterated activity restriction from previous visit.  -continue OTC vitamin-D and calcium for osteopenia left hip.      All imaging were reviewed with patient and wife. All questions were answered.  Patient verbalized understanding and agreed with the above plan of care.  Patient was encouraged to call clinic with any future concerns prior to follow up appt. If any worsening symptoms, patient should report to ED.     Please call with any questions.      [facility]          Note dictated with voice recognition software, please excuse any grammatical errors.

## 2020-03-12 ENCOUNTER — IMMUNIZATION (OUTPATIENT)
Dept: PHARMACY | Facility: CLINIC | Age: 65
End: 2020-03-12
Payer: COMMERCIAL

## 2020-03-23 ENCOUNTER — TELEPHONE (OUTPATIENT)
Dept: NEUROSURGERY | Facility: CLINIC | Age: 65
End: 2020-03-23

## 2020-03-23 NOTE — TELEPHONE ENCOUNTER
Spoke with Mr. Osorio re: upcoming appt on 04/0//20- pt r/s to later date due to covid-19 concerns.     Also, pt and pts wife request to cancel upcoming appointment with Antwon Carroll- appt cancelled.

## 2020-04-06 ENCOUNTER — PATIENT OUTREACH (OUTPATIENT)
Dept: ADMINISTRATIVE | Facility: HOSPITAL | Age: 65
End: 2020-04-06

## 2020-04-16 ENCOUNTER — TELEPHONE (OUTPATIENT)
Dept: FAMILY MEDICINE | Facility: CLINIC | Age: 65
End: 2020-04-16

## 2020-04-16 NOTE — TELEPHONE ENCOUNTER
I called patient in regards to his appointment with Dr. Ayala. There was no answer and I left a message for a return call.

## 2020-04-20 ENCOUNTER — TELEPHONE (OUTPATIENT)
Dept: FAMILY MEDICINE | Facility: CLINIC | Age: 65
End: 2020-04-20

## 2020-04-20 NOTE — TELEPHONE ENCOUNTER
I called patient in regards to his appointment with Dr. Ayala. Patient decided to reschedule because he didn't have a chance to do labs. Appt rescheduled for 06/16/2020 at 9:40

## 2020-05-06 ENCOUNTER — TELEPHONE (OUTPATIENT)
Dept: CARDIOLOGY | Facility: CLINIC | Age: 65
End: 2020-05-06

## 2020-05-06 ENCOUNTER — PATIENT MESSAGE (OUTPATIENT)
Dept: UROLOGY | Facility: CLINIC | Age: 65
End: 2020-05-06

## 2020-05-06 ENCOUNTER — PATIENT MESSAGE (OUTPATIENT)
Dept: CARDIOLOGY | Facility: CLINIC | Age: 65
End: 2020-05-06

## 2020-05-06 NOTE — TELEPHONE ENCOUNTER
I  reached out to patient ,    And we schedule her an Telephone appointment to See .    5-11-20    Appointment also mailed to patient home.    Patient confirmed appointment too.      Oneyda Xiong (rita).

## 2020-05-07 ENCOUNTER — PATIENT MESSAGE (OUTPATIENT)
Dept: UROLOGY | Facility: CLINIC | Age: 65
End: 2020-05-07

## 2020-05-07 ENCOUNTER — PATIENT OUTREACH (OUTPATIENT)
Dept: ADMINISTRATIVE | Facility: OTHER | Age: 65
End: 2020-05-07

## 2020-05-11 ENCOUNTER — OFFICE VISIT (OUTPATIENT)
Dept: CARDIOLOGY | Facility: CLINIC | Age: 65
End: 2020-05-11
Payer: MEDICARE

## 2020-05-11 VITALS
DIASTOLIC BLOOD PRESSURE: 75 MMHG | SYSTOLIC BLOOD PRESSURE: 132 MMHG | BODY MASS INDEX: 29.55 KG/M2 | WEIGHT: 195 LBS | HEIGHT: 68 IN

## 2020-05-11 DIAGNOSIS — I25.10 CORONARY ARTERY DISEASE INVOLVING NATIVE CORONARY ARTERY OF NATIVE HEART WITHOUT ANGINA PECTORIS: Primary | ICD-10-CM

## 2020-05-11 DIAGNOSIS — E66.3 OVERWEIGHT (BMI 25.0-29.9): ICD-10-CM

## 2020-05-11 DIAGNOSIS — Z95.5 HISTORY OF CORONARY ARTERY STENT PLACEMENT: ICD-10-CM

## 2020-05-11 DIAGNOSIS — F17.201 TOBACCO DEPENDENCE IN REMISSION: ICD-10-CM

## 2020-05-11 DIAGNOSIS — E78.2 MIXED HYPERLIPIDEMIA: ICD-10-CM

## 2020-05-11 PROCEDURE — 99443 PR PHYSICIAN TELEPHONE EVALUATION 21-30 MIN: CPT | Mod: 95,,, | Performed by: INTERNAL MEDICINE

## 2020-05-11 PROCEDURE — 99443 PR PHYSICIAN TELEPHONE EVALUATION 21-30 MIN: ICD-10-PCS | Mod: 95,,, | Performed by: INTERNAL MEDICINE

## 2020-05-11 RX ORDER — CLOPIDOGREL BISULFATE 75 MG/1
TABLET ORAL
Qty: 90 TABLET | Refills: 3 | Status: SHIPPED | OUTPATIENT
Start: 2020-05-11 | End: 2021-03-03 | Stop reason: SDUPTHER

## 2020-05-11 RX ORDER — ATORVASTATIN CALCIUM 80 MG/1
TABLET, FILM COATED ORAL
Qty: 90 TABLET | Refills: 3 | Status: SHIPPED | OUTPATIENT
Start: 2020-05-11 | End: 2021-03-03 | Stop reason: SDUPTHER

## 2020-05-11 NOTE — PATIENT INSTRUCTIONS
Heart Disease Education    The heart beats 60 to 100 times per minute, 24 hours a day. This equals almost 1000,000 times a day. It pumps blood with oxygen and nutrients to the tissues and organs of the body. But the heart is a muscle and needs its own supply of blood. Blood flow to the heart is supplied by the coronary arteries. Coronary artery disease (atherosclerosis) is a result of cholesterol, saturated fat, and calcium deposits (plaques) that build up inside the walls. This causes inflammation within the coronary arteries. These plaques narrow the artery and reduce blood flow to the heart muscle. The reduction in blood flow to the heart muscle decreases oxygen supply to the heart. If the narrowing is significant enough, the oxygen supply to one or more regions of the heart can be temporarily or permanently shut down. This can cause chest pain, and possibly death of heart tissue (heart attack).  Types of chest pain  Angina is the name for pain in the heart muscle. Angina is a warning sign of serious heart disease. When untreated it can lead to a heart attack, also known as acute myocardial infarction, or AMI. Angina occurs when there is not enough blood and oxygen flowing to the heart for the amount of work it is doing. This most often happens during physical exertion, when the heart is working hardest. It is usually relieved by rest or nitroglycerin. Angina may also occur after a large meal when extra blood is sent to the digestive organs and less goes to the heart. In the case of advanced or unstable heart disease, angina can occur at rest or awaken you from sleep. Angina usually lasts from a few minutes up to 20 minutes or more. When treated early, the effects of angina can be reversed without permanent damage to the heart. Angina is a serious condition and needs to be evaluated by a medical professional immediately.  There are two types of angina -- stable and unstable:  · Stable angina usually occurs  with a predictable level of activity. Being stable, its character, severity, and occurrence do not change much over time. It usually starts with activity, and resolves with rest or taking your medicine as instructed by your doctor. The symptoms usually do not last long.  · Unstable angina changes or gets worse over time. It is different from whatever you are used to. It may feel different or worse, begin without cause, occur with exercise or exertion, wake you up from sleep, and last longer. It may not respond in the same way as it does when you take your usual medicines for an attack. This type of angina can be a warning sign of an impending heart attack.     A heart attack is usually the result of a blood clot that suddenly forms in a coronary artery that has been narrowed with plaque. When this occurs, blood flow may be cut off to a part of the heart muscle, causing the cells to die. This weakens the pumping action of the heart, which affects the delivery of blood to all the other organs in the body including the brain. This damage is not reversible. However, early treatment can limit the amount of damage.  The pain you feel with angina and a heart attack may have a similar quality. However, it is usually different in intensity and duration. Here are some typical descriptions of a heart attack:  · It is most often experienced as a squeezing, crushing, pressure-like sensation in the center of the chest.  · It is sometimes described as something heavy sitting on my chest.  · It may feel more like a bad case of indigestion.  · The pain may spread from the chest to the arm, shoulder, throat or jaw.  · Sometimes the pain is not felt in the chest at all, but only in the arm, shoulder, throat or jaw.  · There may also be nausea, vomiting, dizziness or light-headedness, sweating and trouble breathing.  · Palpitations, or your heart beating rapidly  · A new, irregular heart beat  · Unexplained weakness  You may not be  "able to tell the difference between "bad" angina and a heart attack at home. Seek help if your symptoms are different than usual. Do not be in denial or just try to "tough it out."  Call 911  This is the fastest and safest way to get to the emergency department. The paramedics can also start treatment on the way to the hospital, saving valuable time for your heart.  · If the angina gets worse, if it continues, or if it stops and returns, call 911 immediately. Do not delay. You may be having a heart attack.  · After you call 911, take a second tablet or spray unless instructed otherwise. When repeating doses, sit down if possible, because it can make you feel lightheaded or dizzy. Wait another 5 minutes. If the angina still does not go away, take a third tablet or spray. Do not take more than 3 tablets or sprays within 15 minutes. Stay on the phone with 911 for further instruction.  · Your healthcare provider may give you slightly different instructions than those above. If so, follow them carefully.  Do not wait until symptoms become severe to call 911.  Other reasons to call 911 include:  · Trouble breathing  · Feeling lightheaded, faint, or dizzy  · Rapid heart beat  · Slower than usual heart rate compared to your normal  · Angina with weakness, dizziness, fainting, heavy sweating, nausea, or vomiting  · Extreme drowsiness, confusion  · Weakness of an arm or leg or one side of the face  · Difficulty with speech or vision  When to seek medical care  Remember, the signs and symptoms of a heart attack are not always like they are on TV. Sometimes they are not so obvious. You may only feel weak, or just not right. If it is not clear or if you have any doubt, call for advice.  · Seek help if there is a change in the type of pain, if it feels different, or if your symptoms are mild.  · Do not drive yourself. Have someone else drive you. If no one can drive, call 911.  · Do not delay. Fast diagnosis and treatment can " "prevent or limit the amount of heart damage during a heart attack.  · Do not go to your doctor's office or a clinic as they may not be able to provide all the testing and treatment required for this condition.  · If your doctor has given you medicine to take when symptoms occur, take them but don't delay getting help trying to locate medicines.  What happens in the emergency department  The emergency department is connected to your local emergency medical system (EMS) through 911. That's why during a cardiac emergency, calling 911 is the fastest way to get help. The goal of the emergency department is to rapidly screen, evaluate, and treat people.  Once you are there, an electrocardiogram (ECG or heart tracing) will be done. Blood samples may be taken to look for the presence of heart enzymes that leak from damaged heart cells and show if a heart attack is occurring. You will often be evaluated by a heart specialist (cardiologist) who decides the best course of action. In the case of severe angina or early heart attack, and depending on the circumstances, powerful "clot busting" medicines can be used to dissolve blood clots in the coronary artery. In other cases, you may be taken to a cardiac catheterization lab. Here, a tiny balloon-tipped catheter is advanced through blood vessels to the heart. There the balloon is inflated pushing open the blood vessel restoring blood flow.  Risk factors for heart disease  Risk factors for heart disease are a combination of genetic and lifestyle. Many risk factors work by either directly or indirectly damaging the blood vessels of the heart, or by increasing the risk of forming blood or cholesterol clots, which then clog up and block the arteries.     Examples of physical lifestyle risk factors:  · Cigarette smoking  · High blood pressure  · High blood cholesterol  · Use of stimulant drugs such as cocaine, crack, and amphetamines  · Eating a high-fat, high-cholesterol " meal  · Diabetes   · Obesity which increases risk for diabetes and high blood pressure  · Lack of regular physical activity     Examples of emotional lifestyle factors:  · Chronic high stress levels release stress hormones. These raise blood pressure and cholesterol level and makes blood clot more easily.  · Held-in anger, hostile or cynical attitude  · Social and emotional isolation, lack of intimacy  · Loss of relationship  · Depression  Other factors that increase the risk of heart attack that you cannot control :  · Age. The older you get beyond 40, the greater is your risk of significant coronary artery disease.  · Gender. More men than women get heart disease; but once past menopause, women who are not taking estrogen replacement have the same risk as men for a heart attack.  · Family history. If your mother, father, brother or sister has coronary artery disease, your risk of having it is higher than a person your age without this family history.  What can you do to decrease your risk  To reduce your risk of heart disease:  · Get regular checkups with your doctor.  · Take your medicines for blood pressure, cholesterol or diabetes as directed.  · Watch your diet. Eat a heart healthy diet choosing fresh foods, less salt, cholesterol, and fat  · Stop smoking. Get help if needed.  · Get regular exercise.  · Manage stress.  · Carry a list of medicines and doses in your wallet.  Date Last Reviewed: 12/30/2015  © 7740-5216 [employer]. [address] [place] [address], [place] [address]. All rights reserved. This information is not intended as a substitute for professional medical care. Always follow your healthcare professional's instructions.

## 2020-05-11 NOTE — LETTER
May 11, 2020      Rosemarie Ayala MD  [address]  [address]  [place] [address]           [place] [facility]  [address]  [facility] [address]  Phone: [phone]          Patient: Masoud Osorio   MR Number: [id number]   YOB: 1955   Date of Visit: 5/11/2020       Dear Dr. Rosemarie Ayala:    Thank you for referring Masoud Osorio to me for evaluation. Attached you will find relevant portions of my assessment and plan of care.    If you have questions, please do not hesitate to call me. I look forward to following Masoud Osorio along with you.    Sincerely,    eKon Bailey MD    Enclosure  CC:  No Recipients    If you would like to receive this communication electronically, please contact [email or web address] or [phone] to request more information on [employer] Link access.    For providers and/or their staff who would like to refer a patient to [facility], please contact us through our one-stop-shop provider referral line, [facility], at [phone].    If you feel you have received this communication in error or would no longer like to receive these types of communications, please e-mail [email or web address]

## 2020-05-11 NOTE — PROGRESS NOTES
Subjective:   Patient ID:  Masoud Osorio is a 65 y.o. male who presents for a virtual follow up of Coronary Artery Disease; Hyperlipidemia; and yearly cardiac evaluation      HPI:           The patient location is: home    Visit type: Virtual visit with synchronous audio   Total time spent with patient: 35 minutes of chart review, discussion, medications update, and charting.   Each patient to whom we provide medical services by telemedicine is:  (1) informed of the relationship between the physician and patient and the respective role of any other health care provider with respect to management of the patient; and (2) notified that he or she may decline to receive medical services by telemedicine and may withdraw from such care at any time.       Masoud Osorio 65 y.o. male is here follow up and feeling well without any new complaints. He was admitted for NSTEMI that required PCI of RCA, PDA, and POBA of PLB with a preserved EF in 7/2016. He quit smoking. He is on aspirin and brilinta for DAPT witjh a DAPT score of 2. Stress test in 3/2017 for atypical chest pain revealed no reversible ischemia. He has been pain free since except for occasional reflux after evening meals. Admitted in 10/2017 with CP again. 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 Normal EF. ECG 6/2018: NSR.       Reviewed his medications and labs from 12/2018            Patient Active Problem List    Diagnosis Date Noted    Compression fracture of L1 lumbar vertebra 12/20/2019    Fall 12/20/2019    Leukocytosis 12/20/2019    Benign prostatic hyperplasia (BPH) with straining on urination 12/05/2018    Chronic actinic dermatitis 12/05/2018    Chronic bilateral low back pain without sciatica 12/05/2018    Non-seasonal allergic rhinitis 12/05/2018     Managed by outside ENT doctor Raya.  Continue follow-up and regular nasal spray      Pain and swelling of left knee 10/11/2017    Mixed  hyperlipidemia 2017    History of colon polyps 10/24/2016    Overweight (BMI 25.0-29.9) 2016    Long-term use of aspirin therapy 2016    History of coronary artery stent placement 2016    Visit for monitoring Plavix therapy 2016    Anxiety and depression 2016     trial of ctialopram and follow up in 8 weeks      Coronary artery disease involving native coronary artery of native heart without angina pectoris 2016 NSTEMI at [facility]     PCI of RCA with 4.0 x 15, 4.0 x 38, and 3.5 x 38 mm Resolute JACOB    PCI of PDA 2.75 x 15 mm JACOB dilated to 3.0  POBA of proximal PLB 2.0 x 12 mm balloon      Normal EF with LVEDP 15 mmHg  DAPT score of 2                        History of heart attack 2016     NSTEMI      Tobacco dependence in remission 2016           Right Arm BP - Sittin/75  Left Arm BP - Sittin/75        LABS    LAST HbA1c  Lab Results   Component Value Date    HGBA1C 5.6 2018       Lipid panel  Lab Results   Component Value Date    CHOL 132 2018    CHOL 116 (L) 2018    CHOL 116 (L) 10/20/2017     Lab Results   Component Value Date    HDL 43 2018    HDL 41 2018    HDL 38 (L) 10/20/2017     Lab Results   Component Value Date    LDLCALC 68.0 2018    LDLCALC 64.8 2018    LDLCALC 60.6 (L) 10/20/2017     Lab Results   Component Value Date    TRIG 105 2018    TRIG 51 2018    TRIG 87 10/20/2017     Lab Results   Component Value Date    CHOLHDL 32.6 2018    CHOLHDL 35.3 2018    CHOLHDL 32.8 10/20/2017            Review of Systems   Constitution: Negative for diaphoresis, night sweats, weight gain and weight loss.   HENT: Negative for congestion.    Eyes: Negative for blurred vision, discharge and double vision.   Cardiovascular: Negative for chest pain, claudication, cyanosis, dyspnea on exertion, irregular heartbeat, leg swelling, near-syncope, orthopnea, palpitations,  paroxysmal nocturnal dyspnea and syncope.   Respiratory: Negative for cough, shortness of breath and wheezing.    Endocrine: Negative for cold intolerance, heat intolerance and polyphagia.   Hematologic/Lymphatic: Negative for adenopathy and bleeding problem. Does not bruise/bleed easily.   Skin: Negative for dry skin and nail changes.   Musculoskeletal: Negative for arthritis, back pain, falls, joint pain, myalgias and neck pain.   Gastrointestinal: Negative for bloating, abdominal pain, change in bowel habit and constipation.   Genitourinary: Negative for bladder incontinence, dysuria, flank pain, genital sores and missed menses.   Neurological: Negative for aphonia, brief paralysis, difficulty with concentration, dizziness and weakness.   Psychiatric/Behavioral: Negative for altered mental status and memory loss. The patient does not have insomnia.    Allergic/Immunologic: Negative for environmental allergies.       Objective:   Physical Exam      No PE in the setting of audio virtual visit       Assessment:     1. Coronary artery disease involving native coronary artery of native heart without angina pectoris    2. History of coronary artery stent placement    3. Mixed hyperlipidemia    4. Overweight (BMI 25.0-29.9)    5. Tobacco dependence in remission        Plan:             Continue with current medical plan and lifestyle changes.  Return sooner for concerns or questions. If symptoms persist go to the ED  I have reviewed all pertinent data on this patient         DAPT for another year because of DAPT score of 2  No beta-blocker because of fatigue    He can take medications for ED safely  If he has recurrent angina he will start low dose norvasc        Referral to urology for ED  Consider sleep study to rule out NATALIE  Adjust sleeping pattern  Reduce nocturnal stimuli-no phone or tv in the bedroom       I have reviewed the patient's medical history in detail and updated the computerized patient  record.    Orders Placed This Encounter   Procedures    Lipid Panel     Standing Status:   Future     Standing Expiration Date:   7/10/2021    Comprehensive metabolic panel     Standing Status:   Future     Standing Expiration Date:   7/10/2021       Follow up as scheduled. Return sooner for concerns or questions        Patient's Medications   New Prescriptions    No medications on file   Previous Medications    ASCORBIC ACID, VITAMIN C, (VITAMIN C) 500 MG TABLET    Take 500 mg by mouth once daily.    ASPIRIN (ECOTRIN) 81 MG EC TABLET    Take 1 tablet (81 mg total) by mouth once daily.    AZELASTINE (ASTELIN) 137 MCG (0.1 %) NASAL SPRAY    INSTILL 2 SPRAYS IN EACH NOSTRIL TWICE A DAY    AZELASTINE (ASTELIN) 137 MCG (0.1 %) NASAL SPRAY    Spray 2 sprays in each nostril twice daily    CYCLOBENZAPRINE (FLEXERIL) 5 MG TABLET    Take 5 mg by mouth 3 (three) times daily as needed for Muscle spasms.    GARLIC EXTRACT ORAL    Take by mouth.    IPRATROPIUM (ATROVENT) 0.06 % NASAL SPRAY    INSTILL 2 SPRAYS IN EACH NOSTRIL EVERY 12 HOURS    IPRATROPIUM (ATROVENT) 42 MCG (0.06 %) NASAL SPRAY    Instill two sprays in each nostril every 12 hours.    MULTIVITAMIN CAPSULE    Take 1 capsule by mouth once daily.    NITROGLYCERIN (NITROSTAT) 0.4 MG SL TABLET    Place 1 tablet (0.4 mg total) under the tongue every 5 (five) minutes as needed for Chest pain.    TAMSULOSIN (FLOMAX) 0.4 MG CAP    Take 1 capsule (0.4 mg total) by mouth once daily.    TRAMADOL (ULTRAM) 50 MG TABLET    Take 1 tablet (50 mg total) by mouth every 8 (eight) hours as needed for Pain.    TRIAMCINOLONE (NASACORT) 55 MCG NASAL INHALER    Instill two sprays in each nostril once daily   Modified Medications    Modified Medication Previous Medication    ATORVASTATIN (LIPITOR) 80 MG TABLET atorvastatin (LIPITOR) 80 MG tablet       TAKE 1 TABLET BY MOUTH DAILY    TAKE 1 TABLET BY MOUTH DAILY    CLOPIDOGREL (PLAVIX) 75 MG TABLET clopidogreL (PLAVIX) 75 mg tablet        TAKE ONE TABLET BY MOUTH DAILY    TAKE ONE TABLET BY MOUTH DAILY   Discontinued Medications    No medications on file

## 2020-05-12 ENCOUNTER — PATIENT MESSAGE (OUTPATIENT)
Dept: UROLOGY | Facility: CLINIC | Age: 65
End: 2020-05-12

## 2020-05-18 ENCOUNTER — PATIENT OUTREACH (OUTPATIENT)
Dept: ADMINISTRATIVE | Facility: OTHER | Age: 65
End: 2020-05-18

## 2020-05-18 ENCOUNTER — PATIENT MESSAGE (OUTPATIENT)
Dept: UROLOGY | Facility: CLINIC | Age: 65
End: 2020-05-18

## 2020-05-19 ENCOUNTER — OFFICE VISIT (OUTPATIENT)
Dept: UROLOGY | Facility: CLINIC | Age: 65
End: 2020-05-19
Payer: MEDICARE

## 2020-05-19 DIAGNOSIS — N13.8 BPH WITH OBSTRUCTION/LOWER URINARY TRACT SYMPTOMS: ICD-10-CM

## 2020-05-19 DIAGNOSIS — R39.12 WEAK URINARY STREAM: ICD-10-CM

## 2020-05-19 DIAGNOSIS — R39.16 STRAINING DURING URINATION: Primary | ICD-10-CM

## 2020-05-19 DIAGNOSIS — N40.1 BPH WITH OBSTRUCTION/LOWER URINARY TRACT SYMPTOMS: ICD-10-CM

## 2020-05-19 PROCEDURE — 99442 PR PHYSICIAN TELEPHONE EVALUATION 11-20 MIN: ICD-10-PCS | Mod: 95,,, | Performed by: STUDENT IN AN ORGANIZED HEALTH CARE EDUCATION/TRAINING PROGRAM

## 2020-05-19 PROCEDURE — 99442 PR PHYSICIAN TELEPHONE EVALUATION 11-20 MIN: CPT | Mod: 95,,, | Performed by: STUDENT IN AN ORGANIZED HEALTH CARE EDUCATION/TRAINING PROGRAM

## 2020-05-19 RX ORDER — TAMSULOSIN HYDROCHLORIDE 0.4 MG/1
0.4 CAPSULE ORAL DAILY
Qty: 90 CAPSULE | Refills: 3 | Status: SHIPPED | OUTPATIENT
Start: 2020-05-19 | End: 2021-08-25 | Stop reason: SDUPTHER

## 2020-05-19 NOTE — LETTER
May 19, 2020      Rosemarie Ayala MD  [address]  [address]  [place] [address]           [place] [facility]  [address]  [facility] [address]  Phone: [phone]          Patient: Masoud Osorio   MR Number: [id number]   YOB: 1955   Date of Visit: 5/19/2020       Dear Dr. Rosemarie Ayala:    Thank you for referring Masoud Osorio to me for evaluation. Attached you will find relevant portions of my assessment and plan of care.    If you have questions, please do not hesitate to call me. I look forward to following Masoud Osorio along with you.    Sincerely,    Anny Kapoor MD    Enclosure  CC:  No Recipients    If you would like to receive this communication electronically, please contact [email or web address] or [phone] to request more information on [facility] Link access.    For providers and/or their staff who would like to refer a patient to [facility], please contact us through our one-stop-shop provider referral line, [facility] , at [phone].    If you feel you have received this communication in error or would no longer like to receive these types of communications, please e-mail [email or web address]

## 2020-05-19 NOTE — PROGRESS NOTES
"Urology Telemedicine Encounter      Subjective:       Patient ID: Masoud Osorio is a 65 y.o. male.    Chief Complaint: bothersome urinary sx  This is a 65 y.o.  male patient that is an established patient of mine.  The patient is referred to me by his cardiologist Dr. Bailey  for erectile dysfunction. He does have a history of kidney stones which he passed kidney stones x2 about 10 years ago. He currently denies flank pain. He is here today mostly to discuss two issues - lower urinary tract symptoms and erectile dysfunction. He has a significant cardiologic history - he has a history of CAD as well as an NSTEMI 7/2016 requiring PCI. He takes plavix and also Nitroglycerin PRN.  Urinary symptoms-  About a few months ago he began to notice more straining with urination. He also notes that he has to strain more in order to "keep the urination going." he also notes occasional +intermittency of his urinary stream. He does feel like he is emptying well.  He has never been started on any medications for his prostate before.   Erections-  He notes that since he has been on cholesterol medications, plavix over the past year he has noticed a decrease in the quality of his erections. He does experience spontaneous erections. He state he is not sure if it is in his head but he was interested in options for erectile dysfunction. He is currently , he states they have a good relationship and her health is overall very good. After his MI in 2016 he did quit smoking in 2016.   Job - retired from [employer] earlier this year  POCT urinalysis at last visit was completely benign    8/9/18  At our last visit on 6/5/18 we started flomax daily to help with his straining, intermittency. He states he has been very happy with the flomax therapy. He has been taking it every morning without any adverse consequence. He and his wife are enjoying residential. They recently returned back from [place] in [place]   Uroflow today - low volume study 87cc; qmax 5.9ml/s, qavg 2.7ml/s.  PVR 59cc    His AUA symptom score today was 2/1 pleased  Incomplete emptying 0  Frequency:  1  Intermittency:  0  Urgency:  0  Weak stream:  0  Strainin  Nocturia:  1  Bother:   1, Pleased    19  The patient returns back today for a followup. He is still satisfied with his medical management of BPH. He needs a flomax refill. He has been doing well during [facility], bought a house and he is doing some fixes in the house before he and his wife moves in. His PSAs are checked by his PCP Dr. Ayala. Last PSA 2018 1.3 stable.     Uroflow - low volume study 39cc  Bladder scan - 84cc  POCT urinalysis completely benign      The patient location is: [place], [place]  The chief complaint leading to consultation is: followup urinary sx  Visit type: Virtual visit with audio only  Total time spent with patient: 20 minutes  Each patient to whom he or she provides medical services by telemedicine is:  (1) informed of the relationship between the physician and patient and the respective role of any other health care provider with respect to management of the patient; and (2) notified that he or she may decline to receive medical services by telemedicine and may withdraw from such care at any time.    The patient understands that [facility] urology department typically does not utilize telemedicine, we are adapting this technology for the time being to try to avoid patients from being exposed to COVID-19 / coronavirus as per the recommendations of [facility] administration as well as the [facility] Department of Health.    I also recommend that insurance companies consider waiving a copay, if the patient is responsible for one, for telemedicine health services during the current healthcare climate dealing with a COVID-19 outbreak.     Notes: patient sustained a fall in 2019 was on his roof. He was admitted for 1  night and just that interruption did result in an acute worsening of his stream and increased straining. He has since been on flomax and his symptoms are sometimes bothersome and he is curious about starting another medications. He also voiced his concerns about taking too many medications as he does have a lot of medications on his list.       LAST PSA  Lab Results   Component Value Date    PSA 1.3 12/05/2018    PSA 1.5 09/23/2016       Lab Results   Component Value Date    CREATININE 0.8 12/21/2019      ---  Past Medical History:   Diagnosis Date    Anxiety and depression 9/21/2016    trial of ctialopram and follow up in 8 weeks    Benign prostatic hyperplasia (BPH) with straining on urination 12/5/2018    Coronary artery disease involving native coronary artery of native heart without angina pectoris 7/26/2016 7/26/2016 [facility]I at [facility]   PCI of RCA with 4.0 x 15, 4.0 x 38, and 3.5 x 38 mm Resolute JACOB   PCI of PDA 2.75 x 15 mm JACOB dilated to 3.0 POBA of proximal PLB 2.0 x 12 mm balloon   Normal EF with LVEDP 15 mmHg DAPT score of 2               Elevated PSA     Gout     History of coronary artery stent placement 9/21/2016    History of heart attack 7/25/2016    NSTEMI    Mixed hyperlipidemia 7/17/2017    Overweight (BMI 25.0-29.9) 9/21/2016    Tobacco dependence in remission 7/25/2016       Past Surgical History:   Procedure Laterality Date    COLONOSCOPY N/A 8/16/2017    Procedure: COLONOSCOPY Split Prep;  Surgeon: Maykel Carcamo Jr., MD;  Location: [facility];  Service: Endoscopy;  Laterality: N/A;    CORONARY ANGIOPLASTY WITH STENT PLACEMENT      HERNIA REPAIR      ROTATOR CUFF REPAIR      VASECTOMY         Family History   Problem Relation Age of Onset    Heart disease Mother     Arthritis Mother     Parkinsonism Mother     Congenital heart disease Father     No Known Problems Sister     No Known Problems Brother     No Known Problems Daughter     No Known Problems Sister      No Known Problems Sister     Prostate cancer Neg Hx     Kidney disease Neg Hx        Social History     Tobacco Use    Smoking status: Former Smoker     Packs/day: 0.50     Types: Cigarettes     Last attempt to quit: 7/25/2016     Years since quitting: 3.8    Smokeless tobacco: Never Used   Substance Use Topics    Alcohol use: Yes     Frequency: Monthly or less     Drinks per session: 1 or 2     Binge frequency: Never     Comment: rarely    Drug use: No       Current Outpatient Medications on File Prior to Visit   Medication Sig Dispense Refill    ascorbic acid, vitamin C, (VITAMIN C) 500 MG tablet Take 500 mg by mouth once daily.      aspirin (ECOTRIN) 81 MG EC tablet Take 1 tablet (81 mg total) by mouth once daily.  0    atorvastatin (LIPITOR) 80 MG tablet TAKE 1 TABLET BY MOUTH DAILY 90 tablet 3    azelastine (ASTELIN) 137 mcg (0.1 %) nasal spray INSTILL 2 SPRAYS IN EACH NOSTRIL TWICE A DAY  5    azelastine (ASTELIN) 137 mcg (0.1 %) nasal spray Spray 2 sprays in each nostril twice daily 30 mL 5    clopidogreL (PLAVIX) 75 mg tablet TAKE ONE TABLET BY MOUTH DAILY 90 tablet 3    cyclobenzaprine (FLEXERIL) 5 MG tablet Take 5 mg by mouth 3 (three) times daily as needed for Muscle spasms.      GARLIC EXTRACT ORAL Take by mouth.      ipratropium (ATROVENT) 0.06 % nasal spray INSTILL 2 SPRAYS IN EACH NOSTRIL EVERY 12 HOURS  5    ipratropium (ATROVENT) 42 mcg (0.06 %) nasal spray Instill two sprays in each nostril every 12 hours. 15 mL 5    multivitamin capsule Take 1 capsule by mouth once daily.      nitroGLYCERIN (NITROSTAT) 0.4 MG SL tablet Place 1 tablet (0.4 mg total) under the tongue every 5 (five) minutes as needed for Chest pain. (Patient not taking: Reported on 2/26/2020) 25 tablet 11    traMADol (ULTRAM) 50 mg tablet Take 1 tablet (50 mg total) by mouth every 8 (eight) hours as needed for Pain. 90 tablet 1    triamcinolone (NASACORT) 55 mcg nasal inhaler Instill two sprays in each nostril  once daily (Patient not taking: Reported on 2/26/2020) 16.9 mL 5    [DISCONTINUED] tamsulosin (FLOMAX) 0.4 mg Cap Take 1 capsule (0.4 mg total) by mouth once daily. 90 capsule 3     No current facility-administered medications on file prior to visit.        Review of patient's allergies indicates:   Allergen Reactions    Penicillins Hives       Review of Systems   Constitutional: Negative for chills.   HENT: Negative for congestion.    Eyes: Negative for visual disturbance.   Respiratory: Negative for shortness of breath.    Cardiovascular: Negative for chest pain.   Gastrointestinal: Negative for abdominal distention.   Musculoskeletal: Negative for gait problem.   Skin: Negative for color change.   Neurological: Negative for dizziness.   Psychiatric/Behavioral: Negative for agitation.       Objective:      Physical Exam    Unable to perform, audio visit only.    Please note that a full physical examination could not be performed due to the limitations of a telemedicine visit.   Assessment:       1. Straining during urination    2. BPH with obstruction/lower urinary tract symptoms    3. Weak urinary stream        Plan:        1. Continue flomax- counseled on mech of action of medication.  2. Counseled about natural history of prostate in men and that it usually grows over time as a man ages.  3. He will look into medication called finasteride. Counseled about slower onset of medication action and that it works to shrink the prostate gland, in the future if he does want to start the medication he can message me. In future if he benefits from dual medical therapy could continue flomax/finasteride or try to stop flomax and keep finasteride on (avoid polypharmacy).  4. He will message me if he wants to start finasteride.  5. Continue PSA checks with Dr. Ayala - PSA 1x/yearly.  Straining during urination    BPH with obstruction/lower urinary tract symptoms  -     tamsulosin (FLOMAX) 0.4 mg Cap; Take 1 capsule (0.4  mg total) by mouth once daily.  Dispense: 90 capsule; Refill: 3    Weak urinary stream

## 2020-05-20 ENCOUNTER — PATIENT MESSAGE (OUTPATIENT)
Dept: UROLOGY | Facility: CLINIC | Age: 65
End: 2020-05-20

## 2020-06-09 ENCOUNTER — PATIENT OUTREACH (OUTPATIENT)
Dept: ADMINISTRATIVE | Facility: OTHER | Age: 65
End: 2020-06-09

## 2020-06-15 ENCOUNTER — OFFICE VISIT (OUTPATIENT)
Dept: NEUROSURGERY | Facility: CLINIC | Age: 65
End: 2020-06-15
Payer: MEDICARE

## 2020-06-15 ENCOUNTER — HOSPITAL ENCOUNTER (OUTPATIENT)
Dept: RADIOLOGY | Facility: HOSPITAL | Age: 65
Discharge: HOME | End: 2020-06-15
Attending: PHYSICIAN ASSISTANT
Payer: MEDICARE

## 2020-06-15 VITALS
DIASTOLIC BLOOD PRESSURE: 74 MMHG | WEIGHT: 195 LBS | BODY MASS INDEX: 29.65 KG/M2 | SYSTOLIC BLOOD PRESSURE: 121 MMHG | HEART RATE: 67 BPM

## 2020-06-15 DIAGNOSIS — S32.010D COMPRESSION FRACTURE OF L1 VERTEBRA WITH ROUTINE HEALING, SUBSEQUENT ENCOUNTER: ICD-10-CM

## 2020-06-15 DIAGNOSIS — S32.010S CLOSED WEDGE COMPRESSION FRACTURE OF FIRST LUMBAR VERTEBRA, SEQUELA: Primary | ICD-10-CM

## 2020-06-15 PROCEDURE — 99999 PR PBB SHADOW E&M-EST. PATIENT-LVL IV: CPT | Mod: PBBFAC,,, | Performed by: NEUROLOGICAL SURGERY

## 2020-06-15 PROCEDURE — 1100F PR PT FALLS ASSESS DOC 2+ FALLS/FALL W/INJURY/YR: ICD-10-PCS | Mod: CPTII,S$GLB,, | Performed by: NEUROLOGICAL SURGERY

## 2020-06-15 PROCEDURE — 99213 PR OFFICE/OUTPT VISIT, EST, LEVL III, 20-29 MIN: ICD-10-PCS | Mod: S$GLB,,, | Performed by: NEUROLOGICAL SURGERY

## 2020-06-15 PROCEDURE — 72080 XR THORACOLUMBAR SPINE AP LATERAL: ICD-10-PCS | Mod: 26,,, | Performed by: RADIOLOGY

## 2020-06-15 PROCEDURE — 99213 OFFICE O/P EST LOW 20 MIN: CPT | Mod: S$GLB,,, | Performed by: NEUROLOGICAL SURGERY

## 2020-06-15 PROCEDURE — 1100F PTFALLS ASSESS-DOCD GE2>/YR: CPT | Mod: CPTII,S$GLB,, | Performed by: NEUROLOGICAL SURGERY

## 2020-06-15 PROCEDURE — 72080 X-RAY EXAM THORACOLMB 2/> VW: CPT | Mod: 26,,, | Performed by: RADIOLOGY

## 2020-06-15 PROCEDURE — 3008F PR BODY MASS INDEX (BMI) DOCUMENTED: ICD-10-PCS | Mod: CPTII,S$GLB,, | Performed by: NEUROLOGICAL SURGERY

## 2020-06-15 PROCEDURE — 3008F BODY MASS INDEX DOCD: CPT | Mod: CPTII,S$GLB,, | Performed by: NEUROLOGICAL SURGERY

## 2020-06-15 PROCEDURE — 72080 X-RAY EXAM THORACOLMB 2/> VW: CPT | Mod: TC,PN

## 2020-06-15 PROCEDURE — 99999 PR PBB SHADOW E&M-EST. PATIENT-LVL IV: ICD-10-PCS | Mod: PBBFAC,,, | Performed by: NEUROLOGICAL SURGERY

## 2020-06-15 PROCEDURE — 3288F FALL RISK ASSESSMENT DOCD: CPT | Mod: CPTII,S$GLB,, | Performed by: NEUROLOGICAL SURGERY

## 2020-06-15 PROCEDURE — 3288F PR FALLS RISK ASSESSMENT DOCUMENTED: ICD-10-PCS | Mod: CPTII,S$GLB,, | Performed by: NEUROLOGICAL SURGERY

## 2020-06-15 NOTE — PROGRESS NOTES
NEUROSURGICAL PROGRESS NOTE    DATE OF SERVICE:  06/15/2020    ATTENDING PHYSICIAN:  Bienvenido Garcia MD    SUBJECTIVE:    INTERIM HISTORY:    This is a very pleasant 65 y.o. male, who is status post fall from a ladder 6 months ago that resulted in a L1 wedge fracture.  Was doing physical therapy but had to stop because of COVID.  Reports low back pain when leans forward for prolonged period of time.  It is not reporting weakness, numbness or sphincter dysfunction symptoms.  Has not tried to use a TENS unit.  Would like to resume physical therapy.  No leg pain at this time.    Low Back Pain Scale  R Low Back-Pain Score: 3  R Low Back-Pain Intensity: I can tolerate the pain I have without having to use pain killers  Personal Care : I can look after myself normally without causing extra pain.  Lifting: I can lift heavy weights, but it gives extra pain.  Walking: Pain does not prevent me from walking any distance.  Sitting: I can sit in any chair as long as I like.   Low Back-Standing: (n/a)   Low Back-Sleeping: I have pain in bed but it does not prevent me from sleeping well  Social Life: My social life is normal, and give me no extra pain.   Low Back-Traveling: I have no pain when traveling   Low Back-Changing Degree of Pain: My pain fluctuates but is definitely getting better         PAST MEDICAL HISTORY:  Active Ambulatory Problems     Diagnosis Date Noted    History of heart attack 07/25/2016    Tobacco dependence in remission 07/25/2016    Coronary artery disease involving native coronary artery of native heart without angina pectoris 07/26/2016    Overweight (BMI 25.0-29.9) 09/21/2016    Long-term use of aspirin therapy 09/21/2016    History of coronary artery stent placement 09/21/2016    Visit for monitoring Plavix therapy 09/21/2016    Anxiety and depression 09/21/2016    History of colon polyps 10/24/2016    Mixed hyperlipidemia 07/17/2017    Pain and swelling of left knee 10/11/2017    Benign  prostatic hyperplasia (BPH) with straining on urination 12/05/2018    Chronic actinic dermatitis 12/05/2018    Chronic bilateral low back pain without sciatica 12/05/2018    Non-seasonal allergic rhinitis 12/05/2018    Compression fracture of L1 lumbar vertebra 12/20/2019    Fall 12/20/2019    Leukocytosis 12/20/2019     Resolved Ambulatory Problems     Diagnosis Date Noted    Chest pain 12/20/2019     Past Medical History:   Diagnosis Date    Elevated PSA     Gout        PAST SURGICAL HISTORY:  Past Surgical History:   Procedure Laterality Date    COLONOSCOPY N/A 8/16/2017    Procedure: COLONOSCOPY Split Prep;  Surgeon: Maykel Carcamo Jr., MD;  Location: [facility] ENDO;  Service: Endoscopy;  Laterality: N/A;    CORONARY ANGIOPLASTY WITH STENT PLACEMENT      HERNIA REPAIR      ROTATOR CUFF REPAIR      VASECTOMY         SOCIAL HISTORY:   Social History     Socioeconomic History    Marital status:      Spouse name: Not on file    Number of children: Not on file    Years of education: Not on file    Highest education level: Not on file   Occupational History    Not on file   Social Needs    Financial resource strain: Not very hard    Food insecurity     Worry: Never true     Inability: Never true    Transportation needs     Medical: No     Non-medical: No   Tobacco Use    Smoking status: Former Smoker     Packs/day: 0.50     Types: Cigarettes     Quit date: 7/25/2016     Years since quitting: 3.8    Smokeless tobacco: Never Used   Substance and Sexual Activity    Alcohol use: Yes     Frequency: Monthly or less     Drinks per session: 1 or 2     Binge frequency: Never     Comment: rarely    Drug use: No    Sexual activity: Not on file   Lifestyle    Physical activity     Days per week: 0 days     Minutes per session: 0 min    Stress: Not at all   Relationships    Social connections     Talks on phone: Three times a week     Gets together: Once a week     Attends Cheondo[employer] service: Not  on file     Active member of club or organization: No     Attends meetings of clubs or organizations: 1 to 4 times per year     Relationship status:    Other Topics Concern    Not on file   Social History Narrative    Not on file       FAMILY HISTORY:  Family History   Problem Relation Age of Onset    Heart disease Mother     Arthritis Mother     Parkinsonism Mother     Congenital heart disease Father     No Known Problems Sister     No Known Problems Brother     No Known Problems Daughter     No Known Problems Sister     No Known Problems Sister     Prostate cancer Neg Hx     Kidney disease Neg Hx        CURRENTS MEDICATIONS:  Current Outpatient Medications on File Prior to Visit   Medication Sig Dispense Refill    ascorbic acid, vitamin C, (VITAMIN C) 500 MG tablet Take 500 mg by mouth once daily.      atorvastatin (LIPITOR) 80 MG tablet TAKE 1 TABLET BY MOUTH DAILY 90 tablet 3    azelastine (ASTELIN) 137 mcg (0.1 %) nasal spray INSTILL 2 SPRAYS IN EACH NOSTRIL TWICE A DAY  5    clopidogreL (PLAVIX) 75 mg tablet TAKE ONE TABLET BY MOUTH DAILY 90 tablet 3    cyclobenzaprine (FLEXERIL) 5 MG tablet Take 5 mg by mouth 3 (three) times daily as needed for Muscle spasms.      GARLIC EXTRACT ORAL Take by mouth.      ipratropium (ATROVENT) 0.06 % nasal spray INSTILL 2 SPRAYS IN EACH NOSTRIL EVERY 12 HOURS  5    ipratropium (ATROVENT) 42 mcg (0.06 %) nasal spray Instill two sprays in each nostril every 12 hours. 15 mL 5    multivitamin capsule Take 1 capsule by mouth once daily.      tamsulosin (FLOMAX) 0.4 mg Cap Take 1 capsule (0.4 mg total) by mouth once daily. 90 capsule 3    triamcinolone (NASACORT) 55 mcg nasal inhaler Instill two sprays in each nostril once daily 16.9 mL 5    aspirin (ECOTRIN) 81 MG EC tablet Take 1 tablet (81 mg total) by mouth once daily.  0    azelastine (ASTELIN) 137 mcg (0.1 %) nasal spray Spray 2 sprays in each nostril twice daily (Patient not taking: Reported  on 6/15/2020) 30 mL 5    nitroGLYCERIN (NITROSTAT) 0.4 MG SL tablet Place 1 tablet (0.4 mg total) under the tongue every 5 (five) minutes as needed for Chest pain. (Patient not taking: Reported on 6/15/2020) 25 tablet 11    traMADol (ULTRAM) 50 mg tablet Take 1 tablet (50 mg total) by mouth every 8 (eight) hours as needed for Pain. (Patient not taking: Reported on 6/15/2020) 90 tablet 1     No current facility-administered medications on file prior to visit.        ALLERGIES:  Review of patient's allergies indicates:   Allergen Reactions    Penicillins Hives       REVIEW OF SYSTEMS:  Review of Systems   Constitutional: Negative for diaphoresis, fever and weight loss.   Respiratory: Negative for shortness of breath.    Cardiovascular: Negative for chest pain.   Gastrointestinal: Negative for blood in stool.   Genitourinary: Negative for hematuria.   Endo/Heme/Allergies: Does not bruise/bleed easily.   All other systems reviewed and are negative.        OBJECTIVE:    PHYSICAL EXAMINATION:   Vitals:    06/15/20 1315   BP: 121/74   Pulse: 67       Physical Exam:  Vitals reviewed.    Constitutional: He appears well-developed and well-nourished.     Eyes: Pupils are equal, round, and reactive to light. Conjunctivae and EOM are normal.     Cardiovascular: Normal distal pulses and no edema.     Abdominal: Soft.     Skin: Skin displays no rash on trunk and no rash on extremities. Skin displays no lesions on trunk and no lesions on extremities.     Psych/Behavior: He is alert. He is oriented to person, place, and time. He has a normal mood and affect.     Musculoskeletal:        Neck: Range of motion is full.     Neurological:        DTRs: Tricep reflexes are 2+ on the right side and 2+ on the left side. Bicep reflexes are 2+ on the right side and 2+ on the left side. Brachioradialis reflexes are 2+ on the right side and 2+ on the left side. Patellar reflexes are 2+ on the right side and 2+ on the left side. Achilles  reflexes are 2+ on the right side and 2+ on the left side.       Back Exam     Tenderness   The patient is experiencing tenderness in the lumbar.    Range of Motion   Extension: normal   Flexion: abnormal   Lateral bend right: normal   Lateral bend left: normal   Rotation right: normal   Rotation left: normal     Muscle Strength   Right Quadriceps:  5/5   Left Quadriceps:  5/5   Right Hamstrings:  5/5   Left Hamstrings:  5/5     Tests   Straight leg raise right: negative  Straight leg raise left: negative    Other   Toe walk: normal  Heel walk: normal                Neurologic Exam     Mental Status   Oriented to person, place, and time.   Speech: speech is normal   Level of consciousness: alert    Cranial Nerves   Cranial nerves II through XII intact.     CN III, IV, VI   Pupils are equal, round, and reactive to light.  Extraocular motions are normal.     Motor Exam   Muscle bulk: normal  Overall muscle tone: normal    Strength   Right deltoid: 5/5  Left deltoid: 5/5  Right biceps: 5/5  Left biceps: 5/5  Right triceps: 5/5  Left triceps: 5/5  Right wrist flexion: 5/5  Left wrist flexion: 5/5  Right wrist extension: 5/5  Left wrist extension: 5/5  Right interossei: 5/5  Left interossei: 5/5  Right iliopsoas: 5/5  Left iliopsoas: 5/5  Right quadriceps: 5/5  Left quadriceps: 5/5  Right hamstrin/5  Left hamstrin/5  Right anterior tibial: 5/5  Left anterior tibial: 5/5  Right posterior tibial: 5/5  Left posterior tibial: 5/5  Right peroneal: 5/5  Left peroneal: 5/5  Right gastroc: 5/5  Left gastroc: 5/5    Sensory Exam   Light touch normal.   Pinprick normal.     Gait, Coordination, and Reflexes     Gait  Gait: normal    Coordination   Finger to nose coordination: normal  Tandem walking coordination: normal    Reflexes   Right brachioradialis: 2+  Left brachioradialis: 2+  Right biceps: 2+  Left biceps: 2+  Right triceps: 2+  Left triceps: 2+  Right patellar: 2+  Left patellar: 2+  Right achilles: 2+  Left  achilles: 2+  Right plantar: normal  Left plantar: normal  Right Downing: absent  Left Downing: absent  Right ankle clonus: absent  Left ankle clonus: absent        DIAGNOSTIC DATA:  I personally interpreted the following imaging:   Thoracolumbar x-ray today shows consolidation of the L1 fracture    ASSESMENT:  This is a 65 y.o. male with     Problem List Items Addressed This Visit     None      Visit Diagnoses     Closed wedge compression fracture of first lumbar vertebra, sequela    -  Primary    Relevant Orders    Ambulatory referral/consult to Physical/Occupational Therapy            PLAN:  Physical therapy 3 times a week for 6 weeks  TENS unit  Follow-up as needed        Bienvenido Garcia MD  Cell:[phone]

## 2020-06-16 ENCOUNTER — TELEPHONE (OUTPATIENT)
Dept: FAMILY MEDICINE | Facility: CLINIC | Age: 65
End: 2020-06-16

## 2020-06-16 ENCOUNTER — OFFICE VISIT (OUTPATIENT)
Dept: FAMILY MEDICINE | Facility: CLINIC | Age: 65
End: 2020-06-16
Payer: MEDICARE

## 2020-06-16 DIAGNOSIS — Z79.02 VISIT FOR MONITORING PLAVIX THERAPY: ICD-10-CM

## 2020-06-16 DIAGNOSIS — E78.2 MIXED HYPERLIPIDEMIA: ICD-10-CM

## 2020-06-16 DIAGNOSIS — I25.2 HISTORY OF HEART ATTACK: ICD-10-CM

## 2020-06-16 DIAGNOSIS — R39.16 BENIGN PROSTATIC HYPERPLASIA (BPH) WITH STRAINING ON URINATION: ICD-10-CM

## 2020-06-16 DIAGNOSIS — I25.10 CORONARY ARTERY DISEASE INVOLVING NATIVE CORONARY ARTERY OF NATIVE HEART WITHOUT ANGINA PECTORIS: Primary | ICD-10-CM

## 2020-06-16 DIAGNOSIS — F41.9 ANXIETY AND DEPRESSION: ICD-10-CM

## 2020-06-16 DIAGNOSIS — N40.1 BENIGN PROSTATIC HYPERPLASIA (BPH) WITH STRAINING ON URINATION: ICD-10-CM

## 2020-06-16 DIAGNOSIS — F17.201 TOBACCO DEPENDENCE IN REMISSION: ICD-10-CM

## 2020-06-16 DIAGNOSIS — Z95.5 HISTORY OF CORONARY ARTERY STENT PLACEMENT: ICD-10-CM

## 2020-06-16 DIAGNOSIS — Z79.82 LONG-TERM USE OF ASPIRIN THERAPY: ICD-10-CM

## 2020-06-16 DIAGNOSIS — Z51.81 VISIT FOR MONITORING PLAVIX THERAPY: ICD-10-CM

## 2020-06-16 DIAGNOSIS — F32.A ANXIETY AND DEPRESSION: ICD-10-CM

## 2020-06-16 DIAGNOSIS — Z11.4 ENCOUNTER FOR SCREENING FOR HIV: ICD-10-CM

## 2020-06-16 DIAGNOSIS — E66.3 OVERWEIGHT (BMI 25.0-29.9): ICD-10-CM

## 2020-06-16 DIAGNOSIS — Z79.899 MEDICATION MANAGEMENT: ICD-10-CM

## 2020-06-16 PROCEDURE — 99214 PR OFFICE/OUTPT VISIT, EST, LEVL IV, 30-39 MIN: ICD-10-PCS | Mod: 95,,, | Performed by: FAMILY MEDICINE

## 2020-06-16 PROCEDURE — 3288F FALL RISK ASSESSMENT DOCD: CPT | Mod: CPTII,95,, | Performed by: FAMILY MEDICINE

## 2020-06-16 PROCEDURE — 3288F PR FALLS RISK ASSESSMENT DOCUMENTED: ICD-10-PCS | Mod: CPTII,95,, | Performed by: FAMILY MEDICINE

## 2020-06-16 PROCEDURE — 99214 OFFICE O/P EST MOD 30 MIN: CPT | Mod: 95,,, | Performed by: FAMILY MEDICINE

## 2020-06-16 PROCEDURE — 1100F PR PT FALLS ASSESS DOC 2+ FALLS/FALL W/INJURY/YR: ICD-10-PCS | Mod: CPTII,95,, | Performed by: FAMILY MEDICINE

## 2020-06-16 PROCEDURE — 1100F PTFALLS ASSESS-DOCD GE2>/YR: CPT | Mod: CPTII,95,, | Performed by: FAMILY MEDICINE

## 2020-06-16 NOTE — PROGRESS NOTES
Office Visit    Patient Name: Masoud Osorio    : 1955  MRN: [id number]    Subjective:  Masoud is a 65 y.o. male who presents today for:    No chief complaint on file.    The patient location is: THEIR HOME  The chief complaint leading to consultation is: 6 month F/U/ LAB REVIEW    Visit type: audiovisual    Face to Face time with patient: START 1020 END 1033 TOTAL 13 MIN    20 minutes of total time spent on the encounter, which includes face to face time and non-face to face time preparing to see the patient (eg, review of tests), Obtaining and/or reviewing separately obtained history, Documenting clinical information in the electronic or other health record, Independently interpreting results (not separately reported) and communicating results to the patient/family/caregiver, or Care coordination (not separately reported).         Each patient to whom he or she provides medical services by telemedicine is:  (1) informed of the relationship between the physician and patient and the respective role of any other health care provider with respect to management of the patient; and (2) notified that he or she may decline to receive medical services by telemedicine and may withdraw from such care at any time.    Notes:     66 yo pt of mine, most recently seen by me for annual exam 20 who presents for VIRTUAL VISIT FOR LAB REVIEW AND 6 MO F/U OF CHRONIC CONDITONS THAT INCLUDE CAD s/p MI and stent, overweight BMI, h/o tobacco abuse, HLD, BPH improved with Flomax (urology 20-dr Kapoor), anxiety/ depression currently stable off of medications.      He sustained a lumbar spine compression fracture of L1 2019 and is being managed conservatively with a back brace-- weaned out of brace.    He is status post physical therapy and has Neurosurgery follow-up with Dr. Garcia- most recently seen yesterday 06/15/2020.  Blood pressure at this office visit was noted to be 121/74.  His weight was noted to be stable with  BMI at 29.  He was advised to resume physical therapy-orders placed-therapy was previously suspended secondary to COVID-19.  He was also advised on home use of a TENS unit. [facility] PT.     Seen by Cardiology 5/11/20: Advised DAPT for another year because of DAPT score of 2, No beta-blocker because of fatigue. Continue current meds-- Lipitor 80, Plavix 75, ASA 81. ANGINA-- not having, exercise tolerance stable-- RIDING BIKE AND SOME BIKE RIDING-- ABOUT 3-4 TIMES PER WEEK ABOUT 10-15 MILES.     Labs drawn yesterday 06/15/2020 shows stable PSA of 1.7, LDL of 49 with normal triglycerides, A1c 5.6 and normal CMP. CBC/TSH normal        Past Medical History  Past Medical History:   Diagnosis Date    Anxiety and depression 9/21/2016    trial of ctialopram and follow up in 8 weeks    Benign prostatic hyperplasia (BPH) with straining on urination 12/5/2018    Coronary artery disease involving native coronary artery of native heart without angina pectoris 7/26/2016 7/26/2016 NSTEMI at [facility]   PCI of RCA with 4.0 x 15, 4.0 x 38, and 3.5 x 38 mm Resolute JACOB   PCI of PDA 2.75 x 15 mm JACOB dilated to 3.0 POBA of proximal PLB 2.0 x 12 mm balloon   Normal EF with LVEDP 15 mmHg DAPT score of 2               Elevated PSA     Gout     History of coronary artery stent placement 9/21/2016    History of heart attack 7/25/2016    NSTEMI    Mixed hyperlipidemia 7/17/2017    Overweight (BMI 25.0-29.9) 9/21/2016    Tobacco dependence in remission 7/25/2016       Past Surgical History  Past Surgical History:   Procedure Laterality Date    COLONOSCOPY N/A 8/16/2017    Procedure: COLONOSCOPY Split Prep;  Surgeon: Maykel Carcamo Jr., MD;  Location: [facility] ENDO;  Service: Endoscopy;  Laterality: N/A;    CORONARY ANGIOPLASTY WITH STENT PLACEMENT      HERNIA REPAIR      ROTATOR CUFF REPAIR      VASECTOMY         Family History  Family History   Problem Relation Age of Onset    Heart disease Mother     Arthritis Mother      Parkinsonism Mother     Congenital heart disease Father     No Known Problems Sister     No Known Problems Brother     No Known Problems Daughter     No Known Problems Sister     No Known Problems Sister     Prostate cancer Neg Hx     Kidney disease Neg Hx        Social History  Social History     Socioeconomic History    Marital status:      Spouse name: Not on file    Number of children: Not on file    Years of education: Not on file    Highest education level: Not on file   Occupational History    Not on file   Social Needs    Financial resource strain: Not very hard    Food insecurity     Worry: Never true     Inability: Never true    Transportation needs     Medical: No     Non-medical: No   Tobacco Use    Smoking status: Former Smoker     Packs/day: 0.50     Types: Cigarettes     Quit date: 7/25/2016     Years since quitting: 3.8    Smokeless tobacco: Never Used   Substance and Sexual Activity    Alcohol use: Yes     Frequency: Monthly or less     Drinks per session: 1 or 2     Binge frequency: Never     Comment: rarely    Drug use: No    Sexual activity: Not on file   Lifestyle    Physical activity     Days per week: 0 days     Minutes per session: 0 min    Stress: Not at all   Relationships    Social connections     Talks on phone: Three times a week     Gets together: Once a week     Attends Ep[employer] service: Not on file     Active member of club or organization: No     Attends meetings of clubs or organizations: 1 to 4 times per year     Relationship status:    Other Topics Concern    Not on file   Social History Narrative    Not on file       Current Medications  Medications reviewed and updated.     Allergies   Review of patient's allergies indicates:   Allergen Reactions    Penicillins Hives       Review of Systems (Pertinent positives)  Review of Systems   Constitutional: Negative for activity change and unexpected weight change.   HENT: Negative for  hearing loss, rhinorrhea and trouble swallowing.    Eyes: Negative for discharge and visual disturbance.   Respiratory: Negative for chest tightness and wheezing.    Cardiovascular: Negative for chest pain and palpitations.   Gastrointestinal: Negative for blood in stool, constipation, diarrhea and vomiting.   Endocrine: Negative for polydipsia and polyuria.   Genitourinary: Negative for difficulty urinating, hematuria and urgency.   Musculoskeletal: Negative for arthralgias, joint swelling and neck pain.   Neurological: Negative for weakness and headaches.   Psychiatric/Behavioral: Negative for confusion and dysphoric mood.       There were no vitals taken for this visit.    Physical Exam  Vitals signs reviewed.   Constitutional:       General: He is not in acute distress.     Appearance: He is well-developed.   Cardiovascular:      Heart sounds: Normal heart sounds.   Pulmonary:      Effort: Pulmonary effort is normal.   Neurological:      Mental Status: He is alert and oriented to person, place, and time.   Psychiatric:         Mood and Affect: Mood normal.         Behavior: Behavior normal.           Assessment/Plan:  Masoud Osorio is a 65 y.o. male who presents today for :    Diagnoses and all orders for this visit:    Coronary artery disease involving native coronary artery of native heart without angina pectoris  Comments:  Now on medical management with high-dose statin-Lipitor 80, daily aspirin, Plavix-needs to continue Plavix for another year per cardiology  Orders:  -     Comprehensive metabolic panel; Future  -     Lipid Panel; Future    History of heart attack  Comments:  Up-to-date with cardiology follow-up-saw Dr. Bailey May 2020, exercise tolerance is stable    History of coronary artery stent placement    Mixed hyperlipidemia    Overweight (BMI 25.0-29.9)    Visit for monitoring Plavix therapy    Long-term use of aspirin therapy    Anxiety and depression  Comments:  stable off meds, ongoing  monitoring    Tobacco dependence in remission    Medication management  -     Comprehensive metabolic panel; Future  -     Lipid Panel; Future  -     HIV 1 / 2 ANTIBODY; Future    Benign prostatic hyperplasia (BPH) with straining on urination  Comments:  Following with urology Dr. Kapoor, his PSA is stable, symptoms stable on Flomax 0.4 mg daily, considering finasteride    Encounter for screening for HIV  -     HIV 1 / 2 ANTIBODY; Future            ICD-10-CM ICD-9-CM    1. Coronary artery disease involving native coronary artery of native heart without angina pectoris  I25.10 414.01 Comprehensive metabolic panel      Lipid Panel    Now on medical management with high-dose statin-Lipitor 80, daily aspirin, Plavix-needs to continue Plavix for another year per cardiology   2. History of heart attack  I25.2 412     Up-to-date with cardiology follow-up-saw Dr. Bailey May 2020, exercise tolerance is stable   3. History of coronary artery stent placement  Z95.5 V45.82    4. Mixed hyperlipidemia  E78.2 272.2    5. Overweight (BMI 25.0-29.9)  E66.3 278.02    6. Visit for monitoring Plavix therapy  Z51.81 V58.83     Z79.02 V58.63    7. Long-term use of aspirin therapy  Z79.82 V58.66    8. Anxiety and depression  F41.9 300.00     F32.9 311     stable off meds, ongoing monitoring   9. Tobacco dependence in remission  F17.201 V15.82    10. Medication management  Z79.899 V58.69 Comprehensive metabolic panel      Lipid Panel      HIV 1 / 2 ANTIBODY   11. Benign prostatic hyperplasia (BPH) with straining on urination  N40.1 600.01     R39.16 788.65     Following with urology Dr. Kapoor, his PSA is stable, symptoms stable on Flomax 0.4 mg daily, considering finasteride   12. Encounter for screening for HIV  Z11.4 V73.89 HIV 1 / 2 ANTIBODY       Patient Instructions   Recall FOR ANNUAL PHYSICAL IN JANUARY 2020.        Follow up in about 6 months (around 12/16/2020) for to follow up on lab results, return as needed for new concerns.

## 2020-06-16 NOTE — TELEPHONE ENCOUNTER
Spoke with pt and walked him through the instruction of the virtual visit. Wife was there to assist with the process. Dr. Ayala is waiting to get connected with the pt for virtual visit

## 2020-09-28 ENCOUNTER — IMMUNIZATION (OUTPATIENT)
Dept: PHARMACY | Facility: CLINIC | Age: 65
End: 2020-09-28
Payer: MEDICARE

## 2020-12-10 ENCOUNTER — HOSPITAL ENCOUNTER (EMERGENCY)
Facility: HOSPITAL | Age: 65
Discharge: HOME | End: 2020-12-10
Attending: EMERGENCY MEDICINE
Payer: MEDICARE

## 2020-12-10 ENCOUNTER — PATIENT MESSAGE (OUTPATIENT)
Dept: NEUROSURGERY | Facility: CLINIC | Age: 65
End: 2020-12-10

## 2020-12-10 VITALS
RESPIRATION RATE: 20 BRPM | BODY MASS INDEX: 28.04 KG/M2 | HEIGHT: 68 IN | HEART RATE: 71 BPM | TEMPERATURE: 98 F | WEIGHT: 185 LBS | OXYGEN SATURATION: 96 % | DIASTOLIC BLOOD PRESSURE: 94 MMHG | SYSTOLIC BLOOD PRESSURE: 141 MMHG

## 2020-12-10 DIAGNOSIS — R20.2 LEFT LEG PARESTHESIAS: Primary | ICD-10-CM

## 2020-12-10 LAB
BILIRUB UR QL STRIP: NEGATIVE
CLARITY UR: CLEAR
COLOR UR: YELLOW
GLUCOSE UR QL STRIP: NEGATIVE
HGB UR QL STRIP: NEGATIVE
KETONES UR QL STRIP: NEGATIVE
LEUKOCYTE ESTERASE UR QL STRIP: NEGATIVE
NITRITE UR QL STRIP: NEGATIVE
PH UR STRIP: 6 [PH] (ref 5–8)
POCT GLUCOSE: 94 MG/DL (ref 70–110)
PROT UR QL STRIP: NEGATIVE
SP GR UR STRIP: 1.01 (ref 1–1.03)
URN SPEC COLLECT METH UR: NORMAL
UROBILINOGEN UR STRIP-ACNC: NEGATIVE EU/DL

## 2020-12-10 PROCEDURE — 25000003 PHARM REV CODE 250: Performed by: EMERGENCY MEDICINE

## 2020-12-10 PROCEDURE — 63600175 PHARM REV CODE 636 W HCPCS: Performed by: EMERGENCY MEDICINE

## 2020-12-10 PROCEDURE — 81003 URINALYSIS AUTO W/O SCOPE: CPT

## 2020-12-10 PROCEDURE — 51798 US URINE CAPACITY MEASURE: CPT

## 2020-12-10 PROCEDURE — 96372 THER/PROPH/DIAG INJ SC/IM: CPT

## 2020-12-10 PROCEDURE — 82962 GLUCOSE BLOOD TEST: CPT

## 2020-12-10 PROCEDURE — 99285 EMERGENCY DEPT VISIT HI MDM: CPT | Mod: 25

## 2020-12-10 RX ORDER — DEXAMETHASONE SODIUM PHOSPHATE 4 MG/ML
8 INJECTION, SOLUTION INTRA-ARTICULAR; INTRALESIONAL; INTRAMUSCULAR; INTRAVENOUS; SOFT TISSUE
Status: COMPLETED | OUTPATIENT
Start: 2020-12-10 | End: 2020-12-10

## 2020-12-10 RX ORDER — KETOROLAC TROMETHAMINE 10 MG/1
10 TABLET, FILM COATED ORAL
Status: COMPLETED | OUTPATIENT
Start: 2020-12-10 | End: 2020-12-10

## 2020-12-10 RX ADMIN — KETOROLAC TROMETHAMINE 10 MG: 10 TABLET, FILM COATED ORAL at 06:12

## 2020-12-10 RX ADMIN — DEXAMETHASONE SODIUM PHOSPHATE 8 MG: 4 INJECTION, SOLUTION INTRAMUSCULAR; INTRAVENOUS at 04:12

## 2020-12-10 NOTE — ED NOTES
"Pt presents to the ED w/ c/ of numbness sensation in LLE that started yesterday. Pt states "my leg just feels different then the right." pt has full ROM of LLE on movement. Pt reports last night started having urinary retention. Reports urge to urinate but states "dribbles" when she tries. +dorsalis pulse in BLE.   "

## 2020-12-10 NOTE — FIRST PROVIDER EVALUATION
Emergency Department TeleTriage Encounter Note      CHIEF COMPLAINT    Chief Complaint   Patient presents with    Numbness     pt c/o left leg numbness/tingling that started at 0500 this morning; ambulatory to triage with mild limping gait noted; denies any back pain, denies any urinary or bowel incontinence    Urinary Retention     pt c/o urinary retention that started this morning; hx of enlarged prostate       VITAL SIGNS   Initial Vitals [12/10/20 1121]   BP Pulse Resp Temp SpO2   135/85 83 20 98.8 °F (37.1 °C) 99 %      MAP       --            ALLERGIES    Review of patient's allergies indicates:   Allergen Reactions    Penicillins Hives       PROVIDER TRIAGE NOTE  This is a teletriage evaluation of a 65 y.o. male presenting to the ED with c/o left leg numbness that began this morning with urinary retention. No history of numbness/tingling in leg. Hx of L1 compression fracture x 1 year. Pt with hx of enlarged prostate, on flomax. No dysuria. Initial orders will be placed and care will be transferred to an alternate provider when patient is roomed for a full evaluation. Any additional orders and the final disposition will be determined by that provider.         ORDERS  Labs Reviewed   URINALYSIS, REFLEX TO URINE CULTURE       ED Orders (720h ago, onward)    Start Ordered     Status Ordering Provider    12/10/20 1142 12/10/20 1143  POCT glucose  Once      Ordered DINA JORGE    12/10/20 1137 12/10/20 1141  Urinalysis, Reflex to Urine Culture Urine, Clean Catch  STAT      Ordered DINA JORGE    12/10/20 1137 12/10/20 1141  Bladder scan  Once     Comments: PRN reason: per post powell removal protocol or symptoms of urinary retention including urge to void, abdominal fullness, or distention.    Notify MD for bladder volume >300 mL        Ordered DINA JORGE            Virtual Visit Note: The provider triage portion of this emergency department evaluation and documentation was performed via  [employer], a HIPAA-compliant telemedicine application, in concert with a tele-presenter in the room. A face to face patient evaluation with one of my colleagues will occur once the patient is placed in an emergency department room.      DISCLAIMER: This note was prepared with [employer] voice recognition transcription software. Garbled syntax, mangled pronouns, and other bizarre constructions may be attributed to that software system.

## 2020-12-10 NOTE — ED NOTES
"Pt states "I want to try to pee before you stick a catheter in me." pt given urinal at this time.   "

## 2020-12-11 ENCOUNTER — PES CALL (OUTPATIENT)
Dept: ADMINISTRATIVE | Facility: CLINIC | Age: 65
End: 2020-12-11

## 2020-12-11 ENCOUNTER — TELEPHONE (OUTPATIENT)
Dept: NEUROSURGERY | Facility: CLINIC | Age: 65
End: 2020-12-11

## 2020-12-11 ENCOUNTER — PATIENT MESSAGE (OUTPATIENT)
Dept: NEUROSURGERY | Facility: CLINIC | Age: 65
End: 2020-12-11

## 2020-12-11 ENCOUNTER — OFFICE VISIT (OUTPATIENT)
Dept: NEUROSURGERY | Facility: CLINIC | Age: 65
End: 2020-12-11
Payer: MEDICARE

## 2020-12-11 ENCOUNTER — PATIENT OUTREACH (OUTPATIENT)
Dept: ADMINISTRATIVE | Facility: OTHER | Age: 65
End: 2020-12-11

## 2020-12-11 VITALS — BODY MASS INDEX: 28.03 KG/M2 | WEIGHT: 184.94 LBS | HEIGHT: 68 IN

## 2020-12-11 DIAGNOSIS — R20.0 LEFT LEG NUMBNESS: Primary | ICD-10-CM

## 2020-12-11 DIAGNOSIS — S32.010D COMPRESSION FRACTURE OF L1 VERTEBRA WITH ROUTINE HEALING, SUBSEQUENT ENCOUNTER: ICD-10-CM

## 2020-12-11 PROCEDURE — 1101F PT FALLS ASSESS-DOCD LE1/YR: CPT | Mod: CPTII,S$GLB,, | Performed by: PHYSICIAN ASSISTANT

## 2020-12-11 PROCEDURE — 1101F PR PT FALLS ASSESS DOC 0-1 FALLS W/OUT INJ PAST YR: ICD-10-PCS | Mod: CPTII,S$GLB,, | Performed by: PHYSICIAN ASSISTANT

## 2020-12-11 PROCEDURE — 99999 PR PBB SHADOW E&M-EST. PATIENT-LVL IV: CPT | Mod: PBBFAC,,, | Performed by: PHYSICIAN ASSISTANT

## 2020-12-11 PROCEDURE — 3288F PR FALLS RISK ASSESSMENT DOCUMENTED: ICD-10-PCS | Mod: CPTII,S$GLB,, | Performed by: PHYSICIAN ASSISTANT

## 2020-12-11 PROCEDURE — 3008F BODY MASS INDEX DOCD: CPT | Mod: CPTII,S$GLB,, | Performed by: PHYSICIAN ASSISTANT

## 2020-12-11 PROCEDURE — 3008F PR BODY MASS INDEX (BMI) DOCUMENTED: ICD-10-PCS | Mod: CPTII,S$GLB,, | Performed by: PHYSICIAN ASSISTANT

## 2020-12-11 PROCEDURE — 3288F FALL RISK ASSESSMENT DOCD: CPT | Mod: CPTII,S$GLB,, | Performed by: PHYSICIAN ASSISTANT

## 2020-12-11 PROCEDURE — 1125F PR PAIN SEVERITY QUANTIFIED, PAIN PRESENT: ICD-10-PCS | Mod: S$GLB,,, | Performed by: PHYSICIAN ASSISTANT

## 2020-12-11 PROCEDURE — 99214 OFFICE O/P EST MOD 30 MIN: CPT | Mod: S$GLB,,, | Performed by: PHYSICIAN ASSISTANT

## 2020-12-11 PROCEDURE — 99214 PR OFFICE/OUTPT VISIT, EST, LEVL IV, 30-39 MIN: ICD-10-PCS | Mod: S$GLB,,, | Performed by: PHYSICIAN ASSISTANT

## 2020-12-11 PROCEDURE — 99999 PR PBB SHADOW E&M-EST. PATIENT-LVL IV: ICD-10-PCS | Mod: PBBFAC,,, | Performed by: PHYSICIAN ASSISTANT

## 2020-12-11 PROCEDURE — 1125F AMNT PAIN NOTED PAIN PRSNT: CPT | Mod: S$GLB,,, | Performed by: PHYSICIAN ASSISTANT

## 2020-12-11 RX ORDER — GABAPENTIN 300 MG/1
CAPSULE ORAL
Qty: 270 CAPSULE | Refills: 0 | Status: SHIPPED | OUTPATIENT
Start: 2020-12-11 | End: 2021-09-20

## 2020-12-11 NOTE — TELEPHONE ENCOUNTER
----- Message from Uriel Bartlett sent at 12/11/2020  3:34 PM CST -----  Regarding: MRI  Contact: Self/ [phone]  Patient requesting to speak with you regarding the MRI that he needs to get done. He says the number that you all need to call to get the MRI approved is [phone]. Please advise.

## 2020-12-11 NOTE — ED PROVIDER NOTES
Chief Complaint  Chief Complaint   Patient presents with    Numbness     pt c/o left leg numbness/tingling that started at 0500 this morning; ambulatory to triage with mild limping gait noted; denies any back pain, denies any urinary or bowel incontinence    Urinary Retention     pt c/o urinary retention that started this morning; hx of enlarged prostate       HPI  Masoud Osorio is a 65 y.o. male who presents with left leg numbness.  Patient reports it is more numbness but does report some throbbing.  He reports a history of sciatic on that side previously.  No trauma.  He woke up with this.  Been bothering him since yesterday morning.  He is also complaining of trouble getting all of his urine out.  He denies any fever vomiting or diarrhea.  No back pain.  No abdominal pain.    Past medical history  Past Medical History:   Diagnosis Date    Anxiety and depression 9/21/2016    trial of ctialopram and follow up in 8 weeks    Benign prostatic hyperplasia (BPH) with straining on urination 12/5/2018    Coronary artery disease involving native coronary artery of native heart without angina pectoris 7/26/2016 7/26/2016 NSTEMI at [facility]   PCI of RCA with 4.0 x 15, 4.0 x 38, and 3.5 x 38 mm Resolute JACOB   PCI of PDA 2.75 x 15 mm JACOB dilated to 3.0 POBA of proximal PLB 2.0 x 12 mm balloon   Normal EF with LVEDP 15 mmHg DAPT score of 2               Elevated PSA     Gout     History of coronary artery stent placement 9/21/2016    History of heart attack 7/25/2016    NSTEMI    Mixed hyperlipidemia 7/17/2017    Overweight (BMI 25.0-29.9) 9/21/2016    Tobacco dependence in remission 7/25/2016       Current Medications  No current facility-administered medications for this encounter.     Current Outpatient Medications:     ascorbic acid, vitamin C, (VITAMIN C) 500 MG tablet, Take 500 mg by mouth once daily., Disp: , Rfl:     aspirin (ECOTRIN) 81 MG EC tablet, Take 1 tablet (81 mg total) by mouth once  daily., Disp: , Rfl: 0    atorvastatin (LIPITOR) 80 MG tablet, TAKE 1 TABLET BY MOUTH DAILY, Disp: 90 tablet, Rfl: 3    azelastine (ASTELIN) 137 mcg (0.1 %) nasal spray, INSTILL 2 SPRAYS IN EACH NOSTRIL TWICE A DAY, Disp: , Rfl: 5    azelastine (ASTELIN) 137 mcg (0.1 %) nasal spray, Spray 2 sprays in each nostril twice daily (Patient not taking: Reported on 6/15/2020), Disp: 30 mL, Rfl: 5    clopidogreL (PLAVIX) 75 mg tablet, TAKE ONE TABLET BY MOUTH DAILY, Disp: 90 tablet, Rfl: 3    cyclobenzaprine (FLEXERIL) 5 MG tablet, Take 5 mg by mouth 3 (three) times daily as needed for Muscle spasms., Disp: , Rfl:     diazePAM (VALIUM) 5 MG tablet, Take 1/2 to 1 tablet by mouth every day, Disp: 30 tablet, Rfl: 1    GARLIC EXTRACT ORAL, Take by mouth., Disp: , Rfl:     hydrOXYzine HCL (ATARAX) 25 MG tablet, Take 1/2 to 1 tablet by mouth at bedtime, Disp: 30 tablet, Rfl: 3    ipratropium (ATROVENT) 0.06 % nasal spray, INSTILL 2 SPRAYS IN EACH NOSTRIL EVERY 12 HOURS, Disp: , Rfl: 5    ipratropium (ATROVENT) 42 mcg (0.06 %) nasal spray, Instill two sprays in each nostril every 12 hours., Disp: 15 mL, Rfl: 5    levoFLOXacin (LEVAQUIN) 500 MG tablet, Take 1 tablet (500 mg total) by mouth once daily., Disp: 7 tablet, Rfl: 0    multivitamin capsule, Take 1 capsule by mouth once daily., Disp: , Rfl:     nitroGLYCERIN (NITROSTAT) 0.4 MG SL tablet, Place 1 tablet (0.4 mg total) under the tongue every 5 (five) minutes as needed for Chest pain. (Patient not taking: Reported on 6/15/2020), Disp: 25 tablet, Rfl: 11    predniSONE (DELTASONE) 20 MG tablet, Take 1.5 tablets by mouth on day 1; 1 tablet on days 2, 3, and 4; and 1/2 tablet on days 5 and 6., Disp: 7 tablet, Rfl: 0    tamsulosin (FLOMAX) 0.4 mg Cap, Take 1 capsule (0.4 mg total) by mouth once daily., Disp: 90 capsule, Rfl: 3    triamcinolone (NASACORT) 55 mcg nasal inhaler, Instill two sprays in each nostril once daily, Disp: 16.9 mL, Rfl: 5    Allergies  Review of  patient's allergies indicates:   Allergen Reactions    Penicillins Hives       Surgical history  Past Surgical History:   Procedure Laterality Date    COLONOSCOPY N/A 2017    Procedure: COLONOSCOPY Split Prep;  Surgeon: Maykel Carcamo Jr., MD;  [facility];  Service: Endoscopy;  Laterality: N/A;    CORONARY ANGIOPLASTY WITH STENT PLACEMENT      HERNIA REPAIR      ROTATOR CUFF REPAIR      VASECTOMY         Social history  Social History     Socioeconomic History    Marital status:      Spouse name: Not on file    Number of children: Not on file    Years of education: Not on file    Highest education level: Not on file   Occupational History    Not on file   Social Needs    Financial resource strain: Not very hard    Food insecurity     Worry: Never true     Inability: Never true    Transportation needs     Medical: No     Non-medical: No   Tobacco Use    Smoking status: Former Smoker     Packs/day: 0.50     Types: Cigarettes     Quit date: 2016     Years since quittin.3    Smokeless tobacco: Never Used   Substance and Sexual Activity    Alcohol use: Yes     Frequency: Monthly or less     Drinks per session: 1 or 2     Binge frequency: Never     Comment: rarely    Drug use: No    Sexual activity: Not on file   Lifestyle    Physical activity     Days per week: 0 days     Minutes per session: 0 min    Stress: Not at all   Relationships    Social connections     Talks on phone: Three times a week     Gets together: Once a week     Attends Jain service: Not on file     Active member of club or organization: No     Attends meetings of clubs or organizations: 1 to 4 times per year     Relationship status:    Other Topics Concern    Not on file   Social History Narrative    Not on file       Family History  Family History   Problem Relation Age of Onset    Heart disease Mother     Arthritis Mother     Parkinsonism Mother     Congenital heart disease  "Father     No Known Problems Sister     No Known Problems Brother     No Known Problems Daughter     No Known Problems Sister     No Known Problems Sister     Prostate cancer Neg Hx     Kidney disease Neg Hx        Review of systems  Constitutional: No fever or weakness.  Eyes: No redness, pain, or discharge.  HENT: No ear pain, no sudden onset headache, no throat pain.  Respiratory: No wheezing, cough, or shortness of breath.  Cardiovascular: No chest pain or palpitations.  GI: No abdominal pain, nausea, vomiting, or diarrhea.  All systems otherwise negative except as noted in ROS and HPI    Physical Exam  Vital signs: BP (!) 141/94   Pulse 71   Temp 97.7 °F (36.5 °C) (Oral)   Resp 20   Ht 5' 8" (1.727 m)   Wt 83.9 kg (185 lb)   SpO2 96%   BMI 28.13 kg/m²   Constitutional: No acute distress.  Well developed, alert, oriented and appropriate.  HENT: Normocephalic, atraumatic. Normal ear, nose, and throat.  Eyes: PERRL, EOMI, normal conjunctiva.  Neck: Normal range of motion, no tenderness; supple.  Respiratory: Nonlabored breathing with normal breath sounds.  Cardiovascular: RRR with no pulse deficit.  GI: Soft, nontender, no rebound or guarding.  :  Patient did have 300 cc plus of urine but was able to urinate and he declined a Duval.  He urinated approximately 300 cc.  No obvious acute urinary retention.  Soft abdomen  Musculoskeletal: Normal ROM, no tenderness, injury, or edema.  Skin: Warm, dry skin without infection or injury.  Neurologic: Normal motor, no change in motor.  No change in cerebellar testing.  Patient able to do good heel to toe walking as well as standing on his Tippy toes as well as heels.  No signs of ischemic stroke but patient does appear to have change in sensation on left lower extremity versus right lower extremity.  No saddle anesthesia.  No obvious spinal cord emergency.  No cauda equina  Psychiatric: Affect normal, judgement normal, mood normal.  No SI, HI, and not " gravely disabled.    Labs  Pertinent labs reviewed (see chart for details)  Labs Reviewed   URINALYSIS, REFLEX TO URINE CULTURE    Narrative:     Specimen Source->Urine   POCT GLUCOSE   POCT GLUCOSE MONITORING CONTINUOUS       ECG  Results for orders placed or performed during the hospital encounter of 12/20/19   EKG 12-lead    Collection Time: 12/20/19  5:18 PM    Narrative    Test Reason : R07.9,    Vent. Rate : 076 BPM     Atrial Rate : 076 BPM     P-R Int : 186 ms          QRS Dur : 094 ms      QT Int : 402 ms       P-R-T Axes : 039 024 033 degrees     QTc Int : 452 ms    Sinus rhythm with occasional Premature ventricular complexes  Otherwise normal ECG  When compared with ECG of 04-JUN-2018 06:41,  Premature ventricular complexes are now Present  Confirmed by Tan Starr MD ([id number]) on 12/23/2019 1:13:52 PM    Referred By: AMARJIT   SELF           Confirmed By:Tan Starr MD     ECG interpreted by ED MD    Radiology  CT Head Without Contrast   Final Result      No acute intracranial hemorrhage or findings of major vascular infarct.      Stable mild maxillary sinus mucosal thickening in the visualized portions and otherwise unremarkable exam as imaged.         Electronically signed by: Mouna Albrecht   Date:    12/10/2020   Time:    17:41          Procedures    Procedures    Medications   dexamethasone injection 8 mg (8 mg Intramuscular Given 12/10/20 1603)   ketorolac tablet 10 mg (10 mg Oral Given 12/10/20 1801)       ED course    ED Course as of Dec 10 1921   Thu Dec 10, 2020   1752 Patient reports improvement after getting steroid.  No weakness.  No signs of ischemic changes.    [MB]      ED Course User Index  [MB] Myles Ruiz MD         Medical Decision Making:    History:  Old medical records:  I decided to obtain old medical records.  Old records summarized:  No recent ER visits    Initial assessment:  65-year-old male with isolated left lower extremity numbness and  throbbing    Differential diagnosis:  Ischemic stroke, peripheral neuropathy, spinal compression, spinal stenosis, cauda equina    Clinical tests:   Labs:  Ordered and reviewed  Imaging:  Ordered and reviewed    ED management:  Symptoms been ongoing for approximately 2 days.  No signs of ischemic stroke.  Neuropathy must be peripheral.  No signs of life-threatening or limb-threatening spinal emergency at this time.  Patient is at good pulses and no deficit.  Good warmth.  Good strength and movement.  Patient has an orthopedist will follow up immediately for re-evaluation.  He did get improvement of his symptoms with steroid here.  After CT returned after he got improvement, we added a NSAIDs for further anti inflammatory effect.  Patient understands strict instructions to return immediately to the ER for any change or worsening symptoms or trouble with bowel or bladder      Disposition    Patient discharged in stable condition      Final impression  1. Left leg paresthesias        Critical care time spent with this patient was 0 minutes excluding the procedure time.          Myles Ruiz MD  12/10/20 1920

## 2020-12-11 NOTE — PROGRESS NOTES
Updates were requested from care everywhere.  Chart was reviewed for overdue Proactive [facility] Encounters (BILL) topics (CRS, Breast Cancer Screening, Eye exam)  Health Maintenance has been updated.  LINKS not responding.

## 2020-12-13 ENCOUNTER — PATIENT MESSAGE (OUTPATIENT)
Dept: UROLOGY | Facility: CLINIC | Age: 65
End: 2020-12-13

## 2020-12-13 ENCOUNTER — PATIENT MESSAGE (OUTPATIENT)
Dept: NEUROSURGERY | Facility: CLINIC | Age: 65
End: 2020-12-13

## 2020-12-13 NOTE — PROGRESS NOTES
"Subjective:     Patient ID:  Masoud Osorio is a 65 y.o. male.    Jose    Chief Complaint:  Left leg numbness    HPI    Masoud Osorio is a 65 y.o. male who presents for follow up.  Patient was seen in the past by Dr. Garcia for a L1 compression fracture.  He was last seen in the Neurosurgery Clinic in June 2020.  Patient states he was doing well and then 2 days ago woke up with left leg numbness in the whole left leg and foot.  He went to the emergency room yesterday where a CT of his head was negative.  They gave him a injection of steroid and anti-inflammatory and discharge him home and told him to follow-up here.    He has numbness from the left lower abdomen down the entire left leg to the foot.  No left leg pain no back pain no right leg pain or paresthesias.  No position makes it worse or better.  When he walks he has to somewhat limp because his left leg feels numb.  His left leg does not buckle or give out when he walks.    He denies any neck pain or arm pain or paresthesias.    No spine surgery.  Patient is currently taking     Patient denies any recent accidents or trauma, no saddle anesthesias, and no bowel or bladder incontinence.    Patient has some difficulty with balance or gait, no difficulty tying shoes or buttoning clothes, is not dropping things, does not have difficulty opening containers, and has had no change in handwriting.    Review of Systems:  Constitution: Negative for chills, fever, night sweats and weight loss.   Musculoskeletal: Negative for falls.   Gastrointestinal: Negative for bowel incontinence, nausea and vomiting.   Genitourinary: Negative for bladder incontinence.   Neurological: Negative for disturbances in coordination and loss of balance.      Objective:      Vitals:    12/11/20 1401   Weight: 83.9 kg (184 lb 15.5 oz)   Height: 5' 8" (1.727 m)   PainSc:   2       Physical Exam:      General:  Masoud Osorio is well-developed, well-nourished, appears stated age, in no " acute distress, alert and oriented to person, place, and time.    Pulmonary/Chest:  Respiratory effort normal  Abdominal: Exhibits no distension  Psychiatric:  Normal mood and affect.  Behavior is normal.  Judgement and thought content normal      Musculoskeletal:    Patient is able to walk heel to toe at a slow pace.     Cervical ROM:   No pain in cervical spine flexion, extension, left rotation, and right rotation, left lateral bending, and right lateral bending.    Cervical Spine Inspection:  Normal with no surgical scars with no visible rashes.    Cervical Spine Palpation:  No tenderness to cervical spine palpation.    Lumbar ROM:   No pain in lumbar flexion.  Mild pain in extension, left lateral bending, and right lateral bending.    Lumbar Spine Inspection:  Normal with no surgical scars with no visible rashes.    Lumbar Spine Palpation:  No tenderness to low back palpation.    SI Joint Palpation:  No tenderness to SI Joint palpation.    Straight Leg Raise:  Negative right and left SLR.      Neurological:     Muscle strength against resistance:     Right Left   Deltoid  5 / 5 5 / 5   Biceps  5 / 5 5 / 5   Triceps 5 / 5 5 / 5   Wrist flexion  5 / 5 5 / 5   Wrist extension 5 / 5 5 / 5   Finger abduction 5 / 5 5 / 5   Thumb opposition 5 / 5 5 / 5   Handgrip 5 / 5 5 / 5   Hip flexion  5 / 5 5 / 5   Hip extension 5 / 5 5 / 5   Hip abduction 5 / 5 5 / 5   Hip adduction 5/ 5 5 / 5   Knee extension  5 / 5 5 / 5   Knee flexion  5 / 5 5 / 5   Dorsiflexion  5 / 5 5 / 5   EHL  5 / 5 5 / 5   Plantar flexion  5 / 5 5 / 5   Inversion of the feet 5 / 5 5 / 5   Eversion of the feet 5 / 5 5 / 5       Reflexes:     Right Left   Triceps 2+ 2+   Biceps 2+ 2+   Brachioradialis 2+ 2+   Patellar 2+ 2+   Achilles 2+ 2+     Babinski: Negative bilaterally  Clonus:  Negative bilaterally  Downing: Negative bilaterally  Sensation decreased to light touch from left T10 down to left S1 compared to the right side.    On gross examination of  the bilateral upper and lower extremities, patient has no signs of clubbing, cyanosis, or edema.       XRAY Interpretation:     I personally interpreted the following imaging from 06/202.  Thoracolumbar x-ray today shows consolidation of the L1 fracture       Assessment:          1. Left leg numbness    2. Compression fracture of L1 vertebra with routine healing, subsequent encounter            Plan:          Orders Placed This Encounter    MRI Thoracic Spine Without Contrast    MRI Lumbar Spine Without Contrast    gabapentin (NEURONTIN) 300 MG capsule       Patient with a history of L1 compression fracture.  He has acute numbness from the T10 to the S1 distribution on the left side.  He is neurologically intact except for decreased sensation from T10-S1 on the left side compared to the right side.    -will get MRI thoracic spine and lumbar spine to evaluate for spinal cord compression with his history of L1 compression fracture  -start gabapentin 300 mg and increase to 3 times a day over 3 weeks  -I will review the images and sent him a message through my [facility] once they are done    Follow-Up:  Follow up if symptoms worsen or fail to improve. If there are any questions prior to this, the patient was instructed to contact the office.       EVANGELIST Whyte, PA-C  Neurosurgery  [facility]    Addendum:    12/21/2020     I reviewed everything with Dr. Garcia including the MRI thoracic and lumbar spine.  He does not think the left leg numbness is due to the L1 compression fracture with the spinal stenosis.   He has bilateral NFS at L4-5 and L5-S1 and the numbness in the left leg is thought to be due more to the NFS at L4-5 and L5-S1.    Continue gabapentin and potentially try CHANO if needed.     EVANGELIST Whyte, PA-C  Neurosurgery  [facility]

## 2020-12-14 ENCOUNTER — PATIENT MESSAGE (OUTPATIENT)
Dept: UROLOGY | Facility: CLINIC | Age: 65
End: 2020-12-14

## 2020-12-14 DIAGNOSIS — N40.1 BPH WITH OBSTRUCTION/LOWER URINARY TRACT SYMPTOMS: Primary | ICD-10-CM

## 2020-12-14 DIAGNOSIS — N13.8 BPH WITH OBSTRUCTION/LOWER URINARY TRACT SYMPTOMS: Primary | ICD-10-CM

## 2020-12-14 RX ORDER — FINASTERIDE 5 MG/1
5 TABLET, FILM COATED ORAL DAILY
Qty: 90 TABLET | Refills: 3 | Status: SHIPPED | OUTPATIENT
Start: 2020-12-14 | End: 2021-08-25 | Stop reason: SDUPTHER

## 2020-12-14 NOTE — TELEPHONE ENCOUNTER
Pt having other sx, numbness and getting evaluated by neurosurgery.   We will try to optimize voiding from the prostate side of things. He is already taking flomax, will add finasteride.

## 2020-12-15 ENCOUNTER — PATIENT MESSAGE (OUTPATIENT)
Dept: NEUROSURGERY | Facility: CLINIC | Age: 65
End: 2020-12-15

## 2020-12-18 ENCOUNTER — HOSPITAL ENCOUNTER (OUTPATIENT)
Dept: RADIOLOGY | Facility: HOSPITAL | Age: 65
Discharge: HOME | End: 2020-12-18
Attending: PHYSICIAN ASSISTANT
Payer: MEDICARE

## 2020-12-18 ENCOUNTER — PATIENT MESSAGE (OUTPATIENT)
Dept: NEUROSURGERY | Facility: CLINIC | Age: 65
End: 2020-12-18

## 2020-12-18 DIAGNOSIS — R20.0 LEFT LEG NUMBNESS: ICD-10-CM

## 2020-12-18 DIAGNOSIS — S32.010D COMPRESSION FRACTURE OF L1 VERTEBRA WITH ROUTINE HEALING, SUBSEQUENT ENCOUNTER: ICD-10-CM

## 2020-12-18 PROCEDURE — 72148 MRI LUMBAR SPINE W/O DYE: CPT | Mod: TC,PO

## 2020-12-18 PROCEDURE — 72146 MRI CHEST SPINE W/O DYE: CPT | Mod: TC,PO

## 2020-12-21 ENCOUNTER — PATIENT MESSAGE (OUTPATIENT)
Dept: NEUROSURGERY | Facility: CLINIC | Age: 65
End: 2020-12-21

## 2020-12-22 ENCOUNTER — PATIENT MESSAGE (OUTPATIENT)
Dept: NEUROSURGERY | Facility: CLINIC | Age: 65
End: 2020-12-22

## 2020-12-22 NOTE — TELEPHONE ENCOUNTER
Please make him a fu appointment in clinic today or tomorrow so I can review the MRI images with him.    Thanks,  Dianelys Jackson, [facility], PA-C  Neurosurgery  [facility]

## 2020-12-23 ENCOUNTER — OFFICE VISIT (OUTPATIENT)
Dept: NEUROSURGERY | Facility: CLINIC | Age: 65
End: 2020-12-23
Payer: MEDICARE

## 2020-12-23 ENCOUNTER — TELEPHONE (OUTPATIENT)
Dept: NEUROSURGERY | Facility: CLINIC | Age: 65
End: 2020-12-23

## 2020-12-23 VITALS — HEIGHT: 68 IN | WEIGHT: 184.94 LBS | BODY MASS INDEX: 28.03 KG/M2

## 2020-12-23 DIAGNOSIS — M47.816 SPONDYLOSIS OF LUMBAR REGION WITHOUT MYELOPATHY OR RADICULOPATHY: Primary | ICD-10-CM

## 2020-12-23 DIAGNOSIS — M51.36 DDD (DEGENERATIVE DISC DISEASE), LUMBAR: ICD-10-CM

## 2020-12-23 DIAGNOSIS — R20.0 NUMBNESS AND TINGLING OF LEFT LEG: ICD-10-CM

## 2020-12-23 DIAGNOSIS — R20.2 NUMBNESS AND TINGLING OF LEFT LEG: ICD-10-CM

## 2020-12-23 DIAGNOSIS — M48.061 SPINAL STENOSIS, LUMBAR REGION WITHOUT NEUROGENIC CLAUDICATION: ICD-10-CM

## 2020-12-23 PROCEDURE — 1126F AMNT PAIN NOTED NONE PRSNT: CPT | Mod: S$GLB,,, | Performed by: PHYSICIAN ASSISTANT

## 2020-12-23 PROCEDURE — 3008F PR BODY MASS INDEX (BMI) DOCUMENTED: ICD-10-PCS | Mod: CPTII,S$GLB,, | Performed by: PHYSICIAN ASSISTANT

## 2020-12-23 PROCEDURE — 99999 PR PBB SHADOW E&M-EST. PATIENT-LVL IV: ICD-10-PCS | Mod: PBBFAC,,, | Performed by: PHYSICIAN ASSISTANT

## 2020-12-23 PROCEDURE — 99213 PR OFFICE/OUTPT VISIT, EST, LEVL III, 20-29 MIN: ICD-10-PCS | Mod: S$GLB,,, | Performed by: PHYSICIAN ASSISTANT

## 2020-12-23 PROCEDURE — 99999 PR PBB SHADOW E&M-EST. PATIENT-LVL IV: CPT | Mod: PBBFAC,,, | Performed by: PHYSICIAN ASSISTANT

## 2020-12-23 PROCEDURE — 99213 OFFICE O/P EST LOW 20 MIN: CPT | Mod: S$GLB,,, | Performed by: PHYSICIAN ASSISTANT

## 2020-12-23 PROCEDURE — 1101F PT FALLS ASSESS-DOCD LE1/YR: CPT | Mod: CPTII,S$GLB,, | Performed by: PHYSICIAN ASSISTANT

## 2020-12-23 PROCEDURE — 1101F PR PT FALLS ASSESS DOC 0-1 FALLS W/OUT INJ PAST YR: ICD-10-PCS | Mod: CPTII,S$GLB,, | Performed by: PHYSICIAN ASSISTANT

## 2020-12-23 PROCEDURE — 1126F PR PAIN SEVERITY QUANTIFIED, NO PAIN PRESENT: ICD-10-PCS | Mod: S$GLB,,, | Performed by: PHYSICIAN ASSISTANT

## 2020-12-23 PROCEDURE — 3008F BODY MASS INDEX DOCD: CPT | Mod: CPTII,S$GLB,, | Performed by: PHYSICIAN ASSISTANT

## 2020-12-23 PROCEDURE — 3288F PR FALLS RISK ASSESSMENT DOCUMENTED: ICD-10-PCS | Mod: CPTII,S$GLB,, | Performed by: PHYSICIAN ASSISTANT

## 2020-12-23 PROCEDURE — 3288F FALL RISK ASSESSMENT DOCD: CPT | Mod: CPTII,S$GLB,, | Performed by: PHYSICIAN ASSISTANT

## 2020-12-24 NOTE — PROGRESS NOTES
"Subjective:     Patient ID:  Masoud Osorio is a 65 y.o. male.    Jose    Chief Complaint: Left leg numbness    HPI    Masoud Osorio is a 65 y.o. male who presents for follow up. He is here today to review his MRI.  He continues to have left leg numbness that is constant from the left lower abdomen down the left leg to the foot.  Some numbness in the left groin region.  No back or leg pain.  No weakness.    He did not start the gabapentin.    Patient denies any recent accidents or trauma, no saddle anesthesias, and no bowel or bladder incontinence.      Review of Systems:  Constitution: Negative for chills, fever, night sweats and weight loss.   Musculoskeletal: Negative for falls.   Gastrointestinal: Negative for bowel incontinence, nausea and vomiting.   Genitourinary: Negative for bladder incontinence.   Neurological: Negative for disturbances in coordination and loss of balance.      Objective:      Vitals:    12/23/20 1349   Weight: 83.9 kg (184 lb 15.5 oz)   Height: 5' 8" (1.727 m)   PainSc: 0-No pain         Physical Exam:      General:  Masoud Osorio is well-developed, well-nourished, appears stated age, in no acute distress, alert and oriented to person, place, and time.    Pulmonary/Chest:  Respiratory effort normal  Abdominal: Exhibits no distension  Psychiatric:  Normal mood and affect.  Behavior is normal.  Judgement and thought content normal      Musculoskeletal:    Patient has some difficulty walking heel to toe.  He walks with a limp favoring his left leg.    Straight Leg Raise:  Negative right and left SLR.      Neurological:  Alert and oriented to person, place, and time    Muscle strength against resistance:     Right Left   Hip flexion  5 / 5 4 / 5   Hip extension 5 / 5 5 / 5   Hip abduction 5 / 5 5 / 5   Hip adduction  5 / 5 5 / 5   Knee extension  5 / 5 5 / 5   Knee flexion 5 / 5 5 / 5   Dorsiflexion  5 / 5 5 / 5   EHL  5 / 5 5 / 5   Plantar flexion  5 / 5 5 / 5   Inversion of the feet 5 " / 5 5 / 5   Eversion of the feet  5 / 5 5 / 5       Reflexes:     Right Left   Patellar 2+ 2+   Achilles 2+ 2+     Clonus:  Negative bilaterally  Sensation decreased to light touch sensation on the left leg compared to the right leg    On gross examination of the bilateral upper extremities, patient has full painfree ROM with no signs of clubbing, cyanosis, edema, or weakness.       MRI Interpretation:     Lumbar spine MRI was personally reviewed today and I had already reviewed it with Dr. Garcia.  Old L1 compression fracture with some bony retropulsion and mild spinal stenosis.  There is bilateral NFS at L4-5 and L5-S1 and DDD at this level.      Assessment:          1. Spondylosis of lumbar region without myelopathy or radiculopathy    2. DDD (degenerative disc disease), lumbar    3. Numbness and tingling of left leg    4. Spinal stenosis, lumbar region without neurogenic claudication            Plan:          Orders Placed This Encounter    Ambulatory referral/consult to Physical/Occupational Therapy       L4-5, L5-S1 DDD/spondylosis and bilateral NFS.  Left leg numbness and tingling thought to be due to this nerve compression.    Old L1 compression fracture with spinal stenosis.    -Start gabapentin 300mg at night and can increase to TID if needed  -PT outside of [facility] to help with leg strength and balance and gait  -Could consider left L4 and left L5 TESI with pain management in the future  -If he starts to have any BB incontinence, saddle anesthesias, severe leg or foot weakness he was told to go to the ED and he verbalized understanding    Follow-Up:  Follow up in about 2 months (around 2/23/2021). If there are any questions prior to this, the patient was instructed to contact the office.       Dianelys Jackson, [facility], PA-C  Neurosurgery  [facility]

## 2021-01-25 ENCOUNTER — OFFICE VISIT (OUTPATIENT)
Dept: UROLOGY | Facility: CLINIC | Age: 66
End: 2021-01-25
Payer: MEDICARE

## 2021-01-25 DIAGNOSIS — N40.1 BPH WITH OBSTRUCTION/LOWER URINARY TRACT SYMPTOMS: Primary | ICD-10-CM

## 2021-01-25 DIAGNOSIS — N13.8 BPH WITH OBSTRUCTION/LOWER URINARY TRACT SYMPTOMS: Primary | ICD-10-CM

## 2021-01-25 PROCEDURE — 99213 PR OFFICE/OUTPT VISIT, EST, LEVL III, 20-29 MIN: ICD-10-PCS | Mod: 95,,, | Performed by: STUDENT IN AN ORGANIZED HEALTH CARE EDUCATION/TRAINING PROGRAM

## 2021-01-25 PROCEDURE — 99213 OFFICE O/P EST LOW 20 MIN: CPT | Mod: 95,,, | Performed by: STUDENT IN AN ORGANIZED HEALTH CARE EDUCATION/TRAINING PROGRAM

## 2021-02-18 ENCOUNTER — PATIENT OUTREACH (OUTPATIENT)
Dept: ADMINISTRATIVE | Facility: OTHER | Age: 66
End: 2021-02-18

## 2021-02-19 ENCOUNTER — TELEPHONE (OUTPATIENT)
Dept: CARDIOLOGY | Facility: CLINIC | Age: 66
End: 2021-02-19

## 2021-02-19 ENCOUNTER — PATIENT MESSAGE (OUTPATIENT)
Dept: CARDIOLOGY | Facility: CLINIC | Age: 66
End: 2021-02-19

## 2021-02-19 DIAGNOSIS — E78.2 MIXED HYPERLIPIDEMIA: ICD-10-CM

## 2021-02-19 DIAGNOSIS — I25.2 HISTORY OF HEART ATTACK: ICD-10-CM

## 2021-02-19 DIAGNOSIS — I25.10 CORONARY ARTERY DISEASE INVOLVING NATIVE CORONARY ARTERY OF NATIVE HEART WITHOUT ANGINA PECTORIS: ICD-10-CM

## 2021-02-19 DIAGNOSIS — R07.9 CHEST PAIN, UNSPECIFIED TYPE: ICD-10-CM

## 2021-02-19 DIAGNOSIS — Z95.5 HISTORY OF CORONARY ARTERY STENT PLACEMENT: Primary | ICD-10-CM

## 2021-02-23 ENCOUNTER — TELEPHONE (OUTPATIENT)
Dept: NEUROSURGERY | Facility: CLINIC | Age: 66
End: 2021-02-23

## 2021-02-24 ENCOUNTER — OFFICE VISIT (OUTPATIENT)
Dept: NEUROSURGERY | Facility: CLINIC | Age: 66
End: 2021-02-24
Payer: MEDICARE

## 2021-02-24 VITALS
HEIGHT: 68 IN | WEIGHT: 184.94 LBS | HEART RATE: 81 BPM | BODY MASS INDEX: 28.03 KG/M2 | SYSTOLIC BLOOD PRESSURE: 130 MMHG | DIASTOLIC BLOOD PRESSURE: 77 MMHG

## 2021-02-24 DIAGNOSIS — M48.061 SPINAL STENOSIS, LUMBAR REGION WITHOUT NEUROGENIC CLAUDICATION: ICD-10-CM

## 2021-02-24 DIAGNOSIS — M51.36 DDD (DEGENERATIVE DISC DISEASE), LUMBAR: ICD-10-CM

## 2021-02-24 DIAGNOSIS — R20.2 NUMBNESS AND TINGLING OF LEFT LEG: ICD-10-CM

## 2021-02-24 DIAGNOSIS — M47.816 SPONDYLOSIS OF LUMBAR REGION WITHOUT MYELOPATHY OR RADICULOPATHY: Primary | ICD-10-CM

## 2021-02-24 DIAGNOSIS — R20.0 NUMBNESS AND TINGLING OF LEFT LEG: ICD-10-CM

## 2021-02-24 PROCEDURE — 99999 PR PBB SHADOW E&M-EST. PATIENT-LVL V: ICD-10-PCS | Mod: PBBFAC,,, | Performed by: PHYSICIAN ASSISTANT

## 2021-02-24 PROCEDURE — 99214 OFFICE O/P EST MOD 30 MIN: CPT | Mod: S$GLB,,, | Performed by: PHYSICIAN ASSISTANT

## 2021-02-24 PROCEDURE — 99214 PR OFFICE/OUTPT VISIT, EST, LEVL IV, 30-39 MIN: ICD-10-PCS | Mod: S$GLB,,, | Performed by: PHYSICIAN ASSISTANT

## 2021-02-24 PROCEDURE — 3008F PR BODY MASS INDEX (BMI) DOCUMENTED: ICD-10-PCS | Mod: CPTII,S$GLB,, | Performed by: PHYSICIAN ASSISTANT

## 2021-02-24 PROCEDURE — 1101F PR PT FALLS ASSESS DOC 0-1 FALLS W/OUT INJ PAST YR: ICD-10-PCS | Mod: CPTII,S$GLB,, | Performed by: PHYSICIAN ASSISTANT

## 2021-02-24 PROCEDURE — 3008F BODY MASS INDEX DOCD: CPT | Mod: CPTII,S$GLB,, | Performed by: PHYSICIAN ASSISTANT

## 2021-02-24 PROCEDURE — 99999 PR PBB SHADOW E&M-EST. PATIENT-LVL V: CPT | Mod: PBBFAC,,, | Performed by: PHYSICIAN ASSISTANT

## 2021-02-24 PROCEDURE — 3288F PR FALLS RISK ASSESSMENT DOCUMENTED: ICD-10-PCS | Mod: CPTII,S$GLB,, | Performed by: PHYSICIAN ASSISTANT

## 2021-02-24 PROCEDURE — 1101F PT FALLS ASSESS-DOCD LE1/YR: CPT | Mod: CPTII,S$GLB,, | Performed by: PHYSICIAN ASSISTANT

## 2021-02-24 PROCEDURE — 1126F AMNT PAIN NOTED NONE PRSNT: CPT | Mod: S$GLB,,, | Performed by: PHYSICIAN ASSISTANT

## 2021-02-24 PROCEDURE — 3288F FALL RISK ASSESSMENT DOCD: CPT | Mod: CPTII,S$GLB,, | Performed by: PHYSICIAN ASSISTANT

## 2021-02-24 PROCEDURE — 1126F PR PAIN SEVERITY QUANTIFIED, NO PAIN PRESENT: ICD-10-PCS | Mod: S$GLB,,, | Performed by: PHYSICIAN ASSISTANT

## 2021-02-25 ENCOUNTER — IMMUNIZATION (OUTPATIENT)
Dept: FAMILY MEDICINE | Facility: CLINIC | Age: 66
End: 2021-02-25
Payer: MEDICARE

## 2021-02-25 DIAGNOSIS — Z23 NEED FOR VACCINATION: Primary | ICD-10-CM

## 2021-02-25 PROCEDURE — 91300 COVID-19, MRNA, LNP-S, PF, 30 MCG/0.3 ML DOSE VACCINE: CPT | Mod: PBBFAC | Performed by: FAMILY MEDICINE

## 2021-02-26 ENCOUNTER — HOSPITAL ENCOUNTER (OUTPATIENT)
Dept: CARDIOLOGY | Facility: HOSPITAL | Age: 66
Discharge: HOME | End: 2021-02-26
Attending: INTERNAL MEDICINE
Payer: MEDICARE

## 2021-02-26 VITALS — WEIGHT: 184 LBS | HEIGHT: 68 IN | BODY MASS INDEX: 27.89 KG/M2

## 2021-02-26 DIAGNOSIS — I25.10 CORONARY ARTERY DISEASE INVOLVING NATIVE CORONARY ARTERY OF NATIVE HEART WITHOUT ANGINA PECTORIS: ICD-10-CM

## 2021-02-26 DIAGNOSIS — R07.9 CHEST PAIN, UNSPECIFIED TYPE: ICD-10-CM

## 2021-02-26 DIAGNOSIS — Z95.5 HISTORY OF CORONARY ARTERY STENT PLACEMENT: ICD-10-CM

## 2021-02-26 LAB
AORTIC ROOT ANNULUS: 2.8 CM
AV INDEX (PROSTH): 0.76
AV MEAN GRADIENT: 5 MMHG
AV PEAK GRADIENT: 9 MMHG
AV VELOCITY RATIO: 0.64
BSA FOR ECHO PROCEDURE: 2 M2
CV ECHO LV RWT: 0.35 CM
DOP CALC AO PEAK VEL: 1.46 M/S
DOP CALC AO VTI: 26.57 CM
DOP CALC LVOT PEAK VEL: 0.94 M/S
DOP CALCLVOT PEAK VEL VTI: 20.3 CM
E WAVE DECELERATION TIME: 193.28 MSEC
E/A RATIO: 0.78
E/E' RATIO: 9.22 M/S
ECHO LV POSTERIOR WALL: 0.94 CM (ref 0.6–1.1)
FRACTIONAL SHORTENING: 39 % (ref 28–44)
INTERVENTRICULAR SEPTUM: 0.74 CM (ref 0.6–1.1)
IVRT: 98.95 MSEC
LA MAJOR: 4.65 CM
LA MINOR: 5.42 CM
LA WIDTH: 4.08 CM
LEFT ATRIUM SIZE: 2.85 CM
LEFT ATRIUM VOLUME INDEX MOD: 17.2 ML/M2
LEFT ATRIUM VOLUME INDEX: 25.1 ML/M2
LEFT ATRIUM VOLUME MOD: 33.81 CM3
LEFT ATRIUM VOLUME: 49.47 CM3
LEFT INTERNAL DIMENSION IN SYSTOLE: 3.29 CM (ref 2.1–4)
LEFT VENTRICLE DIASTOLIC VOLUME INDEX: 71.78 ML/M2
LEFT VENTRICLE DIASTOLIC VOLUME: 141.4 ML
LEFT VENTRICLE MASS INDEX: 84 G/M2
LEFT VENTRICLE SYSTOLIC VOLUME INDEX: 22.3 ML/M2
LEFT VENTRICLE SYSTOLIC VOLUME: 43.86 ML
LEFT VENTRICULAR INTERNAL DIMENSION IN DIASTOLE: 5.4 CM (ref 3.5–6)
LEFT VENTRICULAR MASS: 164.86 G
LV LATERAL E/E' RATIO: 7.55 M/S
LV SEPTAL E/E' RATIO: 11.86 M/S
MV MEAN GRADIENT: 1 MMHG
MV PEAK A VEL: 1.07 M/S
MV PEAK E VEL: 0.83 M/S
MV PEAK GRADIENT: 4 MMHG
MV STENOSIS PRESSURE HALF TIME: 56.05 MS
MV VALVE AREA P 1/2 METHOD: 3.93 CM2
PISA MRMAX VEL: 0.04 M/S
PV PEAK VELOCITY: 0.66 CM/S
RA MAJOR: 5.1 CM
RA PRESSURE: 3 MMHG
RA WIDTH: 4.05 CM
RIGHT VENTRICULAR END-DIASTOLIC DIMENSION: 1.9 CM
TDI LATERAL: 0.11 M/S
TDI SEPTAL: 0.07 M/S
TDI: 0.09 M/S
TRICUSPID ANNULAR PLANE SYSTOLIC EXCURSION: 1.8 CM

## 2021-02-26 PROCEDURE — 93306 TTE W/DOPPLER COMPLETE: CPT | Mod: 26,,, | Performed by: INTERNAL MEDICINE

## 2021-02-26 PROCEDURE — 93306 TTE W/DOPPLER COMPLETE: CPT

## 2021-02-26 PROCEDURE — 93306 ECHO (CUPID ONLY): ICD-10-PCS | Mod: 26,,, | Performed by: INTERNAL MEDICINE

## 2021-03-01 ENCOUNTER — PATIENT MESSAGE (OUTPATIENT)
Dept: CARDIOLOGY | Facility: CLINIC | Age: 66
End: 2021-03-01

## 2021-03-03 ENCOUNTER — OFFICE VISIT (OUTPATIENT)
Dept: CARDIOLOGY | Facility: CLINIC | Age: 66
End: 2021-03-03
Payer: MEDICARE

## 2021-03-03 VITALS
DIASTOLIC BLOOD PRESSURE: 76 MMHG | BODY MASS INDEX: 29.1 KG/M2 | HEIGHT: 68 IN | SYSTOLIC BLOOD PRESSURE: 134 MMHG | HEART RATE: 70 BPM | WEIGHT: 192 LBS

## 2021-03-03 DIAGNOSIS — E78.2 MIXED HYPERLIPIDEMIA: ICD-10-CM

## 2021-03-03 DIAGNOSIS — Z95.5 HISTORY OF CORONARY ARTERY STENT PLACEMENT: ICD-10-CM

## 2021-03-03 DIAGNOSIS — I25.10 CORONARY ARTERY DISEASE INVOLVING NATIVE CORONARY ARTERY OF NATIVE HEART WITHOUT ANGINA PECTORIS: ICD-10-CM

## 2021-03-03 DIAGNOSIS — I25.10 CORONARY ARTERY DISEASE INVOLVING NATIVE CORONARY ARTERY OF NATIVE HEART WITHOUT ANGINA PECTORIS: Primary | ICD-10-CM

## 2021-03-03 DIAGNOSIS — F17.201 TOBACCO DEPENDENCE IN REMISSION: ICD-10-CM

## 2021-03-03 PROCEDURE — 3008F PR BODY MASS INDEX (BMI) DOCUMENTED: ICD-10-PCS | Mod: CPTII,S$GLB,, | Performed by: INTERNAL MEDICINE

## 2021-03-03 PROCEDURE — 99999 PR PBB SHADOW E&M-EST. PATIENT-LVL IV: CPT | Mod: PBBFAC,,, | Performed by: INTERNAL MEDICINE

## 2021-03-03 PROCEDURE — 1101F PT FALLS ASSESS-DOCD LE1/YR: CPT | Mod: CPTII,S$GLB,, | Performed by: INTERNAL MEDICINE

## 2021-03-03 PROCEDURE — 3288F FALL RISK ASSESSMENT DOCD: CPT | Mod: CPTII,S$GLB,, | Performed by: INTERNAL MEDICINE

## 2021-03-03 PROCEDURE — 3288F PR FALLS RISK ASSESSMENT DOCUMENTED: ICD-10-PCS | Mod: CPTII,S$GLB,, | Performed by: INTERNAL MEDICINE

## 2021-03-03 PROCEDURE — 1126F AMNT PAIN NOTED NONE PRSNT: CPT | Mod: S$GLB,,, | Performed by: INTERNAL MEDICINE

## 2021-03-03 PROCEDURE — 1126F PR PAIN SEVERITY QUANTIFIED, NO PAIN PRESENT: ICD-10-PCS | Mod: S$GLB,,, | Performed by: INTERNAL MEDICINE

## 2021-03-03 PROCEDURE — 99215 PR OFFICE/OUTPT VISIT, EST, LEVL V, 40-54 MIN: ICD-10-PCS | Mod: S$GLB,,, | Performed by: INTERNAL MEDICINE

## 2021-03-03 PROCEDURE — 99215 OFFICE O/P EST HI 40 MIN: CPT | Mod: S$GLB,,, | Performed by: INTERNAL MEDICINE

## 2021-03-03 PROCEDURE — 1101F PR PT FALLS ASSESS DOC 0-1 FALLS W/OUT INJ PAST YR: ICD-10-PCS | Mod: CPTII,S$GLB,, | Performed by: INTERNAL MEDICINE

## 2021-03-03 PROCEDURE — 99999 PR PBB SHADOW E&M-EST. PATIENT-LVL IV: ICD-10-PCS | Mod: PBBFAC,,, | Performed by: INTERNAL MEDICINE

## 2021-03-03 PROCEDURE — 3008F BODY MASS INDEX DOCD: CPT | Mod: CPTII,S$GLB,, | Performed by: INTERNAL MEDICINE

## 2021-03-03 RX ORDER — SILDENAFIL CITRATE 20 MG/1
20 TABLET ORAL DAILY
Qty: 30 TABLET | Refills: 11 | Status: SHIPPED | OUTPATIENT
Start: 2021-03-03 | End: 2022-03-07 | Stop reason: SDUPTHER

## 2021-03-03 RX ORDER — NITROGLYCERIN 0.4 MG/1
0.4 TABLET SUBLINGUAL EVERY 5 MIN PRN
Qty: 25 TABLET | Refills: 11 | Status: SHIPPED | OUTPATIENT
Start: 2021-03-03 | End: 2022-03-28 | Stop reason: SDUPTHER

## 2021-03-03 RX ORDER — CLOPIDOGREL BISULFATE 75 MG/1
TABLET ORAL
Qty: 90 TABLET | Refills: 3 | Status: SHIPPED | OUTPATIENT
Start: 2021-03-03 | End: 2022-02-02 | Stop reason: SDUPTHER

## 2021-03-03 RX ORDER — ATORVASTATIN CALCIUM 80 MG/1
TABLET, FILM COATED ORAL
Qty: 90 TABLET | Refills: 3 | Status: SHIPPED | OUTPATIENT
Start: 2021-03-03 | End: 2022-02-02 | Stop reason: SDUPTHER

## 2021-03-18 ENCOUNTER — IMMUNIZATION (OUTPATIENT)
Dept: FAMILY MEDICINE | Facility: CLINIC | Age: 66
End: 2021-03-18
Payer: MEDICARE

## 2021-03-18 DIAGNOSIS — Z23 NEED FOR VACCINATION: Primary | ICD-10-CM

## 2021-03-18 PROCEDURE — 91300 COVID-19, MRNA, LNP-S, PF, 30 MCG/0.3 ML DOSE VACCINE: CPT | Mod: PBBFAC | Performed by: FAMILY MEDICINE

## 2021-03-18 PROCEDURE — 0002A COVID-19, MRNA, LNP-S, PF, 30 MCG/0.3 ML DOSE VACCINE: CPT | Mod: PBBFAC | Performed by: FAMILY MEDICINE

## 2021-05-31 ENCOUNTER — PATIENT OUTREACH (OUTPATIENT)
Dept: ADMINISTRATIVE | Facility: OTHER | Age: 66
End: 2021-05-31

## 2021-06-01 ENCOUNTER — TELEPHONE (OUTPATIENT)
Dept: NEUROSURGERY | Facility: CLINIC | Age: 66
End: 2021-06-01

## 2021-06-02 ENCOUNTER — HOSPITAL ENCOUNTER (OUTPATIENT)
Dept: RADIOLOGY | Facility: HOSPITAL | Age: 66
Discharge: HOME | End: 2021-06-02
Attending: PHYSICIAN ASSISTANT
Payer: MEDICARE

## 2021-06-02 ENCOUNTER — OFFICE VISIT (OUTPATIENT)
Dept: NEUROSURGERY | Facility: CLINIC | Age: 66
End: 2021-06-02
Payer: MEDICARE

## 2021-06-02 ENCOUNTER — TELEPHONE (OUTPATIENT)
Dept: NEUROSURGERY | Facility: CLINIC | Age: 66
End: 2021-06-02

## 2021-06-02 VITALS
BODY MASS INDEX: 29.1 KG/M2 | HEART RATE: 58 BPM | DIASTOLIC BLOOD PRESSURE: 80 MMHG | HEIGHT: 68 IN | WEIGHT: 192 LBS | SYSTOLIC BLOOD PRESSURE: 133 MMHG

## 2021-06-02 DIAGNOSIS — R20.2 NUMBNESS AND TINGLING OF LEFT LEG: ICD-10-CM

## 2021-06-02 DIAGNOSIS — M51.36 DDD (DEGENERATIVE DISC DISEASE), LUMBAR: ICD-10-CM

## 2021-06-02 DIAGNOSIS — M47.816 SPONDYLOSIS OF LUMBAR REGION WITHOUT MYELOPATHY OR RADICULOPATHY: ICD-10-CM

## 2021-06-02 DIAGNOSIS — M48.061 SPINAL STENOSIS, LUMBAR REGION WITHOUT NEUROGENIC CLAUDICATION: ICD-10-CM

## 2021-06-02 DIAGNOSIS — R20.0 NUMBNESS AND TINGLING OF LEFT LEG: ICD-10-CM

## 2021-06-02 DIAGNOSIS — G89.29 CHRONIC BILATERAL LOW BACK PAIN WITHOUT SCIATICA: Primary | ICD-10-CM

## 2021-06-02 DIAGNOSIS — G89.29 CHRONIC BILATERAL LOW BACK PAIN WITHOUT SCIATICA: ICD-10-CM

## 2021-06-02 DIAGNOSIS — M54.50 CHRONIC BILATERAL LOW BACK PAIN WITHOUT SCIATICA: Primary | ICD-10-CM

## 2021-06-02 DIAGNOSIS — M54.50 CHRONIC BILATERAL LOW BACK PAIN WITHOUT SCIATICA: ICD-10-CM

## 2021-06-02 PROCEDURE — 3288F FALL RISK ASSESSMENT DOCD: CPT | Mod: CPTII,S$GLB,, | Performed by: PHYSICIAN ASSISTANT

## 2021-06-02 PROCEDURE — 1159F MED LIST DOCD IN RCRD: CPT | Mod: S$GLB,,, | Performed by: PHYSICIAN ASSISTANT

## 2021-06-02 PROCEDURE — 99214 OFFICE O/P EST MOD 30 MIN: CPT | Mod: S$GLB,,, | Performed by: PHYSICIAN ASSISTANT

## 2021-06-02 PROCEDURE — 1101F PT FALLS ASSESS-DOCD LE1/YR: CPT | Mod: CPTII,S$GLB,, | Performed by: PHYSICIAN ASSISTANT

## 2021-06-02 PROCEDURE — 1125F PR PAIN SEVERITY QUANTIFIED, PAIN PRESENT: ICD-10-PCS | Mod: S$GLB,,, | Performed by: PHYSICIAN ASSISTANT

## 2021-06-02 PROCEDURE — 72110 XR LUMBAR SPINE AP AND LAT WITH FLEX/EXT: ICD-10-PCS | Mod: 26,,, | Performed by: RADIOLOGY

## 2021-06-02 PROCEDURE — 3008F BODY MASS INDEX DOCD: CPT | Mod: CPTII,S$GLB,, | Performed by: PHYSICIAN ASSISTANT

## 2021-06-02 PROCEDURE — 99999 PR PBB SHADOW E&M-EST. PATIENT-LVL V: CPT | Mod: PBBFAC,,, | Performed by: PHYSICIAN ASSISTANT

## 2021-06-02 PROCEDURE — 1125F AMNT PAIN NOTED PAIN PRSNT: CPT | Mod: S$GLB,,, | Performed by: PHYSICIAN ASSISTANT

## 2021-06-02 PROCEDURE — 99999 PR PBB SHADOW E&M-EST. PATIENT-LVL V: ICD-10-PCS | Mod: PBBFAC,,, | Performed by: PHYSICIAN ASSISTANT

## 2021-06-02 PROCEDURE — 1159F PR MEDICATION LIST DOCUMENTED IN MEDICAL RECORD: ICD-10-PCS | Mod: S$GLB,,, | Performed by: PHYSICIAN ASSISTANT

## 2021-06-02 PROCEDURE — 3008F PR BODY MASS INDEX (BMI) DOCUMENTED: ICD-10-PCS | Mod: CPTII,S$GLB,, | Performed by: PHYSICIAN ASSISTANT

## 2021-06-02 PROCEDURE — 1101F PR PT FALLS ASSESS DOC 0-1 FALLS W/OUT INJ PAST YR: ICD-10-PCS | Mod: CPTII,S$GLB,, | Performed by: PHYSICIAN ASSISTANT

## 2021-06-02 PROCEDURE — 3288F PR FALLS RISK ASSESSMENT DOCUMENTED: ICD-10-PCS | Mod: CPTII,S$GLB,, | Performed by: PHYSICIAN ASSISTANT

## 2021-06-02 PROCEDURE — 72110 X-RAY EXAM L-2 SPINE 4/>VWS: CPT | Mod: 26,,, | Performed by: RADIOLOGY

## 2021-06-02 PROCEDURE — 72110 X-RAY EXAM L-2 SPINE 4/>VWS: CPT | Mod: TC,FY

## 2021-06-02 PROCEDURE — 99214 PR OFFICE/OUTPT VISIT, EST, LEVL IV, 30-39 MIN: ICD-10-PCS | Mod: S$GLB,,, | Performed by: PHYSICIAN ASSISTANT

## 2021-06-02 RX ORDER — GABAPENTIN 300 MG/1
600 CAPSULE ORAL NIGHTLY
Qty: 180 CAPSULE | Refills: 0 | Status: SHIPPED | OUTPATIENT
Start: 2021-06-02 | End: 2021-09-20

## 2021-06-07 ENCOUNTER — OFFICE VISIT (OUTPATIENT)
Dept: PAIN MEDICINE | Facility: CLINIC | Age: 66
End: 2021-06-07
Payer: MEDICARE

## 2021-06-07 ENCOUNTER — TELEPHONE (OUTPATIENT)
Dept: CARDIOLOGY | Facility: CLINIC | Age: 66
End: 2021-06-07

## 2021-06-07 VITALS — DIASTOLIC BLOOD PRESSURE: 86 MMHG | HEART RATE: 69 BPM | SYSTOLIC BLOOD PRESSURE: 134 MMHG

## 2021-06-07 DIAGNOSIS — M47.26 OSTEOARTHRITIS OF SPINE WITH RADICULOPATHY, LUMBAR REGION: ICD-10-CM

## 2021-06-07 DIAGNOSIS — M48.061 NEUROFORAMINAL STENOSIS OF LUMBAR SPINE: ICD-10-CM

## 2021-06-07 DIAGNOSIS — M54.16 LUMBAR RADICULOPATHY: ICD-10-CM

## 2021-06-07 DIAGNOSIS — M51.36 DDD (DEGENERATIVE DISC DISEASE), LUMBAR: Primary | ICD-10-CM

## 2021-06-07 PROBLEM — M51.369 DDD (DEGENERATIVE DISC DISEASE), LUMBAR: Status: ACTIVE | Noted: 2021-06-07

## 2021-06-07 PROCEDURE — 99999 PR PBB SHADOW E&M-EST. PATIENT-LVL IV: CPT | Mod: PBBFAC,,, | Performed by: PAIN MEDICINE

## 2021-06-07 PROCEDURE — 99999 PR PBB SHADOW E&M-EST. PATIENT-LVL IV: ICD-10-PCS | Mod: PBBFAC,,, | Performed by: PAIN MEDICINE

## 2021-06-07 PROCEDURE — 99204 PR OFFICE/OUTPT VISIT, NEW, LEVL IV, 45-59 MIN: ICD-10-PCS | Mod: S$GLB,,, | Performed by: PAIN MEDICINE

## 2021-06-07 PROCEDURE — 1159F PR MEDICATION LIST DOCUMENTED IN MEDICAL RECORD: ICD-10-PCS | Mod: S$GLB,,, | Performed by: PAIN MEDICINE

## 2021-06-07 PROCEDURE — 1159F MED LIST DOCD IN RCRD: CPT | Mod: S$GLB,,, | Performed by: PAIN MEDICINE

## 2021-06-07 PROCEDURE — 1125F PR PAIN SEVERITY QUANTIFIED, PAIN PRESENT: ICD-10-PCS | Mod: S$GLB,,, | Performed by: PAIN MEDICINE

## 2021-06-07 PROCEDURE — 99204 OFFICE O/P NEW MOD 45 MIN: CPT | Mod: S$GLB,,, | Performed by: PAIN MEDICINE

## 2021-06-07 PROCEDURE — 1125F AMNT PAIN NOTED PAIN PRSNT: CPT | Mod: S$GLB,,, | Performed by: PAIN MEDICINE

## 2021-06-08 ENCOUNTER — PATIENT MESSAGE (OUTPATIENT)
Dept: CARDIOLOGY | Facility: CLINIC | Age: 66
End: 2021-06-08

## 2021-06-09 ENCOUNTER — PATIENT MESSAGE (OUTPATIENT)
Dept: CARDIOLOGY | Facility: CLINIC | Age: 66
End: 2021-06-09

## 2021-06-09 ENCOUNTER — TELEPHONE (OUTPATIENT)
Dept: PAIN MEDICINE | Facility: CLINIC | Age: 66
End: 2021-06-09

## 2021-06-09 ENCOUNTER — TELEPHONE (OUTPATIENT)
Dept: CARDIOLOGY | Facility: CLINIC | Age: 66
End: 2021-06-09

## 2021-06-10 ENCOUNTER — TELEPHONE (OUTPATIENT)
Dept: PAIN MEDICINE | Facility: CLINIC | Age: 66
End: 2021-06-10

## 2021-06-10 DIAGNOSIS — M54.16 LUMBAR RADICULOPATHY: Primary | ICD-10-CM

## 2021-06-14 ENCOUNTER — TELEPHONE (OUTPATIENT)
Dept: PAIN MEDICINE | Facility: CLINIC | Age: 66
End: 2021-06-14

## 2021-06-15 ENCOUNTER — HOSPITAL ENCOUNTER (OUTPATIENT)
Facility: HOSPITAL | Age: 66
Discharge: HOME | End: 2021-06-15
Attending: PAIN MEDICINE | Admitting: PAIN MEDICINE
Payer: MEDICARE

## 2021-06-15 VITALS
HEIGHT: 68 IN | TEMPERATURE: 97 F | OXYGEN SATURATION: 95 % | RESPIRATION RATE: 16 BRPM | WEIGHT: 195 LBS | HEART RATE: 65 BPM | BODY MASS INDEX: 29.55 KG/M2 | DIASTOLIC BLOOD PRESSURE: 70 MMHG | SYSTOLIC BLOOD PRESSURE: 125 MMHG

## 2021-06-15 DIAGNOSIS — M54.16 LUMBAR RADICULOPATHY: Primary | ICD-10-CM

## 2021-06-15 DIAGNOSIS — G89.29 CHRONIC PAIN: ICD-10-CM

## 2021-06-15 PROCEDURE — 63600175 PHARM REV CODE 636 W HCPCS: Performed by: PAIN MEDICINE

## 2021-06-15 PROCEDURE — 64484 NJX AA&/STRD TFRM EPI L/S EA: CPT | Mod: LT,,, | Performed by: PAIN MEDICINE

## 2021-06-15 PROCEDURE — 25500020 PHARM REV CODE 255: Performed by: PAIN MEDICINE

## 2021-06-15 PROCEDURE — 64484 NJX AA&/STRD TFRM EPI L/S EA: CPT | Performed by: PAIN MEDICINE

## 2021-06-15 PROCEDURE — 64483 PR EPIDURAL INJ, ANES/STEROID, TRANSFORAMINAL, LUMB/SACR, SNGL LEVL: ICD-10-PCS | Mod: LT,,, | Performed by: PAIN MEDICINE

## 2021-06-15 PROCEDURE — 25000003 PHARM REV CODE 250: Performed by: PAIN MEDICINE

## 2021-06-15 PROCEDURE — 64483 NJX AA&/STRD TFRM EPI L/S 1: CPT | Performed by: PAIN MEDICINE

## 2021-06-15 PROCEDURE — 64483 NJX AA&/STRD TFRM EPI L/S 1: CPT | Mod: LT,,, | Performed by: PAIN MEDICINE

## 2021-06-15 PROCEDURE — 64484 PRA INJECT ANES/STEROID FORAMEN LUMBAR/SACRAL W IMG GUIDE ,EA ADD LEVEL: ICD-10-PCS | Mod: LT,,, | Performed by: PAIN MEDICINE

## 2021-06-15 RX ORDER — LIDOCAINE HYDROCHLORIDE 10 MG/ML
INJECTION INFILTRATION; PERINEURAL
Status: DISCONTINUED | OUTPATIENT
Start: 2021-06-15 | End: 2021-06-15 | Stop reason: HOSPADM

## 2021-06-15 RX ORDER — DEXAMETHASONE SODIUM PHOSPHATE 10 MG/ML
INJECTION INTRAMUSCULAR; INTRAVENOUS
Status: DISCONTINUED | OUTPATIENT
Start: 2021-06-15 | End: 2021-06-15 | Stop reason: HOSPADM

## 2021-06-15 RX ORDER — BUPIVACAINE HYDROCHLORIDE 2.5 MG/ML
INJECTION, SOLUTION EPIDURAL; INFILTRATION; INTRACAUDAL
Status: DISCONTINUED | OUTPATIENT
Start: 2021-06-15 | End: 2021-06-15 | Stop reason: HOSPADM

## 2021-06-15 RX ORDER — ALPRAZOLAM 0.5 MG/1
0.5 TABLET, ORALLY DISINTEGRATING ORAL ONCE AS NEEDED
Status: COMPLETED | OUTPATIENT
Start: 2021-06-15 | End: 2021-06-15

## 2021-06-15 RX ADMIN — ALPRAZOLAM 0.5 MG: 0.5 TABLET, ORALLY DISINTEGRATING ORAL at 10:06

## 2021-07-06 ENCOUNTER — PATIENT MESSAGE (OUTPATIENT)
Dept: ADMINISTRATIVE | Facility: HOSPITAL | Age: 66
End: 2021-07-06

## 2021-07-06 ENCOUNTER — OFFICE VISIT (OUTPATIENT)
Dept: PAIN MEDICINE | Facility: CLINIC | Age: 66
End: 2021-07-06
Payer: MEDICARE

## 2021-07-06 VITALS
WEIGHT: 195.13 LBS | HEIGHT: 68 IN | SYSTOLIC BLOOD PRESSURE: 146 MMHG | BODY MASS INDEX: 29.57 KG/M2 | DIASTOLIC BLOOD PRESSURE: 84 MMHG | HEART RATE: 67 BPM

## 2021-07-06 DIAGNOSIS — M51.36 DDD (DEGENERATIVE DISC DISEASE), LUMBAR: ICD-10-CM

## 2021-07-06 DIAGNOSIS — G89.4 CHRONIC PAIN SYNDROME: ICD-10-CM

## 2021-07-06 DIAGNOSIS — M48.061 NEUROFORAMINAL STENOSIS OF LUMBAR SPINE: ICD-10-CM

## 2021-07-06 DIAGNOSIS — M47.26 OSTEOARTHRITIS OF SPINE WITH RADICULOPATHY, LUMBAR REGION: ICD-10-CM

## 2021-07-06 DIAGNOSIS — M54.16 LUMBAR RADICULOPATHY: Primary | ICD-10-CM

## 2021-07-06 PROCEDURE — 99999 PR PBB SHADOW E&M-EST. PATIENT-LVL IV: CPT | Mod: PBBFAC,,, | Performed by: NURSE PRACTITIONER

## 2021-07-06 PROCEDURE — 1125F PR PAIN SEVERITY QUANTIFIED, PAIN PRESENT: ICD-10-PCS | Mod: S$GLB,,, | Performed by: NURSE PRACTITIONER

## 2021-07-06 PROCEDURE — 1159F PR MEDICATION LIST DOCUMENTED IN MEDICAL RECORD: ICD-10-PCS | Mod: S$GLB,,, | Performed by: NURSE PRACTITIONER

## 2021-07-06 PROCEDURE — 3008F BODY MASS INDEX DOCD: CPT | Mod: CPTII,S$GLB,, | Performed by: NURSE PRACTITIONER

## 2021-07-06 PROCEDURE — 99214 PR OFFICE/OUTPT VISIT, EST, LEVL IV, 30-39 MIN: ICD-10-PCS | Mod: S$GLB,,, | Performed by: NURSE PRACTITIONER

## 2021-07-06 PROCEDURE — 1125F AMNT PAIN NOTED PAIN PRSNT: CPT | Mod: S$GLB,,, | Performed by: NURSE PRACTITIONER

## 2021-07-06 PROCEDURE — 3288F PR FALLS RISK ASSESSMENT DOCUMENTED: ICD-10-PCS | Mod: CPTII,S$GLB,, | Performed by: NURSE PRACTITIONER

## 2021-07-06 PROCEDURE — 1159F MED LIST DOCD IN RCRD: CPT | Mod: S$GLB,,, | Performed by: NURSE PRACTITIONER

## 2021-07-06 PROCEDURE — 99999 PR PBB SHADOW E&M-EST. PATIENT-LVL IV: ICD-10-PCS | Mod: PBBFAC,,, | Performed by: NURSE PRACTITIONER

## 2021-07-06 PROCEDURE — 99214 OFFICE O/P EST MOD 30 MIN: CPT | Mod: S$GLB,,, | Performed by: NURSE PRACTITIONER

## 2021-07-06 PROCEDURE — 3008F PR BODY MASS INDEX (BMI) DOCUMENTED: ICD-10-PCS | Mod: CPTII,S$GLB,, | Performed by: NURSE PRACTITIONER

## 2021-07-06 PROCEDURE — 1101F PT FALLS ASSESS-DOCD LE1/YR: CPT | Mod: CPTII,S$GLB,, | Performed by: NURSE PRACTITIONER

## 2021-07-06 PROCEDURE — 1101F PR PT FALLS ASSESS DOC 0-1 FALLS W/OUT INJ PAST YR: ICD-10-PCS | Mod: CPTII,S$GLB,, | Performed by: NURSE PRACTITIONER

## 2021-07-06 PROCEDURE — 3288F FALL RISK ASSESSMENT DOCD: CPT | Mod: CPTII,S$GLB,, | Performed by: NURSE PRACTITIONER

## 2021-07-08 ENCOUNTER — PATIENT MESSAGE (OUTPATIENT)
Dept: PAIN MEDICINE | Facility: CLINIC | Age: 66
End: 2021-07-08

## 2021-07-08 RX ORDER — PREGABALIN 50 MG/1
CAPSULE ORAL
Qty: 60 CAPSULE | Refills: 0 | Status: SHIPPED | OUTPATIENT
Start: 2021-07-08 | End: 2021-08-11

## 2021-08-03 ENCOUNTER — PATIENT OUTREACH (OUTPATIENT)
Dept: ADMINISTRATIVE | Facility: OTHER | Age: 66
End: 2021-08-03

## 2021-08-05 ENCOUNTER — OFFICE VISIT (OUTPATIENT)
Dept: PAIN MEDICINE | Facility: CLINIC | Age: 66
End: 2021-08-05
Payer: MEDICARE

## 2021-08-05 DIAGNOSIS — M48.061 NEUROFORAMINAL STENOSIS OF LUMBAR SPINE: ICD-10-CM

## 2021-08-05 DIAGNOSIS — M54.16 LUMBAR RADICULOPATHY: Primary | ICD-10-CM

## 2021-08-05 DIAGNOSIS — M51.36 DDD (DEGENERATIVE DISC DISEASE), LUMBAR: ICD-10-CM

## 2021-08-05 DIAGNOSIS — M47.26 OSTEOARTHRITIS OF SPINE WITH RADICULOPATHY, LUMBAR REGION: ICD-10-CM

## 2021-08-05 DIAGNOSIS — G89.4 CHRONIC PAIN SYNDROME: ICD-10-CM

## 2021-08-05 PROCEDURE — 99214 PR OFFICE/OUTPT VISIT, EST, LEVL IV, 30-39 MIN: ICD-10-PCS | Mod: 95,,, | Performed by: NURSE PRACTITIONER

## 2021-08-05 PROCEDURE — 1160F RVW MEDS BY RX/DR IN RCRD: CPT | Mod: CPTII,95,, | Performed by: NURSE PRACTITIONER

## 2021-08-05 PROCEDURE — 1160F PR REVIEW ALL MEDS BY PRESCRIBER/CLIN PHARMACIST DOCUMENTED: ICD-10-PCS | Mod: CPTII,95,, | Performed by: NURSE PRACTITIONER

## 2021-08-05 PROCEDURE — 99214 OFFICE O/P EST MOD 30 MIN: CPT | Mod: 95,,, | Performed by: NURSE PRACTITIONER

## 2021-08-05 PROCEDURE — 1159F PR MEDICATION LIST DOCUMENTED IN MEDICAL RECORD: ICD-10-PCS | Mod: CPTII,95,, | Performed by: NURSE PRACTITIONER

## 2021-08-05 PROCEDURE — 1159F MED LIST DOCD IN RCRD: CPT | Mod: CPTII,95,, | Performed by: NURSE PRACTITIONER

## 2021-08-05 RX ORDER — PREGABALIN 75 MG/1
75 CAPSULE ORAL 2 TIMES DAILY
Qty: 60 CAPSULE | Refills: 0 | Status: SHIPPED | OUTPATIENT
Start: 2021-08-05 | End: 2021-09-20 | Stop reason: SDUPTHER

## 2021-08-24 ENCOUNTER — PATIENT MESSAGE (OUTPATIENT)
Dept: UROLOGY | Facility: CLINIC | Age: 66
End: 2021-08-24

## 2021-08-24 DIAGNOSIS — N40.1 BPH WITH OBSTRUCTION/LOWER URINARY TRACT SYMPTOMS: ICD-10-CM

## 2021-08-24 DIAGNOSIS — N13.8 BPH WITH OBSTRUCTION/LOWER URINARY TRACT SYMPTOMS: ICD-10-CM

## 2021-08-25 ENCOUNTER — PATIENT MESSAGE (OUTPATIENT)
Dept: UROLOGY | Facility: CLINIC | Age: 66
End: 2021-08-25

## 2021-08-25 RX ORDER — TAMSULOSIN HYDROCHLORIDE 0.4 MG/1
0.4 CAPSULE ORAL DAILY
Qty: 90 CAPSULE | Refills: 3 | Status: SHIPPED | OUTPATIENT
Start: 2021-08-25 | End: 2022-08-15 | Stop reason: SDUPTHER

## 2021-08-25 RX ORDER — FINASTERIDE 5 MG/1
5 TABLET, FILM COATED ORAL DAILY
Qty: 90 TABLET | Refills: 3 | Status: SHIPPED | OUTPATIENT
Start: 2021-08-25 | End: 2022-02-21

## 2021-09-20 ENCOUNTER — OFFICE VISIT (OUTPATIENT)
Dept: PAIN MEDICINE | Facility: CLINIC | Age: 66
End: 2021-09-20
Payer: MEDICARE

## 2021-09-20 DIAGNOSIS — M54.16 LUMBAR RADICULOPATHY: Primary | ICD-10-CM

## 2021-09-20 DIAGNOSIS — M51.36 DDD (DEGENERATIVE DISC DISEASE), LUMBAR: ICD-10-CM

## 2021-09-20 DIAGNOSIS — M48.061 NEUROFORAMINAL STENOSIS OF LUMBAR SPINE: ICD-10-CM

## 2021-09-20 DIAGNOSIS — M47.26 OSTEOARTHRITIS OF SPINE WITH RADICULOPATHY, LUMBAR REGION: ICD-10-CM

## 2021-09-20 DIAGNOSIS — G89.4 CHRONIC PAIN SYNDROME: ICD-10-CM

## 2021-09-20 PROCEDURE — 99213 OFFICE O/P EST LOW 20 MIN: CPT | Mod: 95,,, | Performed by: NURSE PRACTITIONER

## 2021-09-20 PROCEDURE — 1160F PR REVIEW ALL MEDS BY PRESCRIBER/CLIN PHARMACIST DOCUMENTED: ICD-10-PCS | Mod: CPTII,95,, | Performed by: NURSE PRACTITIONER

## 2021-09-20 PROCEDURE — 1159F PR MEDICATION LIST DOCUMENTED IN MEDICAL RECORD: ICD-10-PCS | Mod: CPTII,95,, | Performed by: NURSE PRACTITIONER

## 2021-09-20 PROCEDURE — 99213 PR OFFICE/OUTPT VISIT, EST, LEVL III, 20-29 MIN: ICD-10-PCS | Mod: 95,,, | Performed by: NURSE PRACTITIONER

## 2021-09-20 PROCEDURE — 1159F MED LIST DOCD IN RCRD: CPT | Mod: CPTII,95,, | Performed by: NURSE PRACTITIONER

## 2021-09-20 PROCEDURE — 1160F RVW MEDS BY RX/DR IN RCRD: CPT | Mod: CPTII,95,, | Performed by: NURSE PRACTITIONER

## 2021-09-20 RX ORDER — PREGABALIN 75 MG/1
75 CAPSULE ORAL 2 TIMES DAILY
Qty: 60 CAPSULE | Refills: 2 | Status: SHIPPED | OUTPATIENT
Start: 2021-09-20 | End: 2021-09-21 | Stop reason: SDUPTHER

## 2021-09-21 RX ORDER — PREGABALIN 75 MG/1
75 CAPSULE ORAL 2 TIMES DAILY
Qty: 60 CAPSULE | Refills: 2 | Status: SHIPPED | OUTPATIENT
Start: 2021-09-21 | End: 2022-01-19 | Stop reason: SDUPTHER

## 2021-10-04 ENCOUNTER — PATIENT MESSAGE (OUTPATIENT)
Dept: ADMINISTRATIVE | Facility: HOSPITAL | Age: 66
End: 2021-10-04

## 2021-10-06 ENCOUNTER — IMMUNIZATION (OUTPATIENT)
Dept: PHARMACY | Facility: CLINIC | Age: 66
End: 2021-10-06

## 2021-10-06 ENCOUNTER — IMMUNIZATION (OUTPATIENT)
Dept: PHARMACY | Facility: CLINIC | Age: 66
End: 2021-10-06
Payer: MEDICARE

## 2021-10-13 ENCOUNTER — PATIENT MESSAGE (OUTPATIENT)
Dept: FAMILY MEDICINE | Facility: CLINIC | Age: 66
End: 2021-10-13
Payer: MEDICARE

## 2021-11-16 ENCOUNTER — IMMUNIZATION (OUTPATIENT)
Dept: FAMILY MEDICINE | Facility: CLINIC | Age: 66
End: 2021-11-16
Payer: MEDICARE

## 2021-11-16 DIAGNOSIS — Z23 NEED FOR VACCINATION: Primary | ICD-10-CM

## 2021-11-16 PROCEDURE — 0004A COVID-19, MRNA, LNP-S, PF, 30 MCG/0.3 ML DOSE VACCINE: CPT | Mod: PBBFAC | Performed by: FAMILY MEDICINE

## 2022-01-05 ENCOUNTER — PATIENT MESSAGE (OUTPATIENT)
Dept: PAIN MEDICINE | Facility: CLINIC | Age: 67
End: 2022-01-05
Payer: MEDICARE

## 2022-01-10 ENCOUNTER — PATIENT MESSAGE (OUTPATIENT)
Dept: ADMINISTRATIVE | Facility: HOSPITAL | Age: 67
End: 2022-01-10
Payer: MEDICARE

## 2022-01-11 DIAGNOSIS — M47.26 OSTEOARTHRITIS OF SPINE WITH RADICULOPATHY, LUMBAR REGION: ICD-10-CM

## 2022-01-11 DIAGNOSIS — M48.061 NEUROFORAMINAL STENOSIS OF LUMBAR SPINE: ICD-10-CM

## 2022-01-11 DIAGNOSIS — M54.16 LUMBAR RADICULOPATHY: Primary | ICD-10-CM

## 2022-01-13 ENCOUNTER — TELEPHONE (OUTPATIENT)
Dept: FAMILY MEDICINE | Facility: CLINIC | Age: 67
End: 2022-01-13
Payer: MEDICARE

## 2022-01-13 DIAGNOSIS — I25.10 CORONARY ARTERY DISEASE INVOLVING NATIVE CORONARY ARTERY OF NATIVE HEART WITHOUT ANGINA PECTORIS: ICD-10-CM

## 2022-01-13 DIAGNOSIS — Z79.899 MEDICATION MANAGEMENT: ICD-10-CM

## 2022-01-13 DIAGNOSIS — Z12.5 SCREENING FOR MALIGNANT NEOPLASM OF PROSTATE: Primary | ICD-10-CM

## 2022-01-13 DIAGNOSIS — M79.2 NEUROPATHIC PAIN: ICD-10-CM

## 2022-01-13 NOTE — TELEPHONE ENCOUNTER
----- Message from Batool Sheikh MA sent at 1/13/2022 12:17 PM CST -----  Regarding: Annual Labs  Good afternoon,     Mr. Osorio is scheduled for an annual on 1/24/22. Please review his chart & order labs accordingly. Please contact patient to schedule.     Thank you!     Batool

## 2022-01-14 NOTE — TELEPHONE ENCOUNTER
Called patient to schedule labs prior to annual. Patient answered and we scheduled his labs. Patient verbalized understanding.

## 2022-01-19 ENCOUNTER — LAB VISIT (OUTPATIENT)
Dept: LAB | Facility: HOSPITAL | Age: 67
End: 2022-01-19
Attending: FAMILY MEDICINE
Payer: MEDICARE

## 2022-01-19 ENCOUNTER — PATIENT MESSAGE (OUTPATIENT)
Dept: FAMILY MEDICINE | Facility: CLINIC | Age: 67
End: 2022-01-19
Payer: MEDICARE

## 2022-01-19 ENCOUNTER — PATIENT MESSAGE (OUTPATIENT)
Dept: PAIN MEDICINE | Facility: CLINIC | Age: 67
End: 2022-01-19
Payer: MEDICARE

## 2022-01-19 DIAGNOSIS — M79.2 NEUROPATHIC PAIN: ICD-10-CM

## 2022-01-19 DIAGNOSIS — Z12.5 SCREENING FOR MALIGNANT NEOPLASM OF PROSTATE: ICD-10-CM

## 2022-01-19 DIAGNOSIS — I25.10 CORONARY ARTERY DISEASE INVOLVING NATIVE CORONARY ARTERY OF NATIVE HEART WITHOUT ANGINA PECTORIS: ICD-10-CM

## 2022-01-19 DIAGNOSIS — Z79.899 MEDICATION MANAGEMENT: ICD-10-CM

## 2022-01-19 LAB
ALBUMIN SERPL BCP-MCNC: 3.9 G/DL (ref 3.5–5.2)
ALP SERPL-CCNC: 67 U/L (ref 55–135)
ALT SERPL W/O P-5'-P-CCNC: 25 U/L (ref 10–44)
ANION GAP SERPL CALC-SCNC: 8 MMOL/L (ref 8–16)
AST SERPL-CCNC: 33 U/L (ref 10–40)
BASOPHILS # BLD AUTO: 0.05 K/UL (ref 0–0.2)
BASOPHILS NFR BLD: 0.8 % (ref 0–1.9)
BILIRUB SERPL-MCNC: 0.5 MG/DL (ref 0.1–1)
BUN SERPL-MCNC: 14 MG/DL (ref 8–23)
CALCIUM SERPL-MCNC: 9.5 MG/DL (ref 8.7–10.5)
CHLORIDE SERPL-SCNC: 109 MMOL/L (ref 95–110)
CHOLEST SERPL-MCNC: 108 MG/DL (ref 120–199)
CHOLEST/HDLC SERPL: 2.9 {RATIO} (ref 2–5)
CO2 SERPL-SCNC: 25 MMOL/L (ref 23–29)
COMPLEXED PSA SERPL-MCNC: 1.8 NG/ML (ref 0–4)
CREAT SERPL-MCNC: 0.9 MG/DL (ref 0.5–1.4)
DIFFERENTIAL METHOD: NORMAL
EOSINOPHIL # BLD AUTO: 0.3 K/UL (ref 0–0.5)
EOSINOPHIL NFR BLD: 4.4 % (ref 0–8)
ERYTHROCYTE [DISTWIDTH] IN BLOOD BY AUTOMATED COUNT: 13.5 % (ref 11.5–14.5)
EST. GFR  (AFRICAN AMERICAN): >60 ML/MIN/1.73 M^2
EST. GFR  (NON AFRICAN AMERICAN): >60 ML/MIN/1.73 M^2
ESTIMATED AVG GLUCOSE: 117 MG/DL (ref 68–131)
GLUCOSE SERPL-MCNC: 99 MG/DL (ref 70–110)
HBA1C MFR BLD: 5.7 % (ref 4–5.6)
HCT VFR BLD AUTO: 46 % (ref 40–54)
HDLC SERPL-MCNC: 37 MG/DL (ref 40–75)
HDLC SERPL: 34.3 % (ref 20–50)
HGB BLD-MCNC: 15.4 G/DL (ref 14–18)
IMM GRANULOCYTES # BLD AUTO: 0.02 K/UL (ref 0–0.04)
IMM GRANULOCYTES NFR BLD AUTO: 0.3 % (ref 0–0.5)
LDLC SERPL CALC-MCNC: 52 MG/DL (ref 63–159)
LYMPHOCYTES # BLD AUTO: 2.7 K/UL (ref 1–4.8)
LYMPHOCYTES NFR BLD: 40.1 % (ref 18–48)
MAGNESIUM SERPL-MCNC: 2.1 MG/DL (ref 1.6–2.6)
MCH RBC QN AUTO: 28.8 PG (ref 27–31)
MCHC RBC AUTO-ENTMCNC: 33.5 G/DL (ref 32–36)
MCV RBC AUTO: 86 FL (ref 82–98)
MONOCYTES # BLD AUTO: 0.7 K/UL (ref 0.3–1)
MONOCYTES NFR BLD: 11 % (ref 4–15)
NEUTROPHILS # BLD AUTO: 2.9 K/UL (ref 1.8–7.7)
NEUTROPHILS NFR BLD: 43.4 % (ref 38–73)
NONHDLC SERPL-MCNC: 71 MG/DL
NRBC BLD-RTO: 0 /100 WBC
PLATELET # BLD AUTO: 255 K/UL (ref 150–450)
PMV BLD AUTO: 10 FL (ref 9.2–12.9)
POTASSIUM SERPL-SCNC: 3.9 MMOL/L (ref 3.5–5.1)
PROT SERPL-MCNC: 6.6 G/DL (ref 6–8.4)
RBC # BLD AUTO: 5.34 M/UL (ref 4.6–6.2)
SODIUM SERPL-SCNC: 142 MMOL/L (ref 136–145)
TRIGL SERPL-MCNC: 95 MG/DL (ref 30–150)
TSH SERPL DL<=0.005 MIU/L-ACNC: 2.9 UIU/ML (ref 0.4–4)
VIT B12 SERPL-MCNC: 924 PG/ML (ref 210–950)
WBC # BLD AUTO: 6.61 K/UL (ref 3.9–12.7)

## 2022-01-19 PROCEDURE — 80061 LIPID PANEL: CPT | Performed by: FAMILY MEDICINE

## 2022-01-19 PROCEDURE — 84443 ASSAY THYROID STIM HORMONE: CPT | Performed by: FAMILY MEDICINE

## 2022-01-19 PROCEDURE — 82652 VIT D 1 25-DIHYDROXY: CPT | Performed by: FAMILY MEDICINE

## 2022-01-19 PROCEDURE — 83036 HEMOGLOBIN GLYCOSYLATED A1C: CPT | Performed by: FAMILY MEDICINE

## 2022-01-19 PROCEDURE — 83735 ASSAY OF MAGNESIUM: CPT | Performed by: FAMILY MEDICINE

## 2022-01-19 PROCEDURE — 82607 VITAMIN B-12: CPT | Performed by: FAMILY MEDICINE

## 2022-01-19 PROCEDURE — 85025 COMPLETE CBC W/AUTO DIFF WBC: CPT | Performed by: FAMILY MEDICINE

## 2022-01-19 PROCEDURE — 80053 COMPREHEN METABOLIC PANEL: CPT | Performed by: FAMILY MEDICINE

## 2022-01-19 PROCEDURE — 36415 COLL VENOUS BLD VENIPUNCTURE: CPT | Performed by: FAMILY MEDICINE

## 2022-01-19 PROCEDURE — 84153 ASSAY OF PSA TOTAL: CPT | Performed by: FAMILY MEDICINE

## 2022-01-19 RX ORDER — PREGABALIN 75 MG/1
75 CAPSULE ORAL 2 TIMES DAILY
Qty: 60 CAPSULE | Refills: 2 | OUTPATIENT
Start: 2022-01-19 | End: 2022-02-18

## 2022-01-19 RX ORDER — PREGABALIN 75 MG/1
75 CAPSULE ORAL 2 TIMES DAILY
Qty: 60 CAPSULE | Refills: 2 | Status: SHIPPED | OUTPATIENT
Start: 2022-01-19 | End: 2022-04-18 | Stop reason: SDUPTHER

## 2022-01-19 RX ORDER — PREGABALIN 75 MG/1
75 CAPSULE ORAL 2 TIMES DAILY
Qty: 60 CAPSULE | Refills: 2 | Status: CANCELLED | OUTPATIENT
Start: 2022-01-19 | End: 2022-02-18

## 2022-01-21 LAB — 1,25(OH)2D3 SERPL-MCNC: 60 PG/ML (ref 20–79)

## 2022-02-02 ENCOUNTER — OFFICE VISIT (OUTPATIENT)
Dept: FAMILY MEDICINE | Facility: CLINIC | Age: 67
End: 2022-02-02
Payer: MEDICARE

## 2022-02-02 VITALS
SYSTOLIC BLOOD PRESSURE: 128 MMHG | WEIGHT: 202.81 LBS | HEIGHT: 68 IN | OXYGEN SATURATION: 97 % | DIASTOLIC BLOOD PRESSURE: 77 MMHG | BODY MASS INDEX: 30.74 KG/M2 | HEART RATE: 72 BPM

## 2022-02-02 DIAGNOSIS — E66.09 CLASS 1 OBESITY DUE TO EXCESS CALORIES WITH SERIOUS COMORBIDITY AND BODY MASS INDEX (BMI) OF 30.0 TO 30.9 IN ADULT: ICD-10-CM

## 2022-02-02 DIAGNOSIS — Z00.00 ROUTINE GENERAL MEDICAL EXAMINATION AT A HEALTH CARE FACILITY: Primary | ICD-10-CM

## 2022-02-02 DIAGNOSIS — N40.1 BENIGN PROSTATIC HYPERPLASIA (BPH) WITH STRAINING ON URINATION: ICD-10-CM

## 2022-02-02 DIAGNOSIS — I25.10 CORONARY ARTERY DISEASE INVOLVING NATIVE CORONARY ARTERY OF NATIVE HEART WITHOUT ANGINA PECTORIS: ICD-10-CM

## 2022-02-02 DIAGNOSIS — Z12.11 COLON CANCER SCREENING: ICD-10-CM

## 2022-02-02 DIAGNOSIS — R39.16 BENIGN PROSTATIC HYPERPLASIA (BPH) WITH STRAINING ON URINATION: ICD-10-CM

## 2022-02-02 DIAGNOSIS — S32.010D COMPRESSION FRACTURE OF L1 VERTEBRA WITH ROUTINE HEALING, SUBSEQUENT ENCOUNTER: ICD-10-CM

## 2022-02-02 DIAGNOSIS — Z79.899 MEDICATION MANAGEMENT: ICD-10-CM

## 2022-02-02 DIAGNOSIS — R59.9 ENLARGED LYMPH NODE: ICD-10-CM

## 2022-02-02 DIAGNOSIS — M47.26 OSTEOARTHRITIS OF SPINE WITH RADICULOPATHY, LUMBAR REGION: ICD-10-CM

## 2022-02-02 DIAGNOSIS — F32.A ANXIETY AND DEPRESSION: ICD-10-CM

## 2022-02-02 DIAGNOSIS — F41.9 ANXIETY AND DEPRESSION: ICD-10-CM

## 2022-02-02 DIAGNOSIS — Z79.02 VISIT FOR MONITORING PLAVIX THERAPY: ICD-10-CM

## 2022-02-02 DIAGNOSIS — Z79.82 LONG-TERM USE OF ASPIRIN THERAPY: ICD-10-CM

## 2022-02-02 DIAGNOSIS — I25.2 HISTORY OF HEART ATTACK: ICD-10-CM

## 2022-02-02 DIAGNOSIS — F17.201 TOBACCO DEPENDENCE IN REMISSION: ICD-10-CM

## 2022-02-02 DIAGNOSIS — J30.89 NON-SEASONAL ALLERGIC RHINITIS, UNSPECIFIED TRIGGER: ICD-10-CM

## 2022-02-02 DIAGNOSIS — Z51.81 VISIT FOR MONITORING PLAVIX THERAPY: ICD-10-CM

## 2022-02-02 DIAGNOSIS — E78.2 MIXED HYPERLIPIDEMIA: ICD-10-CM

## 2022-02-02 DIAGNOSIS — Z95.5 HISTORY OF CORONARY ARTERY STENT PLACEMENT: ICD-10-CM

## 2022-02-02 PROCEDURE — 3074F SYST BP LT 130 MM HG: CPT | Mod: CPTII,S$GLB,, | Performed by: FAMILY MEDICINE

## 2022-02-02 PROCEDURE — 1126F AMNT PAIN NOTED NONE PRSNT: CPT | Mod: CPTII,S$GLB,, | Performed by: FAMILY MEDICINE

## 2022-02-02 PROCEDURE — 3074F PR MOST RECENT SYSTOLIC BLOOD PRESSURE < 130 MM HG: ICD-10-PCS | Mod: CPTII,S$GLB,, | Performed by: FAMILY MEDICINE

## 2022-02-02 PROCEDURE — 1159F MED LIST DOCD IN RCRD: CPT | Mod: CPTII,S$GLB,, | Performed by: FAMILY MEDICINE

## 2022-02-02 PROCEDURE — 1101F PR PT FALLS ASSESS DOC 0-1 FALLS W/OUT INJ PAST YR: ICD-10-PCS | Mod: CPTII,S$GLB,, | Performed by: FAMILY MEDICINE

## 2022-02-02 PROCEDURE — 3008F PR BODY MASS INDEX (BMI) DOCUMENTED: ICD-10-PCS | Mod: CPTII,S$GLB,, | Performed by: FAMILY MEDICINE

## 2022-02-02 PROCEDURE — 3008F BODY MASS INDEX DOCD: CPT | Mod: CPTII,S$GLB,, | Performed by: FAMILY MEDICINE

## 2022-02-02 PROCEDURE — 1160F PR REVIEW ALL MEDS BY PRESCRIBER/CLIN PHARMACIST DOCUMENTED: ICD-10-PCS | Mod: CPTII,S$GLB,, | Performed by: FAMILY MEDICINE

## 2022-02-02 PROCEDURE — 1160F RVW MEDS BY RX/DR IN RCRD: CPT | Mod: CPTII,S$GLB,, | Performed by: FAMILY MEDICINE

## 2022-02-02 PROCEDURE — 1159F PR MEDICATION LIST DOCUMENTED IN MEDICAL RECORD: ICD-10-PCS | Mod: CPTII,S$GLB,, | Performed by: FAMILY MEDICINE

## 2022-02-02 PROCEDURE — 99999 PR PBB SHADOW E&M-EST. PATIENT-LVL V: ICD-10-PCS | Mod: PBBFAC,,, | Performed by: FAMILY MEDICINE

## 2022-02-02 PROCEDURE — 3078F DIAST BP <80 MM HG: CPT | Mod: CPTII,S$GLB,, | Performed by: FAMILY MEDICINE

## 2022-02-02 PROCEDURE — 1101F PT FALLS ASSESS-DOCD LE1/YR: CPT | Mod: CPTII,S$GLB,, | Performed by: FAMILY MEDICINE

## 2022-02-02 PROCEDURE — 99397 PER PM REEVAL EST PAT 65+ YR: CPT | Mod: S$GLB,,, | Performed by: FAMILY MEDICINE

## 2022-02-02 PROCEDURE — 3288F PR FALLS RISK ASSESSMENT DOCUMENTED: ICD-10-PCS | Mod: CPTII,S$GLB,, | Performed by: FAMILY MEDICINE

## 2022-02-02 PROCEDURE — 99999 PR PBB SHADOW E&M-EST. PATIENT-LVL V: CPT | Mod: PBBFAC,,, | Performed by: FAMILY MEDICINE

## 2022-02-02 PROCEDURE — 3044F HG A1C LEVEL LT 7.0%: CPT | Mod: CPTII,S$GLB,, | Performed by: FAMILY MEDICINE

## 2022-02-02 PROCEDURE — 99397 PR PREVENTIVE VISIT,EST,65 & OVER: ICD-10-PCS | Mod: S$GLB,,, | Performed by: FAMILY MEDICINE

## 2022-02-02 PROCEDURE — 3044F PR MOST RECENT HEMOGLOBIN A1C LEVEL <7.0%: ICD-10-PCS | Mod: CPTII,S$GLB,, | Performed by: FAMILY MEDICINE

## 2022-02-02 PROCEDURE — 3078F PR MOST RECENT DIASTOLIC BLOOD PRESSURE < 80 MM HG: ICD-10-PCS | Mod: CPTII,S$GLB,, | Performed by: FAMILY MEDICINE

## 2022-02-02 PROCEDURE — 3288F FALL RISK ASSESSMENT DOCD: CPT | Mod: CPTII,S$GLB,, | Performed by: FAMILY MEDICINE

## 2022-02-02 PROCEDURE — 1126F PR PAIN SEVERITY QUANTIFIED, NO PAIN PRESENT: ICD-10-PCS | Mod: CPTII,S$GLB,, | Performed by: FAMILY MEDICINE

## 2022-02-02 RX ORDER — CLOPIDOGREL BISULFATE 75 MG/1
TABLET ORAL
Qty: 90 TABLET | Refills: 3 | Status: SHIPPED | OUTPATIENT
Start: 2022-02-02 | End: 2022-03-28 | Stop reason: SDUPTHER

## 2022-02-02 RX ORDER — ATORVASTATIN CALCIUM 80 MG/1
TABLET, FILM COATED ORAL
Qty: 90 TABLET | Refills: 3 | Status: SHIPPED | OUTPATIENT
Start: 2022-02-02 | End: 2022-03-28 | Stop reason: SDUPTHER
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
## 2022-02-08 ENCOUNTER — PATIENT MESSAGE (OUTPATIENT)
Dept: FAMILY MEDICINE | Facility: CLINIC | Age: 67
End: 2022-02-08
Payer: MEDICARE

## 2022-02-08 DIAGNOSIS — D17.0 LIPOMA OF SKIN AND SUBCUTANEOUS TISSUE OF NECK: ICD-10-CM

## 2022-02-11 ENCOUNTER — OFFICE VISIT (OUTPATIENT)
Dept: SURGERY | Facility: CLINIC | Age: 67
End: 2022-02-11
Payer: MEDICARE

## 2022-02-11 VITALS
SYSTOLIC BLOOD PRESSURE: 120 MMHG | DIASTOLIC BLOOD PRESSURE: 78 MMHG | HEIGHT: 68 IN | HEART RATE: 72 BPM | WEIGHT: 206 LBS | BODY MASS INDEX: 31.22 KG/M2

## 2022-02-11 DIAGNOSIS — D17.0 LIPOMA OF SKIN AND SUBCUTANEOUS TISSUE OF NECK: ICD-10-CM

## 2022-02-11 PROCEDURE — 3008F BODY MASS INDEX DOCD: CPT | Mod: CPTII,S$GLB,, | Performed by: SURGERY

## 2022-02-11 PROCEDURE — 3074F PR MOST RECENT SYSTOLIC BLOOD PRESSURE < 130 MM HG: ICD-10-PCS | Mod: CPTII,S$GLB,, | Performed by: SURGERY

## 2022-02-11 PROCEDURE — 3044F HG A1C LEVEL LT 7.0%: CPT | Mod: CPTII,S$GLB,, | Performed by: SURGERY

## 2022-02-11 PROCEDURE — 3078F PR MOST RECENT DIASTOLIC BLOOD PRESSURE < 80 MM HG: ICD-10-PCS | Mod: CPTII,S$GLB,, | Performed by: SURGERY

## 2022-02-11 PROCEDURE — 1101F PR PT FALLS ASSESS DOC 0-1 FALLS W/OUT INJ PAST YR: ICD-10-PCS | Mod: CPTII,S$GLB,, | Performed by: SURGERY

## 2022-02-11 PROCEDURE — 99202 PR OFFICE/OUTPT VISIT, NEW, LEVL II, 15-29 MIN: ICD-10-PCS | Mod: S$GLB,,, | Performed by: SURGERY

## 2022-02-11 PROCEDURE — 3008F PR BODY MASS INDEX (BMI) DOCUMENTED: ICD-10-PCS | Mod: CPTII,S$GLB,, | Performed by: SURGERY

## 2022-02-11 PROCEDURE — 99999 PR PBB SHADOW E&M-EST. PATIENT-LVL IV: CPT | Mod: PBBFAC,,, | Performed by: SURGERY

## 2022-02-11 PROCEDURE — 1160F PR REVIEW ALL MEDS BY PRESCRIBER/CLIN PHARMACIST DOCUMENTED: ICD-10-PCS | Mod: CPTII,S$GLB,, | Performed by: SURGERY

## 2022-02-11 PROCEDURE — 1160F RVW MEDS BY RX/DR IN RCRD: CPT | Mod: CPTII,S$GLB,, | Performed by: SURGERY

## 2022-02-11 PROCEDURE — 99202 OFFICE O/P NEW SF 15 MIN: CPT | Mod: S$GLB,,, | Performed by: SURGERY

## 2022-02-11 PROCEDURE — 1101F PT FALLS ASSESS-DOCD LE1/YR: CPT | Mod: CPTII,S$GLB,, | Performed by: SURGERY

## 2022-02-11 PROCEDURE — 3288F PR FALLS RISK ASSESSMENT DOCUMENTED: ICD-10-PCS | Mod: CPTII,S$GLB,, | Performed by: SURGERY

## 2022-02-11 PROCEDURE — 1159F PR MEDICATION LIST DOCUMENTED IN MEDICAL RECORD: ICD-10-PCS | Mod: CPTII,S$GLB,, | Performed by: SURGERY

## 2022-02-11 PROCEDURE — 1125F PR PAIN SEVERITY QUANTIFIED, PAIN PRESENT: ICD-10-PCS | Mod: CPTII,S$GLB,, | Performed by: SURGERY

## 2022-02-11 PROCEDURE — 3078F DIAST BP <80 MM HG: CPT | Mod: CPTII,S$GLB,, | Performed by: SURGERY

## 2022-02-11 PROCEDURE — 1125F AMNT PAIN NOTED PAIN PRSNT: CPT | Mod: CPTII,S$GLB,, | Performed by: SURGERY

## 2022-02-11 PROCEDURE — 99999 PR PBB SHADOW E&M-EST. PATIENT-LVL IV: ICD-10-PCS | Mod: PBBFAC,,, | Performed by: SURGERY

## 2022-02-11 PROCEDURE — 3288F FALL RISK ASSESSMENT DOCD: CPT | Mod: CPTII,S$GLB,, | Performed by: SURGERY

## 2022-02-11 PROCEDURE — 3044F PR MOST RECENT HEMOGLOBIN A1C LEVEL <7.0%: ICD-10-PCS | Mod: CPTII,S$GLB,, | Performed by: SURGERY

## 2022-02-11 PROCEDURE — 3074F SYST BP LT 130 MM HG: CPT | Mod: CPTII,S$GLB,, | Performed by: SURGERY

## 2022-02-11 PROCEDURE — 1159F MED LIST DOCD IN RCRD: CPT | Mod: CPTII,S$GLB,, | Performed by: SURGERY

## 2022-02-11 NOTE — PROGRESS NOTES
General Surgery Office Visit   History and Physical    Patient Name: Masoud Osorio  YOB: 1955 (66 y.o.)  MRN: [id number]  Today's Date: 02/11/2022    Referring Md:   Rosemarie Villegas Md  [address]  [address]  [place] [address]    SUBJECTIVE:     Chief Complaint: lipoma    History of Present Illness:  Masoud Osorio is a 66 y.o. male with past medical history of CAD s/p stent placement in 2016 and HLD who presents with a mass on his neck. He first noticed the mass 1 month ago. The mass has not grown and does not bother him much. US was performed, which showed a 2cm mass consistent with a lipoma.     Review of patient's allergies indicates:   Allergen Reactions    Penicillins Hives     Past Medical History:   Diagnosis Date    Anticoagulant long-term use     Anxiety and depression 9/21/2016    trial of ctialopram and follow up in 8 weeks    Arthritis     Benign prostatic hyperplasia (BPH) with straining on urination 12/5/2018    Coronary artery disease involving native coronary artery of native heart without angina pectoris 7/26/2016 7/26/2016 [facility]   PCI of RCA with 4.0 x 15, 4.0 x 38, and 3.5 x 38 mm Resolute JACOB   PCI of PDA 2.75 x 15 mm JACOB dilated to 3.0 POBA of proximal PLB 2.0 x 12 mm balloon   Normal EF with LVEDP 15 mmHg DAPT score of 2               Elevated PSA     Gout     History of coronary artery stent placement 9/21/2016    History of heart attack 7/25/2016    NSTEMI    Mixed hyperlipidemia 7/17/2017    Overweight (BMI 25.0-29.9) 9/21/2016    Tobacco dependence in remission 7/25/2016     Past Surgical History:   Procedure Laterality Date    COLONOSCOPY N/A 8/16/2017    Procedure: COLONOSCOPY Split Prep;  Surgeon: Maykel Carcamo Jr., MD;  Location[facility];  Service: Endoscopy;  Laterality: N/A;    CORONARY ANGIOPLASTY WITH STENT PLACEMENT      HERNIA REPAIR      ROTATOR CUFF REPAIR      TRANSFORAMINAL EPIDURAL INJECTION OF STEROID Left  6/15/2021    Procedure: Injection,steroid,epidural,transforaminal approach-- Left- L3-4, L4-5;  Surgeon: Enrike Medellin Jr., MD;  [place] [facility] [place]  Service: Pain Management;  Laterality: Left;    VASECTOMY       Family History   Problem Relation Age of Onset    Heart disease Mother     Arthritis Mother     Parkinsonism Mother     Congenital heart disease Father     No Known Problems Sister     No Known Problems Brother     No Known Problems Daughter     No Known Problems Sister     No Known Problems Sister     Prostate cancer Neg Hx     Kidney disease Neg Hx      Social History     Tobacco Use    Smoking status: Former Smoker     Packs/day: 0.50     Types: Cigarettes     Quit date: 2016     Years since quittin.5    Smokeless tobacco: Never Used   Substance Use Topics    Alcohol use: Yes     Comment: rarely    Drug use: No        Review of Systems:  Review of Systems   Constitutional: Negative for chills and fever.   HENT: Negative for hearing loss and sore throat.    Eyes: Negative for pain and discharge.   Respiratory: Negative for cough and shortness of breath.    Cardiovascular: Negative for chest pain and palpitations.   Gastrointestinal: Negative for abdominal pain, constipation, diarrhea, nausea and vomiting.   Genitourinary: Negative for dysuria and hematuria.   Musculoskeletal: Negative for joint pain and myalgias.   Neurological: Negative for dizziness and headaches.   Psychiatric/Behavioral: Negative for depression. The patient is not nervous/anxious.        OBJECTIVE:     Vital Signs (Most Recent)  There were no vitals taken for this visit.    Physical Exam:  Physical Exam  Constitutional:       General: He is not in acute distress.     Appearance: Normal appearance.   HENT:      Head: Normocephalic and atraumatic.   Neck:      Comments: 2x2cm mass, mobile, nontender, soft along the posterior aspect of the left neck  Cardiovascular:      Rate and Rhythm: Normal rate and  regular rhythm.   Pulmonary:      Effort: Pulmonary effort is normal. No respiratory distress.      Breath sounds: Normal breath sounds.   Abdominal:      General: Abdomen is flat. There is no distension.      Palpations: Abdomen is soft.      Tenderness: There is no abdominal tenderness.   Neurological:      General: No focal deficit present.      Mental Status: He is alert and oriented to person, place, and time.   Psychiatric:         Behavior: Behavior normal.         Thought Content: Thought content normal.         Labs:   CBC:   CMP:     Diagnostic Results:  US: Reviewed   FINDINGS:  2.3 x 1.4 x 2.7 cm nonvascular mildly heterogeneous hyperechoic lesion present within the subcutaneous soft tissues suggestive of lipoma.  Correlate clinically and with additional imaging as clinically indicated.  There is a questionable lymph node versus technical accentuation within the soft tissues as well.       ASSESSMENT/PLAN:     Masoud Osorio is a 66 y.o. male presenting for evaluation of a lipoma    - Interested in watching to see if it will grow  - Does not desire surgical excision at this time  - Instructed to call back if desires excision in the future    Tyson Jaeger MD  [facility]    Pt seen and examined.  Agree with note.    Adama oT M.D., F.A.C.S.  [facility]gr[facility] and General Surgery  [facility] [place]

## 2022-02-18 ENCOUNTER — TELEPHONE (OUTPATIENT)
Dept: GASTROENTEROLOGY | Facility: CLINIC | Age: 67
End: 2022-02-18
Payer: MEDICARE

## 2022-02-21 ENCOUNTER — OFFICE VISIT (OUTPATIENT)
Dept: UROLOGY | Facility: CLINIC | Age: 67
End: 2022-02-21
Payer: MEDICARE

## 2022-02-21 ENCOUNTER — PATIENT MESSAGE (OUTPATIENT)
Dept: GASTROENTEROLOGY | Facility: CLINIC | Age: 67
End: 2022-02-21
Payer: MEDICARE

## 2022-02-21 ENCOUNTER — TELEPHONE (OUTPATIENT)
Dept: ENDOSCOPY | Facility: HOSPITAL | Age: 67
End: 2022-02-21
Payer: MEDICARE

## 2022-02-21 VITALS
BODY MASS INDEX: 30.87 KG/M2 | WEIGHT: 203.69 LBS | SYSTOLIC BLOOD PRESSURE: 135 MMHG | HEIGHT: 68 IN | HEART RATE: 73 BPM | DIASTOLIC BLOOD PRESSURE: 80 MMHG

## 2022-02-21 DIAGNOSIS — N40.1 BENIGN PROSTATIC HYPERPLASIA (BPH) WITH STRAINING ON URINATION: Primary | ICD-10-CM

## 2022-02-21 DIAGNOSIS — R39.16 BENIGN PROSTATIC HYPERPLASIA (BPH) WITH STRAINING ON URINATION: Primary | ICD-10-CM

## 2022-02-21 DIAGNOSIS — N52.9 ERECTILE DYSFUNCTION, UNSPECIFIED ERECTILE DYSFUNCTION TYPE: ICD-10-CM

## 2022-02-21 DIAGNOSIS — Z01.818 PRE-OP TESTING: ICD-10-CM

## 2022-02-21 PROCEDURE — 3079F PR MOST RECENT DIASTOLIC BLOOD PRESSURE 80-89 MM HG: ICD-10-PCS | Mod: CPTII,S$GLB,, | Performed by: STUDENT IN AN ORGANIZED HEALTH CARE EDUCATION/TRAINING PROGRAM

## 2022-02-21 PROCEDURE — 1160F PR REVIEW ALL MEDS BY PRESCRIBER/CLIN PHARMACIST DOCUMENTED: ICD-10-PCS | Mod: CPTII,S$GLB,, | Performed by: STUDENT IN AN ORGANIZED HEALTH CARE EDUCATION/TRAINING PROGRAM

## 2022-02-21 PROCEDURE — 3075F PR MOST RECENT SYSTOLIC BLOOD PRESS GE 130-139MM HG: ICD-10-PCS | Mod: CPTII,S$GLB,, | Performed by: STUDENT IN AN ORGANIZED HEALTH CARE EDUCATION/TRAINING PROGRAM

## 2022-02-21 PROCEDURE — 3288F PR FALLS RISK ASSESSMENT DOCUMENTED: ICD-10-PCS | Mod: CPTII,S$GLB,, | Performed by: STUDENT IN AN ORGANIZED HEALTH CARE EDUCATION/TRAINING PROGRAM

## 2022-02-21 PROCEDURE — 3008F BODY MASS INDEX DOCD: CPT | Mod: CPTII,S$GLB,, | Performed by: STUDENT IN AN ORGANIZED HEALTH CARE EDUCATION/TRAINING PROGRAM

## 2022-02-21 PROCEDURE — 99214 PR OFFICE/OUTPT VISIT, EST, LEVL IV, 30-39 MIN: ICD-10-PCS | Mod: S$GLB,,, | Performed by: STUDENT IN AN ORGANIZED HEALTH CARE EDUCATION/TRAINING PROGRAM

## 2022-02-21 PROCEDURE — 1159F MED LIST DOCD IN RCRD: CPT | Mod: CPTII,S$GLB,, | Performed by: STUDENT IN AN ORGANIZED HEALTH CARE EDUCATION/TRAINING PROGRAM

## 2022-02-21 PROCEDURE — 1160F RVW MEDS BY RX/DR IN RCRD: CPT | Mod: CPTII,S$GLB,, | Performed by: STUDENT IN AN ORGANIZED HEALTH CARE EDUCATION/TRAINING PROGRAM

## 2022-02-21 PROCEDURE — 1101F PT FALLS ASSESS-DOCD LE1/YR: CPT | Mod: CPTII,S$GLB,, | Performed by: STUDENT IN AN ORGANIZED HEALTH CARE EDUCATION/TRAINING PROGRAM

## 2022-02-21 PROCEDURE — 1159F PR MEDICATION LIST DOCUMENTED IN MEDICAL RECORD: ICD-10-PCS | Mod: CPTII,S$GLB,, | Performed by: STUDENT IN AN ORGANIZED HEALTH CARE EDUCATION/TRAINING PROGRAM

## 2022-02-21 PROCEDURE — 3008F PR BODY MASS INDEX (BMI) DOCUMENTED: ICD-10-PCS | Mod: CPTII,S$GLB,, | Performed by: STUDENT IN AN ORGANIZED HEALTH CARE EDUCATION/TRAINING PROGRAM

## 2022-02-21 PROCEDURE — 99214 OFFICE O/P EST MOD 30 MIN: CPT | Mod: S$GLB,,, | Performed by: STUDENT IN AN ORGANIZED HEALTH CARE EDUCATION/TRAINING PROGRAM

## 2022-02-21 PROCEDURE — 3044F HG A1C LEVEL LT 7.0%: CPT | Mod: CPTII,S$GLB,, | Performed by: STUDENT IN AN ORGANIZED HEALTH CARE EDUCATION/TRAINING PROGRAM

## 2022-02-21 PROCEDURE — 3079F DIAST BP 80-89 MM HG: CPT | Mod: CPTII,S$GLB,, | Performed by: STUDENT IN AN ORGANIZED HEALTH CARE EDUCATION/TRAINING PROGRAM

## 2022-02-21 PROCEDURE — 3075F SYST BP GE 130 - 139MM HG: CPT | Mod: CPTII,S$GLB,, | Performed by: STUDENT IN AN ORGANIZED HEALTH CARE EDUCATION/TRAINING PROGRAM

## 2022-02-21 PROCEDURE — 1101F PR PT FALLS ASSESS DOC 0-1 FALLS W/OUT INJ PAST YR: ICD-10-PCS | Mod: CPTII,S$GLB,, | Performed by: STUDENT IN AN ORGANIZED HEALTH CARE EDUCATION/TRAINING PROGRAM

## 2022-02-21 PROCEDURE — 3288F FALL RISK ASSESSMENT DOCD: CPT | Mod: CPTII,S$GLB,, | Performed by: STUDENT IN AN ORGANIZED HEALTH CARE EDUCATION/TRAINING PROGRAM

## 2022-02-21 PROCEDURE — 99999 PR PBB SHADOW E&M-EST. PATIENT-LVL V: CPT | Mod: PBBFAC,,, | Performed by: STUDENT IN AN ORGANIZED HEALTH CARE EDUCATION/TRAINING PROGRAM

## 2022-02-21 PROCEDURE — 3044F PR MOST RECENT HEMOGLOBIN A1C LEVEL <7.0%: ICD-10-PCS | Mod: CPTII,S$GLB,, | Performed by: STUDENT IN AN ORGANIZED HEALTH CARE EDUCATION/TRAINING PROGRAM

## 2022-02-21 PROCEDURE — 99999 PR PBB SHADOW E&M-EST. PATIENT-LVL V: ICD-10-PCS | Mod: PBBFAC,,, | Performed by: STUDENT IN AN ORGANIZED HEALTH CARE EDUCATION/TRAINING PROGRAM

## 2022-02-21 NOTE — TELEPHONE ENCOUNTER
Endoscopy Scheduling Questionnaire:         1. Are you taking any blood thinners? Y Plavix               If Yes  Have you been on them for longer than one year? Y    2. Have you been diagnosed with Diverticulitis in past three months?  N    3. Are you on dialysis or have Kidney Disease? N    4. Previous Colonoscopy?  Y         If yes Do you have a history of colon polyps?  Y      6. Are you a diabetic?  N    7. Do you have a history of constipation?  N      Procedure scheduled with Dr. Nieves  on   4/27/2022    The prep being used is Golytely     The patient's prep instructions were sent by [employer]

## 2022-02-21 NOTE — PROGRESS NOTES
"Subjective:       Patient ID: Masoud Osorio is a 66 y.o. male.    Chief Complaint:  f/u  This is a 66 y.o.  male patient that is an established patient of mine.     The patient is referred to me by his cardiologist Dr. Bailey  for erectile dysfunction. He does have a history of kidney stones which he passed kidney stones x2 about 10 years ago. He currently denies flank pain. He is here today mostly to discuss two issues - lower urinary tract symptoms and erectile dysfunction. He has a significant cardiologic history - he has a history of CAD as well as an NSTEMI 7/2016 requiring PCI. He takes plavix and also Nitroglycerin PRN.  Urinary symptoms-  About a few months ago he began to notice more straining with urination. He also notes that he has to strain more in order to "keep the urination going." he also notes occasional +intermittency of his urinary stream. He does feel like he is emptying well.  He has never been started on any medications for his prostate before.   Erections-  He notes that since he has been on cholesterol medications, plavix over the past year he has noticed a decrease in the quality of his erections. He does experience spontaneous erections. He state he is not sure if it is in his head but he was interested in options for erectile dysfunction. He is currently , he states they have a good relationship and her health is overall very good. After his MI in 2016 he did quit smoking in 2016.   Job - retired from [employer] earlier this year      Has been taking flomax for some time without issue. He sustained a fall in 12/2019 from his roof and was having numbness and getting evaluated by neurosurgery. We added finasteride to flomax to help with his bothersome urinary sx.      2/21/22  He stopped the finasteride for about 6 months without any adverse change in his urinary sx - so now only taking flomax. He notes that he stopped initially due to concerns about ED. He has been compliant with " flomax. He has been taking sildenafil as needed, was taking 20mg which he noted a mild response. Interested in trying a supplement to maybe come off of flomax which he believes is contributing to his ED.     LAST PSA  Lab Results   Component Value Date    PSA 1.8 01/19/2022    PSA 1.7 06/15/2020    PSA 1.3 12/05/2018    PSA 1.5 09/23/2016       Lab Results   Component Value Date    CREATININE 0.9 01/19/2022       ---  Past Medical History:   Diagnosis Date    Anticoagulant long-term use     Anxiety and depression 9/21/2016    trial of ctialopram and follow up in 8 weeks    Arthritis     Benign prostatic hyperplasia (BPH) with straining on urination 12/5/2018    Coronary artery disease involving native coronary artery of native heart without angina pectoris 7/26/2016 7/26/2016 NSTEMI at [facility]   PCI of RCA with 4.0 x 15, 4.0 x 38, and 3.5 x 38 mm Resolute JACOB   PCI of PDA 2.75 x 15 mm JACOB dilated to 3.0 POBA of proximal PLB 2.0 x 12 mm balloon   Normal EF with LVEDP 15 mmHg DAPT score of 2               Elevated PSA     Gout     History of coronary artery stent placement 9/21/2016    History of heart attack 7/25/2016    NSTEMI    Mixed hyperlipidemia 7/17/2017    Overweight (BMI 25.0-29.9) 9/21/2016    Tobacco dependence in remission 7/25/2016       Past Surgical History:   Procedure Laterality Date    COLONOSCOPY N/A 8/16/2017    Procedure: COLONOSCOPY Split Prep;  Surgeon: Maykel Carcamo Jr., MD;  Location: [facility] ENDO;  Service: Endoscopy;  Laterality: N/A;    CORONARY ANGIOPLASTY WITH STENT PLACEMENT      HERNIA REPAIR      ROTATOR CUFF REPAIR      TRANSFORAMINAL EPIDURAL INJECTION OF STEROID Left 6/15/2021    Procedure: Injection,steroid,epidural,transforaminal approach-- Left- L3-4, L4-5;  Surgeon: Enrike Medellin Jr., MD;  Location: [facility] PAIN MGT;  Service: Pain Management;  Laterality: Left;    VASECTOMY         Family History   Problem Relation Age of Onset    Heart disease  Mother     Arthritis Mother     Parkinsonism Mother     Congenital heart disease Father     No Known Problems Sister     No Known Problems Brother     No Known Problems Daughter     No Known Problems Sister     No Known Problems Sister     Prostate cancer Neg Hx     Kidney disease Neg Hx        Social History     Tobacco Use    Smoking status: Former Smoker     Packs/day: 0.50     Types: Cigarettes     Quit date: 2016     Years since quittin.5    Smokeless tobacco: Never Used   Substance Use Topics    Alcohol use: Yes     Comment: rarely    Drug use: No       Current Outpatient Medications on File Prior to Visit   Medication Sig Dispense Refill    atorvastatin (LIPITOR) 80 MG tablet TAKE 1 TABLET BY MOUTH DAILY 90 tablet 3    azelastine (ASTELIN) 137 mcg (0.1 %) nasal spray INSTILL 2 SPRAYS IN EACH NOSTRIL TWICE A DAY  5    azelastine (ASTELIN) 137 mcg (0.1 %) nasal spray Spray 2 sprays in each nostril twice daily 30 mL 5    clopidogreL (PLAVIX) 75 mg tablet TAKE ONE TABLET BY MOUTH DAILY 90 tablet 3    cyclobenzaprine (FLEXERIL) 5 MG tablet Take 5 mg by mouth 3 (three) times daily as needed for Muscle spasms.      diazePAM (VALIUM) 5 MG tablet Take 1/2 to 1 tablet by mouth every day 30 tablet 1    diazePAM (VALIUM) 5 MG tablet Take 0.5 to 1 tablet by mouth every 8 to 12 hours as needed 28 tablet 0    GARLIC EXTRACT ORAL Take by mouth.      hydrOXYzine HCL (ATARAX) 25 MG tablet Take 0.5 to 1 tablet by mouth at bedtime 30 tablet 3    ipratropium (ATROVENT) 0.06 % nasal spray INSTILL 2 SPRAYS IN EACH NOSTRIL EVERY 12 HOURS  5    ipratropium (ATROVENT) 42 mcg (0.06 %) nasal spray Instill two sprays in each nostril every 12 hours. 15 mL 5    multivitamin capsule Take 1 capsule by mouth once daily.      nitroGLYCERIN (NITROSTAT) 0.4 MG SL tablet Place 1 tablet (0.4 mg total) under the tongue every 5 (five) minutes as needed for Chest pain. 25 tablet 11    pregabalin (LYRICA) 75 MG  capsule Take 1 capsule (75 mg total) by mouth 2 (two) times daily. 60 capsule 2    sildenafil (REVATIO) 20 mg Tab Take 1 tablet (20 mg total) by mouth once daily. 30 tablet 11    tamsulosin (FLOMAX) 0.4 mg Cap Take 1 capsule (0.4 mg total) by mouth once daily. 90 capsule 3    triamcinolone (NASACORT) 55 mcg nasal inhaler Instill two sprays in each nostril once daily 16.9 mL 5    aspirin (ECOTRIN) 81 MG EC tablet Take 1 tablet (81 mg total) by mouth once daily.  0    hydrOXYzine HCL (ATARAX) 25 MG tablet Take 1/2 to 1 tablet by mouth at bedtime 30 tablet 3    [DISCONTINUED] finasteride (PROSCAR) 5 mg tablet Take 1 tablet (5 mg total) by mouth once daily. (Patient not taking: Reported on 2/21/2022) 90 tablet 3     No current facility-administered medications on file prior to visit.       Review of patient's allergies indicates:   Allergen Reactions    Penicillins Hives       Review of Systems   Constitutional: Negative for chills.   HENT: Negative for congestion.    Eyes: Negative for visual disturbance.   Respiratory: Negative for shortness of breath.    Cardiovascular: Negative for chest pain.   Gastrointestinal: Negative for abdominal distention.   Musculoskeletal: Negative for gait problem.   Skin: Negative for color change.   Neurological: Negative for dizziness.   Psychiatric/Behavioral: Negative for agitation.       Objective:      Physical Exam  Constitutional:       Appearance: He is well-developed.   HENT:      Head: Normocephalic.   Eyes:      Pupils: Pupils are equal, round, and reactive to light.   Pulmonary:      Effort: Pulmonary effort is normal.   Abdominal:      Palpations: Abdomen is soft.   Musculoskeletal:         General: Normal range of motion.      Cervical back: Normal range of motion.   Skin:     General: Skin is warm and dry.   Neurological:      Mental Status: He is alert.         Assessment:       1. Benign prostatic hyperplasia (BPH) with straining on urination    2. Erectile  dysfunction, unspecified erectile dysfunction type        Plan:       1. Continue flomax; will remove finasteride from his list.   2. Sildenafil counseling. Information in AVS.  3. Prostate ultra - no ingredients list available online, pt will send picture with attachment. Will review I will let him know my impressions. He is hoping could possibly stop flomax with taking this supplement in terms of ED.   4. RTC yearly.      Benign prostatic hyperplasia (BPH) with straining on urination  -     Ambulatory referral/consult to Urology    Erectile dysfunction, unspecified erectile dysfunction type

## 2022-02-22 ENCOUNTER — PATIENT MESSAGE (OUTPATIENT)
Dept: UROLOGY | Facility: CLINIC | Age: 67
End: 2022-02-22
Payer: MEDICARE

## 2022-03-07 RX ORDER — SILDENAFIL CITRATE 20 MG/1
20 TABLET ORAL DAILY
Qty: 30 TABLET | Refills: 11 | Status: SHIPPED | OUTPATIENT
Start: 2022-03-07 | End: 2022-03-28 | Stop reason: SDUPTHER

## 2022-03-08 ENCOUNTER — PATIENT MESSAGE (OUTPATIENT)
Dept: CARDIOLOGY | Facility: CLINIC | Age: 67
End: 2022-03-08
Payer: MEDICARE

## 2022-03-08 ENCOUNTER — PATIENT MESSAGE (OUTPATIENT)
Dept: FAMILY MEDICINE | Facility: CLINIC | Age: 67
End: 2022-03-08
Payer: MEDICARE

## 2022-03-08 DIAGNOSIS — Z95.5 HISTORY OF CORONARY ARTERY STENT PLACEMENT: ICD-10-CM

## 2022-03-08 DIAGNOSIS — E66.09 CLASS 1 OBESITY DUE TO EXCESS CALORIES WITH SERIOUS COMORBIDITY AND BODY MASS INDEX (BMI) OF 30.0 TO 30.9 IN ADULT: ICD-10-CM

## 2022-03-08 DIAGNOSIS — U07.1 COVID-19 VIRUS INFECTION: Primary | ICD-10-CM

## 2022-03-08 DIAGNOSIS — U07.1 COVID: ICD-10-CM

## 2022-03-09 ENCOUNTER — INFUSION (OUTPATIENT)
Dept: INFECTIOUS DISEASES | Facility: HOSPITAL | Age: 67
End: 2022-03-09
Attending: FAMILY MEDICINE
Payer: MEDICARE

## 2022-03-09 VITALS
OXYGEN SATURATION: 95 % | RESPIRATION RATE: 18 BRPM | DIASTOLIC BLOOD PRESSURE: 79 MMHG | HEIGHT: 68 IN | TEMPERATURE: 98 F | SYSTOLIC BLOOD PRESSURE: 143 MMHG | WEIGHT: 190 LBS | BODY MASS INDEX: 28.79 KG/M2 | HEART RATE: 65 BPM

## 2022-03-09 DIAGNOSIS — E66.09 CLASS 1 OBESITY DUE TO EXCESS CALORIES WITH SERIOUS COMORBIDITY AND BODY MASS INDEX (BMI) OF 30.0 TO 30.9 IN ADULT: ICD-10-CM

## 2022-03-09 DIAGNOSIS — Z95.5 HISTORY OF CORONARY ARTERY STENT PLACEMENT: ICD-10-CM

## 2022-03-09 DIAGNOSIS — U07.1 COVID-19 VIRUS INFECTION: ICD-10-CM

## 2022-03-09 PROCEDURE — M0247 HC IV INFUSION, SOTROVIMAB, INCL POST ADMIN MONIT: HCPCS | Performed by: INTERNAL MEDICINE

## 2022-03-09 PROCEDURE — 25000003 PHARM REV CODE 250: Performed by: INTERNAL MEDICINE

## 2022-03-09 PROCEDURE — 63600175 PHARM REV CODE 636 W HCPCS: Performed by: INTERNAL MEDICINE

## 2022-03-09 RX ORDER — DIPHENHYDRAMINE HYDROCHLORIDE 50 MG/ML
25 INJECTION INTRAMUSCULAR; INTRAVENOUS
Status: ACTIVE | OUTPATIENT
Start: 2022-03-09 | End: 2022-03-16

## 2022-03-09 RX ORDER — ALBUTEROL SULFATE 90 UG/1
2 AEROSOL, METERED RESPIRATORY (INHALATION)
Status: ACTIVE | OUTPATIENT
Start: 2022-03-09 | End: 2022-03-16

## 2022-03-09 RX ORDER — EPINEPHRINE 0.3 MG/.3ML
0.3 INJECTION SUBCUTANEOUS
Status: ACTIVE | OUTPATIENT
Start: 2022-03-09 | End: 2022-03-16

## 2022-03-09 RX ORDER — ONDANSETRON 4 MG/1
4 TABLET, ORALLY DISINTEGRATING ORAL
Status: ACTIVE | OUTPATIENT
Start: 2022-03-09 | End: 2022-03-16

## 2022-03-09 RX ORDER — ACETAMINOPHEN 325 MG/1
650 TABLET ORAL ONCE AS NEEDED
Status: DISCONTINUED | OUTPATIENT
Start: 2022-03-09 | End: 2022-03-28

## 2022-03-09 RX ORDER — SODIUM CHLORIDE 0.9 % (FLUSH) 0.9 %
10 SYRINGE (ML) INJECTION
Status: ACTIVE | OUTPATIENT
Start: 2022-03-09 | End: 2022-03-16

## 2022-03-09 RX ADMIN — SODIUM CHLORIDE 500 MG: 9 INJECTION, SOLUTION INTRAVENOUS at 12:03

## 2022-03-09 NOTE — PROGRESS NOTES
Patient arrives for sotrovimab infusion. Ambulatory. Pt AAox3. No distress noted. RR even and unlabored.     Symptoms and onset date:  Congestion, cough, sore throat, fever, runny nose. Onset 3/6/22.      Tested COVID + on 3/7/22.

## 2022-03-09 NOTE — PROGRESS NOTES
Patient remains with no signs of complications noted. Patient received sotrovimab infusion with filtered tubing according to FDA recommendations and [facility] S[facility] without complications noted and left with mask in place. Drug fact sheet provided. Pt discharged home. Ambulatory. Remains AAox3. No distress noted. RR even and unlabored.

## 2022-03-27 NOTE — PROGRESS NOTES
Subjective:   Patient ID:  Masoud Osorio is a 66 y.o. male who presents for follow up of Coronary Artery Disease, Hyperlipidemia, and Hypertension      HPI:          Masoud Osorio 66 y.o. male is here follow up and feeling well without any new complaints. He was admitted for NSTEMI that required PCI of RCA, PDA, and POBA of PLB with a preserved EF in 7/2016. He quit smoking. He is on aspirin and brilinta for DAPT witjh a DAPT score of 2. Stress test in 3/2017 for atypical chest pain revealed no reversible ischemia. He has been pain free since except for occasional reflux after evening meals. Admitted in 10/2017 with CP again. 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 Normal EF. ECG 6/2018: NSR.         Echo 2/2021    · Low normal systolic function. The estimated ejection fraction is 50%  · Normal left ventricular diastolic function.  · Normal right ventricular size with normal right ventricular systolic function.  · Normal central venous pressure (3 mmHg).                 Patient Active Problem List    Diagnosis Date Noted    COVID 03/08/2022    Lipoma of skin and subcutaneous tissue of neck 02/08/2022    Chronic pain 06/15/2021    Neuroforaminal stenosis of lumbar spine 06/07/2021    DDD (degenerative disc disease), lumbar 06/07/2021    Lumbar radiculopathy 06/07/2021    Osteoarthritis of spine with radiculopathy, lumbar region 06/07/2021    Compression fracture of L1 lumbar vertebra 12/20/2019    Fall 12/20/2019    Leukocytosis 12/20/2019    Benign prostatic hyperplasia (BPH) with straining on urination 12/05/2018    Chronic actinic dermatitis 12/05/2018    Chronic bilateral low back pain without sciatica 12/05/2018    Non-seasonal allergic rhinitis 12/05/2018     Managed by outside ENT doctor Raya.  Continue follow-up and regular nasal spray      Pain and swelling of left knee 10/11/2017    Mixed hyperlipidemia 07/17/2017    History of colon  polyps 10/24/2016    Class 1 obesity due to excess calories with serious comorbidity and body mass index (BMI) of 30.0 to 30.9 in adult 2016    Long-term use of aspirin therapy 2016    History of coronary artery stent placement 2016    Visit for monitoring Plavix therapy 2016    Anxiety and depression 2016     trial of ctialopram and follow up in 8 weeks      Coronary artery disease involving native coronary artery of native heart without angina pectoris 2016 [facility]STEMI at [facility]     PCI of RCA with 4.0 x 15, 4.0 x 38, and 3.5 x 38 mm Resolute JACOB    PCI of PDA 2.75 x 15 mm JACOB dilated to 3.0  POBA of proximal PLB 2.0 x 12 mm balloon      Normal EF with LVEDP 15 mmHg  DAPT score of 2                        History of heart attack 2016     NSTEMI      Tobacco dependence in remission 2016           Right Arm BP - Sittin/79  Left Arm BP - Sittin/79        LABS    LAST HbA1c  Lab Results   Component Value Date    HGBA1C 5.7 (H) 2022       Lipid panel  Lab Results   Component Value Date    CHOL 108 (L) 2022    CHOL 117 (L) 2021    CHOL 99 (L) 06/15/2020     Lab Results   Component Value Date    HDL 37 (L) 2022    HDL 40 2021    HDL 38 (L) 06/15/2020     Lab Results   Component Value Date    LDLCALC 52.0 (L) 2022    LDLCALC 60.2 (L) 2021    LDLCALC 49.6 (L) 06/15/2020     Lab Results   Component Value Date    TRIG 95 2022    TRIG 84 2021    TRIG 57 06/15/2020     Lab Results   Component Value Date    CHOLHDL 34.3 2022    CHOLHDL 34.2 2021    CHOLHDL 38.4 06/15/2020            Review of Systems   Constitutional: Negative for diaphoresis, night sweats, weight gain and weight loss.   HENT: Negative for congestion.    Eyes: Negative for blurred vision, discharge and double vision.   Cardiovascular: Negative for chest pain, claudication, cyanosis, dyspnea on exertion, irregular  heartbeat, leg swelling, near-syncope, orthopnea, palpitations, paroxysmal nocturnal dyspnea and syncope.   Respiratory: Negative for cough, shortness of breath and wheezing.    Endocrine: Negative for cold intolerance, heat intolerance and polyphagia.   Hematologic/Lymphatic: Negative for adenopathy and bleeding problem. Does not bruise/bleed easily.   Skin: Negative for dry skin and nail changes.   Musculoskeletal: Negative for arthritis, back pain, falls, joint pain, myalgias and neck pain.   Gastrointestinal: Negative for bloating, abdominal pain, change in bowel habit and constipation.   Genitourinary: Negative for bladder incontinence, dysuria, flank pain, genital sores and missed menses.   Neurological: Negative for aphonia, brief paralysis, difficulty with concentration, dizziness and weakness.   Psychiatric/Behavioral: Negative for altered mental status and memory loss. The patient does not have insomnia.    Allergic/Immunologic: Negative for environmental allergies.       Objective:   Physical Exam  Constitutional:       Appearance: He is well-developed.      Interventions: He is not intubated.  HENT:      Head: Normocephalic and atraumatic.      Right Ear: External ear normal.      Left Ear: External ear normal.   Eyes:      General: No scleral icterus.        Right eye: No discharge.         Left eye: No discharge.      Conjunctiva/sclera: Conjunctivae normal.      Pupils: Pupils are equal, round, and reactive to light.   Neck:      Thyroid: No thyromegaly.      Vascular: Normal carotid pulses. No carotid bruit, hepatojugular reflux or JVD.      Trachea: No tracheal deviation.   Cardiovascular:      Rate and Rhythm: Normal rate and regular rhythm.  No extrasystoles are present.     Chest Wall: PMI is not displaced.      Pulses: No midsystolic click.           Carotid pulses are 2+ on the right side and 2+ on the left side.       Radial pulses are 2+ on the right side and 2+ on the left side.         Femoral pulses are 2+ on the right side and 2+ on the left side.       Popliteal pulses are 2+ on the right side and 2+ on the left side.        Dorsalis pedis pulses are 2+ on the right side and 2+ on the left side.        Posterior tibial pulses are 2+ on the right side and 2+ on the left side.      Heart sounds: S1 normal and S2 normal. Heart sounds not distant. No murmur heard.    No friction rub. No gallop. No S3 sounds.   Pulmonary:      Effort: Pulmonary effort is normal. No tachypnea, bradypnea, accessory muscle usage or respiratory distress. He is not intubated.      Breath sounds: Normal breath sounds. No stridor. No decreased breath sounds, wheezing or rales.   Chest:      Chest wall: No tenderness.   Abdominal:      General: There is no distension or abdominal bruit.      Palpations: There is no mass or pulsatile mass.      Tenderness: There is no abdominal tenderness. There is no guarding or rebound.   Musculoskeletal:         General: No tenderness. Normal range of motion.      Cervical back: Normal range of motion and neck supple.   Lymphadenopathy:      Cervical: No cervical adenopathy.   Skin:     General: Skin is warm.      Coloration: Skin is not pale.      Findings: No erythema or rash.   Neurological:      Mental Status: He is alert and oriented to person, place, and time.      Cranial Nerves: No cranial nerve deficit.      Coordination: Coordination normal.      Deep Tendon Reflexes: Reflexes are normal and symmetric.   Psychiatric:         Behavior: Behavior normal.         Thought Content: Thought content normal.         Judgment: Judgment normal.               Assessment:     1. Coronary artery disease involving native coronary artery of native heart without angina pectoris    2. History of coronary artery stent placement    3. Mixed hyperlipidemia    4. History of heart attack    5. Tobacco dependence in remission    6. Coronary artery disease involving native coronary artery of native  heart without angina pectoris        Plan:           Continue with current medical plan and lifestyle changes.  Return sooner for concerns or questions. If symptoms persist go to the ED  I have reviewed all pertinent data on this patient         He completed > 12 months of DAPT. He can stop plavix. He can take medications for ED safely          Patient can proceed with non cardiac surgery with acceptable risks  Hold aspirin and plavix 5 days before the case           Consider sleep study to rule out NATALIE      I have reviewed the patient's medical history in detail and updated the computerized patient record.    Orders Placed This Encounter   Procedures    CBC Auto Differential     Standing Status:   Future     Standing Expiration Date:   9/28/2023    Comprehensive Metabolic Panel     Standing Status:   Future     Standing Expiration Date:   9/28/2023    Lipid Panel     Standing Status:   Future     Standing Expiration Date:   9/28/2023       Follow up as scheduled. Return sooner for concerns or questions        Patient's Medications   New Prescriptions    No medications on file   Previous Medications    ASPIRIN (ECOTRIN) 81 MG EC TABLET    Take 1 tablet (81 mg total) by mouth once daily.    AZELASTINE (ASTELIN) 137 MCG (0.1 %) NASAL SPRAY    Spray 2 sprays in each nostril twice daily    DIAZEPAM (VALIUM) 5 MG TABLET    Take 0.5 to 1 tablet by mouth every 8 to 12 hours as needed    GARLIC EXTRACT ORAL    Take by mouth.    HYDROXYZINE HCL (ATARAX) 25 MG TABLET    Take 0.5 to 1 tablet by mouth at bedtime    IPRATROPIUM (ATROVENT) 0.06 % NASAL SPRAY    INSTILL 2 SPRAYS IN EACH NOSTRIL EVERY 12 HOURS    LEVOFLOXACIN (LEVAQUIN) 500 MG TABLET    Take 1 tablet (500 mg total) by mouth once daily.    MULTIVITAMIN CAPSULE    Take 1 capsule by mouth once daily.    PREGABALIN (LYRICA) 75 MG CAPSULE    Take 1 capsule (75 mg total) by mouth 2 (two) times daily.    TAMSULOSIN (FLOMAX) 0.4 MG CAP    Take 1 capsule (0.4 mg total)  by mouth once daily.    TRIAMCINOLONE (NASACORT) 55 MCG NASAL INHALER    Instill two sprays in each nostril once daily   Modified Medications    Modified Medication Previous Medication    ATORVASTATIN (LIPITOR) 80 MG TABLET atorvastatin (LIPITOR) 80 MG tablet       TAKE 1 TABLET BY MOUTH DAILY    TAKE 1 TABLET BY MOUTH DAILY    CLOPIDOGREL (PLAVIX) 75 MG TABLET clopidogreL (PLAVIX) 75 mg tablet       TAKE ONE TABLET BY MOUTH DAILY    TAKE ONE TABLET BY MOUTH DAILY    NITROGLYCERIN (NITROSTAT) 0.4 MG SL TABLET nitroGLYCERIN (NITROSTAT) 0.4 MG SL tablet       Place 1 tablet (0.4 mg total) under the tongue every 5 (five) minutes as needed for Chest pain.    Place 1 tablet (0.4 mg total) under the tongue every 5 (five) minutes as needed for Chest pain.    SILDENAFIL (REVATIO) 20 MG TAB sildenafil (REVATIO) 20 mg Tab       Take 1 tablet (20 mg total) by mouth once daily.    Take 1 tablet (20 mg total) by mouth once daily.   Discontinued Medications

## 2022-03-28 ENCOUNTER — OFFICE VISIT (OUTPATIENT)
Dept: CARDIOLOGY | Facility: CLINIC | Age: 67
End: 2022-03-28
Payer: MEDICARE

## 2022-03-28 VITALS
SYSTOLIC BLOOD PRESSURE: 128 MMHG | HEIGHT: 68 IN | BODY MASS INDEX: 30.31 KG/M2 | DIASTOLIC BLOOD PRESSURE: 79 MMHG | HEART RATE: 82 BPM | WEIGHT: 200 LBS

## 2022-03-28 DIAGNOSIS — F17.201 TOBACCO DEPENDENCE IN REMISSION: ICD-10-CM

## 2022-03-28 DIAGNOSIS — Z95.5 HISTORY OF CORONARY ARTERY STENT PLACEMENT: ICD-10-CM

## 2022-03-28 DIAGNOSIS — E78.2 MIXED HYPERLIPIDEMIA: ICD-10-CM

## 2022-03-28 DIAGNOSIS — I25.2 HISTORY OF HEART ATTACK: ICD-10-CM

## 2022-03-28 DIAGNOSIS — I25.10 CORONARY ARTERY DISEASE INVOLVING NATIVE CORONARY ARTERY OF NATIVE HEART WITHOUT ANGINA PECTORIS: ICD-10-CM

## 2022-03-28 DIAGNOSIS — I25.10 CORONARY ARTERY DISEASE INVOLVING NATIVE CORONARY ARTERY OF NATIVE HEART WITHOUT ANGINA PECTORIS: Primary | ICD-10-CM

## 2022-03-28 PROCEDURE — 3288F PR FALLS RISK ASSESSMENT DOCUMENTED: ICD-10-PCS | Mod: CPTII,S$GLB,, | Performed by: INTERNAL MEDICINE

## 2022-03-28 PROCEDURE — 1159F MED LIST DOCD IN RCRD: CPT | Mod: CPTII,S$GLB,, | Performed by: INTERNAL MEDICINE

## 2022-03-28 PROCEDURE — 99999 PR PBB SHADOW E&M-EST. PATIENT-LVL IV: ICD-10-PCS | Mod: PBBFAC,,, | Performed by: INTERNAL MEDICINE

## 2022-03-28 PROCEDURE — 3078F DIAST BP <80 MM HG: CPT | Mod: CPTII,S$GLB,, | Performed by: INTERNAL MEDICINE

## 2022-03-28 PROCEDURE — 3078F PR MOST RECENT DIASTOLIC BLOOD PRESSURE < 80 MM HG: ICD-10-PCS | Mod: CPTII,S$GLB,, | Performed by: INTERNAL MEDICINE

## 2022-03-28 PROCEDURE — 3044F HG A1C LEVEL LT 7.0%: CPT | Mod: CPTII,S$GLB,, | Performed by: INTERNAL MEDICINE

## 2022-03-28 PROCEDURE — 3288F FALL RISK ASSESSMENT DOCD: CPT | Mod: CPTII,S$GLB,, | Performed by: INTERNAL MEDICINE

## 2022-03-28 PROCEDURE — 3008F PR BODY MASS INDEX (BMI) DOCUMENTED: ICD-10-PCS | Mod: CPTII,S$GLB,, | Performed by: INTERNAL MEDICINE

## 2022-03-28 PROCEDURE — 3044F PR MOST RECENT HEMOGLOBIN A1C LEVEL <7.0%: ICD-10-PCS | Mod: CPTII,S$GLB,, | Performed by: INTERNAL MEDICINE

## 2022-03-28 PROCEDURE — 3074F SYST BP LT 130 MM HG: CPT | Mod: CPTII,S$GLB,, | Performed by: INTERNAL MEDICINE

## 2022-03-28 PROCEDURE — 1126F PR PAIN SEVERITY QUANTIFIED, NO PAIN PRESENT: ICD-10-PCS | Mod: CPTII,S$GLB,, | Performed by: INTERNAL MEDICINE

## 2022-03-28 PROCEDURE — 3074F PR MOST RECENT SYSTOLIC BLOOD PRESSURE < 130 MM HG: ICD-10-PCS | Mod: CPTII,S$GLB,, | Performed by: INTERNAL MEDICINE

## 2022-03-28 PROCEDURE — 1126F AMNT PAIN NOTED NONE PRSNT: CPT | Mod: CPTII,S$GLB,, | Performed by: INTERNAL MEDICINE

## 2022-03-28 PROCEDURE — 99999 PR PBB SHADOW E&M-EST. PATIENT-LVL IV: CPT | Mod: PBBFAC,,, | Performed by: INTERNAL MEDICINE

## 2022-03-28 PROCEDURE — 1101F PT FALLS ASSESS-DOCD LE1/YR: CPT | Mod: CPTII,S$GLB,, | Performed by: INTERNAL MEDICINE

## 2022-03-28 PROCEDURE — 99215 OFFICE O/P EST HI 40 MIN: CPT | Mod: S$GLB,,, | Performed by: INTERNAL MEDICINE

## 2022-03-28 PROCEDURE — 3008F BODY MASS INDEX DOCD: CPT | Mod: CPTII,S$GLB,, | Performed by: INTERNAL MEDICINE

## 2022-03-28 PROCEDURE — 99215 PR OFFICE/OUTPT VISIT, EST, LEVL V, 40-54 MIN: ICD-10-PCS | Mod: S$GLB,,, | Performed by: INTERNAL MEDICINE

## 2022-03-28 PROCEDURE — 1159F PR MEDICATION LIST DOCUMENTED IN MEDICAL RECORD: ICD-10-PCS | Mod: CPTII,S$GLB,, | Performed by: INTERNAL MEDICINE

## 2022-03-28 PROCEDURE — 1101F PR PT FALLS ASSESS DOC 0-1 FALLS W/OUT INJ PAST YR: ICD-10-PCS | Mod: CPTII,S$GLB,, | Performed by: INTERNAL MEDICINE

## 2022-03-28 RX ORDER — NITROGLYCERIN 0.4 MG/1
0.4 TABLET SUBLINGUAL EVERY 5 MIN PRN
Qty: 25 TABLET | Refills: 11 | Status: SHIPPED | OUTPATIENT
Start: 2022-03-28 | End: 2023-05-15 | Stop reason: SDUPTHER

## 2022-03-28 RX ORDER — SILDENAFIL CITRATE 20 MG/1
20 TABLET ORAL DAILY
Qty: 30 TABLET | Refills: 11 | Status: SHIPPED | OUTPATIENT
Start: 2022-03-28 | End: 2023-05-15 | Stop reason: SDUPTHER

## 2022-03-28 RX ORDER — CLOPIDOGREL BISULFATE 75 MG/1
TABLET ORAL
Qty: 90 TABLET | Refills: 3 | Status: SHIPPED | OUTPATIENT
Start: 2022-03-28 | End: 2023-02-27

## 2022-03-28 RX ORDER — ATORVASTATIN CALCIUM 80 MG/1
TABLET, FILM COATED ORAL
Qty: 90 TABLET | Refills: 3 | Status: SHIPPED | OUTPATIENT
Start: 2022-03-28 | End: 2023-03-14 | Stop reason: SDUPTHER

## 2022-03-30 ENCOUNTER — OFFICE VISIT (OUTPATIENT)
Dept: PSYCHIATRY | Facility: CLINIC | Age: 67
End: 2022-03-30
Payer: MEDICARE

## 2022-03-30 DIAGNOSIS — F43.20 ADJUSTMENT DISORDER, UNSPECIFIED TYPE: Primary | ICD-10-CM

## 2022-03-30 PROCEDURE — 90791 PR PSYCHIATRIC DIAGNOSTIC EVALUATION: ICD-10-PCS | Mod: S$GLB,,, | Performed by: SOCIAL WORKER

## 2022-03-30 PROCEDURE — 1159F PR MEDICATION LIST DOCUMENTED IN MEDICAL RECORD: ICD-10-PCS | Mod: CPTII,S$GLB,, | Performed by: SOCIAL WORKER

## 2022-03-30 PROCEDURE — 3044F PR MOST RECENT HEMOGLOBIN A1C LEVEL <7.0%: ICD-10-PCS | Mod: CPTII,S$GLB,, | Performed by: SOCIAL WORKER

## 2022-03-30 PROCEDURE — 90791 PSYCH DIAGNOSTIC EVALUATION: CPT | Mod: S$GLB,,, | Performed by: SOCIAL WORKER

## 2022-03-30 PROCEDURE — 99999 PR PBB SHADOW E&M-EST. PATIENT-LVL I: CPT | Mod: PBBFAC,,, | Performed by: SOCIAL WORKER

## 2022-03-30 PROCEDURE — 99999 PR PBB SHADOW E&M-EST. PATIENT-LVL I: ICD-10-PCS | Mod: PBBFAC,,, | Performed by: SOCIAL WORKER

## 2022-03-30 PROCEDURE — 1159F MED LIST DOCD IN RCRD: CPT | Mod: CPTII,S$GLB,, | Performed by: SOCIAL WORKER

## 2022-03-30 PROCEDURE — 3044F HG A1C LEVEL LT 7.0%: CPT | Mod: CPTII,S$GLB,, | Performed by: SOCIAL WORKER

## 2022-03-30 NOTE — PROGRESS NOTES
Psychiatry Initial Visit (PhD/LCSW)  Diagnostic Interview - CPT 66605    Date: 3/30/2022    Site: [facility]    Referral source: himself    Clinical status of patient: Outpatient    Masoud Osorio, a 66 y.o. male, for initial evaluation visit.  Met with patient.    Chief complaint/reason for encounter: depression, anger, anxiety, sleep, behavior, somatic and interpersonal    History of present illness: having some moodiness, and anger issues, and medical issues, and is doing better with medical health, wants to learn anger management skills and be less reactive,  Wants to lose ten pounds, and exercise more, and also avoid becoming diabetic as he states he is borderline diabetic, wants to improve himself. Motivated to make changes and manage feelings better, gets aggravated easily, when driving and in other settings, verbally abusive to wife at times, he is very motivated to improve,  since 1995, three grown children, two step-sons and one daughter, and four grandchildren and sons live close to him or in the area. He says his anger does not go back in the family, more what he has learned along the way in his life, he is one five siblings, second to the youngest. It helps him to talk about his feelings and also when he is anger. He states he is quick to anger and escalates. And willing to learn other ways to be angry safely. 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 He had a heart attack in 2016 and retired in 2018. He is doing much better now. He reads some. He likes to travel with his wife and their motor home. Has not done much with COVID being around. Likes to luz about the house and does wood work, and likes to fix things.    Pain: 0    Symptoms:   · Mood: denied  · Anxiety: restlessness/keyed up, irritability and muscle tension  · Substance abuse: denied  · Cognitive functioning: denied  · Health behaviors: has numbness in his  left leg, lower back pain. come and goes. a tingling sensation in his left leg and numbness, feel from his roof  in 2019, one year after he retired. from the fall he had compression fractions to L! and L2 at the time.    Psychiatric history: has participated in counseling/psychotherapy on an outpatient basis in the past    Medical history: pain, wants lose 10 pounds, and take better care of himself.     Family history of psychiatric illness: not known    Social history (marriage, employment, etc.): is retired, lives with wife, and has three grown children and four grand children.    Substance use:   Alcohol: social   Drugs: light use of marijuana   Tobacco: none   Caffeine: 2 cups a day    Current medications and drug reactions (include OTC, herbal): see medication list medications for heart attack and numbness in legs, also has prostrate concerns.    Strengths and liabilities: Strength: Patient accepts guidance/feedback, Strength: Patient is expressive/articulate., Strength: Patient is intelligent., Strength: Patient is motivated for change., Strength: Patient is physically healthy., Strength: Patient has positive support network., Strength: Patient has reasonable judgment., Strength: Patient is stable., patient is motivate to make changes.    Current Evaluation:     Mental Status Exam:  General Appearance:  unremarkable, age appropriate   Speech: normal tone, normal rate, normal pitch, normal volume      Level of Cooperation: cooperative      Thought Processes: normal and logical   Mood: angry, anxious, irritable      Thought Content: normal, no suicidality, no homicidality, delusions, or paranoia   Affect: congruent and appropriate   Orientation: Oriented x3   Memory: recent >  intact, remote >  intact   Attention Span & Concentration: intact   Fund of General Knowledge: intact and appropriate to age and level of education   Abstract Reasoning: interpretation of similarities was concrete   Judgment & Insight:  good     Language  intact     Diagnostic Impression - Plan:       ICD-10-CM ICD-9-CM   1. Adjustment disorder, unspecified type  F43.20 309.9       Plan:individual psychotherapy    Return to Clinic: 2 weeks    Length of Service (minutes): 30

## 2022-03-31 ENCOUNTER — TELEPHONE (OUTPATIENT)
Dept: ENDOSCOPY | Facility: HOSPITAL | Age: 67
End: 2022-03-31
Payer: MEDICARE

## 2022-03-31 NOTE — TELEPHONE ENCOUNTER
Spoke to Masoud Osorio confirmed cardiac clearance for Plavix 5 days prior to procedure on 04/27/2022. Verbalized understanding

## 2022-04-18 DIAGNOSIS — M54.16 LUMBAR RADICULOPATHY: Primary | ICD-10-CM

## 2022-04-18 RX ORDER — PREGABALIN 75 MG/1
75 CAPSULE ORAL 2 TIMES DAILY
Qty: 60 CAPSULE | Refills: 0 | Status: SHIPPED | OUTPATIENT
Start: 2022-04-18 | End: 2022-05-19 | Stop reason: SDUPTHER

## 2022-04-18 NOTE — TELEPHONE ENCOUNTER
Patient will be directed to request future refills of this non-opioid medication from his PCP since we do not have an active treatment plan with him or follow up since Oct 2021.  If our services are needed in the future, we are happy to step in again.    Enrike Medellin Jr, MD  Interventional Pain Medicine / Anesthesiology

## 2022-04-19 ENCOUNTER — PATIENT MESSAGE (OUTPATIENT)
Dept: FAMILY MEDICINE | Facility: CLINIC | Age: 67
End: 2022-04-19
Payer: MEDICARE

## 2022-04-22 ENCOUNTER — TELEPHONE (OUTPATIENT)
Dept: FAMILY MEDICINE | Facility: CLINIC | Age: 67
End: 2022-04-22
Payer: MEDICARE

## 2022-04-22 NOTE — TELEPHONE ENCOUNTER
Contacted pt to inform him since he is vaccinated a covid test is not needed. Pt verbalized understanding and was inquiring about his procedure. I informed pt he should contact the office that scheduled the procedure for more information. Pt verbalized understanding. Appt was canceled.

## 2022-04-25 ENCOUNTER — TELEPHONE (OUTPATIENT)
Dept: ENDOSCOPY | Facility: HOSPITAL | Age: 67
End: 2022-04-25
Payer: MEDICARE

## 2022-04-25 NOTE — TELEPHONE ENCOUNTER
Spoke with patient about arrival time @ 0900.   Covid test = BOOSTED  Prep instructions reviewed: the day before the procedure, follow a clear liquid diet all day, then start the first 1/2 of prep at 5pm and take 2nd 1/2 of prep @0400.  Pt must be completely NPO when prep completed @ 0600.              Medications: Do not take Insulin or oral diabetic medications the day of the procedure.  Take as prescribed: heart, seizure and blood pressure medication in the morning with a sip of water (less than an ounce).  Take any breathing medications and bring inhalers to hospital with you Leave all valuables and jewelry at home.     Wear comfortable clothes to procedure to change into hospital gown You cannot drive for 24 hours after your procedure because you will receive sedation for your procedure to make you comfortable.  A ride must be provided at discharge.

## 2022-04-27 ENCOUNTER — HOSPITAL ENCOUNTER (OUTPATIENT)
Facility: HOSPITAL | Age: 67
Discharge: HOME | End: 2022-04-27
Attending: INTERNAL MEDICINE | Admitting: INTERNAL MEDICINE
Payer: MEDICARE

## 2022-04-27 ENCOUNTER — ANESTHESIA EVENT (OUTPATIENT)
Dept: ENDOSCOPY | Facility: HOSPITAL | Age: 67
End: 2022-04-27
Payer: MEDICARE

## 2022-04-27 ENCOUNTER — ANESTHESIA (OUTPATIENT)
Dept: ENDOSCOPY | Facility: HOSPITAL | Age: 67
End: 2022-04-27
Payer: MEDICARE

## 2022-04-27 VITALS
BODY MASS INDEX: 30.31 KG/M2 | HEART RATE: 64 BPM | WEIGHT: 200 LBS | OXYGEN SATURATION: 96 % | RESPIRATION RATE: 14 BRPM | DIASTOLIC BLOOD PRESSURE: 76 MMHG | HEIGHT: 68 IN | SYSTOLIC BLOOD PRESSURE: 118 MMHG | TEMPERATURE: 98 F

## 2022-04-27 DIAGNOSIS — K63.5 COLON POLYP: ICD-10-CM

## 2022-04-27 PROCEDURE — 45385 COLONOSCOPY W/LESION REMOVAL: CPT | Mod: PT | Performed by: INTERNAL MEDICINE

## 2022-04-27 PROCEDURE — 88305 TISSUE EXAM BY PATHOLOGIST: CPT | Mod: 26,,, | Performed by: PATHOLOGY

## 2022-04-27 PROCEDURE — 27201089 HC SNARE, DISP (ANY): Performed by: INTERNAL MEDICINE

## 2022-04-27 PROCEDURE — 88305 TISSUE EXAM BY PATHOLOGIST: ICD-10-PCS | Mod: 26,,, | Performed by: PATHOLOGY

## 2022-04-27 PROCEDURE — 88305 TISSUE EXAM BY PATHOLOGIST: CPT | Mod: 59 | Performed by: PATHOLOGY

## 2022-04-27 PROCEDURE — 37000008 HC ANESTHESIA 1ST 15 MINUTES: Performed by: INTERNAL MEDICINE

## 2022-04-27 PROCEDURE — 45380 COLONOSCOPY AND BIOPSY: CPT | Mod: NSCH,59,PT, | Performed by: INTERNAL MEDICINE

## 2022-04-27 PROCEDURE — 63600175 PHARM REV CODE 636 W HCPCS: Performed by: NURSE ANESTHETIST, CERTIFIED REGISTERED

## 2022-04-27 PROCEDURE — 45380 PR COLONOSCOPY,BIOPSY: ICD-10-PCS | Mod: NSCH,59,PT, | Performed by: INTERNAL MEDICINE

## 2022-04-27 PROCEDURE — 37000009 HC ANESTHESIA EA ADD 15 MINS: Performed by: INTERNAL MEDICINE

## 2022-04-27 PROCEDURE — 45380 COLONOSCOPY AND BIOPSY: CPT | Mod: PT,59 | Performed by: INTERNAL MEDICINE

## 2022-04-27 PROCEDURE — 45385 COLONOSCOPY W/LESION REMOVAL: CPT | Mod: NSCH,PT,, | Performed by: INTERNAL MEDICINE

## 2022-04-27 PROCEDURE — 25000003 PHARM REV CODE 250: Performed by: NURSE ANESTHETIST, CERTIFIED REGISTERED

## 2022-04-27 PROCEDURE — 25000003 PHARM REV CODE 250: Performed by: INTERNAL MEDICINE

## 2022-04-27 PROCEDURE — 45385 PR COLONOSCOPY,REMV LESN,SNARE: ICD-10-PCS | Mod: NSCH,PT,, | Performed by: INTERNAL MEDICINE

## 2022-04-27 RX ORDER — PROPOFOL 10 MG/ML
VIAL (ML) INTRAVENOUS CONTINUOUS PRN
Status: DISCONTINUED | OUTPATIENT
Start: 2022-04-27 | End: 2022-04-27

## 2022-04-27 RX ORDER — LIDOCAINE HYDROCHLORIDE 20 MG/ML
INJECTION INTRAVENOUS
Status: DISCONTINUED | OUTPATIENT
Start: 2022-04-27 | End: 2022-04-27

## 2022-04-27 RX ORDER — PROPOFOL 10 MG/ML
VIAL (ML) INTRAVENOUS
Status: DISCONTINUED | OUTPATIENT
Start: 2022-04-27 | End: 2022-04-27

## 2022-04-27 RX ORDER — SODIUM CHLORIDE 0.9 % (FLUSH) 0.9 %
10 SYRINGE (ML) INJECTION
Status: DISCONTINUED | OUTPATIENT
Start: 2022-04-27 | End: 2022-04-27 | Stop reason: HOSPADM

## 2022-04-27 RX ORDER — SODIUM CHLORIDE 9 MG/ML
INJECTION, SOLUTION INTRAVENOUS CONTINUOUS
Status: DISCONTINUED | OUTPATIENT
Start: 2022-04-27 | End: 2022-04-27 | Stop reason: HOSPADM

## 2022-04-27 RX ADMIN — SODIUM CHLORIDE: 0.9 INJECTION, SOLUTION INTRAVENOUS at 09:04

## 2022-04-27 RX ADMIN — PROPOFOL 150 MCG/KG/MIN: 10 INJECTION, EMULSION INTRAVENOUS at 10:04

## 2022-04-27 RX ADMIN — LIDOCAINE HYDROCHLORIDE 100 MG: 20 INJECTION, SOLUTION INTRAVENOUS at 10:04

## 2022-04-27 RX ADMIN — PROPOFOL 80 MG: 10 INJECTION, EMULSION INTRAVENOUS at 10:04

## 2022-04-27 NOTE — TRANSFER OF CARE
"Anesthesia Transfer of Care Note    Patient: Masoud Osorio    Procedure(s) Performed: Procedure(s) (LRB):  COLONOSCOPY Golytely (N/A)    Patient location: GI    Anesthesia Type: MAC    Transport from OR: Transported from OR on room air with adequate spontaneous ventilation    Post pain: adequate analgesia    Post assessment: no apparent anesthetic complications and tolerated procedure well    Post vital signs: stable    Level of consciousness: awake, alert and oriented    Nausea/Vomiting: no nausea/vomiting    Complications: none    Transfer of care protocol was followed      Last vitals:   Visit Vitals  BP (!) 154/88 (BP Location: Left arm, Patient Position: Lying)   Pulse (!) 58   Temp 37.1 °C (98.8 °F) (Temporal)   Resp 18   Ht 5' 8" (1.727 m)   Wt 90.7 kg (200 lb)   SpO2 97%   BMI 30.41 kg/m²     "

## 2022-04-27 NOTE — PROVATION PATIENT INSTRUCTIONS
Discharge Summary/Instructions after an Endoscopic Procedure  Patient Name: Masoud Osorio  Patient MRN: [id number]  Patient YOB: 1955 Wednesday, April 27, 2022  Henrry Nieves MD  Dear patient,  As a result of recent federal legislation (The Federal Cures Act), you may   receive lab or pathology results from your procedure in your [facility]   account before your physician is able to contact you. Your physician or   their representative will relay the results to you with their   recommendations at their soonest availability.  Thank you,  Your health is very important to us during the Covid Crisis. Following your   procedure today, you will receive a daily text for 2 weeks asking about   signs or symptoms of Covid 19.  Please respond to this text when you   receive it so we can follow up and keep you as safe as possible.   RESTRICTIONS:  During your procedure today, you received medications for sedation.  These   medications may affect your judgment, balance and coordination.  Therefore,   for 24 hours, you have the following restrictions:   - DO NOT drive a car, operate machinery, make legal/financial decisions,   sign important papers or drink alcohol.    ACTIVITY:  Today: no heavy lifting, straining or running due to procedural   sedation/anesthesia.  The following day: return to full activity including work.  DIET:  Eat and drink normally unless instructed otherwise.     TREATMENT FOR COMMON SIDE EFFECTS:  - Mild abdominal pain, nausea, belching, bloating or excessive gas:  rest,   eat lightly and use a heating pad.  - Sore Throat: treat with throat lozenges and/or gargle with warm salt   water.  - Because air was used during the procedure, expelling large amounts of air   from your rectum or belching is normal.  - If a bowel prep was taken, you may not have a bowel movement for 1-3 days.    This is normal.  SYMPTOMS TO WATCH FOR AND REPORT TO YOUR PHYSICIAN:  1. Abdominal pain or bloating,  other than gas cramps.  2. Chest pain.  3. Back pain.  4. Signs of infection such as: chills or fever occurring within 24 hours   after the procedure.  5. Rectal bleeding, which would show as bright red, maroon, or black stools.   (A tablespoon of blood from the rectum is not serious, especially if   hemorrhoids are present.)  6. Vomiting.  7. Weakness or dizziness.  GO DIRECTLY TO THE NEAREST EMERGENCY ROOM IF YOU HAVE ANY OF THE FOLLOWING:      Difficulty breathing              Chills and/or fever over 101 F   Persistent vomiting and/or vomiting blood   Severe abdominal pain   Severe chest pain   Black, tarry stools   Bleeding- more than one tablespoon   Any other symptom or condition that you feel may need urgent attention  Your doctor recommends these additional instructions:  If any biopsies were taken, your doctors clinic will contact you in 1 to 2   weeks with any results.  - Discharge patient to home.   - Resume previous diet.   - Continue present medications.   - Await pathology results.   - Repeat colonoscopy in 3 - 5 years for surveillance based on pathology   results.   - Patient has a contact number available for emergencies.  The signs and   symptoms of potential delayed complications were discussed with the   patient.  Return to normal activities tomorrow.  Written discharge   instructions were provided to the patient.  For questions, problems or results please call your physician - Henrry Nieves MD.  EMERGENCY PHONE NUMBER: [phone],  LAB RESULTS: [phone]  IF A COMPLICATION OR EMERGENCY SITUATION ARISES AND YOU ARE UNABLE TO REACH   YOUR PHYSICIAN - GO DIRECTLY TO THE EMERGENCY ROOM.  Henrry Nieves MD  4/27/2022 11:06:27 AM  This report has been verified and signed electronically.  Dear patient,  As a result of recent federal legislation (The Federal Cures Act), you may   receive lab or pathology results from your procedure in your [facility]   account before your  physician is able to contact you. Your physician or   their representative will relay the results to you with their   recommendations at their soonest availability.  Thank you,  PROVATION

## 2022-04-27 NOTE — H&P
Short Stay Endoscopy History and Physical    PCP - Rosemarie Ayala MD    Procedure - Colonoscopy  ASA - III  Mallampati - per anesthesia  History of Anesthesia problems - no  Family history Anesthesia problems - no     HPI:  This is a 67 y.o. male here for evaluation of : Colon polyps    Pt here today for surveillance of prior colon polyps. The most recent exam was in 2017, at which time patients recalls having polyps removed. Since patient denies change in bowel habits or overt blood in stool. Denies weight loss.     ROS:  Constitutional: No fevers, chills, No weight loss  ENT: No allergies  CV: No chest pain  Pulm: No shortness of breath  GI: see HPI  Derm: No rash    Medical History:  has a past medical history of Anticoagulant long-term use, Anxiety and depression (9/21/2016), Arthritis, Benign prostatic hyperplasia (BPH) with straining on urination (12/5/2018), Coronary artery disease involving native coronary artery of native heart without angina pectoris (7/26/2016), Elevated PSA, Gout, History of coronary artery stent placement (9/21/2016), History of heart attack (7/25/2016), Mixed hyperlipidemia (7/17/2017), Overweight (BMI 25.0-29.9) (9/21/2016), and Tobacco dependence in remission (7/25/2016).    Surgical History:  has a past surgical history that includes Rotator cuff repair; Coronary angioplasty with stent; Colonoscopy (N/A, 8/16/2017); Vasectomy; Hernia repair; and Transforaminal epidural injection of steroid (Left, 6/15/2021).    Family History: family history includes Arthritis in his mother; Congenital heart disease in his father; Heart disease in his mother; No Known Problems in his brother, daughter, sister, sister, and sister; Parkinsonism in his mother.. Otherwise no colon cancer, inflammatory bowel disease, or GI malignancies.    Social History:  reports that he quit smoking about 5 years ago. His smoking use included cigarettes. He smoked 0.50 packs per day. He has never used smokeless  tobacco. He reports current alcohol use. He reports that he does not use drugs.    Review of patient's allergies indicates:   Allergen Reactions    Penicillins Hives       Medications:   Medications Prior to Admission   Medication Sig Dispense Refill Last Dose    aspirin (ECOTRIN) 81 MG EC tablet Take 1 tablet (81 mg total) by mouth once daily.  0     atorvastatin (LIPITOR) 80 MG tablet TAKE 1 TABLET BY MOUTH DAILY 90 tablet 3     azelastine (ASTELIN) 137 mcg (0.1 %) nasal spray Spray 2 sprays in each nostril twice daily 30 mL 5     clopidogreL (PLAVIX) 75 mg tablet TAKE ONE TABLET BY MOUTH DAILY 90 tablet 3     diazePAM (VALIUM) 5 MG tablet Take 0.5 to 1 tablet by mouth every 8 to 12 hours as needed 28 tablet 0     GARLIC EXTRACT ORAL Take by mouth.       hydrOXYzine HCL (ATARAX) 25 MG tablet Take 0.5 to 1 tablet by mouth at bedtime 30 tablet 3     ipratropium (ATROVENT) 0.06 % nasal spray INSTILL 2 SPRAYS IN EACH NOSTRIL EVERY 12 HOURS  5     levoFLOXacin (LEVAQUIN) 500 MG tablet Take 1 tablet (500 mg total) by mouth once daily. 7 tablet 0     multivitamin capsule Take 1 capsule by mouth once daily.       nitroGLYCERIN (NITROSTAT) 0.4 MG SL tablet Place 1 tablet (0.4 mg total) under the tongue every 5 (five) minutes as needed for Chest pain. 25 tablet 11     pregabalin (LYRICA) 75 MG capsule Take 1 capsule (75 mg total) by mouth 2 (two) times daily. 60 capsule 0     sildenafil (REVATIO) 20 mg Tab Take 1 tablet (20 mg total) by mouth once daily. 30 tablet 11     tamsulosin (FLOMAX) 0.4 mg Cap Take 1 capsule (0.4 mg total) by mouth once daily. 90 capsule 3     triamcinolone (NASACORT) 55 mcg nasal inhaler Instill two sprays in each nostril once daily 16.9 mL 5          Objective Findings:    Vital Signs: see nursing notes  Physical Exam:  General Appearance: Well appearing in no acute distress  Eyes:    No scleral icterus  ENT: Neck supple  Lungs: CTA anteriorly  Heart:  S1, S2 normal, no murmurs  heard  Abdomen: Soft, non tender, non distended with positive bowel sounds. No hepatosplenomegaly, ascites, or mass  Extremities: no edema  Skin: No rash      Labs:  Lab Results   Component Value Date    WBC 6.61 01/19/2022    HGB 15.4 01/19/2022    HCT 46.0 01/19/2022     01/19/2022    CHOL 108 (L) 01/19/2022    TRIG 95 01/19/2022    HDL 37 (L) 01/19/2022    ALT 25 01/19/2022    AST 33 01/19/2022     01/19/2022    K 3.9 01/19/2022     01/19/2022    CREATININE 0.9 01/19/2022    BUN 14 01/19/2022    CO2 25 01/19/2022    TSH 2.895 01/19/2022    PSA 1.8 01/19/2022    INR 1.0 10/20/2017    HGBA1C 5.7 (H) 01/19/2022       I have explained the risks and benefits of endoscopy procedures to the patient including but not limited to bleeding, perforation, infection, and death.    Henrry Nieves MD

## 2022-04-28 ENCOUNTER — TELEPHONE (OUTPATIENT)
Dept: ENDOSCOPY | Facility: HOSPITAL | Age: 67
End: 2022-04-28
Payer: MEDICARE

## 2022-05-05 LAB
FINAL PATHOLOGIC DIAGNOSIS: NORMAL
GROSS: NORMAL
Lab: NORMAL

## 2022-05-09 ENCOUNTER — TELEPHONE (OUTPATIENT)
Dept: GASTROENTEROLOGY | Facility: CLINIC | Age: 67
End: 2022-05-09
Payer: MEDICARE

## 2022-05-09 NOTE — TELEPHONE ENCOUNTER
----- Message from Henrry Nieves MD sent at 5/9/2022  1:20 PM CDT -----  Pathology from recent colonoscopy reviewed.  Colon polyp(s) benign.  Repeat colonoscopy in 5 years.

## 2022-05-18 ENCOUNTER — OFFICE VISIT (OUTPATIENT)
Dept: PSYCHIATRY | Facility: CLINIC | Age: 67
End: 2022-05-18
Payer: MEDICARE

## 2022-05-18 DIAGNOSIS — F32.A ANXIETY AND DEPRESSION: Primary | ICD-10-CM

## 2022-05-18 DIAGNOSIS — F41.9 ANXIETY AND DEPRESSION: Primary | ICD-10-CM

## 2022-05-18 PROCEDURE — 1159F PR MEDICATION LIST DOCUMENTED IN MEDICAL RECORD: ICD-10-PCS | Mod: CPTII,S$GLB,, | Performed by: SOCIAL WORKER

## 2022-05-18 PROCEDURE — 3044F HG A1C LEVEL LT 7.0%: CPT | Mod: CPTII,S$GLB,, | Performed by: SOCIAL WORKER

## 2022-05-18 PROCEDURE — 3044F PR MOST RECENT HEMOGLOBIN A1C LEVEL <7.0%: ICD-10-PCS | Mod: CPTII,S$GLB,, | Performed by: SOCIAL WORKER

## 2022-05-18 PROCEDURE — 99999 PR PBB SHADOW E&M-EST. PATIENT-LVL I: CPT | Mod: PBBFAC,,, | Performed by: SOCIAL WORKER

## 2022-05-18 PROCEDURE — 90834 PSYTX W PT 45 MINUTES: CPT | Mod: S$GLB,,, | Performed by: SOCIAL WORKER

## 2022-05-18 PROCEDURE — 99999 PR PBB SHADOW E&M-EST. PATIENT-LVL I: ICD-10-PCS | Mod: PBBFAC,,, | Performed by: SOCIAL WORKER

## 2022-05-18 PROCEDURE — 1159F MED LIST DOCD IN RCRD: CPT | Mod: CPTII,S$GLB,, | Performed by: SOCIAL WORKER

## 2022-05-18 PROCEDURE — 90834 PR PSYCHOTHERAPY W/PATIENT, 45 MIN: ICD-10-PCS | Mod: S$GLB,,, | Performed by: SOCIAL WORKER

## 2022-05-19 DIAGNOSIS — M54.16 LUMBAR RADICULOPATHY: ICD-10-CM

## 2022-05-19 RX ORDER — PREGABALIN 75 MG/1
75 CAPSULE ORAL 2 TIMES DAILY
Qty: 60 CAPSULE | Refills: 3 | Status: SHIPPED | OUTPATIENT
Start: 2022-05-19 | End: 2022-09-15 | Stop reason: SDUPTHER

## 2022-05-19 NOTE — PROGRESS NOTES
Individual Psychotherapy (PhD/LCSW)    5/18/2022    Site:  [facility]         Therapeutic Intervention: Met with patient.  Outpatient - Insight oriented psychotherapy 45 min - CPT code 08560    Chief complaint/reason for encounter: depression, anger, anxiety, behavior and interpersonal     Interval history and content of current session: discussed coping skills, family, how to take care of himself, stress and anger management skills, boundaries, driving and stress, and how to have time for himself, how to relax, and how to have better coping skills and communications in his marriage and in his family addressed, sleep, health and how continue his good progress emphasized and doing some reading on stress and anger management skills encouraged and keep doing the skills we are focusing on.    Treatment plan:  · Target symptoms: anxiety, stress, marriage, and family, behavior, feelings, anger, and improvement  · Why chosen therapy is appropriate versus another modality: relevant to diagnosis, patient responds to this modality, evidence based practice  · Outcome monitoring methods: self-report, observation  · Therapeutic intervention type: insight oriented psychotherapy, behavior modifying psychotherapy, supportive psychotherapy    Risk parameters:  Patient reports no suicidal ideation  Patient reports no homicidal ideation  Patient reports no self-injurious behavior  Patient reports no violent behavior    Verbal deficits: None    Patient's response to intervention:  The patient's response to intervention is accepting, motivated.    Progress toward goals and other mental status changes:  The patient's progress toward goals is fair .    Diagnosis:     ICD-10-CM ICD-9-CM   1. Anxiety and depression  F41.9 300.00    F32.A 311       Plan:  individual psychotherapy    Return to clinic: 3 weeks    Length of Service (minutes): 45

## 2022-06-14 NOTE — PATIENT INSTRUCTIONS
Acute issues with urinary frequency and trouble emptying bladder accompanied by dull left lower quadrant pain.  Prostate boggy on exam and tender, therefore suspect acute prostatitis.  Will treat with ciprofloxacin 500 mg twice daily for 2 weeks as this is an acute episode.  Send urine for urinalysis and culture.  Send my office or message in one week with update on symptoms.  Drink plenty of fluids and empty bladder regularly.   195.1

## 2022-06-22 ENCOUNTER — OFFICE VISIT (OUTPATIENT)
Dept: PSYCHIATRY | Facility: CLINIC | Age: 67
End: 2022-06-22
Payer: MEDICARE

## 2022-06-22 DIAGNOSIS — F41.9 ANXIETY AND DEPRESSION: Primary | ICD-10-CM

## 2022-06-22 DIAGNOSIS — F32.A ANXIETY AND DEPRESSION: Primary | ICD-10-CM

## 2022-06-22 PROCEDURE — 3044F PR MOST RECENT HEMOGLOBIN A1C LEVEL <7.0%: ICD-10-PCS | Mod: CPTII,S$GLB,, | Performed by: SOCIAL WORKER

## 2022-06-22 PROCEDURE — 99999 PR PBB SHADOW E&M-EST. PATIENT-LVL I: CPT | Mod: PBBFAC,,, | Performed by: SOCIAL WORKER

## 2022-06-22 PROCEDURE — 90834 PR PSYCHOTHERAPY W/PATIENT, 45 MIN: ICD-10-PCS | Mod: S$GLB,,, | Performed by: SOCIAL WORKER

## 2022-06-22 PROCEDURE — 1159F MED LIST DOCD IN RCRD: CPT | Mod: CPTII,S$GLB,, | Performed by: SOCIAL WORKER

## 2022-06-22 PROCEDURE — 90834 PSYTX W PT 45 MINUTES: CPT | Mod: S$GLB,,, | Performed by: SOCIAL WORKER

## 2022-06-22 PROCEDURE — 3044F HG A1C LEVEL LT 7.0%: CPT | Mod: CPTII,S$GLB,, | Performed by: SOCIAL WORKER

## 2022-06-22 PROCEDURE — 99999 PR PBB SHADOW E&M-EST. PATIENT-LVL I: ICD-10-PCS | Mod: PBBFAC,,, | Performed by: SOCIAL WORKER

## 2022-06-22 PROCEDURE — 1159F PR MEDICATION LIST DOCUMENTED IN MEDICAL RECORD: ICD-10-PCS | Mod: CPTII,S$GLB,, | Performed by: SOCIAL WORKER

## 2022-06-22 NOTE — PROGRESS NOTES
Individual Psychotherapy (PhD/LCSW)    6/22/2022    Site:  [facility]         Therapeutic Intervention: Met with patient.  Outpatient - Insight oriented psychotherapy 45 min - CPT code 70691    Chief complaint/reason for encounter: depression, mood swings, anxiety, sleep, appetite, behavior, somatic and interpersonal     Interval history and content of current session: client is stable and doing better, is making progress in being more relaxed and less anxious and less depression, coping skills, family, taking care of himself, and not over reacting,. And over all doing better, went over some ways to keep his good progress going, and coping skills and how to calm down and manage stress skills and over all is doing better and will call when he needs an appointment.    Treatment plan:  · Target symptoms: depression, anxiety , adjustment  · Why chosen therapy is appropriate versus another modality: relevant to diagnosis, patient responds to this modality, evidence based practice  · Outcome monitoring methods: self-report, observation  · Therapeutic intervention type: insight oriented psychotherapy, behavior modifying psychotherapy, supportive psychotherapy    Risk parameters:  Patient reports no suicidal ideation  Patient reports no homicidal ideation  Patient reports no self-injurious behavior  Patient reports no violent behavior    Verbal deficits: None    Patient's response to intervention:  The patient's response to intervention is accepting, motivated.    Progress toward goals and other mental status changes:  The patient's progress toward goals is good.    Diagnosis:     ICD-10-CM ICD-9-CM   1. Anxiety and depression  F41.9 300.00    F32.A 311       Plan:  individual psychotherapy, consult psychiatrist for medication evaluation and medication management by physician    Return to clinic: as scheduled, as needed    Length of Service (minutes): 45

## 2022-08-15 DIAGNOSIS — N40.1 BPH WITH OBSTRUCTION/LOWER URINARY TRACT SYMPTOMS: ICD-10-CM

## 2022-08-15 DIAGNOSIS — N13.8 BPH WITH OBSTRUCTION/LOWER URINARY TRACT SYMPTOMS: ICD-10-CM

## 2022-08-15 RX ORDER — TAMSULOSIN HYDROCHLORIDE 0.4 MG/1
0.4 CAPSULE ORAL DAILY
Qty: 90 CAPSULE | Refills: 3 | Status: SHIPPED | OUTPATIENT
Start: 2022-08-15 | End: 2023-08-17 | Stop reason: SDUPTHER

## 2022-09-15 DIAGNOSIS — M54.16 LUMBAR RADICULOPATHY: ICD-10-CM

## 2022-09-15 RX ORDER — PREGABALIN 75 MG/1
75 CAPSULE ORAL 2 TIMES DAILY
Qty: 60 CAPSULE | Refills: 5 | Status: SHIPPED | OUTPATIENT
Start: 2022-09-15 | End: 2023-01-14

## 2022-09-15 NOTE — TELEPHONE ENCOUNTER
Dr. Ayala, this patient is stable and hasn't seen us in a year.  If you're ok with it, I'll turn him back over to you for refills of this stable medication.  If you feel otherwise it's no problem, just let me know.    Thank you,  Enrike Medellin Jr, MD  Interventional Pain Medicine / Anesthesiology

## 2022-10-05 ENCOUNTER — IMMUNIZATION (OUTPATIENT)
Dept: PHARMACY | Facility: CLINIC | Age: 67
End: 2022-10-05
Payer: MEDICARE

## 2022-12-15 ENCOUNTER — OFFICE VISIT (OUTPATIENT)
Dept: PAIN MEDICINE | Facility: CLINIC | Age: 67
End: 2022-12-15
Payer: MEDICARE

## 2022-12-15 VITALS
HEART RATE: 66 BPM | SYSTOLIC BLOOD PRESSURE: 170 MMHG | WEIGHT: 199.94 LBS | BODY MASS INDEX: 30.4 KG/M2 | DIASTOLIC BLOOD PRESSURE: 103 MMHG

## 2022-12-15 DIAGNOSIS — M54.16 LUMBAR RADICULOPATHY: ICD-10-CM

## 2022-12-15 DIAGNOSIS — M51.36 DDD (DEGENERATIVE DISC DISEASE), LUMBAR: Primary | ICD-10-CM

## 2022-12-15 PROCEDURE — 1160F RVW MEDS BY RX/DR IN RCRD: CPT | Mod: CPTII,S$GLB,, | Performed by: ANESTHESIOLOGY

## 2022-12-15 PROCEDURE — 1159F PR MEDICATION LIST DOCUMENTED IN MEDICAL RECORD: ICD-10-PCS | Mod: CPTII,S$GLB,, | Performed by: ANESTHESIOLOGY

## 2022-12-15 PROCEDURE — 3044F HG A1C LEVEL LT 7.0%: CPT | Mod: CPTII,S$GLB,, | Performed by: ANESTHESIOLOGY

## 2022-12-15 PROCEDURE — 3077F PR MOST RECENT SYSTOLIC BLOOD PRESSURE >= 140 MM HG: ICD-10-PCS | Mod: CPTII,S$GLB,, | Performed by: ANESTHESIOLOGY

## 2022-12-15 PROCEDURE — 3080F DIAST BP >= 90 MM HG: CPT | Mod: CPTII,S$GLB,, | Performed by: ANESTHESIOLOGY

## 2022-12-15 PROCEDURE — 3008F PR BODY MASS INDEX (BMI) DOCUMENTED: ICD-10-PCS | Mod: CPTII,S$GLB,, | Performed by: ANESTHESIOLOGY

## 2022-12-15 PROCEDURE — 99999 PR PBB SHADOW E&M-EST. PATIENT-LVL IV: ICD-10-PCS | Mod: PBBFAC,,, | Performed by: ANESTHESIOLOGY

## 2022-12-15 PROCEDURE — 3080F PR MOST RECENT DIASTOLIC BLOOD PRESSURE >= 90 MM HG: ICD-10-PCS | Mod: CPTII,S$GLB,, | Performed by: ANESTHESIOLOGY

## 2022-12-15 PROCEDURE — 99214 OFFICE O/P EST MOD 30 MIN: CPT | Mod: GC,S$GLB,, | Performed by: ANESTHESIOLOGY

## 2022-12-15 PROCEDURE — 3077F SYST BP >= 140 MM HG: CPT | Mod: CPTII,S$GLB,, | Performed by: ANESTHESIOLOGY

## 2022-12-15 PROCEDURE — 1125F PR PAIN SEVERITY QUANTIFIED, PAIN PRESENT: ICD-10-PCS | Mod: CPTII,S$GLB,, | Performed by: ANESTHESIOLOGY

## 2022-12-15 PROCEDURE — 3008F BODY MASS INDEX DOCD: CPT | Mod: CPTII,S$GLB,, | Performed by: ANESTHESIOLOGY

## 2022-12-15 PROCEDURE — 99999 PR PBB SHADOW E&M-EST. PATIENT-LVL IV: CPT | Mod: PBBFAC,,, | Performed by: ANESTHESIOLOGY

## 2022-12-15 PROCEDURE — 1125F AMNT PAIN NOTED PAIN PRSNT: CPT | Mod: CPTII,S$GLB,, | Performed by: ANESTHESIOLOGY

## 2022-12-15 PROCEDURE — 99214 PR OFFICE/OUTPT VISIT, EST, LEVL IV, 30-39 MIN: ICD-10-PCS | Mod: GC,S$GLB,, | Performed by: ANESTHESIOLOGY

## 2022-12-15 PROCEDURE — 3044F PR MOST RECENT HEMOGLOBIN A1C LEVEL <7.0%: ICD-10-PCS | Mod: CPTII,S$GLB,, | Performed by: ANESTHESIOLOGY

## 2022-12-15 PROCEDURE — 1160F PR REVIEW ALL MEDS BY PRESCRIBER/CLIN PHARMACIST DOCUMENTED: ICD-10-PCS | Mod: CPTII,S$GLB,, | Performed by: ANESTHESIOLOGY

## 2022-12-15 PROCEDURE — 1159F MED LIST DOCD IN RCRD: CPT | Mod: CPTII,S$GLB,, | Performed by: ANESTHESIOLOGY

## 2022-12-15 NOTE — PROGRESS NOTES
"Chronic patient Established Note (Follow up visit)      SUBJECTIVE:        Interval Updates:     Interval History 12/15/2022:    Patient presents for follow up of his chronic low back pain and left leg numbness.  Patient had an L3/4 and L4/5 TFESI on June 2021 and he reports 85% relief for over a year.  He states the pain starting coming back 4 months after the procedure.  He reports his current pain is 2/10, however his leg numbness is severe.  He is requesting a referral to Physical Therapy as he thinks the therapy was the most helpful in the past.  Patient denies fever/weight loss/incontinence/progressive weakness.     9/20/21- Mr. Osorio presents via tele med visit he has a hx of clbp with bilateral anterior/lateral thigh numbness, he denies and "pain" he is reporting mild improvement with his increased lyrica dose (75 mg BID) he denies any adverse SEs, he endorses returning to PT. He denies any LBP at this point. Denies an B/B dysfunction, denies any profound weakness or inciting incident/trauma since our last CV.      8/5/2021- Mr. Osorio present virtually for a f/u appt to discuss effectiveness of Lyrica and overall pain level following injection in June. He continues to report bilateral anterior/lateral thigh numbness, he denies and "pain" switched him to Lyrica from Gabapentin no adverse SEs reported, patient reports Lyrica is helping.      7/6/21 - Mr. Osorio returns to clinic for follow up visit reporting improved low back pain.  Patient is s/p  - Left L3-4 and Left L4-5 TFESI on 6/15/21 with 85% continued relief.  Pain intensity is currently 6/10.   He reports continuing with PT x2 per week that has been effective.      HPI:   Masoud Osorio is a 66 y.o. male with CLBP for many years and a more recent Hx of L1 compression fracture last year and newer onset shooting pains traveling into the lateral/anterior thigh and down the front of the leg.  Radicular symptoms started spontaneously one night and he " woke up with a numb, tingling left leg.  This was not at the same time as the compression fracture, which was caused by falling off of a ladder.  He has been in PT and is taking gabapentin both of which help, but he has been unable to take larger or more frequent doses of gabapentin due to sedation. + Grocery cart sign.     Location: low back   Onset: about a year  Current Pain Score: 4/10  Daily Pain of Range: 5-8/10  Quality: Aching, Throbbing and Numb  Radiation: down left leg  Worsened by: lifting and standing for more than 20 minutes  Improved by: heat, medications and physical therapy     Previous Therapies:  PT/OT: Yes, current.  Helped but pain still persists.  HEP: Yes, daily.  Interventions:    06/15/2021 Lumbar Transforaminal Epidural Steroid Injection, Two Levels    - Left L3-4- Left L4-5                                                                Surgery: Denies back surgery  Medications:   - NSAIDS:   - MSK Relaxants:   - TCAs:   - SNRIs:   - Topicals:   - Anticonvulsants:   - Opioids:      Current Pain Medications:  Lyrica 75 mg BID daily    Pain Disability Index Review:  Last 3 PDI Scores 12/15/2022 7/6/2021 6/7/2021   Pain Disability Index (PDI) 21 16 18       Pain Medications:  Lyrica 75mg BID    Opioid Contract: not applicable     report:  Not applicable    Pain Procedures:     June 2021: Left L3/4 and L4/5 TFESI - 85% relief for 4 months    Physical Therapy/Home Exercise: yes    Imaging:     EXAMINATION:  MRI LUMBAR SPINE WITHOUT CONTRAST     CLINICAL HISTORY:  acute left leg parethesias; Wedge compression fracture of first lumbar vertebra, subsequent encounter for fracture with routine healing     TECHNIQUE:  Multiplanar, multisequence MR images were acquired from the thoracolumbar junction to the sacrum without contrast.     COMPARISON:  Thoracolumbar radiograph 02/26/2020     FINDINGS:  Alignment: Mild levo scoliotic curvature.     Vertebrae: Chronic L1 compression fracture with roughly  75% loss of vertebral body height centrally.  There is posterior fracture retropulsion into the anterior epidural space.  Remaining lumbar vertebral body heights appear maintained.  There are multilevel endplate degenerative changes with edema only sparing the L3-L4 level.  No evidence of acute lumbar spine fracture.     Discs: Disc height loss and desiccation throughout the lumbar spine, but most advanced at L4-L5 and L5-S1.     Cord: Abnormal increased cord signal within the distal thoracic cord/conus.  Conus terminates at L1.     Degenerative findings:     T12-L1: Chronic compression fracture.  Posterosuperior fracture retropulsion into the anterior epidural space.  Moderate spinal canal stenosis.  Mild right-sided neural foraminal stenosis.     L1-L2: Mild asymmetric right broad-based disc bulge, facet arthropathy and ligamentum flavum hypertrophy.  Mild spinal canal stenosis and moderate right-sided neural foraminal stenosis.     L2-L3: Broad-based disc bulge, facet arthropathy and ligamentum flavum hypertrophy result in moderate to severe spinal canal stenosis, mild-to-moderate right and mild left-sided neural foraminal stenosis.     L3-L4: Broad-based disc bulge, facet arthropathy and ligamentum flavum hypertrophy results in moderate spinal canal stenosis, moderate right and mild left-sided neural foraminal stenosis.     L4-L5: Broad-based disc bulge, significant facet arthropathy and ligamentum flavum hypertrophy results and moderate to severe spinal canal stenosis, mild-to-moderate right and moderate to severe left-sided neural foraminal stenosis.     L5-S1: Mild broad-based disc bulge and mild bilateral facet arthropathy.  Moderate right and severe left-sided neural foraminal stenosis.  No significant spinal canal stenosis.     Paraspinal muscles & soft tissues: Unremarkable.     Impression:     1. Chronic L1 compression fracture with roughly 75% loss of central vertebral body height.  Posterosuperior  fracture retropulsion resulting in moderate spinal canal stenosis.  Abnormal increased signal within the distal cord and conus concerning for myelomalacia or edema.  2. Multilevel degenerative changes of the lumbar spine are most significant at L4-L5 with moderate to severe spinal canal stenosis, mild-to-moderate right and moderate to severe left-sided neural foraminal stenosis.  3. Moderate to severe L2-L3 spinal canal stenosis with mild-to-moderate right and mild left-sided neural foraminal stenosis.  4. Moderate L3-L4 spinal canal stenosis with moderate right and mild left-sided neural foraminal stenosis.  5. Additional multilevel degenerative changes as detailed in the body of the report  This report was flagged in Epic as abnormal.        Electronically signed by: Wilner Gordon  Date:                                            12/19/2020  Time:                                           10:29    Allergies:   Review of patient's allergies indicates:   Allergen Reactions    Penicillins Hives       Current Medications:   Current Outpatient Medications   Medication Sig Dispense Refill    atorvastatin (LIPITOR) 80 MG tablet TAKE 1 TABLET BY MOUTH DAILY 90 tablet 3    azelastine (ASTELIN) 137 mcg (0.1 %) nasal spray Spray 2 sprays in each nostril twice daily 30 mL 5    clopidogreL (PLAVIX) 75 mg tablet TAKE ONE TABLET BY MOUTH DAILY 90 tablet 3    diazePAM (VALIUM) 5 MG tablet Take 0.5 to 1 tablet by mouth every 8 to 12 hours as needed 28 tablet 0    diazePAM (VALIUM) 5 MG tablet Take 1/2 to 1 tablet by mouth every 8-12 hours as needed for vertigo 20 tablet 0    diazePAM (VALIUM) 5 MG tablet Take 0.5 tablet by mouth every 8 to 12 hours as needed. 24 tablet 0    GARLIC EXTRACT ORAL Take by mouth.      hydrOXYzine HCL (ATARAX) 25 MG tablet Take 0.5 to 1 tablet by mouth at bedtime 30 tablet 3    hydrOXYzine HCL (ATARAX) 25 MG tablet Take 1/2 to 1 tablet by mouth at bedtime 30 tablet 3    hydrOXYzine HCL (ATARAX) 25 MG  tablet Take 0.5 to 1 tablet by mouth at bedtime 30 tablet 3    ipratropium (ATROVENT) 0.06 % nasal spray INSTILL 2 SPRAYS IN EACH NOSTRIL EVERY 12 HOURS  5    levoFLOXacin (LEVAQUIN) 500 MG tablet Take 1 tablet (500 mg total) by mouth once daily. 7 tablet 0    levoFLOXacin (LEVAQUIN) 500 MG tablet Take 1 tablet (500 mg total) by mouth once daily. 7 tablet 0    multivitamin capsule Take 1 capsule by mouth once daily.      nitroGLYCERIN (NITROSTAT) 0.4 MG SL tablet Place 1 tablet (0.4 mg total) under the tongue every 5 (five) minutes as needed for Chest pain. 25 tablet 11    predniSONE (DELTASONE) 20 MG tablet Take 2 tablets by mouth on day 1, take 1 & 1/2 tablets on days 2 and 3, take 1 tablet on days 4 and 5, and take 1/2 tablet on day 6. 8 tablet 0    pregabalin (LYRICA) 75 MG capsule Take 1 capsule (75 mg total) by mouth 2 (two) times daily. 60 capsule 5    scopolamine (TRANSDERM-SCOP) 1.3-1.5 mg (1 mg over 3 days) Place 1 patch onto the skin behind the ear every 3 days 24 patch 0    sildenafil (REVATIO) 20 mg Tab Take 1 tablet (20 mg total) by mouth once daily. 30 tablet 11    tamsulosin (FLOMAX) 0.4 mg Cap Take 1 capsule (0.4 mg total) by mouth once daily. 90 capsule 3    triamcinolone (NASACORT) 55 mcg nasal inhaler Instill two sprays in each nostril once daily 16.9 mL 5    aspirin (ECOTRIN) 81 MG EC tablet Take 1 tablet (81 mg total) by mouth once daily.  0    promethazine-dextromethorphan (PROMETHAZINE-DM) 6.25-15 mg/5 mL Syrp Take 5 mls by mouth four times daily as needed for cough 200 mL 0     No current facility-administered medications for this visit.       REVIEW OF SYSTEMS:    GENERAL:  No weight loss, malaise or fevers.  HEENT:  Negative for frequent or significant headaches.  NECK:  Negative for lumps, goiter, pain and significant neck swelling.  RESPIRATORY:  Negative for cough, wheezing or shortness of breath.  CARDIOVASCULAR:  Negative for chest pain, leg swelling or palpitations.  GI:  Negative  for abdominal discomfort, blood in stools or black stools or change in bowel habits.  MUSCULOSKELETAL:  See HPI.  SKIN:  Negative for lesions, rash, and itching.  PSYCH:  +ve for sleep disturbance, mood disorder and recent psychosocial stressors.  HEMATOLOGY/LYMPHOLOGY:  Negative for prolonged bleeding, bruising easily or swollen nodes.  NEURO:   No history of headaches, syncope, paralysis, seizures or tremors.  All other reviewed and negative other than HPI.    Past Medical History:  Past Medical History:   Diagnosis Date    Anticoagulant long-term use     Anxiety and depression 9/21/2016    trial of ctialopram and follow up in 8 weeks    Arthritis     Benign prostatic hyperplasia (BPH) with straining on urination 12/5/2018    Coronary artery disease involving native coronary artery of native heart without angina pectoris 7/26/2016 7/26/2016 [facility]I at [facility]   PCI of RCA with 4.0 x 15, 4.0 x 38, and 3.5 x 38 mm Resolute JACOB   PCI of PDA 2.75 x 15 mm JACOB dilated to 3.0 POBA of proximal PLB 2.0 x 12 mm balloon   Normal EF with LVEDP 15 mmHg DAPT score of 2               Elevated PSA     Gout     History of coronary artery stent placement 9/21/2016    History of heart attack 7/25/2016    NSTEMI    Mixed hyperlipidemia 7/17/2017    Overweight (BMI 25.0-29.9) 9/21/2016    Tobacco dependence in remission 7/25/2016       Past Surgical History:  Past Surgical History:   Procedure Laterality Date    COLONOSCOPY N/A 08/16/2017    Procedure: COLONOSCOPY Split Prep;  Surgeon: Maykel Carcamo Jr., MD;  Location: [facility] ENDO;  Service: Endoscopy;  Laterality: N/A;    COLONOSCOPY N/A 4/27/2022    Procedure: COLONOSCOPY Golytely;  Surgeon: Henrry Nieves MD;  Location: [facility] ENDO;  Service: Endoscopy;  Laterality: N/A;  C    CORONARY ANGIOPLASTY WITH STENT PLACEMENT      ROTATOR CUFF REPAIR      TRANSFORAMINAL EPIDURAL INJECTION OF STEROID Left 06/15/2021    Procedure: Injection,steroid,epidural,transforaminal  approach-- Left- L3-4, L4-5;  Surgeon: Enrike Medellin Jr., MD;  Location: [facility];  Service: Pain Management;  Laterality: Left;    VASECTOMY         Family History:  Family History   Problem Relation Age of Onset    Heart disease Mother     Arthritis Mother     Parkinsonism Mother     Congenital heart disease Father     No Known Problems Sister     No Known Problems Brother     No Known Problems Daughter     No Known Problems Sister     No Known Problems Sister     Prostate cancer Neg Hx     Kidney disease Neg Hx        Social History:  Social History     Socioeconomic History    Marital status:    Tobacco Use    Smoking status: Former     Packs/day: 0.50     Types: Cigarettes     Quit date: 2016     Years since quittin.3    Smokeless tobacco: Never   Substance and Sexual Activity    Alcohol use: Yes     Comment: rarely on holidays    Drug use: No     Social Determinants of Health     Financial Resource Strain: Low Risk     Difficulty of Paying Living Expenses: Not hard at all   Food Insecurity: No Food Insecurity    Worried About Running Out of Food in the Last Year: Never true    Ran Out of Food in the Last Year: Never true   Transportation Needs: No Transportation Needs    Lack of Transportation (Medical): No    Lack of Transportation (Non-Medical): No   Physical Activity: Sufficiently Active    Days of Exercise per Week: 3 days    Minutes of Exercise per Session: 60 min   Stress: No Stress Concern Present    Feeling of Stress : Not at all   Social Connections: Unknown    Frequency of Communication with Friends and Family: More than three times a week    Frequency of Social Gatherings with Friends and Family: Once a week    Active Member of Clubs or Organizations: No    Attends Club or Organization Meetings: 1 to 4 times per year    Marital Status:    Housing Stability: Low Risk     Unable to Pay for Housing in the Last Year: No    Number of Places Lived in the Last Year: 1     Unstable Housing in the Last Year: No       OBJECTIVE:    BP (!) 170/103   Pulse 66   Wt 90.7 kg (199 lb 15.3 oz)   BMI 30.40 kg/m²     PHYSICAL EXAMINATION:    General appearance: Well appearing, in no acute distress, alert and oriented x3.  Psych:  Mood and affect appropriate.  Skin: Skin color, texture, turgor normal, no rashes or lesions, in both upper and lower body.  Head/face:  Atraumatic, normocephalic. No palpable lymph nodes  Neck: No pain to palpation over the cervical paraspinous muscles. Spurling Negative. No pain with neck flexion, extension, or lateral flexion. .  Cor: RRR  Pulm: CTA  GI: Abdomen soft and non-tender.  Back: Straight leg raising in the sitting and supine positions is positive for radicular pain. mild pain to palpation over the spine or costovertebral angles. Normal range of motion without pain reproduction.  Extremities: Peripheral joint ROM is full and pain free without obvious instability or laxity in all four extremities. No deformities, edema, or skin discoloration. Good capillary refill.  Musculoskeletal: Bilateral lower extremity strength is normal and symmetric.  No atrophy or tone abnormalities are noted.  Neuro: Bilateral lower extremity coordination and muscle stretch reflexes are physiologic and symmetric.  Plantar response are downgoing. No loss of sensation is noted.  Gait: Normal.    ASSESSMENT: 67 y.o. year old male with low back pain and leg numbness, consistent with the following     1. DDD (degenerative disc disease), lumbar  Ambulatory referral/consult to Physical/Occupational Therapy      2. Lumbar radiculopathy  Ambulatory referral/consult to Physical/Occupational Therapy            PLAN:     - I have stressed the importance of physical activity and a home exercise plan to help with pain and improve health.  - Patient can continue with medications for now since they are providing benefits, using them appropriately, and without side effects.  - Refer to Physical  Therapy  - If symptoms worsen or if no improvement with Physical Therapy, will repeat Left L3/4 and L4/5 TFESI.  - RTC 8 weeks  - Counseled patient regarding the importance of activity modification, constant sleeping habits, and physical therapy.    The above plan and management options were discussed at length with patient. Patient is in agreement with the above and verbalized understanding.    Paxton Oswald I have personally reviewed the history and exam of this patient and agree with the resident/fellow/NPs note as stated above.    Lucio Lin MD    12/15/2022

## 2022-12-16 ENCOUNTER — PATIENT MESSAGE (OUTPATIENT)
Dept: PAIN MEDICINE | Facility: CLINIC | Age: 67
End: 2022-12-16
Payer: MEDICARE

## 2023-01-04 PROBLEM — M62.81 WEAKNESS OF TRUNK MUSCULATURE: Status: ACTIVE | Noted: 2023-01-04

## 2023-01-04 PROBLEM — Z74.09 IMPAIRED MOBILITY AND ADLS: Status: ACTIVE | Noted: 2023-01-04

## 2023-01-04 PROBLEM — Z78.9 IMPAIRED MOBILITY AND ADLS: Status: ACTIVE | Noted: 2023-01-04

## 2023-01-04 PROBLEM — R29.898 WEAKNESS OF BOTH LOWER EXTREMITIES: Status: ACTIVE | Noted: 2023-01-04

## 2023-01-10 ENCOUNTER — PATIENT MESSAGE (OUTPATIENT)
Dept: PAIN MEDICINE | Facility: CLINIC | Age: 68
End: 2023-01-10
Payer: MEDICARE

## 2023-01-12 ENCOUNTER — OFFICE VISIT (OUTPATIENT)
Dept: PAIN MEDICINE | Facility: CLINIC | Age: 68
End: 2023-01-12
Payer: MEDICARE

## 2023-01-12 VITALS — BODY MASS INDEX: 30.4 KG/M2 | WEIGHT: 199.94 LBS | SYSTOLIC BLOOD PRESSURE: 127 MMHG | DIASTOLIC BLOOD PRESSURE: 83 MMHG

## 2023-01-12 DIAGNOSIS — M48.061 NEUROFORAMINAL STENOSIS OF LUMBAR SPINE: ICD-10-CM

## 2023-01-12 DIAGNOSIS — M47.26 OSTEOARTHRITIS OF SPINE WITH RADICULOPATHY, LUMBAR REGION: ICD-10-CM

## 2023-01-12 DIAGNOSIS — M51.36 DDD (DEGENERATIVE DISC DISEASE), LUMBAR: ICD-10-CM

## 2023-01-12 DIAGNOSIS — M54.16 LUMBAR RADICULOPATHY: Primary | ICD-10-CM

## 2023-01-12 DIAGNOSIS — G89.4 CHRONIC PAIN SYNDROME: ICD-10-CM

## 2023-01-12 PROCEDURE — 99999 PR PBB SHADOW E&M-EST. PATIENT-LVL III: ICD-10-PCS | Mod: PBBFAC,,, | Performed by: NURSE PRACTITIONER

## 2023-01-12 PROCEDURE — 1125F AMNT PAIN NOTED PAIN PRSNT: CPT | Mod: CPTII,S$GLB,, | Performed by: NURSE PRACTITIONER

## 2023-01-12 PROCEDURE — 1159F MED LIST DOCD IN RCRD: CPT | Mod: CPTII,S$GLB,, | Performed by: NURSE PRACTITIONER

## 2023-01-12 PROCEDURE — 3079F PR MOST RECENT DIASTOLIC BLOOD PRESSURE 80-89 MM HG: ICD-10-PCS | Mod: CPTII,S$GLB,, | Performed by: NURSE PRACTITIONER

## 2023-01-12 PROCEDURE — 3008F PR BODY MASS INDEX (BMI) DOCUMENTED: ICD-10-PCS | Mod: CPTII,S$GLB,, | Performed by: NURSE PRACTITIONER

## 2023-01-12 PROCEDURE — 1160F RVW MEDS BY RX/DR IN RCRD: CPT | Mod: CPTII,S$GLB,, | Performed by: NURSE PRACTITIONER

## 2023-01-12 PROCEDURE — 3079F DIAST BP 80-89 MM HG: CPT | Mod: CPTII,S$GLB,, | Performed by: NURSE PRACTITIONER

## 2023-01-12 PROCEDURE — 1125F PR PAIN SEVERITY QUANTIFIED, PAIN PRESENT: ICD-10-PCS | Mod: CPTII,S$GLB,, | Performed by: NURSE PRACTITIONER

## 2023-01-12 PROCEDURE — 3074F SYST BP LT 130 MM HG: CPT | Mod: CPTII,S$GLB,, | Performed by: NURSE PRACTITIONER

## 2023-01-12 PROCEDURE — 99214 PR OFFICE/OUTPT VISIT, EST, LEVL IV, 30-39 MIN: ICD-10-PCS | Mod: S$GLB,,, | Performed by: NURSE PRACTITIONER

## 2023-01-12 PROCEDURE — 3074F PR MOST RECENT SYSTOLIC BLOOD PRESSURE < 130 MM HG: ICD-10-PCS | Mod: CPTII,S$GLB,, | Performed by: NURSE PRACTITIONER

## 2023-01-12 PROCEDURE — 99999 PR PBB SHADOW E&M-EST. PATIENT-LVL III: CPT | Mod: PBBFAC,,, | Performed by: NURSE PRACTITIONER

## 2023-01-12 PROCEDURE — 3008F BODY MASS INDEX DOCD: CPT | Mod: CPTII,S$GLB,, | Performed by: NURSE PRACTITIONER

## 2023-01-12 PROCEDURE — 1159F PR MEDICATION LIST DOCUMENTED IN MEDICAL RECORD: ICD-10-PCS | Mod: CPTII,S$GLB,, | Performed by: NURSE PRACTITIONER

## 2023-01-12 PROCEDURE — 99214 OFFICE O/P EST MOD 30 MIN: CPT | Mod: S$GLB,,, | Performed by: NURSE PRACTITIONER

## 2023-01-12 PROCEDURE — 1160F PR REVIEW ALL MEDS BY PRESCRIBER/CLIN PHARMACIST DOCUMENTED: ICD-10-PCS | Mod: CPTII,S$GLB,, | Performed by: NURSE PRACTITIONER

## 2023-01-12 NOTE — PROGRESS NOTES
"Chronic patient Established Note (Follow up visit)      SUBJECTIVE:        Interval History   01/12/[id number]-ypuy-old male presents today for follow-up appoint he was referred to physical therapy per Dr. Lin on his last clinic visit he reports having attended x2.  He does report relief his pain score today is 2/10.  Riley also optimize his Lyrica dose he states he received the prescription just recently and will begin this new prescription of Lyrica 150 mg b.i.d..        12/15/2022:    Patient presents for follow up of his chronic low back pain and left leg numbness.  Patient had an L3/4 and L4/5 TFESI on June 2021 and he reports 85% relief for over a year.  He states the pain starting coming back 4 months after the procedure.  He reports his current pain is 2/10, however his leg numbness is severe.  He is requesting a referral to Physical Therapy as he thinks the therapy was the most helpful in the past.  Patient denies fever/weight loss/incontinence/progressive weakness.     9/20/21- Mr. Osorio presents via tele med visit he has a hx of clbp with bilateral anterior/lateral thigh numbness, he denies and "pain" he is reporting mild improvement with his increased lyrica dose (75 mg BID) he denies any adverse SEs, he endorses returning to PT. He denies any LBP at this point. Denies an B/B dysfunction, denies any profound weakness or inciting incident/trauma since our last CV.      8/5/2021- Mr. Osorio present virtually for a f/u appt to discuss effectiveness of Lyrica and overall pain level following injection in June. He continues to report bilateral anterior/lateral thigh numbness, he denies and "pain" switched him to Lyrica from Gabapentin no adverse SEs reported, patient reports Lyrica is helping.      7/6/21 - Mr. Osorio returns to clinic for follow up visit reporting improved low back pain.  Patient is s/p  - Left L3-4 and Left L4-5 TFESI on 6/15/21 with 85% continued relief.  Pain intensity is " currently 6/10.   He reports continuing with PT x2 per week that has been effective.      HPI:   Masoud Osorio is a 66 y.o. male with CLBP for many years and a more recent Hx of L1 compression fracture last year and newer onset shooting pains traveling into the lateral/anterior thigh and down the front of the leg.  Radicular symptoms started spontaneously one night and he woke up with a numb, tingling left leg.  This was not at the same time as the compression fracture, which was caused by falling off of a ladder.  He has been in PT and is taking gabapentin both of which help, but he has been unable to take larger or more frequent doses of gabapentin due to sedation. + Grocery cart sign.     Location: low back   Onset: about a year  Current Pain Score: 4/10  Daily Pain of Range: 5-8/10  Quality: Aching, Throbbing and Numb  Radiation: down left leg  Worsened by: lifting and standing for more than 20 minutes  Improved by: heat, medications and physical therapy     Previous Therapies:  PT/OT: Yes, current.  Helped but pain still persists.  HEP: Yes, daily.  Interventions:    06/15/2021 Lumbar Transforaminal Epidural Steroid Injection, Two Levels    - Left L3-4- Left L4-5                                                                Surgery: Denies back surgery  Medications:   - NSAIDS:   - MSK Relaxants:   - TCAs:   - SNRIs:   - Topicals:   - Anticonvulsants:   - Opioids:      Current Pain Medications:  Lyrica 75 mg BID daily    Pain Disability Index Review:  Last 3 PDI Scores 1/12/2023 12/15/2022 7/6/2021   Pain Disability Index (PDI) 19 21 16       Pain Medications:  Lyrica 75mg BID    Opioid Contract: not applicable     report:  Not applicable    Pain Procedures:     June 2021: Left L3/4 and L4/5 TFESI - 85% relief for 4 months    Physical Therapy/Home Exercise: yes    Imaging:     EXAMINATION:  MRI LUMBAR SPINE WITHOUT CONTRAST     CLINICAL HISTORY:  acute left leg parethesias; Wedge compression fracture of  first lumbar vertebra, subsequent encounter for fracture with routine healing     TECHNIQUE:  Multiplanar, multisequence MR images were acquired from the thoracolumbar junction to the sacrum without contrast.     COMPARISON:  Thoracolumbar radiograph 02/26/2020     FINDINGS:  Alignment: Mild levo scoliotic curvature.     Vertebrae: Chronic L1 compression fracture with roughly 75% loss of vertebral body height centrally.  There is posterior fracture retropulsion into the anterior epidural space.  Remaining lumbar vertebral body heights appear maintained.  There are multilevel endplate degenerative changes with edema only sparing the L3-L4 level.  No evidence of acute lumbar spine fracture.     Discs: Disc height loss and desiccation throughout the lumbar spine, but most advanced at L4-L5 and L5-S1.     Cord: Abnormal increased cord signal within the distal thoracic cord/conus.  Conus terminates at L1.     Degenerative findings:     T12-L1: Chronic compression fracture.  Posterosuperior fracture retropulsion into the anterior epidural space.  Moderate spinal canal stenosis.  Mild right-sided neural foraminal stenosis.     L1-L2: Mild asymmetric right broad-based disc bulge, facet arthropathy and ligamentum flavum hypertrophy.  Mild spinal canal stenosis and moderate right-sided neural foraminal stenosis.     L2-L3: Broad-based disc bulge, facet arthropathy and ligamentum flavum hypertrophy result in moderate to severe spinal canal stenosis, mild-to-moderate right and mild left-sided neural foraminal stenosis.     L3-L4: Broad-based disc bulge, facet arthropathy and ligamentum flavum hypertrophy results in moderate spinal canal stenosis, moderate right and mild left-sided neural foraminal stenosis.     L4-L5: Broad-based disc bulge, significant facet arthropathy and ligamentum flavum hypertrophy results and moderate to severe spinal canal stenosis, mild-to-moderate right and moderate to severe left-sided neural  foraminal stenosis.     L5-S1: Mild broad-based disc bulge and mild bilateral facet arthropathy.  Moderate right and severe left-sided neural foraminal stenosis.  No significant spinal canal stenosis.     Paraspinal muscles & soft tissues: Unremarkable.     Impression:     1. Chronic L1 compression fracture with roughly 75% loss of central vertebral body height.  Posterosuperior fracture retropulsion resulting in moderate spinal canal stenosis.  Abnormal increased signal within the distal cord and conus concerning for myelomalacia or edema.  2. Multilevel degenerative changes of the lumbar spine are most significant at L4-L5 with moderate to severe spinal canal stenosis, mild-to-moderate right and moderate to severe left-sided neural foraminal stenosis.  3. Moderate to severe L2-L3 spinal canal stenosis with mild-to-moderate right and mild left-sided neural foraminal stenosis.  4. Moderate L3-L4 spinal canal stenosis with moderate right and mild left-sided neural foraminal stenosis.  5. Additional multilevel degenerative changes as detailed in the body of the report  This report was flagged in Epic as abnormal.        Electronically signed by: Wilner Gordon  Date:                                            12/19/2020  Time:                                           10:29    Allergies:   Review of patient's allergies indicates:   Allergen Reactions    Penicillins Hives       Current Medications:   Current Outpatient Medications   Medication Sig Dispense Refill    atorvastatin (LIPITOR) 80 MG tablet TAKE 1 TABLET BY MOUTH DAILY 90 tablet 3    azelastine (ASTELIN) 137 mcg (0.1 %) nasal spray Spray 2 sprays in each nostril twice daily 30 mL 5    diazePAM (VALIUM) 5 MG tablet Take 0.5 to 1 tablet by mouth every 8 to 12 hours as needed 28 tablet 0    diazePAM (VALIUM) 5 MG tablet Take 1/2 to 1 tablet by mouth every 8-12 hours as needed for vertigo 20 tablet 0    diazePAM (VALIUM) 5 MG tablet Take 0.5 tablet by mouth  every 8 to 12 hours as needed. 24 tablet 0    GARLIC EXTRACT ORAL Take by mouth.      hydrOXYzine HCL (ATARAX) 25 MG tablet Take 0.5 to 1 tablet by mouth at bedtime 30 tablet 3    hydrOXYzine HCL (ATARAX) 25 MG tablet Take 1/2 to 1 tablet by mouth at bedtime 30 tablet 3    hydrOXYzine HCL (ATARAX) 25 MG tablet Take 0.5 to 1 tablet by mouth at bedtime 30 tablet 3    ipratropium (ATROVENT) 0.06 % nasal spray INSTILL 2 SPRAYS IN EACH NOSTRIL EVERY 12 HOURS  5    levoFLOXacin (LEVAQUIN) 500 MG tablet Take 1 tablet (500 mg total) by mouth once daily. 7 tablet 0    levoFLOXacin (LEVAQUIN) 500 MG tablet Take 1 tablet (500 mg total) by mouth once daily. 7 tablet 0    multivitamin capsule Take 1 capsule by mouth once daily.      nitroGLYCERIN (NITROSTAT) 0.4 MG SL tablet Place 1 tablet (0.4 mg total) under the tongue every 5 (five) minutes as needed for Chest pain. 25 tablet 11    predniSONE (DELTASONE) 20 MG tablet Take 2 tablets by mouth on day 1, take 1 & 1/2 tablets on days 2 and 3, take 1 tablet on days 4 and 5, and take 1/2 tablet on day 6. 8 tablet 0    pregabalin (LYRICA) 150 MG capsule Take 1 capsule (150 mg total) by mouth 2 (two) times daily. 60 capsule 1    pregabalin (LYRICA) 75 MG capsule Take 1 capsule (75 mg total) by mouth 2 (two) times daily. 60 capsule 5    promethazine-dextromethorphan (PROMETHAZINE-DM) 6.25-15 mg/5 mL Syrp Take 5 mls by mouth four times daily as needed for cough 200 mL 0    scopolamine (TRANSDERM-SCOP) 1.3-1.5 mg (1 mg over 3 days) Place 1 patch onto the skin behind the ear every 3 days 24 patch 0    sildenafil (REVATIO) 20 mg Tab Take 1 tablet (20 mg total) by mouth once daily. 30 tablet 11    tamsulosin (FLOMAX) 0.4 mg Cap Take 1 capsule (0.4 mg total) by mouth once daily. 90 capsule 3    triamcinolone (NASACORT) 55 mcg nasal inhaler Instill two sprays in each nostril once daily 16.9 mL 5    aspirin (ECOTRIN) 81 MG EC tablet Take 1 tablet (81 mg total) by mouth once daily.  0     clopidogreL (PLAVIX) 75 mg tablet TAKE ONE TABLET BY MOUTH DAILY (Patient not taking: Reported on 1/12/2023) 90 tablet 3     No current facility-administered medications for this visit.       REVIEW OF SYSTEMS:    GENERAL:  No weight loss, malaise or fevers.  HEENT:  Negative for frequent or significant headaches.  NECK:  Negative for lumps, goiter, pain and significant neck swelling.  RESPIRATORY:  Negative for cough, wheezing or shortness of breath.  CARDIOVASCULAR:  Negative for chest pain, leg swelling or palpitations.  GI:  Negative for abdominal discomfort, blood in stools or black stools or change in bowel habits.  MUSCULOSKELETAL:  See HPI.  SKIN:  Negative for lesions, rash, and itching.  PSYCH:  +ve for sleep disturbance, mood disorder and recent psychosocial stressors.  HEMATOLOGY/LYMPHOLOGY:  Negative for prolonged bleeding, bruising easily or swollen nodes.  NEURO:   No history of headaches, syncope, paralysis, seizures or tremors.  All other reviewed and negative other than HPI.    Past Medical History:  Past Medical History:   Diagnosis Date    Anticoagulant long-term use     Anxiety and depression 9/21/2016    trial of ctialopram and follow up in 8 weeks    Arthritis     Benign prostatic hyperplasia (BPH) with straining on urination 12/5/2018    Coronary artery disease involving native coronary artery of native heart without angina pectoris 7/26/2016 7/26/2016 NSTEMI at [facility]   PCI of RCA with 4.0 x 15, 4.0 x 38, and 3.5 x 38 mm Resolute JACOB   PCI of PDA 2.75 x 15 mm JACOB dilated to 3.0 POBA of proximal PLB 2.0 x 12 mm balloon   Normal EF with LVEDP 15 mmHg DAPT score of 2               Elevated PSA     Gout     History of coronary artery stent placement 9/21/2016    History of heart attack 7/25/2016    NSTEMI    Mixed hyperlipidemia 7/17/2017    Overweight (BMI 25.0-29.9) 9/21/2016    Tobacco dependence in remission 7/25/2016       Past Surgical History:  Past Surgical History:   Procedure  Laterality Date    COLONOSCOPY N/A 2017    Procedure: COLONOSCOPY Split Prep;  Surgeon: Maykel Carcamo Jr., MD;  Location: [facility] ENDO;  Service: Endoscopy;  Laterality: N/A;    COLONOSCOPY N/A 2022    Procedure: COLONOSCOPY Golytely;  Surgeon: Henrry Nieves MD;  Location: [facility] ENDO;  Service: Endoscopy;  Laterality: N/A;  C    CORONARY ANGIOPLASTY WITH STENT PLACEMENT      ROTATOR CUFF REPAIR      TRANSFORAMINAL EPIDURAL INJECTION OF STEROID Left 06/15/2021    Procedure: Injection,steroid,epidural,transforaminal approach-- Left- L3-4, L4-5;  Surgeon: Enrike Medellin Jr., MD;  Location: [facility] PAIN MGT;  Service: Pain Management;  Laterality: Left;    VASECTOMY         Family History:  Family History   Problem Relation Age of Onset    Heart disease Mother     Arthritis Mother     Parkinsonism Mother     Congenital heart disease Father     No Known Problems Sister     No Known Problems Brother     No Known Problems Daughter     No Known Problems Sister     No Known Problems Sister     Prostate cancer Neg Hx     Kidney disease Neg Hx        Social History:  Social History     Socioeconomic History    Marital status:    Tobacco Use    Smoking status: Former     Packs/day: 0.50     Types: Cigarettes     Quit date: 2016     Years since quittin.4    Smokeless tobacco: Never   Substance and Sexual Activity    Alcohol use: Yes     Comment: rarely on holidays    Drug use: No     Social Determinants of Health     Financial Resource Strain: Low Risk     Difficulty of Paying Living Expenses: Not hard at all   Food Insecurity: No Food Insecurity    Worried About Running Out of Food in the Last Year: Never true    Ran Out of Food in the Last Year: Never true   Transportation Needs: No Transportation Needs    Lack of Transportation (Medical): No    Lack of Transportation (Non-Medical): No   Physical Activity: Sufficiently Active    Days of Exercise per Week: 3 days    Minutes of Exercise per  Session: 60 min   Stress: No Stress Concern Present    Feeling of Stress : Not at all   Social Connections: Unknown    Frequency of Communication with Friends and Family: More than three times a week    Frequency of Social Gatherings with Friends and Family: Once a week    Active Member of Clubs or Organizations: No    Attends Club or Organization Meetings: 1 to 4 times per year    Marital Status:    Housing Stability: Low Risk     Unable to Pay for Housing in the Last Year: No    Number of Places Lived in the Last Year: 1    Unstable Housing in the Last Year: No       OBJECTIVE:    /83   Wt 90.7 kg (199 lb 15.3 oz)   BMI 30.40 kg/m²     PHYSICAL EXAMINATION:    General appearance: Well appearing, in no acute distress, alert and oriented x3.  Psych:  Mood and affect appropriate.  Skin: Skin color, texture, turgor normal, no rashes or lesions, in both upper and lower body.  Head/face:  Atraumatic, normocephalic. No palpable lymph nodes  Neck: No pain to palpation over the cervical paraspinous muscles. Spurling Negative. No pain with neck flexion, extension, or lateral flexion. .  Cor: RRR  Pulm: CTA  GI: Abdomen soft and non-tender.  Back: Straight leg raising in the sitting and supine positions is positive for radicular pain. mild pain to palpation over the spine or costovertebral angles. Normal range of motion without pain reproduction.  Extremities: Peripheral joint ROM is full and pain free without obvious instability or laxity in all four extremities. No deformities, edema, or skin discoloration. Good capillary refill.  Musculoskeletal: Bilateral lower extremity strength is normal and symmetric.  No atrophy or tone abnormalities are noted.  Neuro: Bilateral lower extremity coordination and muscle stretch reflexes are physiologic and symmetric.  Plantar response are downgoing. No loss of sensation is noted.  Gait: Normal.    ASSESSMENT: 67 y.o. year old male with low back pain and leg numbness,  consistent with the following     1. Lumbar radiculopathy        2. Osteoarthritis of spine with radiculopathy, lumbar region        3. Neuroforaminal stenosis of lumbar spine        4. Chronic pain syndrome        5. DDD (degenerative disc disease), lumbar                PLAN:     - I have stressed the importance of physical activity and a home exercise plan to help with pain and improve health.  - Patient can continue with medications for now since they are providing benefits, using them appropriately, and without side effects.  - Continue Physical therapy as previously prescribed-x2 sessions attended   -if symptoms do not improve with physical therapy consider repeating  Left L3/4 and L4/5 TFESI.  (patient can call and schedule)  - RTC after PT or sooner  - Counseled patient regarding the importance of activity modification, constant sleeping habits, and physical therapy.    The above plan and management options were discussed at length with patient. Patient is in agreement with the above and verbalized understanding.    ABDIRASHID Blankenship  Interventional Pain Management      01/12/2023

## 2023-01-19 ENCOUNTER — TELEPHONE (OUTPATIENT)
Dept: CARDIOLOGY | Facility: CLINIC | Age: 68
End: 2023-01-19
Payer: MEDICARE

## 2023-01-19 NOTE — TELEPHONE ENCOUNTER
Spoke with patient, scheduled yearly labs and follow up appointment with Dr. Bailey, verbalized understanding.

## 2023-02-17 DIAGNOSIS — R07.9 CHEST PAIN, UNSPECIFIED TYPE: Primary | ICD-10-CM

## 2023-02-17 DIAGNOSIS — I25.10 CORONARY ARTERY DISEASE INVOLVING NATIVE CORONARY ARTERY OF NATIVE HEART WITHOUT ANGINA PECTORIS: ICD-10-CM

## 2023-02-27 ENCOUNTER — OFFICE VISIT (OUTPATIENT)
Dept: UROLOGY | Facility: CLINIC | Age: 68
End: 2023-02-27
Payer: MEDICARE

## 2023-02-27 VITALS
SYSTOLIC BLOOD PRESSURE: 132 MMHG | WEIGHT: 204.81 LBS | HEART RATE: 62 BPM | BODY MASS INDEX: 31.04 KG/M2 | HEIGHT: 68 IN | DIASTOLIC BLOOD PRESSURE: 78 MMHG

## 2023-02-27 DIAGNOSIS — R39.16 STRAINING DURING URINATION: ICD-10-CM

## 2023-02-27 DIAGNOSIS — N52.9 ERECTILE DYSFUNCTION, UNSPECIFIED ERECTILE DYSFUNCTION TYPE: ICD-10-CM

## 2023-02-27 DIAGNOSIS — R39.12 WEAK URINARY STREAM: ICD-10-CM

## 2023-02-27 DIAGNOSIS — N40.1 BPH WITH OBSTRUCTION/LOWER URINARY TRACT SYMPTOMS: Primary | ICD-10-CM

## 2023-02-27 DIAGNOSIS — N13.8 BPH WITH OBSTRUCTION/LOWER URINARY TRACT SYMPTOMS: Primary | ICD-10-CM

## 2023-02-27 PROCEDURE — 3078F PR MOST RECENT DIASTOLIC BLOOD PRESSURE < 80 MM HG: ICD-10-PCS | Mod: CPTII,S$GLB,, | Performed by: STUDENT IN AN ORGANIZED HEALTH CARE EDUCATION/TRAINING PROGRAM

## 2023-02-27 PROCEDURE — 3008F PR BODY MASS INDEX (BMI) DOCUMENTED: ICD-10-PCS | Mod: CPTII,S$GLB,, | Performed by: STUDENT IN AN ORGANIZED HEALTH CARE EDUCATION/TRAINING PROGRAM

## 2023-02-27 PROCEDURE — 99999 PR PBB SHADOW E&M-EST. PATIENT-LVL IV: CPT | Mod: PBBFAC,,, | Performed by: STUDENT IN AN ORGANIZED HEALTH CARE EDUCATION/TRAINING PROGRAM

## 2023-02-27 PROCEDURE — 99214 OFFICE O/P EST MOD 30 MIN: CPT | Mod: S$GLB,,, | Performed by: STUDENT IN AN ORGANIZED HEALTH CARE EDUCATION/TRAINING PROGRAM

## 2023-02-27 PROCEDURE — 1160F PR REVIEW ALL MEDS BY PRESCRIBER/CLIN PHARMACIST DOCUMENTED: ICD-10-PCS | Mod: CPTII,S$GLB,, | Performed by: STUDENT IN AN ORGANIZED HEALTH CARE EDUCATION/TRAINING PROGRAM

## 2023-02-27 PROCEDURE — 1159F MED LIST DOCD IN RCRD: CPT | Mod: CPTII,S$GLB,, | Performed by: STUDENT IN AN ORGANIZED HEALTH CARE EDUCATION/TRAINING PROGRAM

## 2023-02-27 PROCEDURE — 1126F PR PAIN SEVERITY QUANTIFIED, NO PAIN PRESENT: ICD-10-PCS | Mod: CPTII,S$GLB,, | Performed by: STUDENT IN AN ORGANIZED HEALTH CARE EDUCATION/TRAINING PROGRAM

## 2023-02-27 PROCEDURE — 3075F PR MOST RECENT SYSTOLIC BLOOD PRESS GE 130-139MM HG: ICD-10-PCS | Mod: CPTII,S$GLB,, | Performed by: STUDENT IN AN ORGANIZED HEALTH CARE EDUCATION/TRAINING PROGRAM

## 2023-02-27 PROCEDURE — 3008F BODY MASS INDEX DOCD: CPT | Mod: CPTII,S$GLB,, | Performed by: STUDENT IN AN ORGANIZED HEALTH CARE EDUCATION/TRAINING PROGRAM

## 2023-02-27 PROCEDURE — 1160F RVW MEDS BY RX/DR IN RCRD: CPT | Mod: CPTII,S$GLB,, | Performed by: STUDENT IN AN ORGANIZED HEALTH CARE EDUCATION/TRAINING PROGRAM

## 2023-02-27 PROCEDURE — 3075F SYST BP GE 130 - 139MM HG: CPT | Mod: CPTII,S$GLB,, | Performed by: STUDENT IN AN ORGANIZED HEALTH CARE EDUCATION/TRAINING PROGRAM

## 2023-02-27 PROCEDURE — 1126F AMNT PAIN NOTED NONE PRSNT: CPT | Mod: CPTII,S$GLB,, | Performed by: STUDENT IN AN ORGANIZED HEALTH CARE EDUCATION/TRAINING PROGRAM

## 2023-02-27 PROCEDURE — 99999 PR PBB SHADOW E&M-EST. PATIENT-LVL IV: ICD-10-PCS | Mod: PBBFAC,,, | Performed by: STUDENT IN AN ORGANIZED HEALTH CARE EDUCATION/TRAINING PROGRAM

## 2023-02-27 PROCEDURE — 1159F PR MEDICATION LIST DOCUMENTED IN MEDICAL RECORD: ICD-10-PCS | Mod: CPTII,S$GLB,, | Performed by: STUDENT IN AN ORGANIZED HEALTH CARE EDUCATION/TRAINING PROGRAM

## 2023-02-27 PROCEDURE — 3078F DIAST BP <80 MM HG: CPT | Mod: CPTII,S$GLB,, | Performed by: STUDENT IN AN ORGANIZED HEALTH CARE EDUCATION/TRAINING PROGRAM

## 2023-02-27 PROCEDURE — 99214 PR OFFICE/OUTPT VISIT, EST, LEVL IV, 30-39 MIN: ICD-10-PCS | Mod: S$GLB,,, | Performed by: STUDENT IN AN ORGANIZED HEALTH CARE EDUCATION/TRAINING PROGRAM

## 2023-02-27 NOTE — PROGRESS NOTES
"Subjective:       Patient ID: Masoud Osorio is a 67 y.o. male.    Chief Complaint:  f/u urinary sx  This is a 67 y.o.  male patient that is an established patient of mine.        The patient is referred to me by his cardiologist Dr. Bailey  for erectile dysfunction. He does have a history of kidney stones which he passed kidney stones x2 about 10 years ago. He currently denies flank pain. He is here today mostly to discuss two issues - lower urinary tract symptoms and erectile dysfunction. He has a significant cardiologic history - he has a history of CAD as well as an NSTEMI 7/2016 requiring PCI. He takes plavix and also Nitroglycerin PRN.  Urinary symptoms-  About a few months ago he began to notice more straining with urination. He also notes that he has to strain more in order to "keep the urination going." he also notes occasional +intermittency of his urinary stream. He does feel like he is emptying well.  He has never been started on any medications for his prostate before.   Erections-  He notes that since he has been on cholesterol medications, plavix over the past year he has noticed a decrease in the quality of his erections. He does experience spontaneous erections. He state he is not sure if it is in his head but he was interested in options for erectile dysfunction. He is currently , he states they have a good relationship and her health is overall very good. After his MI in 2016 he did quit smoking in 2016.   Job - retired from [employer] earlier this year      Has been taking flomax for some time without issue. He sustained a fall in 12/2019 from his roof and was having numbness and getting evaluated by neurosurgery. We added finasteride to flomax to help with his bothersome urinary sx.      2/21/22  He stopped the finasteride for about 6 months without any adverse change in his urinary sx - so now only taking flomax. He notes that he stopped initially due to concerns about ED. He has been " "compliant with flomax. He has been taking sildenafil as needed, was taking 20mg which he noted a mild response. Interested in trying a supplement to maybe come off of flomax which he believes is contributing to his ED.    2/27/23  He notes when he doesn't take the flomax when he usually does in the AM, he can "tell." He notes sometimes it is better than other, sometimes it is just a trickle. He notes sometimes hesitancy is less.   He feels that the sildenafil 100mg does help with erections.          LAST PSA  Lab Results   Component Value Date    PSA 1.8 01/19/2022    PSA 1.7 06/15/2020    PSA 1.3 12/05/2018    PSA 1.5 09/23/2016       Lab Results   Component Value Date    CREATININE 0.72 02/27/2023       ---  Past Medical History:   Diagnosis Date    Anticoagulant long-term use     Anxiety and depression 9/21/2016    trial of ctialopram and follow up in 8 weeks    Arthritis     Benign prostatic hyperplasia (BPH) with straining on urination 12/5/2018    Coronary artery disease involving native coronary artery of native heart without angina pectoris 7/26/2016 7/26/2016 NSTEMI at [facility]   PCI of RCA with 4.0 x 15, 4.0 x 38, and 3.5 x 38 mm Resolute JACOB   PCI of PDA 2.75 x 15 mm JACOB dilated to 3.0 POBA of proximal PLB 2.0 x 12 mm balloon   Normal EF with LVEDP 15 mmHg DAPT score of 2               Elevated PSA     Gout     History of coronary artery stent placement 9/21/2016    History of heart attack 7/25/2016    NSTEMI    Mixed hyperlipidemia 7/17/2017    Overweight (BMI 25.0-29.9) 9/21/2016    Tobacco dependence in remission 7/25/2016       Past Surgical History:   Procedure Laterality Date    COLONOSCOPY N/A 08/16/2017    Procedure: COLONOSCOPY Split Prep;  Surgeon: Maykel Carcamo Jr., MD;  Location: [facility];  Service: Endoscopy;  Laterality: N/A;    COLONOSCOPY N/A 4/27/2022    Procedure: COLONOSCOPY Golytely;  Surgeon: Henrry Nieves MD;  Location: [facility];  Service: Endoscopy;  " Laterality: N/A;  C    CORONARY ANGIOPLASTY WITH STENT PLACEMENT      ROTATOR CUFF REPAIR      TRANSFORAMINAL EPIDURAL INJECTION OF STEROID Left 06/15/2021    Procedure: Injection,steroid,epidural,transforaminal approach-- Left- L3-4, L4-5;  Surgeon: Enrike Medellin Jr., MD;  Location: [facility];  Service: Pain Management;  Laterality: Left;    VASECTOMY         Family History   Problem Relation Age of Onset    Heart disease Mother     Arthritis Mother     Parkinsonism Mother     Congenital heart disease Father     No Known Problems Sister     No Known Problems Brother     No Known Problems Daughter     No Known Problems Sister     No Known Problems Sister     Prostate cancer Neg Hx     Kidney disease Neg Hx        Social History     Tobacco Use    Smoking status: Former     Packs/day: 0.50     Types: Cigarettes     Quit date: 2016     Years since quittin.5    Smokeless tobacco: Never   Substance Use Topics    Alcohol use: Yes     Comment: rarely on holidays    Drug use: No       Current Outpatient Medications on File Prior to Visit   Medication Sig Dispense Refill    aspirin (ECOTRIN) 81 MG EC tablet Take 1 tablet (81 mg total) by mouth once daily.  0    atorvastatin (LIPITOR) 80 MG tablet TAKE 1 TABLET BY MOUTH DAILY 90 tablet 3    azelastine (ASTELIN) 137 mcg (0.1 %) nasal spray Spray 2 sprays in each nostril twice daily 30 mL 5    diazePAM (VALIUM) 5 MG tablet Take 0.5 tablet by mouth every 8 to 12 hours as needed. 24 tablet 0    GARLIC EXTRACT ORAL Take by mouth.      hydrOXYzine HCL (ATARAX) 25 MG tablet Take 0.5 to 1 tablet by mouth at bedtime 30 tablet 3    ipratropium (ATROVENT) 0.06 % nasal spray INSTILL 2 SPRAYS IN EACH NOSTRIL EVERY 12 HOURS  5    multivitamin capsule Take 1 capsule by mouth once daily.      nitroGLYCERIN (NITROSTAT) 0.4 MG SL tablet Place 1 tablet (0.4 mg total) under the tongue every 5 (five) minutes as needed for Chest pain. 25 tablet 11    predniSONE (DELTASONE) 20 MG  tablet Take 2 tablets by mouth on day 1, take 1 & 1/2 tablets on days 2 and 3, take 1 tablet on days 4 and 5, and take 1/2 tablet on day 6. 8 tablet 0    pregabalin (LYRICA) 150 MG capsule Take 1 capsule (150 mg total) by mouth 2 (two) times daily. 60 capsule 1    sildenafil (REVATIO) 20 mg Tab Take 1 tablet (20 mg total) by mouth once daily. 30 tablet 11    tamsulosin (FLOMAX) 0.4 mg Cap Take 1 capsule (0.4 mg total) by mouth once daily. 90 capsule 3    triamcinolone (NASACORT) 55 mcg nasal inhaler Instill two sprays in each nostril once daily 16.9 mL 5    hydrOXYzine HCL (ATARAX) 25 MG tablet Take 0.5 to 1 tablet by mouth at bedtime 30 tablet 3    hydrOXYzine HCL (ATARAX) 25 MG tablet Take 1/2 to 1 tablet by mouth at bedtime 30 tablet 3    scopolamine (TRANSDERM-SCOP) 1.3-1.5 mg (1 mg over 3 days) Place 1 patch onto the skin behind the ear every 3 days 24 patch 0    [DISCONTINUED] clopidogreL (PLAVIX) 75 mg tablet TAKE ONE TABLET BY MOUTH DAILY 90 tablet 3    [DISCONTINUED] diazePAM (VALIUM) 5 MG tablet Take 0.5 to 1 tablet by mouth every 8 to 12 hours as needed 28 tablet 0    [DISCONTINUED] diazePAM (VALIUM) 5 MG tablet Take 1/2 to 1 tablet by mouth every 8-12 hours as needed for vertigo 20 tablet 0    [DISCONTINUED] levoFLOXacin (LEVAQUIN) 500 MG tablet Take 1 tablet (500 mg total) by mouth once daily. 7 tablet 0    [DISCONTINUED] levoFLOXacin (LEVAQUIN) 500 MG tablet Take 1 tablet (500 mg total) by mouth once daily. 7 tablet 0    [DISCONTINUED] promethazine-dextromethorphan (PROMETHAZINE-DM) 6.25-15 mg/5 mL Syrp Take 5 mls by mouth four times daily as needed for cough 200 mL 0     No current facility-administered medications on file prior to visit.       Review of patient's allergies indicates:   Allergen Reactions    Penicillins Hives       Review of Systems   Constitutional:  Negative for chills.   HENT:  Negative for congestion.    Eyes:  Negative for visual disturbance.   Respiratory:  Negative for  shortness of breath.    Cardiovascular:  Negative for chest pain.   Gastrointestinal:  Negative for abdominal distention.   Musculoskeletal:  Negative for gait problem.   Skin:  Negative for color change.   Neurological:  Negative for dizziness.   Psychiatric/Behavioral:  Negative for agitation.      Objective:      Physical Exam  Constitutional:       Appearance: He is well-developed.   HENT:      Head: Normocephalic.   Eyes:      Pupils: Pupils are equal, round, and reactive to light.   Pulmonary:      Effort: Pulmonary effort is normal.   Abdominal:      Palpations: Abdomen is soft.   Musculoskeletal:         General: Normal range of motion.      Cervical back: Normal range of motion.   Skin:     General: Skin is warm and dry.   Neurological:      Mental Status: He is alert.       Assessment:       1. BPH with obstruction/lower urinary tract symptoms    2. Straining during urination    3. Weak urinary stream    4. Erectile dysfunction, unspecified erectile dysfunction type        Plan:       Continue flomax.  Continue sildenafil 20mg x 5 tablets at a time.  Can message me and I can send in brand viagra 100mg if he wants to price check.   Discussed next steps for a BPH surgery would be cysto/TRUS.   5. F/u yearly.    BPH with obstruction/lower urinary tract symptoms    Straining during urination    Weak urinary stream    Erectile dysfunction, unspecified erectile dysfunction type

## 2023-02-28 PROBLEM — R29.898 WEAKNESS OF BOTH LOWER EXTREMITIES: Status: RESOLVED | Noted: 2023-01-04 | Resolved: 2023-02-28

## 2023-02-28 PROBLEM — Z78.9 IMPAIRED MOBILITY AND ADLS: Status: RESOLVED | Noted: 2023-01-04 | Resolved: 2023-02-28

## 2023-02-28 PROBLEM — Z74.09 IMPAIRED MOBILITY AND ADLS: Status: RESOLVED | Noted: 2023-01-04 | Resolved: 2023-02-28

## 2023-02-28 PROBLEM — M62.81 WEAKNESS OF TRUNK MUSCULATURE: Status: RESOLVED | Noted: 2023-01-04 | Resolved: 2023-02-28

## 2023-03-08 ENCOUNTER — LAB VISIT (OUTPATIENT)
Dept: LAB | Facility: HOSPITAL | Age: 68
End: 2023-03-08
Attending: FAMILY MEDICINE
Payer: MEDICARE

## 2023-03-08 ENCOUNTER — OFFICE VISIT (OUTPATIENT)
Dept: FAMILY MEDICINE | Facility: CLINIC | Age: 68
End: 2023-03-08
Payer: MEDICARE

## 2023-03-08 VITALS
HEIGHT: 68 IN | BODY MASS INDEX: 31.48 KG/M2 | WEIGHT: 207.69 LBS | OXYGEN SATURATION: 98 % | DIASTOLIC BLOOD PRESSURE: 74 MMHG | HEART RATE: 70 BPM | SYSTOLIC BLOOD PRESSURE: 136 MMHG

## 2023-03-08 DIAGNOSIS — Z23 NEED FOR PNEUMOCOCCAL VACCINE: ICD-10-CM

## 2023-03-08 DIAGNOSIS — Z12.5 SCREENING FOR MALIGNANT NEOPLASM OF PROSTATE: ICD-10-CM

## 2023-03-08 DIAGNOSIS — F32.A ANXIETY AND DEPRESSION: ICD-10-CM

## 2023-03-08 DIAGNOSIS — R15.1 FECAL SMEARING: ICD-10-CM

## 2023-03-08 DIAGNOSIS — Z86.010 HISTORY OF COLON POLYPS: ICD-10-CM

## 2023-03-08 DIAGNOSIS — Z79.82 LONG-TERM USE OF ASPIRIN THERAPY: ICD-10-CM

## 2023-03-08 DIAGNOSIS — E78.2 MIXED HYPERLIPIDEMIA: ICD-10-CM

## 2023-03-08 DIAGNOSIS — J30.89 NON-SEASONAL ALLERGIC RHINITIS, UNSPECIFIED TRIGGER: ICD-10-CM

## 2023-03-08 DIAGNOSIS — Z95.5 HISTORY OF CORONARY ARTERY STENT PLACEMENT: ICD-10-CM

## 2023-03-08 DIAGNOSIS — G89.29 CHRONIC BILATERAL LOW BACK PAIN WITHOUT SCIATICA: ICD-10-CM

## 2023-03-08 DIAGNOSIS — I25.10 CORONARY ARTERY DISEASE INVOLVING NATIVE CORONARY ARTERY OF NATIVE HEART WITHOUT ANGINA PECTORIS: ICD-10-CM

## 2023-03-08 DIAGNOSIS — R60.0 BILATERAL LOWER EXTREMITY EDEMA: ICD-10-CM

## 2023-03-08 DIAGNOSIS — N40.1 BENIGN PROSTATIC HYPERPLASIA (BPH) WITH STRAINING ON URINATION: ICD-10-CM

## 2023-03-08 DIAGNOSIS — M54.50 CHRONIC BILATERAL LOW BACK PAIN WITHOUT SCIATICA: ICD-10-CM

## 2023-03-08 DIAGNOSIS — Z00.00 ROUTINE GENERAL MEDICAL EXAMINATION AT A HEALTH CARE FACILITY: Primary | ICD-10-CM

## 2023-03-08 DIAGNOSIS — Z86.16 PERSONAL HISTORY OF COVID-19: ICD-10-CM

## 2023-03-08 DIAGNOSIS — E66.09 CLASS 1 OBESITY DUE TO EXCESS CALORIES WITH SERIOUS COMORBIDITY AND BODY MASS INDEX (BMI) OF 30.0 TO 30.9 IN ADULT: ICD-10-CM

## 2023-03-08 DIAGNOSIS — R39.16 BENIGN PROSTATIC HYPERPLASIA (BPH) WITH STRAINING ON URINATION: ICD-10-CM

## 2023-03-08 DIAGNOSIS — F17.201 TOBACCO DEPENDENCE IN REMISSION: ICD-10-CM

## 2023-03-08 DIAGNOSIS — M47.26 OSTEOARTHRITIS OF SPINE WITH RADICULOPATHY, LUMBAR REGION: ICD-10-CM

## 2023-03-08 DIAGNOSIS — Z79.899 MEDICATION MANAGEMENT: ICD-10-CM

## 2023-03-08 DIAGNOSIS — F41.9 ANXIETY AND DEPRESSION: ICD-10-CM

## 2023-03-08 DIAGNOSIS — I25.2 HISTORY OF HEART ATTACK: ICD-10-CM

## 2023-03-08 LAB
ALBUMIN SERPL BCP-MCNC: 3.8 G/DL (ref 3.5–5.2)
ALP SERPL-CCNC: 75 U/L (ref 55–135)
ALT SERPL W/O P-5'-P-CCNC: 29 U/L (ref 10–44)
ANION GAP SERPL CALC-SCNC: 6 MMOL/L (ref 8–16)
AST SERPL-CCNC: 36 U/L (ref 10–40)
BASOPHILS # BLD AUTO: 0.04 K/UL (ref 0–0.2)
BASOPHILS NFR BLD: 0.5 % (ref 0–1.9)
BILIRUB SERPL-MCNC: 0.6 MG/DL (ref 0.1–1)
BUN SERPL-MCNC: 14 MG/DL (ref 8–23)
CALCIUM SERPL-MCNC: 9.2 MG/DL (ref 8.7–10.5)
CHLORIDE SERPL-SCNC: 110 MMOL/L (ref 95–110)
CO2 SERPL-SCNC: 25 MMOL/L (ref 23–29)
COMPLEXED PSA SERPL-MCNC: 4.8 NG/ML (ref 0–4)
CREAT SERPL-MCNC: 0.8 MG/DL (ref 0.5–1.4)
DIFFERENTIAL METHOD: NORMAL
EOSINOPHIL # BLD AUTO: 0.2 K/UL (ref 0–0.5)
EOSINOPHIL NFR BLD: 1.9 % (ref 0–8)
ERYTHROCYTE [DISTWIDTH] IN BLOOD BY AUTOMATED COUNT: 13.3 % (ref 11.5–14.5)
EST. GFR  (NO RACE VARIABLE): >60 ML/MIN/1.73 M^2
ESTIMATED AVG GLUCOSE: 105 MG/DL (ref 68–131)
GLUCOSE SERPL-MCNC: 89 MG/DL (ref 70–110)
HBA1C MFR BLD: 5.3 % (ref 4–5.6)
HCT VFR BLD AUTO: 44.4 % (ref 40–54)
HGB BLD-MCNC: 14.7 G/DL (ref 14–18)
IMM GRANULOCYTES # BLD AUTO: 0.02 K/UL (ref 0–0.04)
IMM GRANULOCYTES NFR BLD AUTO: 0.2 % (ref 0–0.5)
LYMPHOCYTES # BLD AUTO: 2.3 K/UL (ref 1–4.8)
LYMPHOCYTES NFR BLD: 26.4 % (ref 18–48)
MAGNESIUM SERPL-MCNC: 2.1 MG/DL (ref 1.6–2.6)
MCH RBC QN AUTO: 28.4 PG (ref 27–31)
MCHC RBC AUTO-ENTMCNC: 33.1 G/DL (ref 32–36)
MCV RBC AUTO: 86 FL (ref 82–98)
MONOCYTES # BLD AUTO: 0.7 K/UL (ref 0.3–1)
MONOCYTES NFR BLD: 8.3 % (ref 4–15)
NEUTROPHILS # BLD AUTO: 5.3 K/UL (ref 1.8–7.7)
NEUTROPHILS NFR BLD: 62.7 % (ref 38–73)
NRBC BLD-RTO: 0 /100 WBC
PLATELET # BLD AUTO: 251 K/UL (ref 150–450)
PMV BLD AUTO: 10.6 FL (ref 9.2–12.9)
POTASSIUM SERPL-SCNC: 4 MMOL/L (ref 3.5–5.1)
PROT SERPL-MCNC: 6.4 G/DL (ref 6–8.4)
RBC # BLD AUTO: 5.18 M/UL (ref 4.6–6.2)
SODIUM SERPL-SCNC: 141 MMOL/L (ref 136–145)
TSH SERPL DL<=0.005 MIU/L-ACNC: 1.95 UIU/ML (ref 0.4–4)
WBC # BLD AUTO: 8.51 K/UL (ref 3.9–12.7)

## 2023-03-08 PROCEDURE — 83735 ASSAY OF MAGNESIUM: CPT | Performed by: FAMILY MEDICINE

## 2023-03-08 PROCEDURE — 84443 ASSAY THYROID STIM HORMONE: CPT | Performed by: FAMILY MEDICINE

## 2023-03-08 PROCEDURE — 99397 PR PREVENTIVE VISIT,EST,65 & OVER: ICD-10-PCS | Mod: 25,S$GLB,, | Performed by: FAMILY MEDICINE

## 2023-03-08 PROCEDURE — 85025 COMPLETE CBC W/AUTO DIFF WBC: CPT | Performed by: FAMILY MEDICINE

## 2023-03-08 PROCEDURE — 99397 PER PM REEVAL EST PAT 65+ YR: CPT | Mod: 25,S$GLB,, | Performed by: FAMILY MEDICINE

## 2023-03-08 PROCEDURE — 3075F SYST BP GE 130 - 139MM HG: CPT | Mod: CPTII,S$GLB,, | Performed by: FAMILY MEDICINE

## 2023-03-08 PROCEDURE — 3078F PR MOST RECENT DIASTOLIC BLOOD PRESSURE < 80 MM HG: ICD-10-PCS | Mod: CPTII,S$GLB,, | Performed by: FAMILY MEDICINE

## 2023-03-08 PROCEDURE — 84153 ASSAY OF PSA TOTAL: CPT | Performed by: FAMILY MEDICINE

## 2023-03-08 PROCEDURE — 80053 COMPREHEN METABOLIC PANEL: CPT | Performed by: FAMILY MEDICINE

## 2023-03-08 PROCEDURE — 1126F AMNT PAIN NOTED NONE PRSNT: CPT | Mod: CPTII,S$GLB,, | Performed by: FAMILY MEDICINE

## 2023-03-08 PROCEDURE — 83036 HEMOGLOBIN GLYCOSYLATED A1C: CPT | Performed by: FAMILY MEDICINE

## 2023-03-08 PROCEDURE — G0009 PNEUMOCOCCAL CONJUGATE VACCINE 20-VALENT: ICD-10-PCS | Mod: S$GLB,,, | Performed by: FAMILY MEDICINE

## 2023-03-08 PROCEDURE — 1101F PR PT FALLS ASSESS DOC 0-1 FALLS W/OUT INJ PAST YR: ICD-10-PCS | Mod: CPTII,S$GLB,, | Performed by: FAMILY MEDICINE

## 2023-03-08 PROCEDURE — 99999 PR PBB SHADOW E&M-EST. PATIENT-LVL IV: CPT | Mod: PBBFAC,,, | Performed by: FAMILY MEDICINE

## 2023-03-08 PROCEDURE — 1159F PR MEDICATION LIST DOCUMENTED IN MEDICAL RECORD: ICD-10-PCS | Mod: CPTII,S$GLB,, | Performed by: FAMILY MEDICINE

## 2023-03-08 PROCEDURE — 3008F PR BODY MASS INDEX (BMI) DOCUMENTED: ICD-10-PCS | Mod: CPTII,S$GLB,, | Performed by: FAMILY MEDICINE

## 2023-03-08 PROCEDURE — 3075F PR MOST RECENT SYSTOLIC BLOOD PRESS GE 130-139MM HG: ICD-10-PCS | Mod: CPTII,S$GLB,, | Performed by: FAMILY MEDICINE

## 2023-03-08 PROCEDURE — 90677 PNEUMOCOCCAL CONJUGATE VACCINE 20-VALENT: ICD-10-PCS | Mod: S$GLB,,, | Performed by: FAMILY MEDICINE

## 2023-03-08 PROCEDURE — 3288F FALL RISK ASSESSMENT DOCD: CPT | Mod: CPTII,S$GLB,, | Performed by: FAMILY MEDICINE

## 2023-03-08 PROCEDURE — 3008F BODY MASS INDEX DOCD: CPT | Mod: CPTII,S$GLB,, | Performed by: FAMILY MEDICINE

## 2023-03-08 PROCEDURE — 99999 PR PBB SHADOW E&M-EST. PATIENT-LVL IV: ICD-10-PCS | Mod: PBBFAC,,, | Performed by: FAMILY MEDICINE

## 2023-03-08 PROCEDURE — 3288F PR FALLS RISK ASSESSMENT DOCUMENTED: ICD-10-PCS | Mod: CPTII,S$GLB,, | Performed by: FAMILY MEDICINE

## 2023-03-08 PROCEDURE — 1159F MED LIST DOCD IN RCRD: CPT | Mod: CPTII,S$GLB,, | Performed by: FAMILY MEDICINE

## 2023-03-08 PROCEDURE — 1126F PR PAIN SEVERITY QUANTIFIED, NO PAIN PRESENT: ICD-10-PCS | Mod: CPTII,S$GLB,, | Performed by: FAMILY MEDICINE

## 2023-03-08 PROCEDURE — 90677 PCV20 VACCINE IM: CPT | Mod: S$GLB,,, | Performed by: FAMILY MEDICINE

## 2023-03-08 PROCEDURE — 1101F PT FALLS ASSESS-DOCD LE1/YR: CPT | Mod: CPTII,S$GLB,, | Performed by: FAMILY MEDICINE

## 2023-03-08 PROCEDURE — G0009 ADMIN PNEUMOCOCCAL VACCINE: HCPCS | Mod: S$GLB,,, | Performed by: FAMILY MEDICINE

## 2023-03-08 PROCEDURE — 82306 VITAMIN D 25 HYDROXY: CPT | Performed by: FAMILY MEDICINE

## 2023-03-08 PROCEDURE — 82607 VITAMIN B-12: CPT | Performed by: FAMILY MEDICINE

## 2023-03-08 PROCEDURE — 3078F DIAST BP <80 MM HG: CPT | Mod: CPTII,S$GLB,, | Performed by: FAMILY MEDICINE

## 2023-03-08 PROCEDURE — 36415 COLL VENOUS BLD VENIPUNCTURE: CPT | Performed by: FAMILY MEDICINE

## 2023-03-08 RX ORDER — ASPIRIN 81 MG/1
81 TABLET ORAL DAILY
Qty: 90 TABLET | Refills: 4 | COMMUNITY
Start: 2023-03-08 | End: 2024-03-07

## 2023-03-08 NOTE — PROGRESS NOTES
Office Visit    Patient Name: Masoud Osorio    : 1955  MRN: [id number]    Subjective:  Masoud is a 67 y.o. male who presents today for: annual wellness exam w/ blood work and review of chronic conditions.   Hx of CAD s/p MI and stent, overweight BMI, h/o tobacco abuse, HLD, BPH improved with Flomax, anxiety/ depression currently stable off of medications.   He sustained a lumbar spine compression fracture 2019 s/p conservative management with a back brace.    Seen by pain management for interventional procedures for treatment of lumbar radiculopathy and seen urologist Dr. Kapoor for management of BPH.    Last seen by FM on 2022  Saw Dr. Kapoor Urology 2023 for BPH   Completed PT for lumbar radiculopathy 1 week ago   Last saw Pain Medicine on 12/15/2022 for lumbar radiculopathy   Saw Psychiatry on 2022 for Anxiety/Depression   Last saw Cardiology on 3/28/2022, has annual appt coming up w/ Dr. Bailey 4/12/3    Colonoscopy on 22  -perianal and SEBASTIAN normal   -2mm sessile polyp found in appendices orifice and resected   -4mm sessile polyp found in transverse colon and resected   -3mm sessile polyp found in sigmoid colon and resected   Polyps sent to Pathology:  found to be hyperplastic, no dysplasia. Determined to be benign. Repeat colonoscopy in 5 years      Had labs prior to office visit 2023. Lipids: Cholesterol 96, Triglycerides WNL, HDL 39, LDL 37.6   CBC normal   CMP: slight elevation in AST 50     Labs taken prior to last FM visit 2022 showed mild increase in A1c to 5.7 concomitant with mild weight gain, but otherwise labs unremarkable with normal vitamin-D of 57     He also follows with Dr. Bailey cardiology (last seen 3/28/2022) due to history of NSTEMI in 2016.  Long-term medical management includes Lipitor 80/ aspirin 81 mg daily. Taken off of Plavix by Cardiology due to easy bruising/bleeding.      He has been feeling overall well.   Mood is stable off of regular  medication, occasionally takes Valium but rarely now that some family stressors have improved-- his son is about to ge  and his mother-in-law is in a detention home.  Back and leg pain mild and tolerable. Does not take OTC pain relief as he says pain is manageable and does not occur very often. Still experiencing numbness down left thigh and sometimes ankle. Completed PT last week and continues to practice exercises to help improve numbness. Is now taking Lyrica 150 at night as prev Lyrica 75 BID was causing daytime fatigue.   He was having some mild fecal smearing but these symptoms have resolved with regular use of Metamucil.  No concerns on colonoscopy and no overt fecal incontinence.     General lifestyle habits are as follows:    Diet is described as heart healthy-- low sodium with plenty of fresh fruits and veggies with enough water  Exercise is described as currently good-- walks 1 mile 1-2x/week and bikes for 1 hour 1-2x/week. Does not need rest and does not have SOB or chest pain with exertion.  S  Sleep: sleeping well and that he takes Atarax sometimes to help with sleep.    Weight is stable at 207 lb, expresses desire to lose weight as he feels it will help with his lumbar radiculopathy and overall health.      Immunizations: Prevnar 20 given today 3/8/23,  TDaP 8/24/2018, shingles vaccine 2/2 7/17/2017, FLU shot UTD 10/5/22,  Pfizer Booster 11/16/20.      Screening Tests: colonoscopy 4/27/22 repeat 3-5 years(polyps), PSA 1.8 1/19/22 (had SEBASTIAN w/ cscope 4/27/22), AAA 12/6/2018: negative (smoking history), Hep C (-) 9/28/17    Eye/Dental: up to date with both      PAST MEDICAL HISTORY, SURGICAL/SOCIAL/FAMILY HISTORY REVIEWED AS PER CHART, WITH PERTINENT FINDINGS INCLUDED IN HISTORY SECTION OF NOTE.     Current Medications    Medication List with Changes/Refills   Current Medications    ASPIRIN (ECOTRIN) 81 MG EC TABLET    Take 1 tablet (81 mg total) by mouth once daily.    ATORVASTATIN (LIPITOR) 80  MG TABLET    TAKE 1 TABLET BY MOUTH DAILY    AZELASTINE (ASTELIN) 137 MCG (0.1 %) NASAL SPRAY    Spray 2 sprays in each nostril twice daily    DIAZEPAM (VALIUM) 5 MG TABLET    Take 0.5 tablet by mouth every 8 to 12 hours as needed.    GARLIC EXTRACT ORAL    Take by mouth.    HYDROXYZINE HCL (ATARAX) 25 MG TABLET    Take 0.5 to 1 tablet by mouth at bedtime    HYDROXYZINE HCL (ATARAX) 25 MG TABLET    Take 1/2 to 1 tablet by mouth at bedtime    HYDROXYZINE HCL (ATARAX) 25 MG TABLET    Take 0.5 to 1 tablet by mouth at bedtime    IPRATROPIUM (ATROVENT) 0.06 % NASAL SPRAY    INSTILL 2 SPRAYS IN EACH NOSTRIL EVERY 12 HOURS    MULTIVITAMIN CAPSULE    Take 1 capsule by mouth once daily.    NITROGLYCERIN (NITROSTAT) 0.4 MG SL TABLET    Place 1 tablet (0.4 mg total) under the tongue every 5 (five) minutes as needed for Chest pain.    PREDNISONE (DELTASONE) 20 MG TABLET    Take 2 tablets by mouth on day 1, take 1 & 1/2 tablets on days 2 and 3, take 1 tablet on days 4 and 5, and take 1/2 tablet on day 6.    PREGABALIN (LYRICA) 150 MG CAPSULE    Take 1 capsule (150 mg total) by mouth 2 (two) times daily.    SCOPOLAMINE (TRANSDERM-SCOP) 1.3-1.5 MG (1 MG OVER 3 DAYS)    Place 1 patch onto the skin behind the ear every 3 days    SILDENAFIL (REVATIO) 20 MG TAB    Take 1 tablet (20 mg total) by mouth once daily.    TAMSULOSIN (FLOMAX) 0.4 MG CAP    Take 1 capsule (0.4 mg total) by mouth once daily.    TRIAMCINOLONE (NASACORT) 55 MCG NASAL INHALER    Instill two sprays in each nostril once daily       Allergies   Review of patient's allergies indicates:   Allergen Reactions    Penicillins Hives         Review of Systems (Pertinent positives)  Review of Systems   Constitutional:  Negative for activity change, appetite change, chills, diaphoresis, fever and unexpected weight change.   HENT:  Positive for congestion (feels this is due to allergies, not taking anti-histamine). Negative for hearing loss, rhinorrhea, sneezing, sore throat  and trouble swallowing.    Eyes:  Negative for discharge and visual disturbance.   Respiratory:  Negative for chest tightness, shortness of breath and wheezing.    Cardiovascular:  Positive for leg swelling (dependent edema, intermittent). Negative for chest pain and palpitations.   Gastrointestinal:  Positive for anal bleeding (noticed bright red blood when wiping 2 weeks ago) and diarrhea (fecal smearing, controlled w/ metamucil). Negative for abdominal pain, constipation, nausea and vomiting. Blood in stool: noticed bright red blood when wiping 2 weeks ago.  Endocrine: Negative for polydipsia and polyuria.   Genitourinary:  Negative for difficulty urinating, dysuria, frequency, hematuria and urgency.   Musculoskeletal:  Negative for arthralgias, joint swelling and neck pain.   Allergic/Immunologic: Positive for environmental allergies.   Neurological:  Positive for numbness (L thigh and L anklef, chronic from lumbar radiculopathy). Negative for dizziness, weakness, light-headedness and headaches.   Hematological:  Does not bruise/bleed easily.   Psychiatric/Behavioral:  Negative for confusion and dysphoric mood.      There were no vitals taken for this visit.    Physical Exam  Constitutional:       Appearance: Normal appearance.   HENT:      Head: Normocephalic and atraumatic.      Right Ear: Tympanic membrane normal.      Left Ear: Tympanic membrane normal.      Nose: No congestion or rhinorrhea.   Eyes:      Conjunctiva/sclera: Conjunctivae normal.   Cardiovascular:      Rate and Rhythm: Normal rate and regular rhythm.      Pulses: Normal pulses.      Heart sounds: Normal heart sounds.   Pulmonary:      Effort: Pulmonary effort is normal. No respiratory distress.      Breath sounds: Normal breath sounds. No wheezing, rhonchi or rales.   Abdominal:      General: Abdomen is flat. There is no distension.      Palpations: Abdomen is soft.      Tenderness: There is no abdominal tenderness. There is no guarding.    Musculoskeletal:      Right lower leg: Edema (intermittent, 1+ pitting edema) present.      Left lower leg: Edema (intermittent, 1+ pitting edema) present.   Skin:     Findings: Erythema: Bilateral waxy pinkish appearance of distal shins with loss of hair growth consistent with venous stasis dermatitis.   Neurological:      Mental Status: He is alert.   Psychiatric:         Mood and Affect: Mood normal.         Assessment/Plan:  Masoud Osorio is a 67 y.o. male who presents today for :        ICD-10-CM ICD-9-CM    1. Routine general medical examination at a health care facility  Z00.00 V70.0       2. Class 1 obesity due to excess calories with serious comorbidity and body mass index (BMI) of 30.0 to 30.9 in adult  E66.09 278.00     Z68.30 V85.30       3. Coronary artery disease involving native coronary artery of native heart without angina pectoris  I25.10 414.01       4. History of coronary artery stent placement  Z95.5 V45.82       5. History of heart attack  I25.2 412       6. Visit for monitoring Plavix therapy  Z51.81 V58.83     Z79.02 V58.63       7. Long-term use of aspirin therapy  Z79.82 V58.66       8. Mixed hyperlipidemia  E78.2 272.2       9. Anxiety and depression  F41.9 300.00     F32.A 311       10. Benign prostatic hyperplasia (BPH) with straining on urination  N40.1 600.01     R39.16 788.65       11. Osteoarthritis of spine with radiculopathy, lumbar region  M47.26 721.3       12. Chronic bilateral low back pain without sciatica  M54.50 724.2     G89.29 338.29       13. History of colon polyps  Z86.010 V12.72       14. Non-seasonal allergic rhinitis, unspecified trigger  J30.89 477.8       15. Personal history of COVID-19  Z86.16 V12.09       16. Tobacco dependence in remission  F17.201 305.1       17. Medication management  Z79.899 V58.69       18. Need for pneumococcal vaccine  Z23 V03.82         ADVISED ON DIET/EXERCISE/SLEEP, ROUTINE EYE/DENTAL EXAMS, AND THE IMPORTANCE OF KEEPING UP WITH  APPROPRIATE SCREENING TESTS BASED ON AGE AND RISK FACTORS.  MAINTENANCE REVIEWED AND UP TO DATE WITH ADMINISTRATION OF PREVNAR 20 TODAY, THOUGH HE HAS NOT HAD AN OMICRON COVID-19 BOOSTER.  COLONOSCOPY UPDATED 4/2022, PSA ORDERED WITH LABS.      LE Pitting Edema: 1+ intermittent pitting edema in both LE for past 2-3 weeks.  Less likely to be due to cardiovascular cause as he has good exercise tolerance and nil SOB, chest pain, and lungs clear. Pulses felt in both LE and skin of both LE has waxy, hairless appearance suggestive of venous stasis dermatitis. Ordered US LE Veins to investigate for venous insufficieny. Counseled on weight loss to improve likely venous insufficiency and advised to elevate legs above heart for 10-15 minutes/day. Will monitor and follow-up on results of venous insufficiency ultrasound.    Fecal Smearing: mild, well controlled with Metamucil. Not likely to be due to lumbar radiculopathy/ no cauda equina concerns.  more likely to be related to irregularity. Continue Metamucil and monitor.  Colonoscopy up-to-date.     OBESITY WITH INCREASED A1C:  Will obtain labs today to check HbA1c. At last visit he had gained about 10 lb with slight increase in A1c to 5.7.  Weight stable at 207 lbs. Has been more active with walking and biking, completed PT last week, continues to practice PT exercises.  Advise based on results.     Continue follow up with Dr. Bailey cardiology (last seen 3/28/22) due to history of NSTEMI in 7/2016.  Long-term medical management includes Lipitor 80/ aspirin 81 mg daily. No longer on Plavix. Exercise tolerance stable     Sees Dr. Kapoor Urology for BPH, currently stable on Flomax.     Osteoarthritis of the spine with left leg radiculopathy, history of compression fracture:  Back and leg pain is stable but continues to have numbness down left leg.  Status post CHANO + PT, on Lyrica 150 mg nightly w/ prn muscle relaxer. Symptoms currently stable.  Follows up with pain management  p.r.n..     Mood stable, takes Valium prn for anxiety. Does not take regularly.     I hereby acknowledge that I am relying upon documentation authored by a medical student working under my supervision and further I hereby attest that I have verified the student documentation or findings by personally re-performing the physical exam and medical decision making activities of the Evaluation and Management service to be billed.  Rosemarie Ayala

## 2023-03-08 NOTE — PATIENT INSTRUCTIONS
CONSIDER THE HOME OCCUR ON COVID-19 UPDATED BOOSTER AS OF 09/2022- RECOMMEND 1 DOSE THROUGH THE PHARMACY.

## 2023-03-09 PROBLEM — R97.20 ELEVATED PSA: Status: ACTIVE | Noted: 2023-03-09

## 2023-03-09 LAB
25(OH)D3+25(OH)D2 SERPL-MCNC: 34 NG/ML (ref 30–96)
VIT B12 SERPL-MCNC: 748 PG/ML (ref 210–950)

## 2023-03-13 ENCOUNTER — PATIENT MESSAGE (OUTPATIENT)
Dept: UROLOGY | Facility: CLINIC | Age: 68
End: 2023-03-13
Payer: MEDICARE

## 2023-03-13 DIAGNOSIS — R97.20 ELEVATED PSA: Primary | ICD-10-CM

## 2023-03-13 DIAGNOSIS — Z12.5 ENCOUNTER FOR PROSTATE CANCER SCREENING: ICD-10-CM

## 2023-03-14 DIAGNOSIS — I25.10 CORONARY ARTERY DISEASE INVOLVING NATIVE CORONARY ARTERY OF NATIVE HEART WITHOUT ANGINA PECTORIS: ICD-10-CM

## 2023-03-15 ENCOUNTER — HOSPITAL ENCOUNTER (OUTPATIENT)
Dept: RADIOLOGY | Facility: HOSPITAL | Age: 68
Discharge: HOME | End: 2023-03-15
Attending: FAMILY MEDICINE
Payer: MEDICARE

## 2023-03-15 DIAGNOSIS — R60.0 BILATERAL LOWER EXTREMITY EDEMA: ICD-10-CM

## 2023-03-15 PROCEDURE — 93970 EXTREMITY STUDY: CPT | Mod: 26,,, | Performed by: RADIOLOGY

## 2023-03-15 PROCEDURE — 93970 EXTREMITY STUDY: CPT | Mod: TC

## 2023-03-15 PROCEDURE — 93970 US LOWER EXTREMITY VEINS BILATERAL INSUFFICIENCY: ICD-10-PCS | Mod: 26,,, | Performed by: RADIOLOGY

## 2023-03-16 ENCOUNTER — PATIENT MESSAGE (OUTPATIENT)
Dept: CARDIOLOGY | Facility: CLINIC | Age: 68
End: 2023-03-16
Payer: MEDICARE

## 2023-03-16 DIAGNOSIS — I25.10 CORONARY ARTERY DISEASE INVOLVING NATIVE CORONARY ARTERY OF NATIVE HEART WITHOUT ANGINA PECTORIS: ICD-10-CM

## 2023-03-16 RX ORDER — ATORVASTATIN CALCIUM 80 MG/1
TABLET, FILM COATED ORAL
Qty: 90 TABLET | Refills: 3 | Status: SHIPPED | OUTPATIENT
Start: 2023-03-16 | End: 2023-03-16 | Stop reason: SDUPTHER

## 2023-03-17 RX ORDER — ATORVASTATIN CALCIUM 80 MG/1
80 TABLET, FILM COATED ORAL DAILY
Qty: 90 TABLET | Refills: 3 | Status: SHIPPED | OUTPATIENT
Start: 2023-03-17

## 2023-03-27 ENCOUNTER — HOSPITAL ENCOUNTER (OUTPATIENT)
Facility: HOSPITAL | Age: 68
Discharge: HOME | End: 2023-03-28
Attending: EMERGENCY MEDICINE | Admitting: FAMILY MEDICINE
Payer: MEDICARE

## 2023-03-27 DIAGNOSIS — R07.9 CHEST PAIN: ICD-10-CM

## 2023-03-27 LAB
ALBUMIN SERPL BCP-MCNC: 3.9 G/DL (ref 3.5–5.2)
ALP SERPL-CCNC: 73 U/L (ref 55–135)
ALT SERPL W/O P-5'-P-CCNC: 26 U/L (ref 10–44)
ANION GAP SERPL CALC-SCNC: 7 MMOL/L (ref 8–16)
AST SERPL-CCNC: 30 U/L (ref 10–40)
BASOPHILS # BLD AUTO: 0.04 K/UL (ref 0–0.2)
BASOPHILS NFR BLD: 0.5 % (ref 0–1.9)
BILIRUB SERPL-MCNC: 0.4 MG/DL (ref 0.1–1)
BNP SERPL-MCNC: 40 PG/ML (ref 0–99)
BUN SERPL-MCNC: 19 MG/DL (ref 8–23)
CALCIUM SERPL-MCNC: 9.4 MG/DL (ref 8.7–10.5)
CHLORIDE SERPL-SCNC: 110 MMOL/L (ref 95–110)
CO2 SERPL-SCNC: 25 MMOL/L (ref 23–29)
CREAT SERPL-MCNC: 1 MG/DL (ref 0.5–1.4)
DIFFERENTIAL METHOD: NORMAL
EOSINOPHIL # BLD AUTO: 0.2 K/UL (ref 0–0.5)
EOSINOPHIL NFR BLD: 3 % (ref 0–8)
ERYTHROCYTE [DISTWIDTH] IN BLOOD BY AUTOMATED COUNT: 13.1 % (ref 11.5–14.5)
EST. GFR  (NO RACE VARIABLE): >60 ML/MIN/1.73 M^2
GLUCOSE SERPL-MCNC: 126 MG/DL (ref 70–110)
HCT VFR BLD AUTO: 41.9 % (ref 40–54)
HGB BLD-MCNC: 14.1 G/DL (ref 14–18)
IMM GRANULOCYTES # BLD AUTO: 0.03 K/UL (ref 0–0.04)
IMM GRANULOCYTES NFR BLD AUTO: 0.4 % (ref 0–0.5)
LYMPHOCYTES # BLD AUTO: 2.7 K/UL (ref 1–4.8)
LYMPHOCYTES NFR BLD: 35.6 % (ref 18–48)
MCH RBC QN AUTO: 28.4 PG (ref 27–31)
MCHC RBC AUTO-ENTMCNC: 33.7 G/DL (ref 32–36)
MCV RBC AUTO: 84 FL (ref 82–98)
MONOCYTES # BLD AUTO: 0.7 K/UL (ref 0.3–1)
MONOCYTES NFR BLD: 9.6 % (ref 4–15)
NEUTROPHILS # BLD AUTO: 3.9 K/UL (ref 1.8–7.7)
NEUTROPHILS NFR BLD: 50.9 % (ref 38–73)
NRBC BLD-RTO: 0 /100 WBC
PLATELET # BLD AUTO: 229 K/UL (ref 150–450)
PMV BLD AUTO: 10 FL (ref 9.2–12.9)
POTASSIUM SERPL-SCNC: 3.7 MMOL/L (ref 3.5–5.1)
PROT SERPL-MCNC: 6.4 G/DL (ref 6–8.4)
RBC # BLD AUTO: 4.97 M/UL (ref 4.6–6.2)
SODIUM SERPL-SCNC: 142 MMOL/L (ref 136–145)
TROPONIN I SERPL DL<=0.01 NG/ML-MCNC: <0.006 NG/ML (ref 0–0.03)
TROPONIN I SERPL DL<=0.01 NG/ML-MCNC: <0.006 NG/ML (ref 0–0.03)
WBC # BLD AUTO: 7.67 K/UL (ref 3.9–12.7)

## 2023-03-27 PROCEDURE — 93005 ELECTROCARDIOGRAM TRACING: CPT

## 2023-03-27 PROCEDURE — 80053 COMPREHEN METABOLIC PANEL: CPT | Performed by: EMERGENCY MEDICINE

## 2023-03-27 PROCEDURE — 83880 ASSAY OF NATRIURETIC PEPTIDE: CPT | Performed by: EMERGENCY MEDICINE

## 2023-03-27 PROCEDURE — G0378 HOSPITAL OBSERVATION PER HR: HCPCS

## 2023-03-27 PROCEDURE — 85025 COMPLETE CBC W/AUTO DIFF WBC: CPT | Performed by: EMERGENCY MEDICINE

## 2023-03-27 PROCEDURE — 84484 ASSAY OF TROPONIN QUANT: CPT | Mod: 91 | Performed by: EMERGENCY MEDICINE

## 2023-03-27 PROCEDURE — 93010 ELECTROCARDIOGRAM REPORT: CPT | Mod: ,,, | Performed by: INTERNAL MEDICINE

## 2023-03-27 PROCEDURE — 84484 ASSAY OF TROPONIN QUANT: CPT | Performed by: EMERGENCY MEDICINE

## 2023-03-27 PROCEDURE — 93010 EKG 12-LEAD: ICD-10-PCS | Mod: ,,, | Performed by: INTERNAL MEDICINE

## 2023-03-27 PROCEDURE — 25000003 PHARM REV CODE 250: Performed by: EMERGENCY MEDICINE

## 2023-03-27 PROCEDURE — 99285 EMERGENCY DEPT VISIT HI MDM: CPT | Mod: 25

## 2023-03-27 RX ORDER — SODIUM,POTASSIUM PHOSPHATES 280-250MG
2 POWDER IN PACKET (EA) ORAL
Status: DISCONTINUED | OUTPATIENT
Start: 2023-03-27 | End: 2023-03-28 | Stop reason: HOSPADM

## 2023-03-27 RX ORDER — PROCHLORPERAZINE EDISYLATE 5 MG/ML
5 INJECTION INTRAMUSCULAR; INTRAVENOUS EVERY 6 HOURS PRN
Status: DISCONTINUED | OUTPATIENT
Start: 2023-03-27 | End: 2023-03-28 | Stop reason: HOSPADM

## 2023-03-27 RX ORDER — ACETAMINOPHEN 325 MG/1
650 TABLET ORAL EVERY 8 HOURS PRN
Status: DISCONTINUED | OUTPATIENT
Start: 2023-03-27 | End: 2023-03-28 | Stop reason: HOSPADM

## 2023-03-27 RX ORDER — MAG HYDROX/ALUMINUM HYD/SIMETH 200-200-20
30 SUSPENSION, ORAL (FINAL DOSE FORM) ORAL 4 TIMES DAILY PRN
Status: DISCONTINUED | OUTPATIENT
Start: 2023-03-27 | End: 2023-03-28 | Stop reason: HOSPADM

## 2023-03-27 RX ORDER — MORPHINE SULFATE 4 MG/ML
4 INJECTION, SOLUTION INTRAMUSCULAR; INTRAVENOUS EVERY 4 HOURS PRN
Status: DISCONTINUED | OUTPATIENT
Start: 2023-03-27 | End: 2023-03-28 | Stop reason: HOSPADM

## 2023-03-27 RX ORDER — LANOLIN ALCOHOL/MO/W.PET/CERES
800 CREAM (GRAM) TOPICAL
Status: DISCONTINUED | OUTPATIENT
Start: 2023-03-27 | End: 2023-03-28 | Stop reason: HOSPADM

## 2023-03-27 RX ORDER — ONDANSETRON 2 MG/ML
4 INJECTION INTRAMUSCULAR; INTRAVENOUS EVERY 8 HOURS PRN
Status: DISCONTINUED | OUTPATIENT
Start: 2023-03-27 | End: 2023-03-28 | Stop reason: HOSPADM

## 2023-03-27 RX ORDER — HYDROCODONE BITARTRATE AND ACETAMINOPHEN 5; 325 MG/1; MG/1
1 TABLET ORAL EVERY 6 HOURS PRN
Status: DISCONTINUED | OUTPATIENT
Start: 2023-03-27 | End: 2023-03-28 | Stop reason: HOSPADM

## 2023-03-27 RX ORDER — DEXTROSE 40 %
15 GEL (GRAM) ORAL
Status: DISCONTINUED | OUTPATIENT
Start: 2023-03-27 | End: 2023-03-28 | Stop reason: HOSPADM

## 2023-03-27 RX ORDER — NALOXONE HCL 0.4 MG/ML
0.02 VIAL (ML) INJECTION
Status: DISCONTINUED | OUTPATIENT
Start: 2023-03-27 | End: 2023-03-28 | Stop reason: HOSPADM

## 2023-03-27 RX ORDER — ASPIRIN 81 MG/1
162 TABLET ORAL
Status: COMPLETED | OUTPATIENT
Start: 2023-03-27 | End: 2023-03-27

## 2023-03-27 RX ORDER — POLYETHYLENE GLYCOL 3350 17 G/17G
17 POWDER, FOR SOLUTION ORAL DAILY
Status: DISCONTINUED | OUTPATIENT
Start: 2023-03-28 | End: 2023-03-28 | Stop reason: HOSPADM

## 2023-03-27 RX ORDER — SODIUM CHLORIDE 0.9 % (FLUSH) 0.9 %
2 SYRINGE (ML) INJECTION EVERY 12 HOURS PRN
Status: DISCONTINUED | OUTPATIENT
Start: 2023-03-27 | End: 2023-03-28 | Stop reason: HOSPADM

## 2023-03-27 RX ORDER — ACETAMINOPHEN 325 MG/1
650 TABLET ORAL EVERY 4 HOURS PRN
Status: DISCONTINUED | OUTPATIENT
Start: 2023-03-27 | End: 2023-03-28 | Stop reason: HOSPADM

## 2023-03-27 RX ORDER — GLUCAGON 1 MG
1 KIT INJECTION
Status: DISCONTINUED | OUTPATIENT
Start: 2023-03-27 | End: 2023-03-28 | Stop reason: HOSPADM

## 2023-03-27 RX ADMIN — ASPIRIN 162 MG: 81 TABLET, COATED ORAL at 08:03

## 2023-03-28 ENCOUNTER — PATIENT MESSAGE (OUTPATIENT)
Dept: FAMILY MEDICINE | Facility: CLINIC | Age: 68
End: 2023-03-28
Payer: MEDICARE

## 2023-03-28 ENCOUNTER — PATIENT MESSAGE (OUTPATIENT)
Dept: CARDIOLOGY | Facility: CLINIC | Age: 68
End: 2023-03-28
Payer: MEDICARE

## 2023-03-28 VITALS
HEIGHT: 68 IN | RESPIRATION RATE: 17 BRPM | BODY MASS INDEX: 31.57 KG/M2 | SYSTOLIC BLOOD PRESSURE: 151 MMHG | TEMPERATURE: 96 F | OXYGEN SATURATION: 96 % | DIASTOLIC BLOOD PRESSURE: 89 MMHG | WEIGHT: 208.31 LBS | HEART RATE: 60 BPM

## 2023-03-28 DIAGNOSIS — R07.9 CHEST PAIN, UNSPECIFIED TYPE: Primary | ICD-10-CM

## 2023-03-28 LAB
ANION GAP SERPL CALC-SCNC: 9 MMOL/L (ref 8–16)
BASOPHILS # BLD AUTO: 0.04 K/UL (ref 0–0.2)
BASOPHILS NFR BLD: 0.6 % (ref 0–1.9)
BUN SERPL-MCNC: 17 MG/DL (ref 8–23)
CALCIUM SERPL-MCNC: 9.1 MG/DL (ref 8.7–10.5)
CHLORIDE SERPL-SCNC: 112 MMOL/L (ref 95–110)
CHOLEST SERPL-MCNC: 119 MG/DL (ref 120–199)
CHOLEST/HDLC SERPL: 3.2 {RATIO} (ref 2–5)
CO2 SERPL-SCNC: 22 MMOL/L (ref 23–29)
CREAT SERPL-MCNC: 0.8 MG/DL (ref 0.5–1.4)
DIFFERENTIAL METHOD: ABNORMAL
EOSINOPHIL # BLD AUTO: 0.3 K/UL (ref 0–0.5)
EOSINOPHIL NFR BLD: 4.1 % (ref 0–8)
ERYTHROCYTE [DISTWIDTH] IN BLOOD BY AUTOMATED COUNT: 13 % (ref 11.5–14.5)
EST. GFR  (NO RACE VARIABLE): >60 ML/MIN/1.73 M^2
GLUCOSE SERPL-MCNC: 91 MG/DL (ref 70–110)
HCT VFR BLD AUTO: 41.5 % (ref 40–54)
HDLC SERPL-MCNC: 37 MG/DL (ref 40–75)
HDLC SERPL: 31.1 % (ref 20–50)
HGB BLD-MCNC: 13.9 G/DL (ref 14–18)
IMM GRANULOCYTES # BLD AUTO: 0.01 K/UL (ref 0–0.04)
IMM GRANULOCYTES NFR BLD AUTO: 0.1 % (ref 0–0.5)
LDLC SERPL CALC-MCNC: 68.4 MG/DL (ref 63–159)
LYMPHOCYTES # BLD AUTO: 2.9 K/UL (ref 1–4.8)
LYMPHOCYTES NFR BLD: 41.9 % (ref 18–48)
MAGNESIUM SERPL-MCNC: 2 MG/DL (ref 1.6–2.6)
MCH RBC QN AUTO: 28.7 PG (ref 27–31)
MCHC RBC AUTO-ENTMCNC: 33.5 G/DL (ref 32–36)
MCV RBC AUTO: 86 FL (ref 82–98)
MONOCYTES # BLD AUTO: 0.7 K/UL (ref 0.3–1)
MONOCYTES NFR BLD: 9.9 % (ref 4–15)
NEUTROPHILS # BLD AUTO: 3 K/UL (ref 1.8–7.7)
NEUTROPHILS NFR BLD: 43.4 % (ref 38–73)
NONHDLC SERPL-MCNC: 82 MG/DL
NRBC BLD-RTO: 0 /100 WBC
PHOSPHATE SERPL-MCNC: 2.9 MG/DL (ref 2.7–4.5)
PLATELET # BLD AUTO: 214 K/UL (ref 150–450)
PMV BLD AUTO: 10.1 FL (ref 9.2–12.9)
POTASSIUM SERPL-SCNC: 3.9 MMOL/L (ref 3.5–5.1)
RBC # BLD AUTO: 4.84 M/UL (ref 4.6–6.2)
SODIUM SERPL-SCNC: 143 MMOL/L (ref 136–145)
TRIGL SERPL-MCNC: 68 MG/DL (ref 30–150)
TROPONIN I SERPL DL<=0.01 NG/ML-MCNC: <0.006 NG/ML (ref 0–0.03)
TROPONIN I SERPL DL<=0.01 NG/ML-MCNC: <0.006 NG/ML (ref 0–0.03)
WBC # BLD AUTO: 6.8 K/UL (ref 3.9–12.7)

## 2023-03-28 PROCEDURE — 93005 ELECTROCARDIOGRAM TRACING: CPT

## 2023-03-28 PROCEDURE — 94761 N-INVAS EAR/PLS OXIMETRY MLT: CPT

## 2023-03-28 PROCEDURE — 93010 ELECTROCARDIOGRAM REPORT: CPT | Mod: ,,, | Performed by: INTERNAL MEDICINE

## 2023-03-28 PROCEDURE — G0378 HOSPITAL OBSERVATION PER HR: HCPCS

## 2023-03-28 PROCEDURE — 80061 LIPID PANEL: CPT | Performed by: STUDENT IN AN ORGANIZED HEALTH CARE EDUCATION/TRAINING PROGRAM

## 2023-03-28 PROCEDURE — 25000003 PHARM REV CODE 250

## 2023-03-28 PROCEDURE — 25000003 PHARM REV CODE 250: Performed by: NURSE PRACTITIONER

## 2023-03-28 PROCEDURE — 84484 ASSAY OF TROPONIN QUANT: CPT | Performed by: STUDENT IN AN ORGANIZED HEALTH CARE EDUCATION/TRAINING PROGRAM

## 2023-03-28 PROCEDURE — 84100 ASSAY OF PHOSPHORUS: CPT | Performed by: STUDENT IN AN ORGANIZED HEALTH CARE EDUCATION/TRAINING PROGRAM

## 2023-03-28 PROCEDURE — 99223 1ST HOSP IP/OBS HIGH 75: CPT | Mod: ,,, | Performed by: NURSE PRACTITIONER

## 2023-03-28 PROCEDURE — 36415 COLL VENOUS BLD VENIPUNCTURE: CPT | Performed by: STUDENT IN AN ORGANIZED HEALTH CARE EDUCATION/TRAINING PROGRAM

## 2023-03-28 PROCEDURE — 83735 ASSAY OF MAGNESIUM: CPT | Performed by: STUDENT IN AN ORGANIZED HEALTH CARE EDUCATION/TRAINING PROGRAM

## 2023-03-28 PROCEDURE — 93010 EKG 12-LEAD: ICD-10-PCS | Mod: ,,, | Performed by: INTERNAL MEDICINE

## 2023-03-28 PROCEDURE — 99223 PR INITIAL HOSPITAL CARE,LEVL III: ICD-10-PCS | Mod: ,,, | Performed by: NURSE PRACTITIONER

## 2023-03-28 PROCEDURE — 80048 BASIC METABOLIC PNL TOTAL CA: CPT | Performed by: STUDENT IN AN ORGANIZED HEALTH CARE EDUCATION/TRAINING PROGRAM

## 2023-03-28 PROCEDURE — 85025 COMPLETE CBC W/AUTO DIFF WBC: CPT | Performed by: STUDENT IN AN ORGANIZED HEALTH CARE EDUCATION/TRAINING PROGRAM

## 2023-03-28 RX ORDER — ATORVASTATIN CALCIUM 40 MG/1
80 TABLET, FILM COATED ORAL DAILY
Status: DISCONTINUED | OUTPATIENT
Start: 2023-03-28 | End: 2023-03-28 | Stop reason: HOSPADM

## 2023-03-28 RX ORDER — TAMSULOSIN HYDROCHLORIDE 0.4 MG/1
0.4 CAPSULE ORAL DAILY
Status: DISCONTINUED | OUTPATIENT
Start: 2023-03-28 | End: 2023-03-28 | Stop reason: HOSPADM

## 2023-03-28 RX ORDER — ASPIRIN 81 MG/1
81 TABLET ORAL DAILY
Status: DISCONTINUED | OUTPATIENT
Start: 2023-03-28 | End: 2023-03-28 | Stop reason: HOSPADM

## 2023-03-28 RX ADMIN — ASPIRIN 81 MG: 81 TABLET, COATED ORAL at 08:03

## 2023-03-28 RX ADMIN — ATORVASTATIN CALCIUM 80 MG: 40 TABLET, FILM COATED ORAL at 08:03

## 2023-03-28 RX ADMIN — TAMSULOSIN HYDROCHLORIDE 0.4 MG: 0.4 CAPSULE ORAL at 10:03

## 2023-03-28 NOTE — ED TRIAGE NOTES
Masoud Osorio, a 67 y.o. male presents to the ED w/ complaint of chest pain starting 1.5 hours before arrival to ED that is similar to pain felt during last MI.      Triage note:  Chief Complaint   Patient presents with    Chest Pain     Left-sided chest pain x 1 hour that feels like tightness. Reports hx of stent placement x 4 in 2016. States tightness feels like last MI. Denies nausea, vomiting, diaphoresis. Took baby aspirin and 1 nitro 30 minutes ago w/o relief. Rates pain 2 out of 10.     Review of patient's allergies indicates:   Allergen Reactions    Penicillins Hives     Past Medical History:   Diagnosis Date    Anticoagulant long-term use     Anxiety and depression 9/21/2016    trial of ctialopram and follow up in 8 weeks    Arthritis     Benign prostatic hyperplasia (BPH) with straining on urination 12/5/2018    Coronary artery disease involving native coronary artery of native heart without angina pectoris 7/26/2016 7/26/2016 NSTEMI at [facility]   PCI of RCA with 4.0 x 15, 4.0 x 38, and 3.5 x 38 mm Resolute JACOB   PCI of PDA 2.75 x 15 mm JACOB dilated to 3.0 POBA of proximal PLB 2.0 x 12 mm balloon   Normal EF with LVEDP 15 mmHg DAPT score of 2               Elevated PSA     Gout     History of coronary artery stent placement 9/21/2016    History of heart attack 7/25/2016    NSTEMI    Mixed hyperlipidemia 7/17/2017    Overweight (BMI 25.0-29.9) 9/21/2016    Tobacco dependence in remission 7/25/2016

## 2023-03-28 NOTE — HPI
Patient is a 67-year-old male with past medical history of hyperlipidemia pulmonary hypertension and CAD status post PCI who presented with complaints of chest pain of a few hours duration.  History obtained from patient and wife at bedside.  He noted sudden onset chest pain located in his left chest, described as dull, nonradiating with no associated shortness of breath.  Denies nausea, vomiting, diaphoresis.  His last chest pain was when he had his MI in 2016.  He last saw his cardiologist a year ago and was scheduled to see him was done.  At home, he took a dose of aspirin and nitroglycerin.  In ER, he received aspirin

## 2023-03-28 NOTE — PLAN OF CARE
D/C recs noted. Pt to have cardiology, urology, and PCP follow up. Pharmacist will go over home medications and reasons for medications. VN and bedside nurse to reiterate final discharge instructions.       At time of discharge pt will be transported home by spouse at bedside.     DME at discharge: none  Home Health: none    Pt has follow up appointments added to AVS.         03/28/23 1014   Final Note   Assessment Type Final Discharge Note   Anticipated Discharge Disposition Home   What phone number can be called within the next 1-3 days to see how you are doing after discharge? [phone]   Hospital Resources/Appts/Education Provided Appointments scheduled and added to AVS   Post-Acute Status   Discharge Delays None known at this time       Future Appointments   Date Time Provider Department Center   4/12/2023 10:30 AM CARDIOLOGY, ECHO [facility] CARD [facility]   4/14/2023  8:30 AM LAB, [facility]   4/17/2023  4:15 PM Anny Kapoor MD [facility] UROLOGY [facility]   5/15/2023  9:40 AM Keon Bailey MD [facility] CARDIO [facility]   5/15/2023  1:30 PM Rosemarie Ayala MD [facility] FAM MED [facility]     [facility] Case Management  [phone]

## 2023-03-28 NOTE — PROGRESS NOTES
03/27/23 2259   Admission   Initial VN Admission Questions Complete   Communication Issues? None   Shift   Virtual Nurse - Patient Verbalized Approval Of Camera Use   Safety/Activity   Patient Rounds bed in low position;placement of personal items at bedside;call light in patient/parent reach;clutter free environment maintained   Safety Promotion/Fall Prevention medications reviewed;instructed to call staff for mobility;assistive device/personal item within reach

## 2023-03-28 NOTE — SUBJECTIVE & OBJECTIVE
Past Medical History:   Diagnosis Date    Anticoagulant long-term use     Anxiety and depression 9/21/2016    trial of ctialopram and follow up in 8 weeks    Arthritis     Benign prostatic hyperplasia (BPH) with straining on urination 12/5/2018    Coronary artery disease involving native coronary artery of native heart without angina pectoris 7/26/2016 7/26/2016 NSTEMI at [facility]   PCI of RCA with 4.0 x 15, 4.0 x 38, and 3.5 x 38 mm Resolute JACOB   PCI of PDA 2.75 x 15 mm JACOB dilated to 3.0 POBA of proximal PLB 2.0 x 12 mm balloon   Normal EF with LVEDP 15 mmHg DAPT score of 2               Elevated PSA     Gout     History of coronary artery stent placement 9/21/2016    History of heart attack 7/25/2016    NSTEMI    Mixed hyperlipidemia 7/17/2017    Overweight (BMI 25.0-29.9) 9/21/2016    Tobacco dependence in remission 7/25/2016       Past Surgical History:   Procedure Laterality Date    COLONOSCOPY N/A 08/16/2017    Procedure: COLONOSCOPY Split Prep;  Surgeon: Maykel Carcamo Jr., MD;  Location: [facility] ENDO;  Service: Endoscopy;  Laterality: N/A;    COLONOSCOPY N/A 4/27/2022    Procedure: COLONOSCOPY Golytely;  Surgeon: Henrry Nieves MD;  Location: [facility] ENDO;  Service: Endoscopy;  Laterality: N/A;  C    CORONARY ANGIOPLASTY WITH STENT PLACEMENT      ROTATOR CUFF REPAIR      TRANSFORAMINAL EPIDURAL INJECTION OF STEROID Left 06/15/2021    Procedure: Injection,steroid,epidural,transforaminal approach-- Left- L3-4, L4-5;  Surgeon: Enrike Medellin Jr., MD;  Location: [facility] PAIN MGT;  Service: Pain Management;  Laterality: Left;    VASECTOMY         Review of patient's allergies indicates:   Allergen Reactions    Penicillins Hives       No current facility-administered medications on file prior to encounter.     Current Outpatient Medications on File Prior to Encounter   Medication Sig    aspirin (ECOTRIN) 81 MG EC tablet Take 1 tablet (81 mg total) by mouth once daily.    atorvastatin (LIPITOR) 80 MG  tablet Take 1 tablet (80 mg total) by mouth once daily.    azelastine (ASTELIN) 137 mcg (0.1 %) nasal spray Spray 2 sprays in each nostril twice daily    diazePAM (VALIUM) 5 MG tablet Take 0.5 tablet by mouth every 8 to 12 hours as needed.    GARLIC EXTRACT ORAL Take by mouth.    hydrOXYzine HCL (ATARAX) 25 MG tablet Take 0.5 to 1 tablet by mouth at bedtime    ipratropium (ATROVENT) 0.06 % nasal spray INSTILL 2 SPRAYS IN EACH NOSTRIL EVERY 12 HOURS    multivitamin capsule Take 1 capsule by mouth once daily.    nitroGLYCERIN (NITROSTAT) 0.4 MG SL tablet Place 1 tablet (0.4 mg total) under the tongue every 5 (five) minutes as needed for Chest pain.    pregabalin (LYRICA) 150 MG capsule Take 1 capsule (150 mg total) by mouth 2 (two) times daily.    scopolamine (TRANSDERM-SCOP) 1.3-1.5 mg (1 mg over 3 days) Place 1 patch onto the skin behind the ear every 3 days    sildenafil (REVATIO) 20 mg Tab Take 1 tablet (20 mg total) by mouth once daily.    tamsulosin (FLOMAX) 0.4 mg Cap Take 1 capsule (0.4 mg total) by mouth once daily.    triamcinolone (NASACORT) 55 mcg nasal inhaler Instill two sprays in each nostril once daily     Family History       Problem Relation (Age of Onset)    Arthritis Mother    Congenital heart disease Father    Heart disease Mother    No Known Problems Sister, Brother, Daughter, Sister, Sister    Parkinsonism Mother          Tobacco Use    Smoking status: Former     Packs/day: 0.50     Types: Cigarettes     Quit date: 2016     Years since quittin.6    Smokeless tobacco: Never   Substance and Sexual Activity    Alcohol use: Yes     Comment: rarely on holidays    Drug use: No    Sexual activity: Not on file     Review of Systems   Constitutional: Negative for chills, decreased appetite, diaphoresis and fever.   Cardiovascular:  Positive for chest pain. Negative for claudication, cyanosis, dyspnea on exertion, irregular heartbeat, leg swelling, near-syncope, orthopnea, palpitations,  paroxysmal nocturnal dyspnea and syncope.   Respiratory:  Negative for cough, hemoptysis, shortness of breath and wheezing.    Gastrointestinal:  Negative for bloating, abdominal pain, constipation, diarrhea, melena, nausea and vomiting.   Neurological:  Negative for dizziness and weakness.   Objective:     Vital Signs (Most Recent):  Temp: 96.1 °F (35.6 °C) (03/28/23 0813)  Pulse: 60 (03/28/23 0813)  Resp: 17 (03/28/23 0813)  BP: (!) 151/89 (03/28/23 0813)  SpO2: 96 % (03/28/23 0905)   Vital Signs (24h Range):  Temp:  [96 °F (35.6 °C)-97.7 °F (36.5 °C)] 96.1 °F (35.6 °C)  Pulse:  [53-68] 60  Resp:  [17-20] 17  SpO2:  [94 %-97 %] 96 %  BP: (122-152)/(73-89) 151/89     Weight: 94.5 kg (208 lb 5.4 oz)  Body mass index is 31.68 kg/m².    SpO2: 96 %         Intake/Output Summary (Last 24 hours) at 3/28/2023 0942  Last data filed at 3/28/2023 0846  Gross per 24 hour   Intake 180 ml   Output --   Net 180 ml       Lines/Drains/Airways       Peripheral Intravenous Line  Duration                  Peripheral IV - Single Lumen 03/27/23 2011 20 G Right Antecubital <1 day                    Physical Exam  Constitutional:       General: He is not in acute distress.     Appearance: He is well-developed.   Cardiovascular:      Rate and Rhythm: Normal rate and regular rhythm.      Heart sounds: No murmur heard.    No gallop.   Pulmonary:      Effort: Pulmonary effort is normal. No respiratory distress.      Breath sounds: Normal breath sounds. No wheezing.   Abdominal:      General: Bowel sounds are normal. There is no distension.      Palpations: Abdomen is soft.      Tenderness: There is no abdominal tenderness.   Skin:     General: Skin is warm and dry.   Neurological:      Mental Status: He is alert and oriented to person, place, and time.       Significant Labs: BMP:   Recent Labs   Lab 03/27/23 2008 03/28/23  0440   * 91    143   K 3.7 3.9    112*   CO2 25 22*   BUN 19 17   CREATININE 1.0 0.8   CALCIUM 9.4  9.1   MG  --  2.0   , CBC   Recent Labs   Lab 03/27/23 2008 03/28/23  0440   WBC 7.67 6.80   HGB 14.1 13.9*   HCT 41.9 41.5    214   , and Troponin   Recent Labs   Lab 03/27/23 2008 03/27/23 2257 03/28/23  0440   TROPONINI <0.006 <0.006 <0.006       Significant Imaging: Echocardiogram: Transthoracic echo (TTE) complete (Cupid Only):   Results for orders placed or performed during the hospital encounter of 02/26/21   Echo Color Flow Doppler? Yes   Result Value Ref Range    BSA 2 m2    TDI SEPTAL 0.07 m/s    LV LATERAL E/E' RATIO 7.55 m/s    LV SEPTAL E/E' RATIO 11.86 m/s    LA WIDTH 4.08 cm    TDI LATERAL 0.11 m/s    PV PEAK VELOCITY 0.66 cm/s    LVIDd 5.40 3.5 - 6.0 cm    IVS 0.74 0.6 - 1.1 cm    Posterior Wall 0.94 0.6 - 1.1 cm    Ao root annulus 2.80 cm    LVIDs 3.29 2.1 - 4.0 cm    FS 39 28 - 44 %    LA volume 49.47 cm3    LV mass 164.86 g    LA size 2.85 cm    RVDD 1.90 cm    TAPSE 1.80 cm    Left Ventricle Relative Wall Thickness 0.35 cm    AV mean gradient 5 mmHg    AV Velocity Ratio 0.64     AV index (prosthetic) 0.76     MV mean gradient 1 mmHg    MV valve area p 1/2 method 3.93 cm2    E/A ratio 0.78     Mean e' 0.09 m/s    E wave deceleration time 193.28 msec    IVRT 98.95 msec    LVOT peak rashad 0.94 m/s    LVOT peak VTI 20.30 cm    Ao peak rashad 1.46 m/s    Ao VTI 26.57 cm    Mr max rashad 0.04 m/s    AV peak gradient 9 mmHg    MV peak gradient 4 mmHg    E/E' ratio 9.22 m/s    MV Peak E Rashad 0.83 m/s    MV stenosis pressure 1/2 time 56.05 ms    MV Peak A Rashad 1.07 m/s    LV Systolic Volume 43.86 mL    LV Systolic Volume Index 22.3 mL/m2    LV Diastolic Volume 141.40 mL    LV Diastolic Volume Index 71.78 mL/m2    LA Volume Index 25.1 mL/m2    LV Mass Index 84 g/m2    RA Major Axis 5.10 cm    Left Atrium Minor Axis 5.42 cm    Left Atrium Major Axis 4.65 cm    LA Volume Index (Mod) 17.2 mL/m2    LA volume (mod) 33.81 cm3    RA Width 4.05 cm    Right Atrial Pressure (from IVC) 3 mmHg    Narrative    · Low  normal systolic function. The estimated ejection fraction is 50%  · Normal left ventricular diastolic function.  · Normal right ventricular size with normal right ventricular systolic   function.  · Normal central venous pressure (3 mmHg).

## 2023-03-28 NOTE — PLAN OF CARE
VN note: VN completed AVS and attachments and notified bedside nurseIlana. Will cont to be available and intervene prn.

## 2023-03-28 NOTE — PLAN OF CARE
AVS and educational attachments shared with patient and wife via [employer]. Discussed thoroughly. Patient and wife verbalized clear understanding using teach back method. Notified bedside nurse of completion of education. At present no distress noted. Patient to be discharged via w/c with escort service and family with all of patient's belonings. Will cont to be available to patient and family and intervene prn.

## 2023-03-28 NOTE — H&P
[facility] [facility] - Emergency Dept  Hospital Medicine  History & Physical    Patient Name: Masoud Osorio  MRN: [id number]  Patient Class: OP- Observation  Admission Date: 3/27/2023  Attending Physician: Cassie Hewitt*   Primary Care Provider: oRsemarie Ayala MD         Patient information was obtained from patient, spouse/SO and ER records.     Subjective:     Principal Problem:Chest pain    Chief Complaint:   Chief Complaint   Patient presents with    Chest Pain     Left-sided chest pain x 1 hour that feels like tightness. Reports hx of stent placement x 4 in 2016. States tightness feels like last MI. Denies nausea, vomiting, diaphoresis. Took baby aspirin and 1 nitro 30 minutes ago w/o relief. Rates pain 2 out of 10.        HPI: Patient is a 67-year-old male with past medical history of hyperlipidemia pulmonary hypertension and CAD status post PCI who presented with complaints of chest pain of a few hours duration.  History obtained from patient and wife at bedside.  He noted sudden onset chest pain located in his left chest, described as dull, nonradiating with no associated shortness of breath.  Denies nausea, vomiting, diaphoresis.  His last chest pain was when he had his MI in 2016.  He last saw his cardiologist a year ago and was scheduled to see him was done.  At home, he took a dose of aspirin and nitroglycerin.  In ER, he received aspirin      Past Medical History:   Diagnosis Date    Anticoagulant long-term use     Anxiety and depression 9/21/2016    trial of ctialopram and follow up in 8 weeks    Arthritis     Benign prostatic hyperplasia (BPH) with straining on urination 12/5/2018    Coronary artery disease involving native coronary artery of native heart without angina pectoris 7/26/2016 7/26/2016 [facility]STEMI at [facility]   PCI of RCA with 4.0 x 15, 4.0 x 38, and 3.5 x 38 mm Resolute JACOB   PCI of PDA 2.75 x 15 mm JACOB dilated to 3.0 POBA of proximal PLB 2.0 x 12 mm balloon   Normal EF with LVEDP  15 mmHg DAPT score of 2               Elevated PSA     Gout     History of coronary artery stent placement 9/21/2016    History of heart attack 7/25/2016    NSTEMI    Mixed hyperlipidemia 7/17/2017    Overweight (BMI 25.0-29.9) 9/21/2016    Tobacco dependence in remission 7/25/2016       Past Surgical History:   Procedure Laterality Date    COLONOSCOPY N/A 08/16/2017    Procedure: COLONOSCOPY Split Prep;  Surgeon: Maykel Carcamo Jr., MD;  Location: [facility] ENDO;  Service: Endoscopy;  Laterality: N/A;    COLONOSCOPY N/A 4/27/2022    Procedure: COLONOSCOPY Golytely;  Surgeon: Henrry Nieves MD;  Location: [facility] ENDO;  Service: Endoscopy;  Laterality: N/A;  C    CORONARY ANGIOPLASTY WITH STENT PLACEMENT      ROTATOR CUFF REPAIR      TRANSFORAMINAL EPIDURAL INJECTION OF STEROID Left 06/15/2021    Procedure: Injection,steroid,epidural,transforaminal approach-- Left- L3-4, L4-5;  Surgeon: Enrike Medellin Jr., MD;  Location: [facility] PAIN MGT;  Service: Pain Management;  Laterality: Left;    VASECTOMY         Review of patient's allergies indicates:   Allergen Reactions    Penicillins Hives       No current facility-administered medications on file prior to encounter.     Current Outpatient Medications on File Prior to Encounter   Medication Sig    aspirin (ECOTRIN) 81 MG EC tablet Take 1 tablet (81 mg total) by mouth once daily.    atorvastatin (LIPITOR) 80 MG tablet Take 1 tablet (80 mg total) by mouth once daily.    azelastine (ASTELIN) 137 mcg (0.1 %) nasal spray Spray 2 sprays in each nostril twice daily    diazePAM (VALIUM) 5 MG tablet Take 0.5 tablet by mouth every 8 to 12 hours as needed.    GARLIC EXTRACT ORAL Take by mouth.    hydrOXYzine HCL (ATARAX) 25 MG tablet Take 0.5 to 1 tablet by mouth at bedtime    ipratropium (ATROVENT) 0.06 % nasal spray INSTILL 2 SPRAYS IN EACH NOSTRIL EVERY 12 HOURS    multivitamin capsule Take 1 capsule by mouth once daily.    nitroGLYCERIN (NITROSTAT)  0.4 MG SL tablet Place 1 tablet (0.4 mg total) under the tongue every 5 (five) minutes as needed for Chest pain.    pregabalin (LYRICA) 150 MG capsule Take 1 capsule (150 mg total) by mouth 2 (two) times daily.    scopolamine (TRANSDERM-SCOP) 1.3-1.5 mg (1 mg over 3 days) Place 1 patch onto the skin behind the ear every 3 days    sildenafil (REVATIO) 20 mg Tab Take 1 tablet (20 mg total) by mouth once daily.    tamsulosin (FLOMAX) 0.4 mg Cap Take 1 capsule (0.4 mg total) by mouth once daily.    triamcinolone (NASACORT) 55 mcg nasal inhaler Instill two sprays in each nostril once daily     Family History       Problem Relation (Age of Onset)    Arthritis Mother    Congenital heart disease Father    Heart disease Mother    No Known Problems Sister, Brother, Daughter, Sister, Sister    Parkinsonism Mother          Tobacco Use    Smoking status: Former     Packs/day: 0.50     Types: Cigarettes     Quit date: 2016     Years since quittin.6    Smokeless tobacco: Never   Substance and Sexual Activity    Alcohol use: Yes     Comment: rarely on holidays    Drug use: No    Sexual activity: Not on file     Review of Systems   Constitutional:  Negative for fatigue and fever.   Respiratory:  Positive for shortness of breath. Negative for cough.    Cardiovascular:  Positive for chest pain. Negative for palpitations and leg swelling.   Gastrointestinal:  Negative for abdominal pain, nausea and vomiting.   Musculoskeletal:  Negative for back pain.   Neurological:  Negative for seizures and syncope.   Objective:     Vital Signs (Most Recent):  Temp: 97.7 °F (36.5 °C) (23)  Pulse: 67 (23)  Resp: 20 (23)  BP: (!) 152/83 (23)  SpO2: 95 % (23)   Vital Signs (24h Range):  Temp:  [97.7 °F (36.5 °C)] 97.7 °F (36.5 °C)  Pulse:  [67-68] 67  Resp:  [20] 20  SpO2:  [95 %-97 %] 95 %  BP: (143-152)/(73-83) 152/83     Weight: 87.1 kg (192 lb)  Body mass index is 29.19  kg/m².    Physical Exam  Constitutional:       General: He is not in acute distress.  HENT:      Head: Normocephalic and atraumatic.   Cardiovascular:      Rate and Rhythm: Normal rate and regular rhythm.      Heart sounds: Normal heart sounds.   Pulmonary:      Effort: No respiratory distress.      Breath sounds: Normal breath sounds.   Abdominal:      General: Bowel sounds are normal.      Palpations: Abdomen is soft.      Tenderness: There is no abdominal tenderness.   Musculoskeletal:         General: No swelling.      Cervical back: Neck supple.   Neurological:      Mental Status: He is alert and oriented to person, place, and time.           Significant Labs: All pertinent labs within the past 24 hours have been reviewed.  CBC:   Recent Labs   Lab 03/27/23 2008   WBC 7.67   HGB 14.1   HCT 41.9        CMP:   Recent Labs   Lab 03/27/23 2008      K 3.7      CO2 25   *   BUN 19   CREATININE 1.0   CALCIUM 9.4   PROT 6.4   ALBUMIN 3.9   BILITOT 0.4   ALKPHOS 73   AST 30   ALT 26   ANIONGAP 7*     Troponin:   Recent Labs   Lab 03/27/23 2008   TROPONINI <0.006       Significant Imaging: I have reviewed all pertinent imaging results/findings within the past 24 hours.  CXR: I have reviewed all pertinent results/findings within the past 24 hours and my personal findings are:  No acute findings    Assessment/Plan:     * Chest pain  - I have independently reviewed the EKG, and it showed normal sinus rhythm  - Troponin reviewed, negative x 1  - Repeat EKG and Troponin Q6 hours  - Aspirin 162 given  - patient states he was taken off his aspirin by his cardiologist a year ago  - Lipid panel ordered, will follow up result  - 2D Echocardiogram ordered  - Cardiology consult.  He has a cardiologist with the [facility]  - I have reviewed the note from the ER provider and discussed with him about the results and management of the patient in the ER.        Coronary artery disease involving native  coronary artery of native heart without angina pectoris  Continue cardiac home medications after verification.  Medication reconciliation not done at the time of admission  Patient states that he is not on aspirin or any blood thinner as per his cardiologist.        VTE Risk Mitigation (From admission, onward)    None             On 03/27/2023, patient should be placed in hospital observation services under my care.    Due to this patients presentation, age and co-morbid conditions, this patient will require acute care due to risks for life-threatening myocardial ischemia, silent angina, life-threatening  pulmonary etiologies, life-threatening arrhythmias, and sudden death that would require immediate actions to be taken including cardiac catheterization and PCI. This patient will need  to be placed on continuous cardiac monitoring and Oxygen supplement while further investigations for the cause of this chest pain is underway.    Breezy Smith MD  Department of Hospital Medicine  [place] - Emergency Dept

## 2023-03-28 NOTE — PLAN OF CARE
SW met with pt at bedside to complete assessment. Pt is AxO X3 and able to verbally answer assessment questions. Pt able to confirm demographic information. Pt report to live at home with spouse Kenya who is at bedside. Pt reports while at home being independent of ADL's and no DME in use. Pt able to drive and attend any follow ups. At time of discharge spouse to transport home. Pt denied any active agencies in place and reports feeling supported. SW updated whiteboard with [facility] name and contact information. SW confirmed pt understanding of Observation unit and expected discharge plan. SW will continue to follow pt throughout care and assist with any discharge needs.           03/28/23 1010   Discharge Planning   Assessment Type Discharge Planning Brief Assessment   Resource/Environmental Concerns none   Support Systems Spouse/significant other;Family members   Equipment Currently Used at Home none   Current Living Arrangements home   Patient/Family Anticipates Transition to home   Patient/Family Anticipated Services at Transition none   DME Needed Upon Discharge  none   Discharge Plan A Home with family       Future Appointments   Date Time Provider Department Center   4/12/2023 10:30 AM CARDIOLOGY, ECHO [facility] CARD [facility]   4/14/2023  8:30 AM LAB, [facility]   4/17/2023  4:15 PM Anny Kapoor MD [facility] UROLOGY [facility]   5/15/2023  9:40 AM Keon Bailey MD [facility] CARDIO [facility]   5/15/2023  1:30 PM Rosemarie Ayala MD [facility] FAM MED [facility]     [facility] Case Management  [phone]

## 2023-03-28 NOTE — SUBJECTIVE & OBJECTIVE
Past Medical History:   Diagnosis Date    Anticoagulant long-term use     Anxiety and depression 9/21/2016    trial of ctialopram and follow up in 8 weeks    Arthritis     Benign prostatic hyperplasia (BPH) with straining on urination 12/5/2018    Coronary artery disease involving native coronary artery of native heart without angina pectoris 7/26/2016 7/26/2016 NSTEMI at [facility]   PCI of RCA with 4.0 x 15, 4.0 x 38, and 3.5 x 38 mm Resolute JACOB   PCI of PDA 2.75 x 15 mm JACOB dilated to 3.0 POBA of proximal PLB 2.0 x 12 mm balloon   Normal EF with LVEDP 15 mmHg DAPT score of 2               Elevated PSA     Gout     History of coronary artery stent placement 9/21/2016    History of heart attack 7/25/2016    NSTEMI    Mixed hyperlipidemia 7/17/2017    Overweight (BMI 25.0-29.9) 9/21/2016    Tobacco dependence in remission 7/25/2016       Past Surgical History:   Procedure Laterality Date    COLONOSCOPY N/A 08/16/2017    Procedure: COLONOSCOPY Split Prep;  Surgeon: Maykel Carcamo Jr., MD;  Location: [facility] ENDO;  Service: Endoscopy;  Laterality: N/A;    COLONOSCOPY N/A 4/27/2022    Procedure: COLONOSCOPY Golytely;  Surgeon: Henrry Nieves MD;  Location: [facility] ENDO;  Service: Endoscopy;  Laterality: N/A;  C    CORONARY ANGIOPLASTY WITH STENT PLACEMENT      ROTATOR CUFF REPAIR      TRANSFORAMINAL EPIDURAL INJECTION OF STEROID Left 06/15/2021    Procedure: Injection,steroid,epidural,transforaminal approach-- Left- L3-4, L4-5;  Surgeon: Enrike Medellin Jr., MD;  Location: [facility] PAIN MGT;  Service: Pain Management;  Laterality: Left;    VASECTOMY         Review of patient's allergies indicates:   Allergen Reactions    Penicillins Hives       No current facility-administered medications on file prior to encounter.     Current Outpatient Medications on File Prior to Encounter   Medication Sig    aspirin (ECOTRIN) 81 MG EC tablet Take 1 tablet (81 mg total) by mouth once daily.    atorvastatin (LIPITOR) 80 MG  tablet Take 1 tablet (80 mg total) by mouth once daily.    azelastine (ASTELIN) 137 mcg (0.1 %) nasal spray Spray 2 sprays in each nostril twice daily    diazePAM (VALIUM) 5 MG tablet Take 0.5 tablet by mouth every 8 to 12 hours as needed.    GARLIC EXTRACT ORAL Take by mouth.    hydrOXYzine HCL (ATARAX) 25 MG tablet Take 0.5 to 1 tablet by mouth at bedtime    ipratropium (ATROVENT) 0.06 % nasal spray INSTILL 2 SPRAYS IN EACH NOSTRIL EVERY 12 HOURS    multivitamin capsule Take 1 capsule by mouth once daily.    nitroGLYCERIN (NITROSTAT) 0.4 MG SL tablet Place 1 tablet (0.4 mg total) under the tongue every 5 (five) minutes as needed for Chest pain.    pregabalin (LYRICA) 150 MG capsule Take 1 capsule (150 mg total) by mouth 2 (two) times daily.    scopolamine (TRANSDERM-SCOP) 1.3-1.5 mg (1 mg over 3 days) Place 1 patch onto the skin behind the ear every 3 days    sildenafil (REVATIO) 20 mg Tab Take 1 tablet (20 mg total) by mouth once daily.    tamsulosin (FLOMAX) 0.4 mg Cap Take 1 capsule (0.4 mg total) by mouth once daily.    triamcinolone (NASACORT) 55 mcg nasal inhaler Instill two sprays in each nostril once daily     Family History       Problem Relation (Age of Onset)    Arthritis Mother    Congenital heart disease Father    Heart disease Mother    No Known Problems Sister, Brother, Daughter, Sister, Sister    Parkinsonism Mother          Tobacco Use    Smoking status: Former     Packs/day: 0.50     Types: Cigarettes     Quit date: 2016     Years since quittin.6    Smokeless tobacco: Never   Substance and Sexual Activity    Alcohol use: Yes     Comment: rarely on holidays    Drug use: No    Sexual activity: Not on file     Review of Systems   Constitutional:  Negative for fatigue and fever.   Respiratory:  Positive for shortness of breath. Negative for cough.    Cardiovascular:  Positive for chest pain. Negative for palpitations and leg swelling.   Gastrointestinal:  Negative for abdominal pain, nausea  and vomiting.   Musculoskeletal:  Negative for back pain.   Neurological:  Negative for seizures and syncope.   Objective:     Vital Signs (Most Recent):  Temp: 97.7 °F (36.5 °C) (03/27/23 1946)  Pulse: 67 (03/27/23 2004)  Resp: 20 (03/27/23 2004)  BP: (!) 152/83 (03/27/23 2004)  SpO2: 95 % (03/27/23 2004)   Vital Signs (24h Range):  Temp:  [97.7 °F (36.5 °C)] 97.7 °F (36.5 °C)  Pulse:  [67-68] 67  Resp:  [20] 20  SpO2:  [95 %-97 %] 95 %  BP: (143-152)/(73-83) 152/83     Weight: 87.1 kg (192 lb)  Body mass index is 29.19 kg/m².    Physical Exam  Constitutional:       General: He is not in acute distress.  HENT:      Head: Normocephalic and atraumatic.   Cardiovascular:      Rate and Rhythm: Normal rate and regular rhythm.      Heart sounds: Normal heart sounds.   Pulmonary:      Effort: No respiratory distress.      Breath sounds: Normal breath sounds.   Abdominal:      General: Bowel sounds are normal.      Palpations: Abdomen is soft.      Tenderness: There is no abdominal tenderness.   Musculoskeletal:         General: No swelling.      Cervical back: Neck supple.   Neurological:      Mental Status: He is alert and oriented to person, place, and time.           Significant Labs: All pertinent labs within the past 24 hours have been reviewed.  CBC:   Recent Labs   Lab 03/27/23 2008   WBC 7.67   HGB 14.1   HCT 41.9        CMP:   Recent Labs   Lab 03/27/23 2008      K 3.7      CO2 25   *   BUN 19   CREATININE 1.0   CALCIUM 9.4   PROT 6.4   ALBUMIN 3.9   BILITOT 0.4   ALKPHOS 73   AST 30   ALT 26   ANIONGAP 7*     Troponin:   Recent Labs   Lab 03/27/23 2008   TROPONINI <0.006       Significant Imaging: I have reviewed all pertinent imaging results/findings within the past 24 hours.  CXR: I have reviewed all pertinent results/findings within the past 24 hours and my personal findings are:  No acute findings

## 2023-03-28 NOTE — HPI
68yo male with CAD s/p RCA/PDA/PLB PCI 2016 and HLP who presented to the ER with complaints of chest pain. He complains of left sided chest pain described as a tightness that started while sitting at rest yesterday. The pain was not associated with radiation, nausea, vomiting or diaphoresis but did cause a mild SOB described as inability to take a deep breath. He reports the pain lasted more than an hour and was not relieved with SL NTG therefore he came to the ER for evaluation upon the encouragement of his wife. He reports occasional similar episodes in the past but was not overly concerned and it did feel similar to his PCI symptoms but not as intense. He is very active with cutting grass and riding a bike with no complaints of chest pain. He reports the pain eventually relieved after admission and has not recurred. He is compliant with his medication regimen. CBC and BMP WNL. BNP 40 troponin negative x 3 EKG with no acute STTWC. SBP 120s-140s overmight. Admitted to [facility] Cardiology consulted for evaluation of chest pain

## 2023-03-28 NOTE — ASSESSMENT & PLAN NOTE
- history of PCI in 2016 with JACOB to RCA and PDA and PTCA to PLB  - repeat LHC in 2017 with LIs to LM/LAD/LCX/PLB/PDA patent stents to prox/mid RCA and distal RCA 60% with medical management  - continue ASA and statin; recently stopped Plavix and will remain off for now  - stress test and echo as detailed previously

## 2023-03-28 NOTE — ED PROVIDER NOTES
Encounter Date: 3/27/2023       History     Chief Complaint   Patient presents with    Chest Pain     Left-sided chest pain x 1 hour that feels like tightness. Reports hx of stent placement x 4 in 2016. States tightness feels like last MI. Denies nausea, vomiting, diaphoresis. Took baby aspirin and 1 nitro 30 minutes ago w/o relief. Rates pain 2 out of 10.     Masoud Osorio is a 67 y.o. male who  has a past medical history of Anticoagulant long-term use, Anxiety and depression (9/21/2016), Arthritis, Benign prostatic hyperplasia (BPH) with straining on urination (12/5/2018), Coronary artery disease involving native coronary artery of native heart without angina pectoris (7/26/2016), Elevated PSA, Gout, History of coronary artery stent placement (9/21/2016), History of heart attack (7/25/2016), Mixed hyperlipidemia (7/17/2017), Overweight (BMI 25.0-29.9) (9/21/2016), and Tobacco dependence in remission (7/25/2016).      Patient presents today due to chest pain which started a couple of hours ago while patient was working on his computer.  He reports having had a similar chest pain in the past when he had a heart attack in 2016.  He tried taking nitroglycerin which did not help his pain.  He took a baby aspirin an hour before coming here.  He reports his pain is worse sometimes with deep breaths but denies any shortness of breath at this time.  He denies exertional pain vomiting sweating or any other concerns at this time.    The history is provided by the patient.   Review of patient's allergies indicates:   Allergen Reactions    Penicillins Hives     Past Medical History:   Diagnosis Date    Anticoagulant long-term use     Anxiety and depression 9/21/2016    trial of ctialopram and follow up in 8 weeks    Arthritis     Benign prostatic hyperplasia (BPH) with straining on urination 12/5/2018    Coronary artery disease involving native coronary artery of native heart without angina pectoris 7/26/2016 7/26/2016  NSTEMI at [facility]   PCI of RCA with 4.0 x 15, 4.0 x 38, and 3.5 x 38 mm Resolute JACOB   PCI of PDA 2.75 x 15 mm JACOB dilated to 3.0 POBA of proximal PLB 2.0 x 12 mm balloon   Normal EF with LVEDP 15 mmHg DAPT score of 2               Elevated PSA     Gout     History of coronary artery stent placement 2016    History of heart attack 2016    NSTEMI    Mixed hyperlipidemia 2017    Overweight (BMI 25.0-29.9) 2016    Tobacco dependence in remission 2016     Past Surgical History:   Procedure Laterality Date    COLONOSCOPY N/A 2017    Procedure: COLONOSCOPY Split Prep;  Surgeon: Maykel Carcamo Jr., MD;  Location: [facility] ENDO;  Service: Endoscopy;  Laterality: N/A;    COLONOSCOPY N/A 2022    Procedure: COLONOSCOPY Golytely;  Surgeon: Henrry Nieves MD;  Location: [facility] ENDO;  Service: Endoscopy;  Laterality: N/A;  C    CORONARY ANGIOPLASTY WITH STENT PLACEMENT      ROTATOR CUFF REPAIR      TRANSFORAMINAL EPIDURAL INJECTION OF STEROID Left 06/15/2021    Procedure: Injection,steroid,epidural,transforaminal approach-- Left- L3-4, L4-5;  Surgeon: Enrike Medellin Jr., MD;  Location: [facility] PAIN MGT;  Service: Pain Management;  Laterality: Left;    VASECTOMY       Family History   Problem Relation Age of Onset    Heart disease Mother     Arthritis Mother     Parkinsonism Mother     Congenital heart disease Father     No Known Problems Sister     No Known Problems Brother     No Known Problems Daughter     No Known Problems Sister     No Known Problems Sister     Prostate cancer Neg Hx     Kidney disease Neg Hx      Social History     Tobacco Use    Smoking status: Former     Packs/day: 0.50     Types: Cigarettes     Quit date: 2016     Years since quittin.6    Smokeless tobacco: Never   Substance Use Topics    Alcohol use: Yes     Comment: rarely on holidays    Drug use: No     Review of Systems   Constitutional:  Negative for fever.   HENT:  Negative for sore throat.     Respiratory:  Negative for shortness of breath.    Cardiovascular:  Positive for chest pain.   Gastrointestinal:  Negative for nausea.   Genitourinary:  Negative for dysuria.   Musculoskeletal:  Negative for back pain.   Skin:  Negative for rash.   Neurological:  Negative for weakness.   Hematological:  Does not bruise/bleed easily.     Physical Exam     Initial Vitals [03/27/23 1946]   BP Pulse Resp Temp SpO2   (!) 143/73 68 20 97.7 °F (36.5 °C) 97 %      MAP       --         Physical Exam    Nursing note and vitals reviewed.  Constitutional: He appears well-developed and well-nourished. He is not diaphoretic. No distress.   HENT:   Head: Normocephalic and atraumatic.   Mouth/Throat: Oropharynx is clear and moist.   Eyes: EOM are normal. Pupils are equal, round, and reactive to light.   Neck: No tracheal deviation present.   Cardiovascular:  Normal rate, regular rhythm, normal heart sounds and intact distal pulses.           Pulmonary/Chest: Breath sounds normal. No stridor. No respiratory distress.   Abdominal: Abdomen is soft. He exhibits no distension and no mass. There is no abdominal tenderness.   Musculoskeletal:         General: No edema. Normal range of motion.     Neurological: He is alert and oriented to person, place, and time. No cranial nerve deficit or sensory deficit.   Skin: Skin is warm and dry. Capillary refill takes less than 2 seconds. No rash noted.   Psychiatric: He has a normal mood and affect.       ED Course   Procedures  Labs Reviewed   COMPREHENSIVE METABOLIC PANEL - Abnormal; Notable for the following components:       Result Value    Glucose 126 (*)     Anion Gap 7 (*)     All other components within normal limits   CBC W/ AUTO DIFFERENTIAL   TROPONIN I   B-TYPE NATRIURETIC PEPTIDE        ECG Results    None       Imaging Results              X-Ray Chest AP Portable (Final result)  Result time 03/27/23 20:56:19      Final result by Jez Mcduffie DO (03/27/23 20:56:19)                    Impression:      No acute abnormality.      Electronically signed by: Jez Mcduffie  Date:    03/27/2023  Time:    20:56               Narrative:    EXAMINATION:  XR CHEST AP PORTABLE    CLINICAL HISTORY:  Chest Pain;    TECHNIQUE:  Single frontal view of the chest was performed.    COMPARISON:  12/20/2019.    FINDINGS:  The lungs are well expanded and clear. No focal opacities are seen. The pleural spaces are clear.    The cardiac silhouette is unremarkable.    The visualized osseous structures demonstrate degenerative changes.                                       Medications   sodium chloride 0.9% flush 2 mL (has no administration in time range)   ondansetron injection 4 mg (has no administration in time range)   prochlorperazine injection Soln 5 mg (has no administration in time range)   polyethylene glycol packet 17 g (17 g Oral Not Given 3/28/23 0848)   acetaminophen tablet 650 mg (has no administration in time range)   aluminum-magnesium hydroxide-simethicone 200-200-20 mg/5 mL suspension 30 mL (has no administration in time range)   acetaminophen tablet 650 mg (has no administration in time range)   HYDROcodone-acetaminophen 5-325 mg per tablet 1 tablet (has no administration in time range)   morphine injection 4 mg (has no administration in time range)   naloxone 0.4 mg/mL injection 0.02 mg (has no administration in time range)   potassium bicarbonate disintegrating tablet 50 mEq (has no administration in time range)   potassium bicarbonate disintegrating tablet 35 mEq (has no administration in time range)   potassium bicarbonate disintegrating tablet 60 mEq (has no administration in time range)   magnesium oxide tablet 800 mg (has no administration in time range)   magnesium oxide tablet 800 mg (has no administration in time range)   potassium, sodium phosphates 280-160-250 mg packet 2 packet (has no administration in time range)   potassium, sodium phosphates 280-160-250 mg packet 2 packet (has no  administration in time range)   potassium, sodium phosphates 280-160-250 mg packet 2 packet (has no administration in time range)   glucagon (human recombinant) injection 1 mg (has no administration in time range)   dextrose 10% bolus 125 mL 125 mL (has no administration in time range)   dextrose 10% bolus 250 mL 250 mL (has no administration in time range)   dextrose 40 % gel 15,000 mg (has no administration in time range)   aspirin EC tablet 81 mg (81 mg Oral Given 3/28/23 0849)   atorvastatin tablet 80 mg (80 mg Oral Given 3/28/23 0849)   tamsulosin 24 hr capsule 0.4 mg (0.4 mg Oral Given 3/28/23 1025)   aspirin EC tablet 162 mg (162 mg Oral Given 3/27/23 2021)     Medical Decision Making:   Differential Diagnosis:   Differential Diagnosis includes, but is not limited to:  ACS/MI, PE, aortic dissection, pneumothorax, cardiac tamponade, pericarditis/myocarditis, pneumonia, infection/abscess, lung mass, trauma/fracture, costochondritis/pleurisy, MSK pain/contusion, GERD, biliary disease, pancreatitis, anemia             ED Course as of 03/28/23 1239   Mon Mar 27, 2023   2004 ECG: rate 66.  rhythm first-degree AV block.  No STEMI.   [RN]   2040 CBC auto differential  No significant abnormality.    [RN]   2101 Comprehensive metabolic panel(!)  No significant abnormality.    [RN]   2101 BNP  No significant abnormality.    [RN]   2101 Troponin I #1  No significant abnormality.    [RN]   2119 Patient's initial blood work demonstrates no significant findings.  Given patient's risk factors will plan observation for ACS [RN]      ED Course User Index  [RN] Dell Styles Jr., MD                 Clinical Impression:   Final diagnoses:  [R07.9] Chest pain        ED Disposition Condition    Observation Stable               Portions of this note were dictated using voice recognition software and may contain dictation related errors in spelling/grammar/syntax not found on text review       Dell Styles Jr., MD  03/28/23  1230

## 2023-03-28 NOTE — ASSESSMENT & PLAN NOTE
- I have independently reviewed the EKG, and it showed normal sinus rhythm  - Troponin reviewed, negative x 1  - Repeat EKG and Troponin Q6 hours  - Aspirin 162 given  - patient states he was taken off his aspirin by his cardiologist a year ago  - Lipid panel ordered, will follow up result  - 2D Echocardiogram ordered  - Cardiology consult.  He has a cardiologist with the [facility]  - I have reviewed the note from the ER provider and discussed with him about the results and management of the patient in the ER.

## 2023-03-28 NOTE — CONSULTS
[place] - Telemetry  Cardiology  Consult Note    Patient Name: Masoud Osorio  MRN: [id number]  Admission Date: 3/27/2023  Hospital Length of Stay: 0 days  Code Status: Full Code   Attending Provider: Bon Greenwood MD   Consulting Provider: KEVAN Ibarra ANP  Primary Care Physician: Rosemarie Ayala MD  Principal Problem:Chest pain    Patient information was obtained from patient, past medical records and ER records.     Inpatient consult to [facility]  Consult performed by: KEVAN Ruiz, IRMA  Consult ordered by: Breezy Smith MD  Reason for consult: chest pain         Subjective:     Chief Complaint:  Chest pain      HPI:   68yo male with CAD s/p RCA/PDA/PLB PCI 2016 and HLP who presented to the ER with complaints of chest pain. He complains of left sided chest pain described as a tightness that started while sitting at rest yesterday. The pain was not associated with radiation, nausea, vomiting or diaphoresis but did cause a mild SOB described as inability to take a deep breath. He reports the pain lasted more than an hour and was not relieved with SL NTG therefore he came to the ER for evaluation upon the encouragement of his wife. He reports occasional similar episodes in the past but was not overly concerned and it did feel similar to his PCI symptoms but not as intense. He is very active with cutting grass and riding a bike with no complaints of chest pain. He reports the pain eventually relieved after admission and has not recurred. He is compliant with his medication regimen. CBC and BMP WNL. BNP 40 troponin negative x 3 EKG with no acute STTWC. SBP 120s-140s overmight. Admitted to [facility] and Cardiology consulted for evaluation of chest pain     Hospital Course: 3/28/2023 per HPI      Past Medical History:   Diagnosis Date    Anticoagulant long-term use     Anxiety and depression 9/21/2016    trial of ctialopram and follow up in 8 weeks    Arthritis      Benign prostatic hyperplasia (BPH) with straining on urination 12/5/2018    Coronary artery disease involving native coronary artery of native heart without angina pectoris 7/26/2016 7/26/2016 NSTEMI at [facility]   PCI of RCA with 4.0 x 15, 4.0 x 38, and 3.5 x 38 mm Resolute JACOB   PCI of PDA 2.75 x 15 mm JACOB dilated to 3.0 POBA of proximal PLB 2.0 x 12 mm balloon   Normal EF with LVEDP 15 mmHg DAPT score of 2               Elevated PSA     Gout     History of coronary artery stent placement 9/21/2016    History of heart attack 7/25/2016    NSTEMI    Mixed hyperlipidemia 7/17/2017    Overweight (BMI 25.0-29.9) 9/21/2016    Tobacco dependence in remission 7/25/2016       Past Surgical History:   Procedure Laterality Date    COLONOSCOPY N/A 08/16/2017    Procedure: COLONOSCOPY Split Prep;  Surgeon: Maykel Carcamo Jr., MD;  Location: [facility] ENDO;  Service: Endoscopy;  Laterality: N/A;    COLONOSCOPY N/A 4/27/2022    Procedure: COLONOSCOPY Golytely;  Surgeon: Henrry Nieves MD;  Location: [facility] ENDO;  Service: Endoscopy;  Laterality: N/A;  C    CORONARY ANGIOPLASTY WITH STENT PLACEMENT      ROTATOR CUFF REPAIR      TRANSFORAMINAL EPIDURAL INJECTION OF STEROID Left 06/15/2021    Procedure: Injection,steroid,epidural,transforaminal approach-- Left- L3-4, L4-5;  Surgeon: Enrike Medellin Jr., MD;  Location: [facility] PAIN MGT;  Service: Pain Management;  Laterality: Left;    VASECTOMY         Review of patient's allergies indicates:   Allergen Reactions    Penicillins Hives       No current facility-administered medications on file prior to encounter.     Current Outpatient Medications on File Prior to Encounter   Medication Sig    aspirin (ECOTRIN) 81 MG EC tablet Take 1 tablet (81 mg total) by mouth once daily.    atorvastatin (LIPITOR) 80 MG tablet Take 1 tablet (80 mg total) by mouth once daily.    azelastine (ASTELIN) 137 mcg (0.1 %) nasal spray Spray 2 sprays in each nostril twice daily     diazePAM (VALIUM) 5 MG tablet Take 0.5 tablet by mouth every 8 to 12 hours as needed.    GARLIC EXTRACT ORAL Take by mouth.    hydrOXYzine HCL (ATARAX) 25 MG tablet Take 0.5 to 1 tablet by mouth at bedtime    ipratropium (ATROVENT) 0.06 % nasal spray INSTILL 2 SPRAYS IN EACH NOSTRIL EVERY 12 HOURS    multivitamin capsule Take 1 capsule by mouth once daily.    nitroGLYCERIN (NITROSTAT) 0.4 MG SL tablet Place 1 tablet (0.4 mg total) under the tongue every 5 (five) minutes as needed for Chest pain.    pregabalin (LYRICA) 150 MG capsule Take 1 capsule (150 mg total) by mouth 2 (two) times daily.    scopolamine (TRANSDERM-SCOP) 1.3-1.5 mg (1 mg over 3 days) Place 1 patch onto the skin behind the ear every 3 days    sildenafil (REVATIO) 20 mg Tab Take 1 tablet (20 mg total) by mouth once daily.    tamsulosin (FLOMAX) 0.4 mg Cap Take 1 capsule (0.4 mg total) by mouth once daily.    triamcinolone (NASACORT) 55 mcg nasal inhaler Instill two sprays in each nostril once daily     Family History       Problem Relation (Age of Onset)    Arthritis Mother    Congenital heart disease Father    Heart disease Mother    No Known Problems Sister, Brother, Daughter, Sister, Sister    Parkinsonism Mother          Tobacco Use    Smoking status: Former     Packs/day: 0.50     Types: Cigarettes     Quit date: 2016     Years since quittin.6    Smokeless tobacco: Never   Substance and Sexual Activity    Alcohol use: Yes     Comment: rarely on holidays    Drug use: No    Sexual activity: Not on file     Review of Systems   Constitutional: Negative for chills, decreased appetite, diaphoresis and fever.   Cardiovascular:  Positive for chest pain. Negative for claudication, cyanosis, dyspnea on exertion, irregular heartbeat, leg swelling, near-syncope, orthopnea, palpitations, paroxysmal nocturnal dyspnea and syncope.   Respiratory:  Negative for cough, hemoptysis, shortness of breath and wheezing.    Gastrointestinal:   Negative for bloating, abdominal pain, constipation, diarrhea, melena, nausea and vomiting.   Neurological:  Negative for dizziness and weakness.   Objective:     Vital Signs (Most Recent):  Temp: 96.1 °F (35.6 °C) (03/28/23 0813)  Pulse: 60 (03/28/23 0813)  Resp: 17 (03/28/23 0813)  BP: (!) 151/89 (03/28/23 0813)  SpO2: 96 % (03/28/23 0905)   Vital Signs (24h Range):  Temp:  [96 °F (35.6 °C)-97.7 °F (36.5 °C)] 96.1 °F (35.6 °C)  Pulse:  [53-68] 60  Resp:  [17-20] 17  SpO2:  [94 %-97 %] 96 %  BP: (122-152)/(73-89) 151/89     Weight: 94.5 kg (208 lb 5.4 oz)  Body mass index is 31.68 kg/m².    SpO2: 96 %         Intake/Output Summary (Last 24 hours) at 3/28/2023 0942  Last data filed at 3/28/2023 0846  Gross per 24 hour   Intake 180 ml   Output --   Net 180 ml       Lines/Drains/Airways       Peripheral Intravenous Line  Duration                  Peripheral IV - Single Lumen 03/27/23 2011 20 G Right Antecubital <1 day                    Physical Exam  Constitutional:       General: He is not in acute distress.     Appearance: He is well-developed.   Cardiovascular:      Rate and Rhythm: Normal rate and regular rhythm.      Heart sounds: No murmur heard.    No gallop.   Pulmonary:      Effort: Pulmonary effort is normal. No respiratory distress.      Breath sounds: Normal breath sounds. No wheezing.   Abdominal:      General: Bowel sounds are normal. There is no distension.      Palpations: Abdomen is soft.      Tenderness: There is no abdominal tenderness.   Skin:     General: Skin is warm and dry.   Neurological:      Mental Status: He is alert and oriented to person, place, and time.       Significant Labs: BMP:   Recent Labs   Lab 03/27/23 2008 03/28/23  0440   * 91    143   K 3.7 3.9    112*   CO2 25 22*   BUN 19 17   CREATININE 1.0 0.8   CALCIUM 9.4 9.1   MG  --  2.0   , CBC   Recent Labs   Lab 03/27/23 2008 03/28/23  0440   WBC 7.67 6.80   HGB 14.1 13.9*   HCT 41.9 41.5    214   ,  and Troponin   Recent Labs   Lab 03/27/23 2008 03/27/23  2257 03/28/23  0440   TROPONINI <0.006 <0.006 <0.006       Significant Imaging: Echocardiogram: Transthoracic echo (TTE) complete ([facility] Only):   Results for orders placed or performed during the hospital encounter of 02/26/21   Echo Color Flow Doppler? Yes   Result Value Ref Range    BSA 2 m2    TDI SEPTAL 0.07 m/s    LV LATERAL E/E' RATIO 7.55 m/s    LV SEPTAL E/E' RATIO 11.86 m/s    LA WIDTH 4.08 cm    TDI LATERAL 0.11 m/s    PV PEAK VELOCITY 0.66 cm/s    LVIDd 5.40 3.5 - 6.0 cm    IVS 0.74 0.6 - 1.1 cm    Posterior Wall 0.94 0.6 - 1.1 cm    Ao root annulus 2.80 cm    LVIDs 3.29 2.1 - 4.0 cm    FS 39 28 - 44 %    LA volume 49.47 cm3    LV mass 164.86 g    LA size 2.85 cm    RVDD 1.90 cm    TAPSE 1.80 cm    Left Ventricle Relative Wall Thickness 0.35 cm    AV mean gradient 5 mmHg    AV Velocity Ratio 0.64     AV index (prosthetic) 0.76     MV mean gradient 1 mmHg    MV valve area p 1/2 method 3.93 cm2    E/A ratio 0.78     Mean e' 0.09 m/s    E wave deceleration time 193.28 msec    IVRT 98.95 msec    LVOT peak rashad 0.94 m/s    LVOT peak VTI 20.30 cm    Ao peak rashad 1.46 m/s    Ao VTI 26.57 cm    Mr max rashad 0.04 m/s    AV peak gradient 9 mmHg    MV peak gradient 4 mmHg    E/E' ratio 9.22 m/s    MV Peak E Rashad 0.83 m/s    MV stenosis pressure 1/2 time 56.05 ms    MV Peak A Rashad 1.07 m/s    LV Systolic Volume 43.86 mL    LV Systolic Volume Index 22.3 mL/m2    LV Diastolic Volume 141.40 mL    LV Diastolic Volume Index 71.78 mL/m2    LA Volume Index 25.1 mL/m2    LV Mass Index 84 g/m2    RA Major Axis 5.10 cm    Left Atrium Minor Axis 5.42 cm    Left Atrium Major Axis 4.65 cm    LA Volume Index (Mod) 17.2 mL/m2    LA volume (mod) 33.81 cm3    RA Width 4.05 cm    Right Atrial Pressure (from IVC) 3 mmHg    Narrative    · Low normal systolic function. The estimated ejection fraction is 50%  · Normal left ventricular diastolic function.  · Normal right ventricular size  with normal right ventricular systolic   function.  · Normal central venous pressure (3 mmHg).        Assessment and Plan:     * Chest pain  - presented with chest pain at rest; no exertional complaints present  - pain somewhat atypical; able to do full activities with no chest pain  - troponin negative x 3; EKG with no acute STTWC  - HR and BP stable  - given CAD feel reasonable to proceed with stress test either inpatient or outpatient and provided patient options- decided to proceed as an outpatient (ate breakfast this AM inpatient route would have limited proceeding today)  - continue ASA and statin therapy; instructed on appropriate use of SL NTG prn; instructed patient to return to the ER with any recurrent/worsening or exertional chest pain  - follow up with Dr. Bailey as scheduled next month     Mixed hyperlipidemia  - on Lipitor 80mg  - FLP with LDL 68.4  - cholesterol well controlled; continue Lipitor     Coronary artery disease involving native coronary artery of native heart without angina pectoris  - history of PCI in 2016 with JACOB to RCA and PDA and PTCA to PLB  - repeat LHC in 2017 with LIs to LM/LAD/LCX/PLB/PDA patent stents to prox/mid RCA and distal RCA 60% with medical management  - continue ASA and statin; recently stopped Plavix and will remain off for now  - stress test and echo as detailed previously         VTE Risk Mitigation (From admission, onward)         Ordered     IP VTE LOW RISK PATIENT  Once         03/27/23 2243     Place sequential compression device  Until discontinued         03/27/23 2243                Thank you for your consult. I will sign off. Please contact us if you have any additional questions.    Hayley Robledo APRN, ANP  Cardiology   [facility] - Telemetry

## 2023-03-28 NOTE — ASSESSMENT & PLAN NOTE
- presented with chest pain at rest; no exertional complaints present  - pain somewhat atypical; able to do full activities with no chest pain  - troponin negative x 3; EKG with no acute STTWC  - HR and BP stable  - given CAD feel reasonable to proceed with stress test either inpatient or outpatient and provided patient options- decided to proceed as an outpatient (ate breakfast this AM inpatient route would have limited proceeding today)  - continue ASA and statin therapy; instructed on appropriate use of SL NTG prn; instructed patient to return to the ER with any recurrent/worsening or exertional chest pain  - follow up with Dr. Bailey as scheduled next month

## 2023-03-28 NOTE — ASSESSMENT & PLAN NOTE
Continue cardiac home medications after verification.  Medication reconciliation not done at the time of admission  Patient states that he is not on aspirin or any blood thinner as per his cardiologist.

## 2023-03-29 ENCOUNTER — PATIENT MESSAGE (OUTPATIENT)
Dept: FAMILY MEDICINE | Facility: CLINIC | Age: 68
End: 2023-03-29
Payer: MEDICARE

## 2023-03-29 NOTE — TELEPHONE ENCOUNTER
The patient had an EKG done in the ED yesterday and would like you to review it. Would you like to see him to discuss?

## 2023-03-31 ENCOUNTER — OFFICE VISIT (OUTPATIENT)
Dept: FAMILY MEDICINE | Facility: CLINIC | Age: 68
End: 2023-03-31
Payer: MEDICARE

## 2023-03-31 VITALS
DIASTOLIC BLOOD PRESSURE: 92 MMHG | BODY MASS INDEX: 30.87 KG/M2 | HEIGHT: 68 IN | WEIGHT: 203.69 LBS | OXYGEN SATURATION: 97 % | SYSTOLIC BLOOD PRESSURE: 136 MMHG | HEART RATE: 74 BPM | TEMPERATURE: 98 F

## 2023-03-31 DIAGNOSIS — I44.0 1ST DEGREE AV BLOCK: Primary | ICD-10-CM

## 2023-03-31 DIAGNOSIS — Z79.82 LONG-TERM USE OF ASPIRIN THERAPY: ICD-10-CM

## 2023-03-31 DIAGNOSIS — K42.9 UMBILICAL HERNIA WITHOUT OBSTRUCTION AND WITHOUT GANGRENE: ICD-10-CM

## 2023-03-31 DIAGNOSIS — I25.2 HISTORY OF HEART ATTACK: ICD-10-CM

## 2023-03-31 DIAGNOSIS — Z95.5 HISTORY OF CORONARY ARTERY STENT PLACEMENT: ICD-10-CM

## 2023-03-31 DIAGNOSIS — R07.89 COSTOCHONDRAL PAIN: ICD-10-CM

## 2023-03-31 DIAGNOSIS — F17.201 TOBACCO DEPENDENCE IN REMISSION: ICD-10-CM

## 2023-03-31 DIAGNOSIS — E66.09 CLASS 1 OBESITY DUE TO EXCESS CALORIES WITH SERIOUS COMORBIDITY AND BODY MASS INDEX (BMI) OF 30.0 TO 30.9 IN ADULT: ICD-10-CM

## 2023-03-31 DIAGNOSIS — I25.10 CORONARY ARTERY DISEASE INVOLVING NATIVE CORONARY ARTERY OF NATIVE HEART WITHOUT ANGINA PECTORIS: ICD-10-CM

## 2023-03-31 DIAGNOSIS — R94.31 ABNORMAL EKG: ICD-10-CM

## 2023-03-31 PROCEDURE — 1159F MED LIST DOCD IN RCRD: CPT | Mod: CPTII,S$GLB,, | Performed by: FAMILY MEDICINE

## 2023-03-31 PROCEDURE — 99214 PR OFFICE/OUTPT VISIT, EST, LEVL IV, 30-39 MIN: ICD-10-PCS | Mod: S$GLB,,, | Performed by: FAMILY MEDICINE

## 2023-03-31 PROCEDURE — 3288F FALL RISK ASSESSMENT DOCD: CPT | Mod: CPTII,S$GLB,, | Performed by: FAMILY MEDICINE

## 2023-03-31 PROCEDURE — 3288F PR FALLS RISK ASSESSMENT DOCUMENTED: ICD-10-PCS | Mod: CPTII,S$GLB,, | Performed by: FAMILY MEDICINE

## 2023-03-31 PROCEDURE — 1101F PR PT FALLS ASSESS DOC 0-1 FALLS W/OUT INJ PAST YR: ICD-10-PCS | Mod: CPTII,S$GLB,, | Performed by: FAMILY MEDICINE

## 2023-03-31 PROCEDURE — 3044F HG A1C LEVEL LT 7.0%: CPT | Mod: CPTII,S$GLB,, | Performed by: FAMILY MEDICINE

## 2023-03-31 PROCEDURE — 1126F PR PAIN SEVERITY QUANTIFIED, NO PAIN PRESENT: ICD-10-PCS | Mod: CPTII,S$GLB,, | Performed by: FAMILY MEDICINE

## 2023-03-31 PROCEDURE — 1160F PR REVIEW ALL MEDS BY PRESCRIBER/CLIN PHARMACIST DOCUMENTED: ICD-10-PCS | Mod: CPTII,S$GLB,, | Performed by: FAMILY MEDICINE

## 2023-03-31 PROCEDURE — 3044F PR MOST RECENT HEMOGLOBIN A1C LEVEL <7.0%: ICD-10-PCS | Mod: CPTII,S$GLB,, | Performed by: FAMILY MEDICINE

## 2023-03-31 PROCEDURE — 3080F PR MOST RECENT DIASTOLIC BLOOD PRESSURE >= 90 MM HG: ICD-10-PCS | Mod: CPTII,S$GLB,, | Performed by: FAMILY MEDICINE

## 2023-03-31 PROCEDURE — 1101F PT FALLS ASSESS-DOCD LE1/YR: CPT | Mod: CPTII,S$GLB,, | Performed by: FAMILY MEDICINE

## 2023-03-31 PROCEDURE — 3008F BODY MASS INDEX DOCD: CPT | Mod: CPTII,S$GLB,, | Performed by: FAMILY MEDICINE

## 2023-03-31 PROCEDURE — 1160F RVW MEDS BY RX/DR IN RCRD: CPT | Mod: CPTII,S$GLB,, | Performed by: FAMILY MEDICINE

## 2023-03-31 PROCEDURE — 3008F PR BODY MASS INDEX (BMI) DOCUMENTED: ICD-10-PCS | Mod: CPTII,S$GLB,, | Performed by: FAMILY MEDICINE

## 2023-03-31 PROCEDURE — 3075F PR MOST RECENT SYSTOLIC BLOOD PRESS GE 130-139MM HG: ICD-10-PCS | Mod: CPTII,S$GLB,, | Performed by: FAMILY MEDICINE

## 2023-03-31 PROCEDURE — 1159F PR MEDICATION LIST DOCUMENTED IN MEDICAL RECORD: ICD-10-PCS | Mod: CPTII,S$GLB,, | Performed by: FAMILY MEDICINE

## 2023-03-31 PROCEDURE — 99999 PR PBB SHADOW E&M-EST. PATIENT-LVL IV: ICD-10-PCS | Mod: PBBFAC,,, | Performed by: FAMILY MEDICINE

## 2023-03-31 PROCEDURE — 99214 OFFICE O/P EST MOD 30 MIN: CPT | Mod: S$GLB,,, | Performed by: FAMILY MEDICINE

## 2023-03-31 PROCEDURE — 3075F SYST BP GE 130 - 139MM HG: CPT | Mod: CPTII,S$GLB,, | Performed by: FAMILY MEDICINE

## 2023-03-31 PROCEDURE — 99999 PR PBB SHADOW E&M-EST. PATIENT-LVL IV: CPT | Mod: PBBFAC,,, | Performed by: FAMILY MEDICINE

## 2023-03-31 PROCEDURE — 1126F AMNT PAIN NOTED NONE PRSNT: CPT | Mod: CPTII,S$GLB,, | Performed by: FAMILY MEDICINE

## 2023-03-31 PROCEDURE — 3080F DIAST BP >= 90 MM HG: CPT | Mod: CPTII,S$GLB,, | Performed by: FAMILY MEDICINE

## 2023-03-31 NOTE — PROGRESS NOTES
Office Visit    Patient Name: Masoud Osorio    : 1955  MRN: [id number]    Subjective:  Masoud is a 67 y.o. male who presents today for:    Follow-up (hos)    Admission Date: 3/27/2023, OBSERVATION   Discharge: 3/28/23   Chest pain R/O    PHYSICAL W/ ME 3/8/23    67 y.o. male patient of mine with Hx of CAD s/p MI and stent, overweight/obese BMI, h/o tobacco abuse, HLD, BPH improved with Flomax, anxiety/ depression currently stable off of medications, h/o lumbar spine compression fracture 2019 who presents for follow up s/p obs admission for chest pain.     On date of ER presentation he reports left sided dull chest pain only with deep inspiration.   The pain did not respond to SL Nitro or ASA so he elected to present for evaluation.   No cough reported, no heart burn symptoms.   No shortness of of breath.   Pain has fully subsided since then.   Went bike riding yesterday for 1 hour without any symptoms-- no dizziness, excess dyspnea, or CP    Some improvement in previously evaluated leg swelling with elevation.    EKG reviewed and discussed: Sinus rhythm with 1st degree A-V block, Old Inferior infarct     PAST MEDICAL HISTORY, SURGICAL/SOCIAL/FAMILY HISTORY REVIEWED AS PER CHART, WITH PERTINENT FINDINGS INCLUDED IN HISTORY SECTION OF NOTE.     Current Medications    Medication List with Changes/Refills   Current Medications    ASPIRIN (ECOTRIN) 81 MG EC TABLET    Take 1 tablet (81 mg total) by mouth once daily.    ATORVASTATIN (LIPITOR) 80 MG TABLET    Take 1 tablet (80 mg total) by mouth once daily.    AZELASTINE (ASTELIN) 137 MCG (0.1 %) NASAL SPRAY    Spray 2 sprays in each nostril twice daily    DIAZEPAM (VALIUM) 5 MG TABLET    Take 0.5 tablet by mouth every 8 to 12 hours as needed.    GARLIC EXTRACT ORAL    Take by mouth.    HYDROXYZINE HCL (ATARAX) 25 MG TABLET    Take 0.5 to 1 tablet by mouth at bedtime    IPRATROPIUM (ATROVENT) 0.06 % NASAL SPRAY    INSTILL 2 SPRAYS IN EACH NOSTRIL EVERY 12 HOURS     "MULTIVITAMIN CAPSULE    Take 1 capsule by mouth once daily.    NITROGLYCERIN (NITROSTAT) 0.4 MG SL TABLET    Place 1 tablet (0.4 mg total) under the tongue every 5 (five) minutes as needed for Chest pain.    PREGABALIN (LYRICA) 150 MG CAPSULE    Take 1 capsule (150 mg total) by mouth 2 (two) times daily.    SCOPOLAMINE (TRANSDERM-SCOP) 1.3-1.5 MG (1 MG OVER 3 DAYS)    Place 1 patch onto the skin behind the ear every 3 days    SILDENAFIL (REVATIO) 20 MG TAB    Take 1 tablet (20 mg total) by mouth once daily.    TAMSULOSIN (FLOMAX) 0.4 MG CAP    Take 1 capsule (0.4 mg total) by mouth once daily.    TRIAMCINOLONE (NASACORT) 55 MCG NASAL INHALER    Instill two sprays in each nostril once daily       Allergies   Review of patient's allergies indicates:   Allergen Reactions    Penicillins Hives         Review of Systems (Pertinent positives)  Review of Systems   Constitutional:  Negative for unexpected weight change.   Eyes:  Negative for visual disturbance.   Respiratory:  Negative for shortness of breath.    Cardiovascular:  Negative for chest pain and leg swelling.   Gastrointestinal:  Positive for abdominal pain (Intermittent pain with direct pressure to his umbilical hernia). Negative for nausea and vomiting.   Neurological:  Negative for dizziness, light-headedness and headaches.   Psychiatric/Behavioral:  The patient is nervous/anxious (episodic, intermittent).      BP (!) 136/92 (BP Location: Right arm, Patient Position: Sitting, BP Method: Medium (Manual))   Pulse 74   Temp 98.1 °F (36.7 °C) (Oral)   Ht 5' 8" (1.727 m)   Wt 92.4 kg (203 lb 11.3 oz)   SpO2 97%   BMI 30.97 kg/m²     Physical Exam  Vitals reviewed.   Constitutional:       General: He is not in acute distress.     Appearance: He is well-developed.   HENT:      Head: Normocephalic and atraumatic.   Eyes:      Conjunctiva/sclera: Conjunctivae normal.   Cardiovascular:      Rate and Rhythm: Normal rate and regular rhythm.   Pulmonary:      Effort: " Pulmonary effort is normal.      Breath sounds: Normal breath sounds.   Chest:      Chest wall: Tenderness present.       Abdominal:      General: Bowel sounds are normal.      Palpations: Abdomen is soft.      Hernia: A hernia is present. Hernia is present in the umbilical area.   Musculoskeletal:      Right lower leg: No edema.      Left lower leg: No edema.   Skin:     General: Skin is warm and dry.   Neurological:      General: No focal deficit present.      Mental Status: He is alert and oriented to person, place, and time.   Psychiatric:         Mood and Affect: Mood normal.         Behavior: Behavior normal.         Assessment/Plan:  Masoud Osorio is a 67 y.o. male who presents today for :        ICD-10-CM ICD-9-CM    1. 1st degree AV block  I44.0 426.11       2. Abnormal EKG  R94.31 794.31       3. Costochondral pain  R07.89 786.59       4. History of heart attack  I25.2 412       5. Class 1 obesity due to excess calories with serious comorbidity and body mass index (BMI) of 30.0 to 30.9 in adult  E66.09 278.00     Z68.30 V85.30       6. Coronary artery disease involving native coronary artery of native heart without angina pectoris  I25.10 414.01       7. History of coronary artery stent placement  Z95.5 V45.82       8. Tobacco dependence in remission  F17.201 305.1       9. Long-term use of aspirin therapy  Z79.82 V58.66       10. Umbilical hernia without obstruction and without gangrene  K42.9 553.1         Chest Pain: suspect costochondritis etiology. Has point TTP of left upper ribs with some palpable prominence/swelling. Has recently started riding his bike more and may be related. EKG, labs with troponin and CXR all unremarkable for cardiovascular event. Has ECHO and Nuclear Exercise stress test pending. Exercise tolerance is excellent and ER precautions given.      1st Degree AV Block: new finding, discussed with patient, mildly increased, and discussed avoiding/ using caution medications that can  exacerbate conduction delay. Normal electrolytes including normal magnesium    LE Pitting Edema: most c/w venous stasis dermatitis. Had US LE Veins to investigate for venous insufficieny. Responding to increased elevation     OBESITY WITH INCREASED A1C:  A1c improved to 5.3. Weight down a few lbs and advised continued attention to diet/exercise and weight loss     H/O NSTEMI in 7/2016: Long-term medical management includes Lipitor 80/ aspirin 81 mg daily. No longer on Plavix. Exercise tolerance stable, most recently seen by cardiology Dr Bailey 3/28/22, has ECHO and Exercise Nuclear stress test scheduled with Cardiology F/U.     Continue monitoring of umbilical hernia, weight loss recommended, precautions given    There are no Patient Instructions on file for this visit.      Follow up for return as needed for new concerns.

## 2023-04-02 NOTE — HOSPITAL COURSE
Patient present to ED for chest pain. Cardiology was consulted. Chest pain was ultimately ruled out as atypical in nature. He is able to do full activities with no chest pain. Troponin negative x 3 and EKG with no acute ST or T wave abnormalities. VSS. He is to continue ASA, statin, and PRN nitro therapy upon discharge. He is to follow up with cardiology outpatient for stress test. Patient also instructed to return to the ER with any recurrent/worsening or exertional chest pain

## 2023-04-02 NOTE — DISCHARGE SUMMARY
[facility] Medicine  Discharge Summary      Patient Name: Masoud Osorio  MRN: [id number]  PASCUAL: [id number]  Patient Class: OP- Observation  Admission Date: 3/27/2023  Hospital Length of Stay: 0 days  Discharge Date and Time: 3/28/2023 12:06 PM  Attending Physician: No att. providers found   Discharging Provider: Ayana Bronson NP  Primary Care Provider: Rosemarie Ayala MD    Primary Care Team: Networked reference to record PCT     HPI:   Patient is a 67-year-old male with past medical history of hyperlipidemia pulmonary hypertension and CAD status post PCI who presented with complaints of chest pain of a few hours duration.  History obtained from patient and wife at bedside.  He noted sudden onset chest pain located in his left chest, described as dull, nonradiating with no associated shortness of breath.  Denies nausea, vomiting, diaphoresis.  His last chest pain was when he had his MI in 2016.  He last saw his cardiologist a year ago and was scheduled to see him was done.  At home, he took a dose of aspirin and nitroglycerin.  In ER, he received aspirin      * No surgery found *      Hospital Course:   Patient present to ED for chest pain. Cardiology was consulted. Chest pain was ultimately ruled out as atypical in nature. He is able to do full activities with no chest pain. Troponin negative x 3 and EKG with no acute ST or T wave abnormalities. VSS. He is to continue ASA, statin, and PRN nitro therapy upon discharge. He is to follow up with cardiology outpatient for stress test. Patient also instructed to return to the ER with any recurrent/worsening or exertional chest pain         Goals of Care Treatment Preferences:  Code Status: Full Code      Consults:   Consults (From admission, onward)        Status Ordering Provider     Inpatient consult to Cardiology-[facility]  Once        Provider:  (Not yet assigned)    Completed ERLIN GAR          No new Assessment & Plan notes have been  filed under this hospital service since the last note was generated.  Service: Hospital Medicine    Final Active Diagnoses:    Diagnosis Date Noted POA    PRINCIPAL PROBLEM:  Chest pain [R07.9] 12/20/2019 Yes    Mixed hyperlipidemia [E78.2] 07/17/2017 Yes    Coronary artery disease involving native coronary artery of native heart without angina pectoris [I25.10] 07/26/2016 Yes      Problems Resolved During this Admission:       Discharged Condition: stable    Disposition: Home or Self Care    Follow Up:   Follow-up Information     Rosemarie Ayala MD Follow up in 1 week(s).    Specialty: Family Medicine  Contact information:  [address]  [address]  [place] [id number]  [phone]                       Patient Instructions:      Ambulatory referral/consult to Cardiology   Standing Status: Future   Referral Priority: Routine Referral Type: Consultation   Referral Reason: Specialty Services Required   Requested Specialty: Cardiology   Number of Visits Requested: 1     Diet Cardiac     Notify your health care provider if you experience any of the following:  severe uncontrolled pain     Activity as tolerated       Significant Diagnostic Studies: N/A    Pending Diagnostic Studies:     None         Medications:  Reconciled Home Medications:      Medication List      CONTINUE taking these medications    aspirin 81 MG EC tablet  Commonly known as: ECOTRIN  Take 1 tablet (81 mg total) by mouth once daily.     atorvastatin 80 MG tablet  Commonly known as: LIPITOR  Take 1 tablet (80 mg total) by mouth once daily.     azelastine 137 mcg (0.1 %) nasal spray  Commonly known as: ASTELIN  Spray 2 sprays in each nostril twice daily     diazePAM 5 MG tablet  Commonly known as: VALIUM  Take 0.5 tablet by mouth every 8 to 12 hours as needed.     GARLIC EXTRACT ORAL  Take by mouth.     hydrOXYzine HCL 25 MG tablet  Commonly known as: ATARAX  Take 0.5 to 1 tablet by mouth at bedtime     ipratropium 42 mcg (0.06 %) nasal  spray  Commonly known as: ATROVENT  INSTILL 2 SPRAYS IN EACH NOSTRIL EVERY 12 HOURS     multivitamin capsule  Take 1 capsule by mouth once daily.     nitroGLYCERIN 0.4 MG SL tablet  Commonly known as: NITROSTAT  Place 1 tablet (0.4 mg total) under the tongue every 5 (five) minutes as needed for Chest pain.     pregabalin 150 MG capsule  Commonly known as: LYRICA  Take 1 capsule (150 mg total) by mouth 2 (two) times daily.     scopolamine 1.3-1.5 mg (1 mg over 3 days)  Commonly known as: TRANSDERM-SCOP  Place 1 patch onto the skin behind the ear every 3 days     sildenafil 20 mg Tab  Commonly known as: REVATIO  Take 1 tablet (20 mg total) by mouth once daily.     tamsulosin 0.4 mg Cap  Commonly known as: FLOMAX  Take 1 capsule (0.4 mg total) by mouth once daily.     triamcinolone 55 mcg nasal inhaler  Commonly known as: NASACORT  Instill two sprays in each nostril once daily            Indwelling Lines/Drains at time of discharge:   Lines/Drains/Airways     None                 Time spent on the discharge of patient: 45 minutes         Ayana Bronson NP  Department of Hospital Medicine  [facility]

## 2023-04-12 ENCOUNTER — HOSPITAL ENCOUNTER (OUTPATIENT)
Dept: CARDIOLOGY | Facility: HOSPITAL | Age: 68
Discharge: HOME | End: 2023-04-12
Attending: INTERNAL MEDICINE
Payer: MEDICARE

## 2023-04-12 ENCOUNTER — HOSPITAL ENCOUNTER (OUTPATIENT)
Dept: RADIOLOGY | Facility: HOSPITAL | Age: 68
Discharge: HOME | End: 2023-04-12
Attending: INTERNAL MEDICINE
Payer: MEDICARE

## 2023-04-12 VITALS — BODY MASS INDEX: 30.77 KG/M2 | WEIGHT: 203 LBS | HEIGHT: 68 IN

## 2023-04-12 DIAGNOSIS — R07.9 CHEST PAIN, UNSPECIFIED TYPE: ICD-10-CM

## 2023-04-12 DIAGNOSIS — I25.10 CORONARY ARTERY DISEASE INVOLVING NATIVE CORONARY ARTERY OF NATIVE HEART WITHOUT ANGINA PECTORIS: ICD-10-CM

## 2023-04-12 LAB
CV STRESS BASE HR: 60 BPM
DIASTOLIC BLOOD PRESSURE: 94 MMHG
OHS CV CPX 1 MINUTE RECOVERY HEART RATE: 131 BPM
OHS CV CPX 85 PERCENT MAX PREDICTED HEART RATE MALE: 130
OHS CV CPX ESTIMATED METS: 12
OHS CV CPX MAX PREDICTED HEART RATE: 153
OHS CV CPX PATIENT IS FEMALE: 0
OHS CV CPX PATIENT IS MALE: 1
OHS CV CPX PEAK DIASTOLIC BLOOD PRESSURE: 88 MMHG
OHS CV CPX PEAK HEAR RATE: 162 BPM
OHS CV CPX PEAK RATE PRESSURE PRODUCT: NORMAL
OHS CV CPX PEAK SYSTOLIC BLOOD PRESSURE: 202 MMHG
OHS CV CPX PERCENT MAX PREDICTED HEART RATE ACHIEVED: 106
OHS CV CPX RATE PRESSURE PRODUCT PRESENTING: 8640
STRESS ECHO POST EXERCISE DUR MIN: 10 MINUTES
STRESS ECHO POST EXERCISE DUR SEC: 27 SECONDS
SYSTOLIC BLOOD PRESSURE: 144 MMHG

## 2023-04-12 PROCEDURE — 93306 TTE W/DOPPLER COMPLETE: CPT

## 2023-04-12 PROCEDURE — 78452 HT MUSCLE IMAGE SPECT MULT: CPT | Mod: 26,,, | Performed by: RADIOLOGY

## 2023-04-12 PROCEDURE — 93018 CV STRESS TEST I&R ONLY: CPT | Mod: ,,, | Performed by: INTERNAL MEDICINE

## 2023-04-12 PROCEDURE — 93017 CV STRESS TEST TRACING ONLY: CPT

## 2023-04-12 PROCEDURE — 93306 TTE W/DOPPLER COMPLETE: CPT | Mod: 26,,, | Performed by: INTERNAL MEDICINE

## 2023-04-12 PROCEDURE — 78452 HT MUSCLE IMAGE SPECT MULT: CPT | Mod: TC

## 2023-04-12 PROCEDURE — 78452 NM MYOCARDIAL PERFUSION SPECT MULTI STUDY: ICD-10-PCS | Mod: 26,,, | Performed by: RADIOLOGY

## 2023-04-12 PROCEDURE — 93016 NUCLEAR STRESS TEST (CUPID ONLY): ICD-10-PCS | Mod: ,,, | Performed by: INTERNAL MEDICINE

## 2023-04-12 PROCEDURE — 93018 NUCLEAR STRESS TEST (CUPID ONLY): ICD-10-PCS | Mod: ,,, | Performed by: INTERNAL MEDICINE

## 2023-04-12 PROCEDURE — 93306 ECHO (CUPID ONLY): ICD-10-PCS | Mod: 26,,, | Performed by: INTERNAL MEDICINE

## 2023-04-12 PROCEDURE — A9502 TC99M TETROFOSMIN: HCPCS

## 2023-04-12 PROCEDURE — 93016 CV STRESS TEST SUPVJ ONLY: CPT | Mod: ,,, | Performed by: INTERNAL MEDICINE

## 2023-04-13 LAB
ASCENDING AORTA: 3.18 CM
AV INDEX (PROSTH): 0.51
AV MEAN GRADIENT: 4 MMHG
AV PEAK GRADIENT: 6 MMHG
AV VALVE AREA: 1.82 CM2
AV VELOCITY RATIO: 0.55
BSA FOR ECHO PROCEDURE: 2.1 M2
CV ECHO LV RWT: 0.34 CM
DOP CALC AO PEAK VEL: 1.24 M/S
DOP CALC AO VTI: 28.6 CM
DOP CALC LVOT AREA: 3.6 CM2
DOP CALC LVOT DIAMETER: 2.14 CM
DOP CALC LVOT PEAK VEL: 0.68 M/S
DOP CALC LVOT STROKE VOLUME: 52.13 CM3
DOP CALC MV VTI: 24.9 CM
DOP CALCLVOT PEAK VEL VTI: 14.5 CM
E WAVE DECELERATION TIME: 178.72 MSEC
E/A RATIO: 1.03
E/E' RATIO: 11.83 M/S
ECHO LV POSTERIOR WALL: 0.84 CM (ref 0.6–1.1)
EJECTION FRACTION: 40 %
FRACTIONAL SHORTENING: 22 % (ref 28–44)
INTERVENTRICULAR SEPTUM: 0.77 CM (ref 0.6–1.1)
IVRT: 111.32 MSEC
LA MAJOR: 5.1 CM
LA MINOR: 6 CM
LA WIDTH: 4.5 CM
LEFT ATRIUM SIZE: 3.31 CM
LEFT ATRIUM VOLUME INDEX MOD: 29.7 ML/M2
LEFT ATRIUM VOLUME INDEX: 33.9 ML/M2
LEFT ATRIUM VOLUME MOD: 61.25 CM3
LEFT ATRIUM VOLUME: 69.81 CM3
LEFT INTERNAL DIMENSION IN SYSTOLE: 3.83 CM (ref 2.1–4)
LEFT VENTRICLE DIASTOLIC VOLUME INDEX: 52.5 ML/M2
LEFT VENTRICLE DIASTOLIC VOLUME: 108.16 ML
LEFT VENTRICLE MASS INDEX: 64 G/M2
LEFT VENTRICLE SYSTOLIC VOLUME INDEX: 25.8 ML/M2
LEFT VENTRICLE SYSTOLIC VOLUME: 53.09 ML
LEFT VENTRICULAR INTERNAL DIMENSION IN DIASTOLE: 4.91 CM (ref 3.5–6)
LEFT VENTRICULAR MASS: 132.72 G
LV LATERAL E/E' RATIO: 10.14 M/S
LV SEPTAL E/E' RATIO: 14.2 M/S
LVOT MG: 1.11 MMHG
LVOT MV: 0.51 CM/S
MV MEAN GRADIENT: 2 MMHG
MV PEAK A VEL: 0.69 M/S
MV PEAK E VEL: 0.71 M/S
MV PEAK GRADIENT: 4 MMHG
MV STENOSIS PRESSURE HALF TIME: 51.83 MS
MV VALVE AREA BY CONTINUITY EQUATION: 2.09 CM2
MV VALVE AREA P 1/2 METHOD: 4.24 CM2
PULM VEIN S/D RATIO: 1.14
PV PEAK D VEL: 0.37 M/S
PV PEAK S VEL: 0.42 M/S
RA MAJOR: 5.35 CM
RA PRESSURE: 3 MMHG
RA WIDTH: 4.5 CM
RIGHT VENTRICULAR END-DIASTOLIC DIMENSION: 2.53 CM
RV TISSUE DOPPLER FREE WALL SYSTOLIC VELOCITY 1 (APICAL 4 CHAMBER VIEW): 0.01 CM/S
STJ: 3.15 CM
TDI LATERAL: 0.07 M/S
TDI SEPTAL: 0.05 M/S
TDI: 0.06 M/S

## 2023-04-17 ENCOUNTER — PATIENT MESSAGE (OUTPATIENT)
Dept: UROLOGY | Facility: CLINIC | Age: 68
End: 2023-04-17
Payer: MEDICARE

## 2023-04-17 ENCOUNTER — PATIENT MESSAGE (OUTPATIENT)
Dept: CARDIOLOGY | Facility: HOSPITAL | Age: 68
End: 2023-04-17
Payer: MEDICARE

## 2023-04-17 RX ORDER — PREGABALIN 150 MG/1
150 CAPSULE ORAL 2 TIMES DAILY
Qty: 60 CAPSULE | Refills: 1 | OUTPATIENT
Start: 2023-04-17 | End: 2023-10-16

## 2023-04-17 NOTE — TELEPHONE ENCOUNTER
Patient's appt was moved to May which he didn't realize. He is extremely worried. He is agreeable to a response over the portal.

## 2023-04-21 ENCOUNTER — OFFICE VISIT (OUTPATIENT)
Dept: UROLOGY | Facility: CLINIC | Age: 68
End: 2023-04-21
Payer: MEDICARE

## 2023-04-21 VITALS
HEART RATE: 62 BPM | BODY MASS INDEX: 31.74 KG/M2 | HEIGHT: 68 IN | WEIGHT: 209.44 LBS | SYSTOLIC BLOOD PRESSURE: 151 MMHG | DIASTOLIC BLOOD PRESSURE: 90 MMHG

## 2023-04-21 DIAGNOSIS — R97.20 ELEVATED PSA: Primary | ICD-10-CM

## 2023-04-21 DIAGNOSIS — Z12.5 ENCOUNTER FOR PROSTATE CANCER SCREENING: ICD-10-CM

## 2023-04-21 PROCEDURE — 1160F PR REVIEW ALL MEDS BY PRESCRIBER/CLIN PHARMACIST DOCUMENTED: ICD-10-PCS | Mod: CPTII,S$GLB,, | Performed by: STUDENT IN AN ORGANIZED HEALTH CARE EDUCATION/TRAINING PROGRAM

## 2023-04-21 PROCEDURE — 99999 PR PBB SHADOW E&M-EST. PATIENT-LVL IV: CPT | Mod: PBBFAC,,, | Performed by: STUDENT IN AN ORGANIZED HEALTH CARE EDUCATION/TRAINING PROGRAM

## 2023-04-21 PROCEDURE — 3044F HG A1C LEVEL LT 7.0%: CPT | Mod: CPTII,S$GLB,, | Performed by: STUDENT IN AN ORGANIZED HEALTH CARE EDUCATION/TRAINING PROGRAM

## 2023-04-21 PROCEDURE — 3077F SYST BP >= 140 MM HG: CPT | Mod: CPTII,S$GLB,, | Performed by: STUDENT IN AN ORGANIZED HEALTH CARE EDUCATION/TRAINING PROGRAM

## 2023-04-21 PROCEDURE — 3077F PR MOST RECENT SYSTOLIC BLOOD PRESSURE >= 140 MM HG: ICD-10-PCS | Mod: CPTII,S$GLB,, | Performed by: STUDENT IN AN ORGANIZED HEALTH CARE EDUCATION/TRAINING PROGRAM

## 2023-04-21 PROCEDURE — 1126F PR PAIN SEVERITY QUANTIFIED, NO PAIN PRESENT: ICD-10-PCS | Mod: CPTII,S$GLB,, | Performed by: STUDENT IN AN ORGANIZED HEALTH CARE EDUCATION/TRAINING PROGRAM

## 2023-04-21 PROCEDURE — 1159F MED LIST DOCD IN RCRD: CPT | Mod: CPTII,S$GLB,, | Performed by: STUDENT IN AN ORGANIZED HEALTH CARE EDUCATION/TRAINING PROGRAM

## 2023-04-21 PROCEDURE — 99214 PR OFFICE/OUTPT VISIT, EST, LEVL IV, 30-39 MIN: ICD-10-PCS | Mod: S$GLB,,, | Performed by: STUDENT IN AN ORGANIZED HEALTH CARE EDUCATION/TRAINING PROGRAM

## 2023-04-21 PROCEDURE — 3080F DIAST BP >= 90 MM HG: CPT | Mod: CPTII,S$GLB,, | Performed by: STUDENT IN AN ORGANIZED HEALTH CARE EDUCATION/TRAINING PROGRAM

## 2023-04-21 PROCEDURE — 99214 OFFICE O/P EST MOD 30 MIN: CPT | Mod: S$GLB,,, | Performed by: STUDENT IN AN ORGANIZED HEALTH CARE EDUCATION/TRAINING PROGRAM

## 2023-04-21 PROCEDURE — 1159F PR MEDICATION LIST DOCUMENTED IN MEDICAL RECORD: ICD-10-PCS | Mod: CPTII,S$GLB,, | Performed by: STUDENT IN AN ORGANIZED HEALTH CARE EDUCATION/TRAINING PROGRAM

## 2023-04-21 PROCEDURE — 1160F RVW MEDS BY RX/DR IN RCRD: CPT | Mod: CPTII,S$GLB,, | Performed by: STUDENT IN AN ORGANIZED HEALTH CARE EDUCATION/TRAINING PROGRAM

## 2023-04-21 PROCEDURE — 3008F BODY MASS INDEX DOCD: CPT | Mod: CPTII,S$GLB,, | Performed by: STUDENT IN AN ORGANIZED HEALTH CARE EDUCATION/TRAINING PROGRAM

## 2023-04-21 PROCEDURE — 3080F PR MOST RECENT DIASTOLIC BLOOD PRESSURE >= 90 MM HG: ICD-10-PCS | Mod: CPTII,S$GLB,, | Performed by: STUDENT IN AN ORGANIZED HEALTH CARE EDUCATION/TRAINING PROGRAM

## 2023-04-21 PROCEDURE — 3044F PR MOST RECENT HEMOGLOBIN A1C LEVEL <7.0%: ICD-10-PCS | Mod: CPTII,S$GLB,, | Performed by: STUDENT IN AN ORGANIZED HEALTH CARE EDUCATION/TRAINING PROGRAM

## 2023-04-21 PROCEDURE — 99999 PR PBB SHADOW E&M-EST. PATIENT-LVL IV: ICD-10-PCS | Mod: PBBFAC,,, | Performed by: STUDENT IN AN ORGANIZED HEALTH CARE EDUCATION/TRAINING PROGRAM

## 2023-04-21 PROCEDURE — 3008F PR BODY MASS INDEX (BMI) DOCUMENTED: ICD-10-PCS | Mod: CPTII,S$GLB,, | Performed by: STUDENT IN AN ORGANIZED HEALTH CARE EDUCATION/TRAINING PROGRAM

## 2023-04-21 PROCEDURE — 1126F AMNT PAIN NOTED NONE PRSNT: CPT | Mod: CPTII,S$GLB,, | Performed by: STUDENT IN AN ORGANIZED HEALTH CARE EDUCATION/TRAINING PROGRAM

## 2023-04-21 RX ORDER — PREGABALIN 150 MG/1
150 CAPSULE ORAL 2 TIMES DAILY
Qty: 60 CAPSULE | Refills: 1 | OUTPATIENT
Start: 2023-04-21 | End: 2023-10-20

## 2023-04-21 NOTE — PROGRESS NOTES
"Subjective:       Patient ID: Masoud Osorio is a 68 y.o. male.    Chief Complaint: Follow-up (PSA results)      This is a 68 y.o.  male patient that is an established patient of mine.       The patient is referred to me by his cardiologist Dr. Bailey  for erectile dysfunction. He does have a history of kidney stones which he passed kidney stones x2 about 10 years ago. He currently denies flank pain. He is here today mostly to discuss two issues - lower urinary tract symptoms and erectile dysfunction. He has a significant cardiologic history - he has a history of CAD as well as an NSTEMI 7/2016 requiring PCI. He takes plavix and also Nitroglycerin PRN.  Urinary symptoms-  About a few months ago he began to notice more straining with urination. He also notes that he has to strain more in order to "keep the urination going." he also notes occasional +intermittency of his urinary stream. He does feel like he is emptying well.  He has never been started on any medications for his prostate before.   Erections-  He notes that since he has been on cholesterol medications, plavix over the past year he has noticed a decrease in the quality of his erections. He does experience spontaneous erections. He state he is not sure if it is in his head but he was interested in options for erectile dysfunction. He is currently , he states they have a good relationship and her health is overall very good. After his MI in 2016 he did quit smoking in 2016.   Job - retired from [employer] earlier this year      Has been taking flomax for some time without issue. He sustained a fall in 12/2019 from his roof and was having numbness and getting evaluated by neurosurgery. We added finasteride to flomax to help with his bothersome urinary sx.      2/21/22  He stopped the finasteride for about 6 months without any adverse change in his urinary sx - so now only taking flomax. He notes that he stopped initially due to concerns about ED. " "He has been compliant with flomax. He has been taking sildenafil as needed, was taking 20mg which he noted a mild response. Interested in trying a supplement to maybe come off of flomax which he believes is contributing to his ED.    2/27/23  He notes when he doesn't take the flomax when he usually does in the AM, he can "tell." He notes sometimes it is better than other, sometimes it is just a trickle. He notes sometimes hesitancy is less.   He feels that the sildenafil 100mg does help with erections.     4/21/23  Here today to f/u PSA 7.7 4/14/23. recently also was admitted for CP 2 weeks before the PSA was checked. Here today with his wife.     LAST PSA  Lab Results   Component Value Date    PSA 4.8 (H) 03/08/2023    PSA 1.8 01/19/2022    PSA 1.7 06/15/2020    PSA 1.3 12/05/2018    PSA 1.5 09/23/2016    PSADIAG 7.7 (H) 04/14/2023       Lab Results   Component Value Date    CREATININE 0.8 03/28/2023       ---  Past Medical History:   Diagnosis Date    Allergy     Anticoagulant long-term use     Anxiety and depression 09/21/2016    trial of ctialopram and follow up in 8 weeks    Arthritis     Benign prostatic hyperplasia (BPH) with straining on urination 12/05/2018    Coronary artery disease involving native coronary artery of native heart without angina pectoris 07/26/2016 7/26/2016 NSTEMI at [facility]   PCI of RCA with 4.0 x 15, 4.0 x 38, and 3.5 x 38 mm Resolute JACOB   PCI of PDA 2.75 x 15 mm JACOB dilated to 3.0 POBA of proximal PLB 2.0 x 12 mm balloon   Normal EF with LVEDP 15 mmHg DAPT score of 2               Elevated PSA     Gout     History of coronary artery stent placement 09/21/2016    History of heart attack 07/25/2016    NSTEMI    Mixed hyperlipidemia 07/17/2017    Overweight (BMI 25.0-29.9) 09/21/2016    Tobacco dependence in remission 07/25/2016       Past Surgical History:   Procedure Laterality Date    COLONOSCOPY N/A 08/16/2017    Procedure: COLONOSCOPY Split Prep;  Surgeon: Maykel Carcamo " MD Agatha;  Location: [facility] ENDO;  Service: Endoscopy;  Laterality: N/A;    COLONOSCOPY N/A 2022    Procedure: COLONOSCOPY Golytely;  Surgeon: Henrry Nieves MD;  Location: [facility] ENDO;  Service: Endoscopy;  Laterality: N/A;  C    CORONARY ANGIOPLASTY WITH STENT PLACEMENT      ROTATOR CUFF REPAIR      TRANSFORAMINAL EPIDURAL INJECTION OF STEROID Left 06/15/2021    Procedure: Injection,steroid,epidural,transforaminal approach-- Left- L3-4, L4-5;  Surgeon: Enrike Medellin Jr., MD;  Location: [facility] PAIN MGT;  Service: Pain Management;  Laterality: Left;    VASECTOMY         Family History   Problem Relation Age of Onset    Heart disease Mother     Arthritis Mother     Parkinsonism Mother     Congenital heart disease Father     No Known Problems Sister     No Known Problems Brother     No Known Problems Daughter     No Known Problems Sister     No Known Problems Sister     Prostate cancer Neg Hx     Kidney disease Neg Hx        Social History     Tobacco Use    Smoking status: Former     Packs/day: 0.50     Types: Cigarettes     Quit date: 2016     Years since quittin.7    Smokeless tobacco: Never   Substance Use Topics    Alcohol use: Yes     Comment: rarely on holidays    Drug use: No       Current Outpatient Medications on File Prior to Visit   Medication Sig Dispense Refill    aspirin (ECOTRIN) 81 MG EC tablet Take 1 tablet (81 mg total) by mouth once daily. 90 tablet 4    atorvastatin (LIPITOR) 80 MG tablet Take 1 tablet (80 mg total) by mouth once daily. 90 tablet 3    azelastine (ASTELIN) 137 mcg (0.1 %) nasal spray Spray 2 sprays in each nostril twice daily 30 mL 5    diazePAM (VALIUM) 5 MG tablet Take 0.5 tablet by mouth every 8 to 12 hours as needed. 24 tablet 0    GARLIC EXTRACT ORAL Take by mouth.      hydrOXYzine HCL (ATARAX) 25 MG tablet Take 0.5 to 1 tablet by mouth at bedtime 30 tablet 3    ipratropium (ATROVENT) 0.06 % nasal spray INSTILL 2 SPRAYS IN EACH NOSTRIL EVERY 12 HOURS  5     multivitamin capsule Take 1 capsule by mouth once daily.      nitroGLYCERIN (NITROSTAT) 0.4 MG SL tablet Place 1 tablet (0.4 mg total) under the tongue every 5 (five) minutes as needed for Chest pain. 25 tablet 11    pregabalin (LYRICA) 150 MG capsule Take 1 capsule (150 mg total) by mouth 2 (two) times daily. 60 capsule 1    sildenafil (REVATIO) 20 mg Tab Take 1 tablet (20 mg total) by mouth once daily. 30 tablet 11    tamsulosin (FLOMAX) 0.4 mg Cap Take 1 capsule (0.4 mg total) by mouth once daily. 90 capsule 3    triamcinolone (NASACORT) 55 mcg nasal inhaler Instill two sprays in each nostril once daily 16.9 mL 5    scopolamine (TRANSDERM-SCOP) 1.3-1.5 mg (1 mg over 3 days) Place 1 patch onto the skin behind the ear every 3 days 24 patch 0     No current facility-administered medications on file prior to visit.       Review of patient's allergies indicates:   Allergen Reactions    Penicillins Hives       Review of Systems   Constitutional:  Negative for chills.   HENT:  Negative for congestion.    Eyes:  Negative for visual disturbance.   Respiratory:  Negative for shortness of breath.    Cardiovascular:  Negative for chest pain.   Gastrointestinal:  Negative for abdominal distention.   Musculoskeletal:  Negative for gait problem.   Skin:  Negative for color change.   Neurological:  Negative for dizziness.   Psychiatric/Behavioral:  Negative for agitation.      Objective:      Physical Exam  Constitutional:       Appearance: He is well-developed.   HENT:      Head: Normocephalic.   Eyes:      Pupils: Pupils are equal, round, and reactive to light.   Pulmonary:      Effort: Pulmonary effort is normal.   Abdominal:      Palpations: Abdomen is soft.   Musculoskeletal:         General: Normal range of motion.      Cervical back: Normal range of motion.   Skin:     General: Skin is warm and dry.   Neurological:      Mental Status: He is alert.       Assessment:       1. Elevated PSA    2. Encounter for prostate cancer  screening        Plan:       Long discussion about PSA, what could affect it, and that he recently had a hospitalization so it may not have been the ideal time to recheck it.  Would recommend BMP, PSA and MRI so we can recheck the PSA in May to trend it but also the MRI to investigate for any concerning pirads lesions.        Elevated PSA  -     Basic Metabolic Panel; Future; Expected date: 04/21/2023  -     Prostate Specific Antigen, Diagnostic; Future; Expected date: 04/21/2023  -     MRI Prostate W W/O Contrast; Future; Expected date: 04/21/2023    Encounter for prostate cancer screening  -     Basic Metabolic Panel; Future; Expected date: 04/21/2023  -     Prostate Specific Antigen, Diagnostic; Future; Expected date: 04/21/2023  -     MRI Prostate W W/O Contrast; Future; Expected date: 04/21/2023

## 2023-04-22 ENCOUNTER — PATIENT MESSAGE (OUTPATIENT)
Dept: PAIN MEDICINE | Facility: CLINIC | Age: 68
End: 2023-04-22
Payer: MEDICARE

## 2023-04-24 ENCOUNTER — PATIENT MESSAGE (OUTPATIENT)
Dept: FAMILY MEDICINE | Facility: CLINIC | Age: 68
End: 2023-04-24
Payer: MEDICARE

## 2023-04-24 DIAGNOSIS — M48.061 NEUROFORAMINAL STENOSIS OF LUMBAR SPINE: Primary | ICD-10-CM

## 2023-04-24 DIAGNOSIS — M47.26 OSTEOARTHRITIS OF SPINE WITH RADICULOPATHY, LUMBAR REGION: ICD-10-CM

## 2023-04-24 RX ORDER — PREGABALIN 150 MG/1
150 CAPSULE ORAL 2 TIMES DAILY
Qty: 60 CAPSULE | Refills: 1 | Status: SHIPPED | OUTPATIENT
Start: 2023-04-24 | End: 2023-04-24 | Stop reason: SDUPTHER

## 2023-04-24 RX ORDER — PREGABALIN 150 MG/1
150 CAPSULE ORAL 2 TIMES DAILY
Qty: 60 CAPSULE | Refills: 5 | Status: SHIPPED | OUTPATIENT
Start: 2023-04-24 | End: 2024-03-19

## 2023-05-12 ENCOUNTER — PATIENT MESSAGE (OUTPATIENT)
Dept: UROLOGY | Facility: CLINIC | Age: 68
End: 2023-05-12
Payer: MEDICARE

## 2023-05-15 ENCOUNTER — OFFICE VISIT (OUTPATIENT)
Dept: CARDIOLOGY | Facility: CLINIC | Age: 68
End: 2023-05-15
Payer: MEDICARE

## 2023-05-15 VITALS
WEIGHT: 207.31 LBS | HEIGHT: 68 IN | HEART RATE: 73 BPM | SYSTOLIC BLOOD PRESSURE: 145 MMHG | OXYGEN SATURATION: 96 % | DIASTOLIC BLOOD PRESSURE: 91 MMHG | BODY MASS INDEX: 31.42 KG/M2

## 2023-05-15 DIAGNOSIS — Z95.5 HISTORY OF CORONARY ARTERY STENT PLACEMENT: ICD-10-CM

## 2023-05-15 DIAGNOSIS — I44.0 1ST DEGREE AV BLOCK: ICD-10-CM

## 2023-05-15 DIAGNOSIS — I25.10 CORONARY ARTERY DISEASE INVOLVING NATIVE CORONARY ARTERY OF NATIVE HEART WITHOUT ANGINA PECTORIS: Primary | ICD-10-CM

## 2023-05-15 DIAGNOSIS — R07.9 CHEST PAIN: ICD-10-CM

## 2023-05-15 DIAGNOSIS — E78.2 MIXED HYPERLIPIDEMIA: ICD-10-CM

## 2023-05-15 DIAGNOSIS — E66.09 CLASS 1 OBESITY DUE TO EXCESS CALORIES WITH SERIOUS COMORBIDITY AND BODY MASS INDEX (BMI) OF 30.0 TO 30.9 IN ADULT: ICD-10-CM

## 2023-05-15 DIAGNOSIS — F17.201 TOBACCO DEPENDENCE IN REMISSION: ICD-10-CM

## 2023-05-15 PROCEDURE — 1160F RVW MEDS BY RX/DR IN RCRD: CPT | Mod: CPTII,S$GLB,, | Performed by: INTERNAL MEDICINE

## 2023-05-15 PROCEDURE — 4010F ACE/ARB THERAPY RXD/TAKEN: CPT | Mod: CPTII,S$GLB,, | Performed by: INTERNAL MEDICINE

## 2023-05-15 PROCEDURE — 3288F FALL RISK ASSESSMENT DOCD: CPT | Mod: CPTII,S$GLB,, | Performed by: INTERNAL MEDICINE

## 2023-05-15 PROCEDURE — 3288F PR FALLS RISK ASSESSMENT DOCUMENTED: ICD-10-PCS | Mod: CPTII,S$GLB,, | Performed by: INTERNAL MEDICINE

## 2023-05-15 PROCEDURE — 99215 OFFICE O/P EST HI 40 MIN: CPT | Mod: S$GLB,,, | Performed by: INTERNAL MEDICINE

## 2023-05-15 PROCEDURE — 3044F HG A1C LEVEL LT 7.0%: CPT | Mod: CPTII,S$GLB,, | Performed by: INTERNAL MEDICINE

## 2023-05-15 PROCEDURE — 1101F PT FALLS ASSESS-DOCD LE1/YR: CPT | Mod: CPTII,S$GLB,, | Performed by: INTERNAL MEDICINE

## 2023-05-15 PROCEDURE — 3008F PR BODY MASS INDEX (BMI) DOCUMENTED: ICD-10-PCS | Mod: CPTII,S$GLB,, | Performed by: INTERNAL MEDICINE

## 2023-05-15 PROCEDURE — 3044F PR MOST RECENT HEMOGLOBIN A1C LEVEL <7.0%: ICD-10-PCS | Mod: CPTII,S$GLB,, | Performed by: INTERNAL MEDICINE

## 2023-05-15 PROCEDURE — 4010F PR ACE/ARB THEARPY RXD/TAKEN: ICD-10-PCS | Mod: CPTII,S$GLB,, | Performed by: INTERNAL MEDICINE

## 2023-05-15 PROCEDURE — 3008F BODY MASS INDEX DOCD: CPT | Mod: CPTII,S$GLB,, | Performed by: INTERNAL MEDICINE

## 2023-05-15 PROCEDURE — 99215 PR OFFICE/OUTPT VISIT, EST, LEVL V, 40-54 MIN: ICD-10-PCS | Mod: S$GLB,,, | Performed by: INTERNAL MEDICINE

## 2023-05-15 PROCEDURE — 1159F PR MEDICATION LIST DOCUMENTED IN MEDICAL RECORD: ICD-10-PCS | Mod: CPTII,S$GLB,, | Performed by: INTERNAL MEDICINE

## 2023-05-15 PROCEDURE — 99999 PR PBB SHADOW E&M-EST. PATIENT-LVL V: ICD-10-PCS | Mod: PBBFAC,,, | Performed by: INTERNAL MEDICINE

## 2023-05-15 PROCEDURE — 3080F DIAST BP >= 90 MM HG: CPT | Mod: CPTII,S$GLB,, | Performed by: INTERNAL MEDICINE

## 2023-05-15 PROCEDURE — 3080F PR MOST RECENT DIASTOLIC BLOOD PRESSURE >= 90 MM HG: ICD-10-PCS | Mod: CPTII,S$GLB,, | Performed by: INTERNAL MEDICINE

## 2023-05-15 PROCEDURE — 1160F PR REVIEW ALL MEDS BY PRESCRIBER/CLIN PHARMACIST DOCUMENTED: ICD-10-PCS | Mod: CPTII,S$GLB,, | Performed by: INTERNAL MEDICINE

## 2023-05-15 PROCEDURE — 99999 PR PBB SHADOW E&M-EST. PATIENT-LVL V: CPT | Mod: PBBFAC,,, | Performed by: INTERNAL MEDICINE

## 2023-05-15 PROCEDURE — 1101F PR PT FALLS ASSESS DOC 0-1 FALLS W/OUT INJ PAST YR: ICD-10-PCS | Mod: CPTII,S$GLB,, | Performed by: INTERNAL MEDICINE

## 2023-05-15 PROCEDURE — 1126F PR PAIN SEVERITY QUANTIFIED, NO PAIN PRESENT: ICD-10-PCS | Mod: CPTII,S$GLB,, | Performed by: INTERNAL MEDICINE

## 2023-05-15 PROCEDURE — 1126F AMNT PAIN NOTED NONE PRSNT: CPT | Mod: CPTII,S$GLB,, | Performed by: INTERNAL MEDICINE

## 2023-05-15 PROCEDURE — 1159F MED LIST DOCD IN RCRD: CPT | Mod: CPTII,S$GLB,, | Performed by: INTERNAL MEDICINE

## 2023-05-15 PROCEDURE — 3077F SYST BP >= 140 MM HG: CPT | Mod: CPTII,S$GLB,, | Performed by: INTERNAL MEDICINE

## 2023-05-15 PROCEDURE — 3077F PR MOST RECENT SYSTOLIC BLOOD PRESSURE >= 140 MM HG: ICD-10-PCS | Mod: CPTII,S$GLB,, | Performed by: INTERNAL MEDICINE

## 2023-05-15 RX ORDER — VALSARTAN 40 MG/1
40 TABLET ORAL DAILY
Qty: 90 TABLET | Refills: 3 | Status: SHIPPED | OUTPATIENT
Start: 2023-05-15 | End: 2024-05-14

## 2023-05-15 RX ORDER — SILDENAFIL CITRATE 20 MG/1
20 TABLET ORAL DAILY
Qty: 30 TABLET | Refills: 11 | Status: SHIPPED | OUTPATIENT
Start: 2023-05-15 | End: 2024-05-14

## 2023-05-15 RX ORDER — NITROGLYCERIN 0.4 MG/1
0.4 TABLET SUBLINGUAL EVERY 5 MIN PRN
Qty: 25 TABLET | Refills: 11 | Status: SHIPPED | OUTPATIENT
Start: 2023-05-15 | End: 2024-05-14

## 2023-05-15 NOTE — PROGRESS NOTES
Subjective:   Patient ID:  Masoud Osorio is a 68 y.o. male who presents for follow up of Coronary Artery Disease, Hypertension, and Hyperlipidemia      HPI:          Masoud Osorio 68 y.o. male is here follow up and feeling well without any new complaints. He was admitted for NSTEMI that required PCI of RCA, PDA, and POBA of PLB with a preserved EF in 7/2016. He quit smoking. He is on aspirin SAPT. Non ischemic stress test in 3/2017 for atypical chest pain revealed no reversible ischemia. Admitted in 10/2017 with CP again. 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 Normal EF. ECG 6/2018: NSR.  He was admitted in March 2023 for atypical chest discomfort.  His EKG and cardiac biomarkers were normal.  Stress test revealed no reversible ischemia.  His ejection fraction 42%.  Historically had not tolerated beta-blockers or ACE inhibitors or angiotensin receptor blockers due to hypotension        Echo 2/2021    Low normal systolic function. The estimated ejection fraction is 50%  Normal left ventricular diastolic function.  Normal right ventricular size with normal right ventricular systolic function.  Normal central venous pressure (3 mmHg).                 Patient Active Problem List    Diagnosis Date Noted    1st degree AV block 03/31/2023    Costochondral pain 03/31/2023    Elevated PSA 03/09/2023    Personal history of COVID-19 03/08/2022    Lipoma of skin and subcutaneous tissue of neck 02/08/2022    Neuroforaminal stenosis of lumbar spine 06/07/2021    DDD (degenerative disc disease), lumbar 06/07/2021    Lumbar radiculopathy 06/07/2021    Osteoarthritis of spine with radiculopathy, lumbar region 06/07/2021    Chest pain 12/20/2019    Compression fracture of L1 lumbar vertebra 12/20/2019    Leukocytosis 12/20/2019    Benign prostatic hyperplasia (BPH) with straining on urination 12/05/2018    Chronic actinic dermatitis 12/05/2018    Chronic bilateral low back pain  without sciatica 2018    Non-seasonal allergic rhinitis 2018     Managed by outside ENT doctor Raya.  Continue follow-up and regular nasal spray      Pain and swelling of left knee 10/11/2017    Mixed hyperlipidemia 2017    History of colon polyps 10/24/2016     Noted on colonoscopy 22- -repeat in 3-5 years pending pathology results       Class 1 obesity due to excess calories with serious comorbidity and body mass index (BMI) of 30.0 to 30.9 in adult 2016    Long-term use of aspirin therapy 2016    History of coronary artery stent placement 2016    Anxiety and depression 2016     trial of ctialopram and follow up in 8 weeks      Coronary artery disease involving native coronary artery of native heart without angina pectoris [place] [facility] [place]     PCI of RCA with 4.0 x 15, 4.0 x 38, and 3.5 x 38 mm Resolute JACOB    PCI of PDA 2.75 x 15 mm JACOB dilated to 3.0  POBA of proximal PLB 2.0 x 12 mm balloon      Normal EF with LVEDP 15 mmHg  DAPT score of 2                        History of heart attack 2016     NSTEMI      Tobacco dependence in remission 2016           Right Arm BP - Sittin/88  Left Arm BP - Sittin/91        LABS    LAST HbA1c  Lab Results   Component Value Date    HGBA1C 5.3 2023       Lipid panel  Lab Results   Component Value Date    CHOL 119 (L) 2023    CHOL 96 (L) 2023    CHOL 108 (L) 2022     Lab Results   Component Value Date    HDL 37 (L) 2023    HDL 39 (L) 2023    HDL 37 (L) 2022     Lab Results   Component Value Date    LDLCALC 68.4 2023    LDLCALC 37.6 (L) 2023    LDLCALC 52.0 (L) 2022     Lab Results   Component Value Date    TRIG 68 2023    TRIG 97 2023    TRIG 95 2022     Lab Results   Component Value Date    CHOLHDL 31.1 2023    CHOLHDL 40.6 2023    CHOLHDL 34.3 2022            Review of Systems    Constitutional: Negative for diaphoresis, night sweats, weight gain and weight loss.   HENT:  Negative for congestion.    Eyes:  Negative for blurred vision, discharge and double vision.   Cardiovascular:  Negative for chest pain, claudication, cyanosis, dyspnea on exertion, irregular heartbeat, leg swelling, near-syncope, orthopnea, palpitations, paroxysmal nocturnal dyspnea and syncope.   Respiratory:  Negative for cough, shortness of breath and wheezing.    Endocrine: Negative for cold intolerance, heat intolerance and polyphagia.   Hematologic/Lymphatic: Negative for adenopathy and bleeding problem. Does not bruise/bleed easily.   Skin:  Negative for dry skin and nail changes.   Musculoskeletal:  Negative for arthritis, back pain, falls, joint pain, myalgias and neck pain.   Gastrointestinal:  Negative for bloating, abdominal pain, change in bowel habit and constipation.   Genitourinary:  Negative for bladder incontinence, dysuria, flank pain, genital sores and missed menses.   Neurological:  Negative for aphonia, brief paralysis, difficulty with concentration, dizziness and weakness.   Psychiatric/Behavioral:  Negative for altered mental status and memory loss. The patient does not have insomnia.    Allergic/Immunologic: Negative for environmental allergies.     Objective:   Physical Exam  Constitutional:       Appearance: He is well-developed.      Interventions: He is not intubated.  HENT:      Head: Normocephalic and atraumatic.      Right Ear: External ear normal.      Left Ear: External ear normal.   Eyes:      General: No scleral icterus.        Right eye: No discharge.         Left eye: No discharge.      Conjunctiva/sclera: Conjunctivae normal.      Pupils: Pupils are equal, round, and reactive to light.   Neck:      Thyroid: No thyromegaly.      Vascular: Normal carotid pulses. No carotid bruit, hepatojugular reflux or JVD.      Trachea: No tracheal deviation.   Cardiovascular:      Rate and Rhythm:  Normal rate and regular rhythm. No extrasystoles are present.     Chest Wall: PMI is not displaced.      Pulses:           Carotid pulses are 2+ on the right side and 2+ on the left side.       Radial pulses are 2+ on the right side and 2+ on the left side.        Femoral pulses are 2+ on the right side and 2+ on the left side.       Popliteal pulses are 2+ on the right side and 2+ on the left side.        Dorsalis pedis pulses are 2+ on the right side and 2+ on the left side.        Posterior tibial pulses are 2+ on the right side and 2+ on the left side.      Heart sounds: S1 normal and S2 normal. Heart sounds not distant. No midsystolic click. No murmur heard.    No friction rub. No gallop. No S3 sounds.   Pulmonary:      Effort: Pulmonary effort is normal. No tachypnea, bradypnea, accessory muscle usage or respiratory distress. He is not intubated.      Breath sounds: Normal breath sounds. No stridor. No decreased breath sounds, wheezing or rales.   Chest:      Chest wall: No tenderness.   Abdominal:      General: There is no distension or abdominal bruit.      Palpations: There is no mass or pulsatile mass.      Tenderness: There is no abdominal tenderness. There is no guarding or rebound.   Musculoskeletal:         General: No tenderness. Normal range of motion.      Cervical back: Normal range of motion and neck supple.   Lymphadenopathy:      Cervical: No cervical adenopathy.   Skin:     General: Skin is warm.      Coloration: Skin is not pale.      Findings: No erythema or rash.   Neurological:      Mental Status: He is alert and oriented to person, place, and time.      Cranial Nerves: No cranial nerve deficit.      Coordination: Coordination normal.      Deep Tendon Reflexes: Reflexes are normal and symmetric.   Psychiatric:         Behavior: Behavior normal.         Thought Content: Thought content normal.         Judgment: Judgment normal.             Assessment:     1. Coronary artery disease involving  native coronary artery of native heart without angina pectoris    2. Chest pain    3. History of coronary artery stent placement    4. Mixed hyperlipidemia    5. 1st degree AV block    6. Class 1 obesity due to excess calories with serious comorbidity and body mass index (BMI) of 30.0 to 30.9 in adult    7. Tobacco dependence in remission        Plan:           Continue with current medical plan and lifestyle changes.  Return sooner for concerns or questions. If symptoms persist go to the ED  I have reviewed all pertinent data on this patient       Continue with aspirin 81 mg daily.  Intense statin therapy with LDL less than 70.  Start Diovan 40 mg daily for a target blood pressure less than 130/80 mmHg and in view of mildly depressed ejection fraction 42%. Consider adding beta-blocker at a later time if blood pressure can tolerate it. Low-salt diet.  Exercise.    He can take medications for ED safely          Patient can proceed with non cardiac surgery with acceptable risks  Hold aspirin 5 days before the case           Consider sleep study to rule out NATALIE      I have reviewed the patient's medical history in detail and updated the computerized patient record.    Orders Placed This Encounter   Procedures    CBC Auto Differential     Standing Status:   Future     Standing Expiration Date:   11/15/2024    Comprehensive Metabolic Panel     Standing Status:   Future     Standing Expiration Date:   11/15/2024    Lipid Panel     Standing Status:   Future     Standing Expiration Date:   11/15/2024    Echo     Standing Status:   Future     Standing Expiration Date:   5/15/2024     Order Specific Question:   Release to patient     Answer:   Immediate       Follow up as scheduled. Return sooner for concerns or questions            Patient's Medications   New Prescriptions    VALSARTAN (DIOVAN) 40 MG TABLET    Take 1 tablet (40 mg total) by mouth once daily.   Previous Medications    ASPIRIN (ECOTRIN) 81 MG EC TABLET    Take  1 tablet (81 mg total) by mouth once daily.    ATORVASTATIN (LIPITOR) 80 MG TABLET    Take 1 tablet (80 mg total) by mouth once daily.    AZELASTINE (ASTELIN) 137 MCG (0.1 %) NASAL SPRAY    Spray 2 sprays in each nostril twice daily    DIAZEPAM (VALIUM) 5 MG TABLET    Take 0.5 tablet by mouth every 8 to 12 hours as needed.    GARLIC EXTRACT ORAL    Take by mouth.    HYDROXYZINE HCL (ATARAX) 25 MG TABLET    Take 0.5 to 1 tablet by mouth at bedtime    IPRATROPIUM (ATROVENT) 0.06 % NASAL SPRAY    INSTILL 2 SPRAYS IN EACH NOSTRIL EVERY 12 HOURS    MULTIVITAMIN CAPSULE    Take 1 capsule by mouth once daily.    PREGABALIN (LYRICA) 150 MG CAPSULE    Take 1 capsule (150 mg total) by mouth 2 (two) times daily.    TAMSULOSIN (FLOMAX) 0.4 MG CAP    Take 1 capsule (0.4 mg total) by mouth once daily.    TRIAMCINOLONE (NASACORT) 55 MCG NASAL INHALER    Instill two sprays in each nostril once daily   Modified Medications    Modified Medication Previous Medication    NITROGLYCERIN (NITROSTAT) 0.4 MG SL TABLET nitroGLYCERIN (NITROSTAT) 0.4 MG SL tablet       Place 1 tablet (0.4 mg total) under the tongue every 5 (five) minutes as needed for Chest pain.    Place 1 tablet (0.4 mg total) under the tongue every 5 (five) minutes as needed for Chest pain.    SILDENAFIL (REVATIO) 20 MG TAB sildenafil (REVATIO) 20 mg Tab       Take 1 tablet (20 mg total) by mouth once daily.    Take 1 tablet (20 mg total) by mouth once daily.   Discontinued Medications         '

## 2023-05-16 ENCOUNTER — HOSPITAL ENCOUNTER (OUTPATIENT)
Dept: RADIOLOGY | Facility: HOSPITAL | Age: 68
Discharge: HOME | End: 2023-05-16
Attending: STUDENT IN AN ORGANIZED HEALTH CARE EDUCATION/TRAINING PROGRAM
Payer: MEDICARE

## 2023-05-16 DIAGNOSIS — R97.20 ELEVATED PSA: ICD-10-CM

## 2023-05-16 DIAGNOSIS — Z12.5 ENCOUNTER FOR PROSTATE CANCER SCREENING: ICD-10-CM

## 2023-05-16 PROCEDURE — 72197 MRI PELVIS W/O & W/DYE: CPT | Mod: TC

## 2023-05-16 PROCEDURE — 25500020 PHARM REV CODE 255: Performed by: STUDENT IN AN ORGANIZED HEALTH CARE EDUCATION/TRAINING PROGRAM

## 2023-05-16 PROCEDURE — 72197 MRI PELVIS W/O & W/DYE: CPT | Mod: 26,,, | Performed by: STUDENT IN AN ORGANIZED HEALTH CARE EDUCATION/TRAINING PROGRAM

## 2023-05-16 PROCEDURE — 72197 MRI PROSTATE W W/O CONTRAST: ICD-10-PCS | Mod: 26,,, | Performed by: STUDENT IN AN ORGANIZED HEALTH CARE EDUCATION/TRAINING PROGRAM

## 2023-05-16 PROCEDURE — A9585 GADOBUTROL INJECTION: HCPCS | Performed by: STUDENT IN AN ORGANIZED HEALTH CARE EDUCATION/TRAINING PROGRAM

## 2023-05-16 RX ORDER — GADOBUTROL 604.72 MG/ML
10 INJECTION INTRAVENOUS
Status: COMPLETED | OUTPATIENT
Start: 2023-05-16 | End: 2023-05-16

## 2023-05-16 RX ADMIN — GADOBUTROL 10 ML: 604.72 INJECTION INTRAVENOUS at 06:05

## 2023-05-22 ENCOUNTER — OFFICE VISIT (OUTPATIENT)
Dept: UROLOGY | Facility: CLINIC | Age: 68
End: 2023-05-22
Payer: MEDICARE

## 2023-05-22 VITALS
SYSTOLIC BLOOD PRESSURE: 152 MMHG | WEIGHT: 209.75 LBS | DIASTOLIC BLOOD PRESSURE: 77 MMHG | HEART RATE: 76 BPM | BODY MASS INDEX: 31.79 KG/M2 | HEIGHT: 68 IN

## 2023-05-22 DIAGNOSIS — R97.20 ELEVATED PSA: Primary | ICD-10-CM

## 2023-05-22 DIAGNOSIS — Z12.5 ENCOUNTER FOR PROSTATE CANCER SCREENING: ICD-10-CM

## 2023-05-22 DIAGNOSIS — N41.9 PROSTATITIS, UNSPECIFIED PROSTATITIS TYPE: ICD-10-CM

## 2023-05-22 PROCEDURE — 1159F PR MEDICATION LIST DOCUMENTED IN MEDICAL RECORD: ICD-10-PCS | Mod: CPTII,S$GLB,, | Performed by: STUDENT IN AN ORGANIZED HEALTH CARE EDUCATION/TRAINING PROGRAM

## 2023-05-22 PROCEDURE — 3008F PR BODY MASS INDEX (BMI) DOCUMENTED: ICD-10-PCS | Mod: CPTII,S$GLB,, | Performed by: STUDENT IN AN ORGANIZED HEALTH CARE EDUCATION/TRAINING PROGRAM

## 2023-05-22 PROCEDURE — 99214 PR OFFICE/OUTPT VISIT, EST, LEVL IV, 30-39 MIN: ICD-10-PCS | Mod: S$GLB,,, | Performed by: STUDENT IN AN ORGANIZED HEALTH CARE EDUCATION/TRAINING PROGRAM

## 2023-05-22 PROCEDURE — 3077F SYST BP >= 140 MM HG: CPT | Mod: CPTII,S$GLB,, | Performed by: STUDENT IN AN ORGANIZED HEALTH CARE EDUCATION/TRAINING PROGRAM

## 2023-05-22 PROCEDURE — 1126F AMNT PAIN NOTED NONE PRSNT: CPT | Mod: CPTII,S$GLB,, | Performed by: STUDENT IN AN ORGANIZED HEALTH CARE EDUCATION/TRAINING PROGRAM

## 2023-05-22 PROCEDURE — 99999 PR PBB SHADOW E&M-EST. PATIENT-LVL III: CPT | Mod: PBBFAC,,, | Performed by: STUDENT IN AN ORGANIZED HEALTH CARE EDUCATION/TRAINING PROGRAM

## 2023-05-22 PROCEDURE — 99213 OFFICE O/P EST LOW 20 MIN: CPT | Mod: PO | Performed by: STUDENT IN AN ORGANIZED HEALTH CARE EDUCATION/TRAINING PROGRAM

## 2023-05-22 PROCEDURE — 1159F MED LIST DOCD IN RCRD: CPT | Mod: CPTII,S$GLB,, | Performed by: STUDENT IN AN ORGANIZED HEALTH CARE EDUCATION/TRAINING PROGRAM

## 2023-05-22 PROCEDURE — 3044F HG A1C LEVEL LT 7.0%: CPT | Mod: CPTII,S$GLB,, | Performed by: STUDENT IN AN ORGANIZED HEALTH CARE EDUCATION/TRAINING PROGRAM

## 2023-05-22 PROCEDURE — 3288F FALL RISK ASSESSMENT DOCD: CPT | Mod: CPTII,S$GLB,, | Performed by: STUDENT IN AN ORGANIZED HEALTH CARE EDUCATION/TRAINING PROGRAM

## 2023-05-22 PROCEDURE — 1160F PR REVIEW ALL MEDS BY PRESCRIBER/CLIN PHARMACIST DOCUMENTED: ICD-10-PCS | Mod: CPTII,S$GLB,, | Performed by: STUDENT IN AN ORGANIZED HEALTH CARE EDUCATION/TRAINING PROGRAM

## 2023-05-22 PROCEDURE — 1160F RVW MEDS BY RX/DR IN RCRD: CPT | Mod: CPTII,S$GLB,, | Performed by: STUDENT IN AN ORGANIZED HEALTH CARE EDUCATION/TRAINING PROGRAM

## 2023-05-22 PROCEDURE — 4010F PR ACE/ARB THEARPY RXD/TAKEN: ICD-10-PCS | Mod: CPTII,S$GLB,, | Performed by: STUDENT IN AN ORGANIZED HEALTH CARE EDUCATION/TRAINING PROGRAM

## 2023-05-22 PROCEDURE — 99214 OFFICE O/P EST MOD 30 MIN: CPT | Mod: S$GLB,,, | Performed by: STUDENT IN AN ORGANIZED HEALTH CARE EDUCATION/TRAINING PROGRAM

## 2023-05-22 PROCEDURE — 3078F PR MOST RECENT DIASTOLIC BLOOD PRESSURE < 80 MM HG: ICD-10-PCS | Mod: CPTII,S$GLB,, | Performed by: STUDENT IN AN ORGANIZED HEALTH CARE EDUCATION/TRAINING PROGRAM

## 2023-05-22 PROCEDURE — 1101F PR PT FALLS ASSESS DOC 0-1 FALLS W/OUT INJ PAST YR: ICD-10-PCS | Mod: CPTII,S$GLB,, | Performed by: STUDENT IN AN ORGANIZED HEALTH CARE EDUCATION/TRAINING PROGRAM

## 2023-05-22 PROCEDURE — 1126F PR PAIN SEVERITY QUANTIFIED, NO PAIN PRESENT: ICD-10-PCS | Mod: CPTII,S$GLB,, | Performed by: STUDENT IN AN ORGANIZED HEALTH CARE EDUCATION/TRAINING PROGRAM

## 2023-05-22 PROCEDURE — 99999 PR PBB SHADOW E&M-EST. PATIENT-LVL III: ICD-10-PCS | Mod: PBBFAC,,, | Performed by: STUDENT IN AN ORGANIZED HEALTH CARE EDUCATION/TRAINING PROGRAM

## 2023-05-22 PROCEDURE — 3044F PR MOST RECENT HEMOGLOBIN A1C LEVEL <7.0%: ICD-10-PCS | Mod: CPTII,S$GLB,, | Performed by: STUDENT IN AN ORGANIZED HEALTH CARE EDUCATION/TRAINING PROGRAM

## 2023-05-22 PROCEDURE — 3008F BODY MASS INDEX DOCD: CPT | Mod: CPTII,S$GLB,, | Performed by: STUDENT IN AN ORGANIZED HEALTH CARE EDUCATION/TRAINING PROGRAM

## 2023-05-22 PROCEDURE — 1101F PT FALLS ASSESS-DOCD LE1/YR: CPT | Mod: CPTII,S$GLB,, | Performed by: STUDENT IN AN ORGANIZED HEALTH CARE EDUCATION/TRAINING PROGRAM

## 2023-05-22 PROCEDURE — 3288F PR FALLS RISK ASSESSMENT DOCUMENTED: ICD-10-PCS | Mod: CPTII,S$GLB,, | Performed by: STUDENT IN AN ORGANIZED HEALTH CARE EDUCATION/TRAINING PROGRAM

## 2023-05-22 PROCEDURE — 3078F DIAST BP <80 MM HG: CPT | Mod: CPTII,S$GLB,, | Performed by: STUDENT IN AN ORGANIZED HEALTH CARE EDUCATION/TRAINING PROGRAM

## 2023-05-22 PROCEDURE — 3077F PR MOST RECENT SYSTOLIC BLOOD PRESSURE >= 140 MM HG: ICD-10-PCS | Mod: CPTII,S$GLB,, | Performed by: STUDENT IN AN ORGANIZED HEALTH CARE EDUCATION/TRAINING PROGRAM

## 2023-05-22 PROCEDURE — 4010F ACE/ARB THERAPY RXD/TAKEN: CPT | Mod: CPTII,S$GLB,, | Performed by: STUDENT IN AN ORGANIZED HEALTH CARE EDUCATION/TRAINING PROGRAM

## 2023-05-22 RX ORDER — SULFAMETHOXAZOLE AND TRIMETHOPRIM 800; 160 MG/1; MG/1
1 TABLET ORAL 2 TIMES DAILY
Qty: 60 TABLET | Refills: 0 | Status: SHIPPED | OUTPATIENT
Start: 2023-05-22 | End: 2023-06-22

## 2023-05-22 RX ORDER — IPRATROPIUM BROMIDE 21 UG/1
SPRAY, METERED NASAL
COMMUNITY
Start: 2023-05-06

## 2023-05-22 NOTE — PROGRESS NOTES
"Subjective:       Patient ID: Masoud Osorio is a 68 y.o. male.    Chief Complaint:  f/u MRI  This is a 68 y.o.  male patient that is an established patient of mine.       The patient is referred to me by his cardiologist Dr. Bailey  for erectile dysfunction. He does have a history of kidney stones which he passed kidney stones x2 about 10 years ago. He currently denies flank pain. He is here today mostly to discuss two issues - lower urinary tract symptoms and erectile dysfunction. He has a significant cardiologic history - he has a history of CAD as well as an NSTEMI 7/2016 requiring PCI. He takes plavix and also Nitroglycerin PRN.  Urinary symptoms-  About a few months ago he began to notice more straining with urination. He also notes that he has to strain more in order to "keep the urination going." he also notes occasional +intermittency of his urinary stream. He does feel like he is emptying well.  He has never been started on any medications for his prostate before.   Erections-  He notes that since he has been on cholesterol medications, plavix over the past year he has noticed a decrease in the quality of his erections. He does experience spontaneous erections. He state he is not sure if it is in his head but he was interested in options for erectile dysfunction. He is currently , he states they have a good relationship and her health is overall very good. After his MI in 2016 he did quit smoking in 2016.   Job - retired from [employer] earlier this year      Has been taking flomax for some time without issue. He sustained a fall in 12/2019 from his roof and was having numbness and getting evaluated by neurosurgery. We added finasteride to flomax to help with his bothersome urinary sx.      2/21/22  He stopped the finasteride for about 6 months without any adverse change in his urinary sx - so now only taking flomax. He notes that he stopped initially due to concerns about ED. He has been " "compliant with flomax. He has been taking sildenafil as needed, was taking 20mg which he noted a mild response. Interested in trying a supplement to maybe come off of flomax which he believes is contributing to his ED.    2/27/23  He notes when he doesn't take the flomax when he usually does in the AM, he can "tell." He notes sometimes it is better than other, sometimes it is just a trickle. He notes sometimes hesitancy is less.   He feels that the sildenafil 100mg does help with erections.     4/21/23  Here today to f/u PSA 7.7 4/14/23. recently also was admitted for CP 2 weeks before the PSA was checked. Here today with his wife.      5/22/23  MRI 5/16/23 - volume 63 cc. 1. No concerning prostate lesion. 2. BPH. Overall Assessment: PI-RADS 2 - Low (clinically significant cancer is unlikely to be present)  PSA 7.5 5/12/23 (7.7 4/14/23).      LAST PSA  Lab Results   Component Value Date    PSA 4.8 (H) 03/08/2023    PSA 1.8 01/19/2022    PSA 1.7 06/15/2020    PSA 1.3 12/05/2018    PSA 1.5 09/23/2016    PSADIAG 7.5 (H) 05/12/2023    PSADIAG 7.7 (H) 04/14/2023       Lab Results   Component Value Date    CREATININE 0.79 05/12/2023       ---  Past Medical History:   Diagnosis Date    Allergy     Anticoagulant long-term use     Anxiety and depression 09/21/2016    trial of ctialopram and follow up in 8 weeks    Arthritis     Benign prostatic hyperplasia (BPH) with straining on urination 12/05/2018    Coronary artery disease involving native coronary artery of native heart without angina pectoris 07/26/2016 7/26/2016 NSTEMI at [facility]   PCI of RCA with 4.0 x 15, 4.0 x 38, and 3.5 x 38 mm Resolute JACOB   PCI of PDA 2.75 x 15 mm JACOB dilated to 3.0 POBA of proximal PLB 2.0 x 12 mm balloon   Normal EF with LVEDP 15 mmHg DAPT score of 2               Elevated PSA     Gout     History of coronary artery stent placement 09/21/2016    History of heart attack 07/25/2016    NSTEMI    Mixed hyperlipidemia 07/17/2017    " Overweight (BMI 25.0-29.9) 2016    Tobacco dependence in remission 2016       Past Surgical History:   Procedure Laterality Date    COLONOSCOPY N/A 2017    Procedure: COLONOSCOPY Split Prep;  Surgeon: Maykel Carcamo Jr., MD;  Location: [facility];  Service: Endoscopy;  Laterality: N/A;    COLONOSCOPY N/A 2022    Procedure: COLONOSCOPY Golytely;  Surgeon: Henrry Nieves MD;  Location: [facility];  Service: Endoscopy;  Laterality: N/A;  C    CORONARY ANGIOPLASTY WITH STENT PLACEMENT      ROTATOR CUFF REPAIR      TRANSFORAMINAL EPIDURAL INJECTION OF STEROID Left 06/15/2021    Procedure: Injection,steroid,epidural,transforaminal approach-- Left- L3-4, L4-5;  Surgeon: Enrike Medellin Jr., MD;  Location: [facility];  Service: Pain Management;  Laterality: Left;    VASECTOMY         Family History   Problem Relation Age of Onset    Heart disease Mother     Arthritis Mother     Parkinsonism Mother     Congenital heart disease Father     No Known Problems Sister     No Known Problems Brother     No Known Problems Daughter     No Known Problems Sister     No Known Problems Sister     Prostate cancer Neg Hx     Kidney disease Neg Hx        Social History     Tobacco Use    Smoking status: Former     Packs/day: 0.50     Types: Cigarettes     Quit date: 2016     Years since quittin.8    Smokeless tobacco: Never   Substance Use Topics    Alcohol use: Yes     Comment: rarely on holidays    Drug use: No       Current Outpatient Medications on File Prior to Visit   Medication Sig Dispense Refill    aspirin (ECOTRIN) 81 MG EC tablet Take 1 tablet (81 mg total) by mouth once daily. 90 tablet 4    atorvastatin (LIPITOR) 80 MG tablet Take 1 tablet (80 mg total) by mouth once daily. 90 tablet 3    azelastine (ASTELIN) 137 mcg (0.1 %) nasal spray Spray 2 sprays in each nostril twice daily 30 mL 5    diazePAM (VALIUM) 5 MG tablet Take 0.5 tablet by mouth every 8 to 12 hours as needed. 24  tablet 0    GARLIC EXTRACT ORAL Take by mouth.      hydrOXYzine HCL (ATARAX) 25 MG tablet Take 0.5 to 1 tablet by mouth at bedtime 30 tablet 3    ipratropium (ATROVENT) 0.06 % nasal spray INSTILL 2 SPRAYS IN EACH NOSTRIL EVERY 12 HOURS  5    ipratropium (ATROVENT) 21 mcg (0.03 %) nasal spray       multivitamin capsule Take 1 capsule by mouth once daily.      nitroGLYCERIN (NITROSTAT) 0.4 MG SL tablet Place 1 tablet (0.4 mg total) under the tongue every 5 (five) minutes as needed for Chest pain. 25 tablet 11    pregabalin (LYRICA) 150 MG capsule Take 1 capsule (150 mg total) by mouth 2 (two) times daily. 60 capsule 5    sildenafil (REVATIO) 20 mg Tab Take 1 tablet (20 mg total) by mouth once daily. 30 tablet 11    tamsulosin (FLOMAX) 0.4 mg Cap Take 1 capsule (0.4 mg total) by mouth once daily. 90 capsule 3    triamcinolone (NASACORT) 55 mcg nasal inhaler Instill two sprays in each nostril once daily 16.9 mL 5    valsartan (DIOVAN) 40 MG tablet Take 1 tablet (40 mg total) by mouth once daily. 90 tablet 3     No current facility-administered medications on file prior to visit.       Review of patient's allergies indicates:   Allergen Reactions    Penicillins Hives       Review of Systems   Constitutional:  Negative for chills.   HENT:  Negative for congestion.    Eyes:  Negative for visual disturbance.   Respiratory:  Negative for shortness of breath.    Cardiovascular:  Negative for chest pain.   Gastrointestinal:  Negative for abdominal distention.   Musculoskeletal:  Negative for gait problem.   Skin:  Negative for color change.   Neurological:  Negative for dizziness.   Psychiatric/Behavioral:  Negative for agitation.      Objective:      Physical Exam  Constitutional:       Appearance: He is well-developed.   HENT:      Head: Normocephalic.   Eyes:      Pupils: Pupils are equal, round, and reactive to light.   Pulmonary:      Effort: Pulmonary effort is normal.   Abdominal:      Palpations: Abdomen is soft.    Musculoskeletal:         General: Normal range of motion.      Cervical back: Normal range of motion.   Skin:     General: Skin is warm and dry.   Neurological:      Mental Status: He is alert.       Assessment:       1. Elevated PSA    2. Encounter for prostate cancer screening    3. Prostatitis, unspecified prostatitis type        Plan:           Plan:  Long discussion about what could be contributing to an elevated PSA. PSA is nonspecific and could be elevated due to multitude of reasons including enlarged prostate, inflammation/infection, and prostate cancer. Discussed MRI results show he is not a candidate for an MRI guided bx of the prostate (only pirads 2) but this does not mean he should not be offered a standard transrectal ultrasound guided bx of the prostate. Discussed recheck of PSA vs TRUS bx.   Pt and his wife had many well researched questions and I tried my best to address. At the end of the visit pt and his wife were still undecided, understandably so. Leaning towards rechecking a PSA. Since we are rechecking a PSA I offered an empiric prostatitis course of abx. Discussed pros/cons, pt does not have any symptoms but we could see if the PSA responds.   Pt would like to proceed with prostatitis course-bactrim x 30 days.  PSA in 2 months.       Elevated PSA    Encounter for prostate cancer screening    Prostatitis, unspecified prostatitis type  -     sulfamethoxazole-trimethoprim 800-160mg (BACTRIM DS) 800-160 mg Tab; Take 1 tablet by mouth 2 (two) times daily.  Dispense: 60 tablet; Refill: 0  -     Prostate Specific Antigen, Diagnostic; Future; Expected date: 05/22/2023

## 2023-07-28 ENCOUNTER — PATIENT MESSAGE (OUTPATIENT)
Dept: CARDIOLOGY | Facility: CLINIC | Age: 68
End: 2023-07-28
Payer: MEDICARE

## 2023-08-09 ENCOUNTER — TELEPHONE (OUTPATIENT)
Dept: FAMILY MEDICINE | Facility: CLINIC | Age: 68
End: 2023-08-09
Payer: MEDICARE

## 2023-08-09 DIAGNOSIS — M79.673 HEEL PAIN, CHRONIC, UNSPECIFIED LATERALITY: Primary | ICD-10-CM

## 2023-08-09 DIAGNOSIS — G89.29 HEEL PAIN, CHRONIC, UNSPECIFIED LATERALITY: Primary | ICD-10-CM

## 2023-08-09 NOTE — TELEPHONE ENCOUNTER
Patient send a message via his patient portal stating that he hurt his heel and went to urgent care, but did not have an appointment because there was no xray tech until Friday in [place]. Can you please order an xray at [place] imaging [facility] tomorrow? Pls advise.   I

## 2023-08-09 NOTE — TELEPHONE ENCOUNTER
Reviewed chart it looks like he is having heel pain.  Have placed orders for x-rays of the heels for scheduling in [place] per his request, thank you

## 2023-08-10 ENCOUNTER — PATIENT MESSAGE (OUTPATIENT)
Dept: FAMILY MEDICINE | Facility: CLINIC | Age: 68
End: 2023-08-10
Payer: MEDICARE

## 2023-08-10 DIAGNOSIS — M77.30 CALCANEAL SPUR, UNSPECIFIED LATERALITY: Primary | ICD-10-CM

## 2023-08-10 DIAGNOSIS — M79.673 PAIN OF FOOT, UNSPECIFIED LATERALITY: ICD-10-CM

## 2023-08-14 ENCOUNTER — TELEPHONE (OUTPATIENT)
Dept: CARDIOLOGY | Facility: CLINIC | Age: 68
End: 2023-08-14
Payer: MEDICARE

## 2023-08-14 NOTE — TELEPHONE ENCOUNTER
I reached out to , he just wanted to  Know when he could see .  Unfortunately,   scheduled is booked,  And no one is cancelling their appointment.  You can checked schedule daily to see if anyone has cancelled their appointment and make yourself an appointment at that time.  Patient appreciates the call.     Nw                                                    ----- Message from Oneyda Xiong MA sent at 2023  4:47 PM CDT -----  Appointment Request  Masoud Osorio  Sent:   5:10 PM  To: [facility] Cardiology Clinical Support Staff       Masoud GUZMANAllie Jo  MRN: [id number] : 1955  Pt Home: [phone]    Entered: [phone]      Message    Appointment Request From: Masoud Osorio    With Provider: Keon Bailey MD [[facility] - Cardiology]    Preferred Date Range: 2023 - 2023    Preferred Times: Any Time    Reason for visit: Previous heart attack, 4 [employer] installed in 2016    Comments:  My new cardiology dr meet

## 2023-08-16 ENCOUNTER — TELEPHONE (OUTPATIENT)
Dept: PODIATRY | Facility: CLINIC | Age: 68
End: 2023-08-16
Payer: MEDICARE

## 2023-08-16 ENCOUNTER — PATIENT MESSAGE (OUTPATIENT)
Dept: PODIATRY | Facility: CLINIC | Age: 68
End: 2023-08-16
Payer: MEDICARE

## 2023-08-16 NOTE — TELEPHONE ENCOUNTER
Contacted pt in regards to 1p.m. appointment pt has scheduled with Dr. Ramírez on 08/18/23.  Pt unavailable left vm to contact [facility] for rescheduling.

## 2023-08-17 ENCOUNTER — OFFICE VISIT (OUTPATIENT)
Dept: UROLOGY | Facility: CLINIC | Age: 68
End: 2023-08-17
Payer: MEDICARE

## 2023-08-17 VITALS
SYSTOLIC BLOOD PRESSURE: 109 MMHG | WEIGHT: 203.81 LBS | HEIGHT: 68 IN | HEART RATE: 56 BPM | DIASTOLIC BLOOD PRESSURE: 57 MMHG | BODY MASS INDEX: 30.89 KG/M2

## 2023-08-17 DIAGNOSIS — N13.8 BPH WITH OBSTRUCTION/LOWER URINARY TRACT SYMPTOMS: ICD-10-CM

## 2023-08-17 DIAGNOSIS — N40.1 BPH WITH OBSTRUCTION/LOWER URINARY TRACT SYMPTOMS: ICD-10-CM

## 2023-08-17 DIAGNOSIS — R97.20 ELEVATED PSA: Primary | ICD-10-CM

## 2023-08-17 PROCEDURE — 3078F DIAST BP <80 MM HG: CPT | Mod: CPTII,S$GLB,, | Performed by: UROLOGY

## 2023-08-17 PROCEDURE — 99999 PR PBB SHADOW E&M-EST. PATIENT-LVL IV: ICD-10-PCS | Mod: PBBFAC,,, | Performed by: UROLOGY

## 2023-08-17 PROCEDURE — 3288F FALL RISK ASSESSMENT DOCD: CPT | Mod: CPTII,S$GLB,, | Performed by: UROLOGY

## 2023-08-17 PROCEDURE — 1126F PR PAIN SEVERITY QUANTIFIED, NO PAIN PRESENT: ICD-10-PCS | Mod: CPTII,S$GLB,, | Performed by: UROLOGY

## 2023-08-17 PROCEDURE — 3008F PR BODY MASS INDEX (BMI) DOCUMENTED: ICD-10-PCS | Mod: CPTII,S$GLB,, | Performed by: UROLOGY

## 2023-08-17 PROCEDURE — 3288F PR FALLS RISK ASSESSMENT DOCUMENTED: ICD-10-PCS | Mod: CPTII,S$GLB,, | Performed by: UROLOGY

## 2023-08-17 PROCEDURE — 99999 PR PBB SHADOW E&M-EST. PATIENT-LVL IV: CPT | Mod: PBBFAC,,, | Performed by: UROLOGY

## 2023-08-17 PROCEDURE — 3008F BODY MASS INDEX DOCD: CPT | Mod: CPTII,S$GLB,, | Performed by: UROLOGY

## 2023-08-17 PROCEDURE — 4010F ACE/ARB THERAPY RXD/TAKEN: CPT | Mod: CPTII,S$GLB,, | Performed by: UROLOGY

## 2023-08-17 PROCEDURE — 1159F PR MEDICATION LIST DOCUMENTED IN MEDICAL RECORD: ICD-10-PCS | Mod: CPTII,S$GLB,, | Performed by: UROLOGY

## 2023-08-17 PROCEDURE — 99215 PR OFFICE/OUTPT VISIT, EST, LEVL V, 40-54 MIN: ICD-10-PCS | Mod: S$GLB,,, | Performed by: UROLOGY

## 2023-08-17 PROCEDURE — 3078F PR MOST RECENT DIASTOLIC BLOOD PRESSURE < 80 MM HG: ICD-10-PCS | Mod: CPTII,S$GLB,, | Performed by: UROLOGY

## 2023-08-17 PROCEDURE — 1159F MED LIST DOCD IN RCRD: CPT | Mod: CPTII,S$GLB,, | Performed by: UROLOGY

## 2023-08-17 PROCEDURE — 1101F PR PT FALLS ASSESS DOC 0-1 FALLS W/OUT INJ PAST YR: ICD-10-PCS | Mod: CPTII,S$GLB,, | Performed by: UROLOGY

## 2023-08-17 PROCEDURE — 3044F HG A1C LEVEL LT 7.0%: CPT | Mod: CPTII,S$GLB,, | Performed by: UROLOGY

## 2023-08-17 PROCEDURE — 3044F PR MOST RECENT HEMOGLOBIN A1C LEVEL <7.0%: ICD-10-PCS | Mod: CPTII,S$GLB,, | Performed by: UROLOGY

## 2023-08-17 PROCEDURE — 3074F SYST BP LT 130 MM HG: CPT | Mod: CPTII,S$GLB,, | Performed by: UROLOGY

## 2023-08-17 PROCEDURE — 1101F PT FALLS ASSESS-DOCD LE1/YR: CPT | Mod: CPTII,S$GLB,, | Performed by: UROLOGY

## 2023-08-17 PROCEDURE — 3074F PR MOST RECENT SYSTOLIC BLOOD PRESSURE < 130 MM HG: ICD-10-PCS | Mod: CPTII,S$GLB,, | Performed by: UROLOGY

## 2023-08-17 PROCEDURE — 99215 OFFICE O/P EST HI 40 MIN: CPT | Mod: S$GLB,,, | Performed by: UROLOGY

## 2023-08-17 PROCEDURE — 4010F PR ACE/ARB THEARPY RXD/TAKEN: ICD-10-PCS | Mod: CPTII,S$GLB,, | Performed by: UROLOGY

## 2023-08-17 PROCEDURE — 1126F AMNT PAIN NOTED NONE PRSNT: CPT | Mod: CPTII,S$GLB,, | Performed by: UROLOGY

## 2023-08-17 RX ORDER — TAMSULOSIN HYDROCHLORIDE 0.4 MG/1
0.4 CAPSULE ORAL DAILY
Qty: 90 CAPSULE | Refills: 3 | Status: SHIPPED | OUTPATIENT
Start: 2023-08-17 | End: 2023-10-25 | Stop reason: SDUPTHER

## 2023-08-17 NOTE — PATIENT INSTRUCTIONS
Holmium laser enucleation of the prostate (HoLEP): procedure-specific information    What is the evidence base for this information?  This leaflet includes advice from the [employer] guidelines in the management of benign prostate enlargement (BPH). It is therefore reflective of best practice in the care of this condition.  It is intended to supplement any advice you may already have been given by your urologist or other healthcare professionals. Alternative treatments are outlined below and can be discussed in more detail with your Urologist or Nurse Practitioner.    What does the procedure involve?  This operation involves the telescopic removal of obstructing prostate tissue using a laser and temporary insertion of a catheter.    What are the alternatives to this procedure?  Drugs, use of a catheter/stent, observation, conventional transurethral resection, other lasers that vaporize the prostate tissue or an open or robotic/laparoscopic operation.          What should I expect before the procedure?  If you are taking Clopidogrel or other new generation platelet inhibitors such as Pradaxa or Elaquis, on a regular basis, you must stop 10 days before your admission. This drug can cause increased bleeding after prostate surgery. Please inform your urologist if you have been instructed to not stop blood-thinning medications for any reason or if you have had cardiac stent placement within the last year. Treatment can be re-started safely about 10 days after you get home. If you are taking Warfarin to thin your blood, you should ensure that the Urology staff are aware of this well in advance of your admission.    If you live locally, you will usually be able to be discharged home on the day of your surgery.  If you have travelled from outside the local area for surgery, typically you will be kept in hospital overnight one night. If you are admitted to hospital after surgery, you will be seen by  members of the medical team, which include your surgeon, the urology residents or house officer and your nurse.    You will be asked not to eat or drink any solid food for 8 hours before surgery and, immediately before the operation, you may be given a pre-medication by the anesthetist which will make you dry-mouthed and pleasantly sleepy.    Please be sure to inform your Urologist in advance of your surgery if you have any of the following:    an artificial heart valve  a coronary artery stent  a heart pacemaker or defibrillator  an artificial joint placed less than 2 years prior to your HoLEP  an artificial blood vessel graft  a neurosurgical shunt  any other implanted foreign body  a prescription for Warfarin, Aspirin or Clopidogrel (Plavix®) or other blood thinners  a previous or current MRSA infection    What happens during the procedure?  A general anesthetic (where you will be asleep throughout the procedure) will be used most often. All methods minimize pain; your anesthetist will explain the pros and cons of each type of anesthetic to you. The operation, on average, takes  minutes, depending on the size of your prostate.    You will usually be given an injectable antibiotic before the procedure after checking for any drug allergies.    The laser is used to separate the obstructing prostate tissue from its surrounding capsule and to push it in large chunks into the bladder. An instrument (morcellator) is then used through the telescope to remove the prostate tissue from the bladder. A catheter is normally left to drain the bladder at the end of the procedure.                        What happens immediately after the procedure?  There is always some bleeding from the prostate area after the operation. The urine is usually clear of blood within 36-72 hours, although some patients lose more blood for longer. It is very unusual to require a blood transfusion after laser surgery.    It is useful to drink as  more fluid than normal in the first week after the operation because this helps the urine clear of any blood more quickly. Sometimes, fluid is flushed through the catheter to clear the urine of blood.    You will be able to eat and drink on the same day as the operation when you feel able to.    The catheter is generally removed after surgery, the date of removal varies and is dependent on several different factors. At first, it may be painful to pass your urine and it may come more frequently or urgently than normal. Any initial discomfort and frequency of urination usually improves steadily within a few days. Some of your symptoms, especially frequency, urgency and getting up at night to pass urine, may not improve for several months because these are often due to bladder over activity (which takes time to resolve after prostate surgery) rather than prostate blockage. Since a large portion of prostate tissue is removing with the laser technique, there may be some temporary loss of urinary control until your pelvic floor muscles strengthen and recover. Pelvic floor exercises before and after surgery help to decrease the chance of any temporary loss of urinary control (incontinence).    It is not unusual for your urine to turn bloody again and blood may be visible in the urine even up to 6 weeks after surgery but this is not usually a problem. Some patients, particularly those with small prostate glands, are unable to pass urine at all after the operation due to temporary swelling of the prostate area.  If this should happen, we would pass a catheter again to allow the swelling to resolve and the bladder to regain its function. Usually, patients who require re-catheterization go home with a catheter in place and then return within a week for a second catheter removal, which is successful in almost all cases.    If you travel from out of town or for medical reasons are admitted to the hospital after surgery, the  expected hospital stay is 1 night. It is safe for almost all patients to be discharged the afternoon after surgery.    Are there any side-effects?  Most procedures have a potential for side-effects. You should be reassured that, although all these complications are well-recognized, the majority of patients do not suffer any problems after a urological procedure.    Please use the check boxes to tick off individual items when you are happy that they have been discussed to your satisfaction:    Common (greater than 1 in 10)  Temporary mild burning, bleeding and frequency of urination after the procedure  No semen is produced during an orgasm in approximately 75% If the prostate is fully enucleated  Treatment may not relieve all the urinary symptoms  Infection of the bladder, testes or kidney requiring antibiotics  Loss of urinary control (incontinence) which usually resolves within 6 weeks (10%); this can usually be improved with pelvic floor exercises  Permanent Incontinence that is bothersome (1%)  Initial failure to pass urine after surgery requiring a new catheter for less than 1 week (10%)    Occasional (between 1 in 10 and 1 in 50)  Weakened erections or impotence (less than 2%)  Injury to the urethra causing delayed scar formation (stricture) in 1%  Finding unsuspected cancer in the removed tissue which may need further treatment (8%)    Rare (less than 1 in 50)  Retained tissue fragments floating in the bladder which may require a second telescopic procedure for their removal  Very rarely, perforation of the bladder requiring a temporary urinary catheter or open surgical repair  Persistent loss of urinary control which may require a further operation (0.5%)  Possible need to repeat treatment later due to re-obstruction (less than 2%)  May need self-catheterization to empty bladder fully If bladder weak (1%)  Bleeding requiring return to theatre and/or blood transfusion (less than 1%)    Hospital-acquired  infection (overall risk for [facility])  Colonization with MRSA (0.02%, 1 in 5,000)  Clostridium difficile bowel infection (0.04%; 1 in 2,500)  MRSA bloodstream infection (0.01%; 1 in 10,000)    (These rates may be greater in high-risk patients e.g. with long- term drainage tubes, after removal of the bladder for cancer, after previous infections, after prolonged hospitalization or after multiple admissions)    What should I expect when I get home?  When you leave hospital, you will be given a after visit summary of your admission. This holds important information about your inpatient stay and your operation. If, in the first few weeks after your discharge, you need to call your PCP for any reason or to attend another hospital, please take this summary with you to allow the doctors to see details of your treatment. This is particularly important if you need to consult another doctor within a few days of your discharge.    Most patients feel pretty much back to normal within a week. Apart from some burning on urination you should not be in any pain.  You may notice that you pass very small flecks of tissue in the urine at times within the first month as the prostate area heals. This does not usually interfere with the urinary stream or cause discomfort. It is normal to pass some blood in the urine (usually intermittently) for up to 6 weeks after surgery.    What else should I look out for?  If you experience increasing frequency, burning or difficulty on passing urine or worrying bleeding, contact your urologist.    In the event of severe bleeding, passage of clots or sudden difficulty in passing urine, you should contact your urologist immediately since it may be necessary for you to be re-admitted to hospital.    Are there any other important points?  Removal of your prostate should not adversely affect your sex life provided you are getting normal erections before the surgery. Sexual activity can be resumed as soon as  you are comfortable, usually after 3-4 weeks.    It is often helpful to continue with pelvic floor exercises as soon as possible after the operation since this can improve your control when you get home. The symptoms of an overactive bladder may take 3 months to resolve whereas the flow is improved immediately.    If you need any specific information on these exercises, please contact the [facility] staff or the Specialist Nurses. The symptoms of an overactive bladder may take 3 months to resolve whereas the flow is improved immediately.    The results of any tissue removed will be available after 14 - 21 days and will be reviewed at your post-operative visit, typically 2-3 weeks after surgery.    Around 3 months after surgery you will be reviewed in the outpatient clinic and several tests repeated (including a flow rate, bladder scan & symptom score) to help assess the effects of the surgery. Please come to your clinic appointment prepared to pass urine for a flow test.    Most patients require a recovery period of 1-2 weeks at home before they feel ready for work. You should avoid any heavy lifting or physical straining during this time. You should not drive until you feel fully recovered; 1 week is the minimum period that most patients require before resuming driving, but you may drive sooner if you feel recovered.    Driving after surgery  It is your responsibility to ensure that you are fit to drive following your surgery. You do not normally need to notify the DMV unless you have a medical condition that will last for longer than 3 months after your surgery and may affect your ability to drive. You should, however, check with your insurance company before returning to driving. Your doctors will be happy to provide you with advice on request.    Who can I contact for more help or information?  Urology Clinic: [phone]    What should I do with this form?  Thank you for taking the trouble to read this information  sheet. Please retain a copy for your own records, please do so below.    If you would like a copy of this form to be filed in your hospital records for future reference, please let your Urologist or Specialist Nurse know.  If you do, however, decide to proceed with the scheduled procedure, you will be asked to sign a separate consent form which will be filed in your hospital notes and you will, in addition, be provided with a copy of the form if you wish.

## 2023-08-17 NOTE — PROGRESS NOTES
[place] - Urology   Clinic Note    SUBJECTIVE:     Chief Complaint   Patient presents with    Other     2 month follow up  Elevated PSA        Referral from: No ref. provider found.    History of Present Illness:  Masoud Osoiro is a 68 y.o. male who presents to clinic for evaluation of elevated PSA.      Patient here for evaluation of elevated PSA and prostate MRI.  Patient currently takes tamsulosin and reports that this somewhat controls his symptoms.  He does report that he does have bothersome lower urinary tract symptoms including straining to void, weak stream, hesitancy and intermittency.  He does have a history of elevated PSA and recently had an MRI performed which revealed a size of 63, overall PI-RADS 2 lesions.    MRI Prostate:  Prostate Volume: 63 cc  PSA: 7.3  PSA Density: 0.12  Overall Assessment: PI-RADS 2, 0 targets      PSA:  Lab Results   Component Value Date    PSA 4.8 (H) 03/08/2023    PSA 1.8 01/19/2022    PSA 1.7 06/15/2020    PSA 1.3 12/05/2018    PSA 1.5 09/23/2016    PSADIAG 7.3 (H) 07/21/2023    PSADIAG 7.5 (H) 05/12/2023    PSADIAG 7.7 (H) 04/14/2023       Previously abnormal PSA: yes.   Previous biopsy: no.   Family history of prostate cancer: no.      Patient endorses no additional complaints at this time.    Anticoagulation/Antiplatelets:  No    Past Medical History:   Diagnosis Date    Allergy     Anticoagulant long-term use     Anxiety and depression 09/21/2016    trial of ctialopram and follow up in 8 weeks    Arthritis     Benign prostatic hyperplasia (BPH) with straining on urination 12/05/2018    Coronary artery disease involving native coronary artery of native heart without angina pectoris 07/26/2016 7/26/2016 [place]STEM[place] [facility] [place]   PCI of RCA with 4.0 x 15, 4.0 x 38, and 3.5 x 38 mm Resolute JACOB   PCI of PDA 2.75 x 15 mm JACOB dilated to 3.0 POBA of proximal PLB 2.0 x 12 mm balloon   Normal EF with LVEDP 15 mmHg DAPT score of 2               Elevated PSA     Gout      History of coronary artery stent placement 2016    History of heart attack 2016    NSTEMI    Mixed hyperlipidemia 2017    Overweight (BMI 25.0-29.9) 2016    Tobacco dependence in remission 2016       Past Surgical History:   Procedure Laterality Date    COLONOSCOPY N/A 2017    Procedure: COLONOSCOPY Split Prep;  Surgeon: Maykel Carcamo Jr., MD;  Location: [facility] ENDO;  Service: Endoscopy;  Laterality: N/A;    COLONOSCOPY N/A 2022    Procedure: COLONOSCOPY Golytely;  Surgeon: Henrry Nieves MD;  Location: [facility] ENDO;  Service: Endoscopy;  Laterality: N/A;  C    CORONARY ANGIOPLASTY WITH STENT PLACEMENT      ROTATOR CUFF REPAIR      TRANSFORAMINAL EPIDURAL INJECTION OF STEROID Left 06/15/2021    Procedure: Injection,steroid,epidural,transforaminal approach-- Left- L3-4, L4-5;  Surgeon: Enrike Medellin Jr., MD;  Location: [facility] PAIN MGT;  Service: Pain Management;  Laterality: Left;    VASECTOMY         Family History   Problem Relation Age of Onset    Heart disease Mother     Arthritis Mother     Parkinsonism Mother     Congenital heart disease Father     No Known Problems Sister     No Known Problems Brother     No Known Problems Daughter     No Known Problems Sister     No Known Problems Sister     Prostate cancer Neg Hx     Kidney disease Neg Hx        Social History     Tobacco Use    Smoking status: Former     Current packs/day: 0.00     Types: Cigarettes     Quit date: 2016     Years since quittin.0    Smokeless tobacco: Never   Substance Use Topics    Alcohol use: Yes     Comment: rarely on holidays    Drug use: No       Current Outpatient Medications on File Prior to Visit   Medication Sig Dispense Refill    aspirin (ECOTRIN) 81 MG EC tablet Take 1 tablet (81 mg total) by mouth once daily. 90 tablet 4    atorvastatin (LIPITOR) 80 MG tablet Take 1 tablet (80 mg total) by mouth once daily. 90 tablet 3    azelastine (ASTELIN) 137 mcg (0.1 %) nasal  "spray Spray 2 sprays in each nostril twice daily 30 mL 5    diazePAM (VALIUM) 5 MG tablet Take 0.5 tablet by mouth every 8 to 12 hours as needed. 24 tablet 0    GARLIC EXTRACT ORAL Take by mouth.      hydrOXYzine HCL (ATARAX) 25 MG tablet Take 0.5 to 1 tablet by mouth at bedtime 30 tablet 3    ipratropium (ATROVENT) 0.06 % nasal spray INSTILL 2 SPRAYS IN EACH NOSTRIL EVERY 12 HOURS  5    ipratropium (ATROVENT) 21 mcg (0.03 %) nasal spray       multivitamin capsule Take 1 capsule by mouth once daily.      nitroGLYCERIN (NITROSTAT) 0.4 MG SL tablet Place 1 tablet (0.4 mg total) under the tongue every 5 (five) minutes as needed for Chest pain. 25 tablet 11    pregabalin (LYRICA) 150 MG capsule Take 1 capsule (150 mg total) by mouth 2 (two) times daily. 60 capsule 5    sildenafil (REVATIO) 20 mg Tab Take 1 tablet (20 mg total) by mouth once daily. 30 tablet 11    tamsulosin (FLOMAX) 0.4 mg Cap Take 1 capsule (0.4 mg total) by mouth once daily. 90 capsule 3    triamcinolone (NASACORT) 55 mcg nasal inhaler Instill two sprays in each nostril once daily 16.9 mL 5    valsartan (DIOVAN) 40 MG tablet Take 1 tablet (40 mg total) by mouth once daily. 90 tablet 3     No current facility-administered medications on file prior to visit.       Review of patient's allergies indicates:   Allergen Reactions    Penicillins Hives       Review of Systems:  A review of 10+ systems was conducted with pertinent positive and negative findings documented in HPI with all other systems reviewed and negative.    OBJECTIVE:     Estimated body mass index is 30.99 kg/m² as calculated from the following:    Height as of this encounter: 5' 8" (1.727 m).    Weight as of this encounter: 92.4 kg (203 lb 12.8 oz).    Vital Signs (Most Recent)  Vitals:    08/17/23 1315   BP: (!) 109/57   Pulse: (!) 56       Physical Exam:  GENERAL: patient sitting comfortably  HEENT: normocephalic  NECK: supple, no JVD  PULM: normal chest rise, no increased WOB  HEART: " "non-diaphoretic  ABDO: soft, nondistended, nontender  BACK: no CVA tenderness bilaterally  SKIN: warm, dry, well perfused  EXT: no bruising or edema  NEURO: grossly normal with no focal deficits  PSYCH: appropriate mood and affect    Genitourinary Exam:  deferred    Lab Results   Component Value Date    BUN 18 05/12/2023    CREATININE 0.79 05/12/2023    WBC 6.80 03/28/2023    HGB 13.9 (L) 03/28/2023    HCT 41.5 03/28/2023     03/28/2023    AST 30 03/27/2023    ALT 26 03/27/2023    ALKPHOS 73 03/27/2023    ALBUMIN 3.9 03/27/2023    HGBA1C 5.3 03/08/2023        PSA:  Lab Results   Component Value Date    PSA 4.8 (H) 03/08/2023    PSA 1.8 01/19/2022    PSA 1.7 06/15/2020    PSA 1.3 12/05/2018    PSA 1.5 09/23/2016    PSADIAG 7.3 (H) 07/21/2023    PSADIAG 7.5 (H) 05/12/2023    PSADIAG 7.7 (H) 04/14/2023       Testosterone:  No results found for: "TOTALTESTOST", "TESTOSTERONE"     Imaging:  I have personally reviewed all relevant imaging.    No results found for this or any previous visit (from the past 2160 hour(s)).  No results found for this or any previous visit (from the past 2160 hour(s)).  X-Ray Calcaneus Bilateral  Narrative: EXAM: XR CALCANEUS BILATERAL    CLINICAL INDICATION:     Pain    TECHNIQUE/FINDINGS: 2 views bilateral calcanei.    Minimal plantar spurring of each calcaneus.  No fracture or dislocation.  Minimal spurring at Achilles insertion site of the right calcaneus.  Impression: 1.  No acute findings.    Finalized on: 8/10/2023 10:36 AM By:  Maykel Villa MD  [facility]# [id number]      2023-08-10 10:38:[id number]    [facility]      ASSESSMENT     No diagnosis found.    PLAN:     Elevated PSA    - PSA screening and prostate cancer were discussed.  Prostate MRI was also discussed and the PI-RADS scoring system was explained in depth.  The patient had a prostate MRI with a PI-RADS 2 lesion.    - We discussed the sensitivity and specificity of both PSA testing and prostate MRI in the diagnosis of prostate cancer. " We discussed next steps, including observation, continuing to trend the PSA, and proceeding to a prostate biopsy. We discussed ultrasound guided MR Fusion biopsy ([facility]). We discussed the typical detection rates for clinically significant prostate cancer (as published by the Journal of Urology): 4% (95% CI 2-8) for category 1-2, 17% (95% CI 13-21) for category 3, 46% (95% CI 38-55) for category 4 and 75% (95% CI 73-78) for category 5.    - After our discussion, we decided to continue to trend his PSA with a repeat PSA in 6 months.  Patient considering de obstructive surgery for BPH.    2. BPH    - Flomax refilled.  Had a long discussion regarding BPH.  Options were extensively explained.  Patient provided literature on HoLEP and Rezum.  Will consider.  Follow up in 6 months with a PSA or sooner if he decides on surgery.    I spent a total of 60 minutes on the day of the visit. This includes face to face time and non-face to face time preparing to see the patient (eg, review of tests), obtaining and/or reviewing separately obtained history, documenting clinical information in the electronic or other health record, independently interpreting results and communicating results to the patient/family/caregiver, or care coordinator.    Jose Alfredo Hua MD  Urology  [facility]    Disclaimer: This note has been generated using voice-recognition software. There may be typographical errors that have been missed during proof-reading.

## 2023-08-18 ENCOUNTER — OFFICE VISIT (OUTPATIENT)
Dept: PODIATRY | Facility: CLINIC | Age: 68
End: 2023-08-18
Payer: MEDICARE

## 2023-08-18 VITALS
WEIGHT: 206.56 LBS | OXYGEN SATURATION: 96 % | HEIGHT: 68 IN | HEART RATE: 60 BPM | TEMPERATURE: 98 F | RESPIRATION RATE: 18 BRPM | BODY MASS INDEX: 31.3 KG/M2 | DIASTOLIC BLOOD PRESSURE: 83 MMHG | SYSTOLIC BLOOD PRESSURE: 133 MMHG

## 2023-08-18 DIAGNOSIS — M79.672 LEFT FOOT PAIN: ICD-10-CM

## 2023-08-18 DIAGNOSIS — S90.32XD CONTUSION OF LEFT FOOT, SUBSEQUENT ENCOUNTER: ICD-10-CM

## 2023-08-18 PROCEDURE — 99999 PR PBB SHADOW E&M-EST. PATIENT-LVL IV: CPT | Mod: PBBFAC,,, | Performed by: STUDENT IN AN ORGANIZED HEALTH CARE EDUCATION/TRAINING PROGRAM

## 2023-08-18 PROCEDURE — 1159F MED LIST DOCD IN RCRD: CPT | Mod: CPTII,S$GLB,, | Performed by: STUDENT IN AN ORGANIZED HEALTH CARE EDUCATION/TRAINING PROGRAM

## 2023-08-18 PROCEDURE — 99999 PR PBB SHADOW E&M-EST. PATIENT-LVL IV: ICD-10-PCS | Mod: PBBFAC,,, | Performed by: STUDENT IN AN ORGANIZED HEALTH CARE EDUCATION/TRAINING PROGRAM

## 2023-08-18 PROCEDURE — 3079F DIAST BP 80-89 MM HG: CPT | Mod: CPTII,S$GLB,, | Performed by: STUDENT IN AN ORGANIZED HEALTH CARE EDUCATION/TRAINING PROGRAM

## 2023-08-18 PROCEDURE — 3288F PR FALLS RISK ASSESSMENT DOCUMENTED: ICD-10-PCS | Mod: CPTII,S$GLB,, | Performed by: STUDENT IN AN ORGANIZED HEALTH CARE EDUCATION/TRAINING PROGRAM

## 2023-08-18 PROCEDURE — 3288F FALL RISK ASSESSMENT DOCD: CPT | Mod: CPTII,S$GLB,, | Performed by: STUDENT IN AN ORGANIZED HEALTH CARE EDUCATION/TRAINING PROGRAM

## 2023-08-18 PROCEDURE — 3044F HG A1C LEVEL LT 7.0%: CPT | Mod: CPTII,S$GLB,, | Performed by: STUDENT IN AN ORGANIZED HEALTH CARE EDUCATION/TRAINING PROGRAM

## 2023-08-18 PROCEDURE — 3008F PR BODY MASS INDEX (BMI) DOCUMENTED: ICD-10-PCS | Mod: CPTII,S$GLB,, | Performed by: STUDENT IN AN ORGANIZED HEALTH CARE EDUCATION/TRAINING PROGRAM

## 2023-08-18 PROCEDURE — 3079F PR MOST RECENT DIASTOLIC BLOOD PRESSURE 80-89 MM HG: ICD-10-PCS | Mod: CPTII,S$GLB,, | Performed by: STUDENT IN AN ORGANIZED HEALTH CARE EDUCATION/TRAINING PROGRAM

## 2023-08-18 PROCEDURE — 3044F PR MOST RECENT HEMOGLOBIN A1C LEVEL <7.0%: ICD-10-PCS | Mod: CPTII,S$GLB,, | Performed by: STUDENT IN AN ORGANIZED HEALTH CARE EDUCATION/TRAINING PROGRAM

## 2023-08-18 PROCEDURE — 1125F AMNT PAIN NOTED PAIN PRSNT: CPT | Mod: CPTII,S$GLB,, | Performed by: STUDENT IN AN ORGANIZED HEALTH CARE EDUCATION/TRAINING PROGRAM

## 2023-08-18 PROCEDURE — 3075F SYST BP GE 130 - 139MM HG: CPT | Mod: CPTII,S$GLB,, | Performed by: STUDENT IN AN ORGANIZED HEALTH CARE EDUCATION/TRAINING PROGRAM

## 2023-08-18 PROCEDURE — 3008F BODY MASS INDEX DOCD: CPT | Mod: CPTII,S$GLB,, | Performed by: STUDENT IN AN ORGANIZED HEALTH CARE EDUCATION/TRAINING PROGRAM

## 2023-08-18 PROCEDURE — 4010F PR ACE/ARB THEARPY RXD/TAKEN: ICD-10-PCS | Mod: CPTII,S$GLB,, | Performed by: STUDENT IN AN ORGANIZED HEALTH CARE EDUCATION/TRAINING PROGRAM

## 2023-08-18 PROCEDURE — 1101F PT FALLS ASSESS-DOCD LE1/YR: CPT | Mod: CPTII,S$GLB,, | Performed by: STUDENT IN AN ORGANIZED HEALTH CARE EDUCATION/TRAINING PROGRAM

## 2023-08-18 PROCEDURE — 3075F PR MOST RECENT SYSTOLIC BLOOD PRESS GE 130-139MM HG: ICD-10-PCS | Mod: CPTII,S$GLB,, | Performed by: STUDENT IN AN ORGANIZED HEALTH CARE EDUCATION/TRAINING PROGRAM

## 2023-08-18 PROCEDURE — 4010F ACE/ARB THERAPY RXD/TAKEN: CPT | Mod: CPTII,S$GLB,, | Performed by: STUDENT IN AN ORGANIZED HEALTH CARE EDUCATION/TRAINING PROGRAM

## 2023-08-18 PROCEDURE — 1101F PR PT FALLS ASSESS DOC 0-1 FALLS W/OUT INJ PAST YR: ICD-10-PCS | Mod: CPTII,S$GLB,, | Performed by: STUDENT IN AN ORGANIZED HEALTH CARE EDUCATION/TRAINING PROGRAM

## 2023-08-18 PROCEDURE — 99204 PR OFFICE/OUTPT VISIT, NEW, LEVL IV, 45-59 MIN: ICD-10-PCS | Mod: S$GLB,,, | Performed by: STUDENT IN AN ORGANIZED HEALTH CARE EDUCATION/TRAINING PROGRAM

## 2023-08-18 PROCEDURE — 1125F PR PAIN SEVERITY QUANTIFIED, PAIN PRESENT: ICD-10-PCS | Mod: CPTII,S$GLB,, | Performed by: STUDENT IN AN ORGANIZED HEALTH CARE EDUCATION/TRAINING PROGRAM

## 2023-08-18 PROCEDURE — 99204 OFFICE O/P NEW MOD 45 MIN: CPT | Mod: S$GLB,,, | Performed by: STUDENT IN AN ORGANIZED HEALTH CARE EDUCATION/TRAINING PROGRAM

## 2023-08-18 PROCEDURE — 1159F PR MEDICATION LIST DOCUMENTED IN MEDICAL RECORD: ICD-10-PCS | Mod: CPTII,S$GLB,, | Performed by: STUDENT IN AN ORGANIZED HEALTH CARE EDUCATION/TRAINING PROGRAM

## 2023-08-18 NOTE — PROGRESS NOTES
Subjective:     Patient    Masoud Osorio is a 68 y.o. male.    Problem    New to [facility] pod[facility]. Presents for left heel pain. Started 2-3 weeks ago. Stepped off a door sill walking barefoot, foot slipped and hit the concrete and heel has been hurting since. He has rolled the area with a tennis ball and massaged it, has not tried any other treatments. Had a prior fall with subsequent left sided nerve damage. Also with occasional low back pain.      History    History obtained from patient and review of medical records.     Past Medical History:   Diagnosis Date    Allergy     Anticoagulant long-term use     Anxiety and depression 09/21/2016    trial of ctialopram and follow up in 8 weeks    Arthritis     Benign prostatic hyperplasia (BPH) with straining on urination 12/05/2018    Coronary artery disease involving native coronary artery of native heart without angina pectoris 07/26/2016 7/26/2016 [facility]I at [facility]   PCI of RCA with 4.0 x 15, 4.0 x 38, and 3.5 x 38 mm Resolute JACOB   PCI of PDA 2.75 x 15 mm JACOB dilated to 3.0 POBA of proximal PLB 2.0 x 12 mm balloon   Normal EF with LVEDP 15 mmHg DAPT score of 2               Elevated PSA     Gout     History of coronary artery stent placement 09/21/2016    History of heart attack 07/25/2016    NSTEMI    Mixed hyperlipidemia 07/17/2017    Overweight (BMI 25.0-29.9) 09/21/2016    Tobacco dependence in remission 07/25/2016       Past Surgical History:   Procedure Laterality Date    COLONOSCOPY N/A 08/16/2017    Procedure: COLONOSCOPY Split Prep;  Surgeon: Maykel Carcamo Jr., MD;  Location: [facility] ENDO;  Service: Endoscopy;  Laterality: N/A;    COLONOSCOPY N/A 4/27/2022    Procedure: COLONOSCOPY Golytely;  Surgeon: Henrry Nieves MD;  Location: [facility] ENDO;  Service: Endoscopy;  Laterality: N/A;  C    CORONARY ANGIOPLASTY WITH STENT PLACEMENT      ROTATOR CUFF REPAIR      TRANSFORAMINAL EPIDURAL INJECTION OF STEROID Left 06/15/2021    Procedure:  Injection,steroid,epidural,transforaminal approach-- Left- L3-4, L4-5;  Surgeon: Enrike Medellin Jr., MD;  Location: [facility];  Service: Pain Management;  Laterality: Left;    VASECTOMY          Objective:     Vitals  Wt Readings from Last 1 Encounters:   08/18/23 93.7 kg (206 lb 9.1 oz)     Temp Readings from Last 1 Encounters:   08/18/23 97.6 °F (36.4 °C)     BP Readings from Last 1 Encounters:   08/18/23 133/83     Pulse Readings from Last 1 Encounters:   08/18/23 60       Dermatological Exam    Skin:  Pedal hair growth, skin color, and skin texture normal on right  Pedal hair growth, skin color, and skin texture normal on left    Nails:  All nails normal in length, thickness, color    Vascular Exam    Arteries:  Posterior tibial artery palpable on right  Dorsalis pedis artery palpable on right  Posterior tibial artery palpable on left  Dorsalis pedis artery palpable on left    Veins:  Superficial veins unremarkable on right  Superficial veins unremarkable on left    Swelling:  None on right  None on left    Neurological Exam    [place] touch test:  6/6 sites sensed, light touch intact    Provocation Sign:  - at all major ankle and foot nerves bilaterally    Tinel Sign:  - at all major ankle and foot nerves bilaterally    Slump Test:  - bilaterally    Musculoskeletal Exam    Footwear:  Casual on right  Casual on left    Gait Exam:   Ambulatory Status: Ambulatory  Gait: Normal  Assistive Devices: None    Foot Progression Angle:  Normal on right  Normal on left     Right Lower Extremity Additional Findings:  Right foot and ankle function, strength, and range of motion unremarkable except as noted above.     Left Lower Extremity Additional Findings:  Pain on palpation of direct plantar-posterior corner of heel, no pain on palpation of plantar fascia or Achilles tendon  Left foot and ankle function, strength, and range of motion unremarkable except as noted above.    Imaging and Other  Tests    Imaging:  Independently reviewed and interpreted imaging, findings are as follows:   08/10/23 B calcaneal X rays: bilateral plantar calcaneal spurs and insertional Achilles calcifications on right; otherwise unremarkable.      Assessment:     Encounter Diagnoses   Name Primary?    Left foot pain     Contusion of left foot, subsequent encounter         Plan:     I counseled the patient on his conditions, their implications and medical management.    Left foot contusion, pain: acute  -Reviewed X rays with patient and wife. Assure them that they should not be concerned about bone spur findings, these are incidental.  -Ordered topical compounded nerve medicine.  -Physical activity as tolerated.    -If not improving after a couple more months then can consider MRI.     Return to clinic PRN.

## 2023-10-18 ENCOUNTER — PATIENT MESSAGE (OUTPATIENT)
Dept: UROLOGY | Facility: CLINIC | Age: 68
End: 2023-10-18
Payer: MEDICARE

## 2023-10-18 DIAGNOSIS — N13.8 BPH WITH OBSTRUCTION/LOWER URINARY TRACT SYMPTOMS: ICD-10-CM

## 2023-10-18 DIAGNOSIS — N40.1 BPH WITH OBSTRUCTION/LOWER URINARY TRACT SYMPTOMS: ICD-10-CM

## 2023-10-25 RX ORDER — TAMSULOSIN HYDROCHLORIDE 0.4 MG/1
0.8 CAPSULE ORAL DAILY
Qty: 180 CAPSULE | Refills: 3 | Status: SHIPPED | OUTPATIENT
Start: 2023-10-25 | End: 2024-10-24

## 2023-12-19 ENCOUNTER — E-VISIT (OUTPATIENT)
Dept: FAMILY MEDICINE | Facility: CLINIC | Age: 68
End: 2023-12-19
Payer: MEDICARE

## 2023-12-19 DIAGNOSIS — L03.032 PARONYCHIA OF GREAT TOE OF LEFT FOOT: Primary | ICD-10-CM

## 2023-12-19 PROCEDURE — 99421 OL DIG E/M SVC 5-10 MIN: CPT | Mod: ,,, | Performed by: FAMILY MEDICINE

## 2023-12-19 PROCEDURE — 99421 PR E&M, ONLINE DIGIT, EST, < 7 DAYS, 5-10 MINS: ICD-10-PCS | Mod: ,,, | Performed by: FAMILY MEDICINE

## 2023-12-19 RX ORDER — CLINDAMYCIN HYDROCHLORIDE 300 MG/1
300 CAPSULE ORAL EVERY 6 HOURS
Qty: 28 CAPSULE | Refills: 0 | Status: SHIPPED | OUTPATIENT
Start: 2023-12-19 | End: 2023-12-27

## 2023-12-19 NOTE — PROGRESS NOTES
Patient ID: Masoud Osorio is a 68 y.o. male.    Chief Complaint: Rash (Entered automatically based on patient selection in Patient Portal.)    The patient initiated a request through [facility] on 12/19/2023 for evaluation and management with a chief complaint of Rash (Entered automatically based on patient selection in Patient Portal.)     I evaluated the questionnaire submission on 12/19/2023  .    Ohs Peq Evisit Rash    12/19/2023  9:47 AM CST - Filed by Patient   Do you agree to participate in an E-Visit? Yes   If you have any of the following symptoms, please present to your local ER or call 911:  I acknowledge   What is the main issue that you would like for your doctor to address today? Red swelling around toenail   Are you able to take your vital signs? No   How would you describe your skin problem? Lump or bump   When did your symptoms first appear? 12/16/2023   Where is it located?  Foot/Feet   Does it itch? No   Does it hurt? Yes   Where is the pain located? Where the skin change is noted   The pain came on: Gradually   The pain has the character of: Dull   Frequency of the pain (How often does it appear)? Daily   Please select the face that most closely captures your pain level: 8   Is there discharge or drainage? No   Is there bleeding? No   Describe the character Raised;  Solid or firm   Describe the color Red   Has it changed over time? Grown in size   Frequency of skin problem Seasonally   Duration of the skin problem (how long does it stay when it is present) Days   I have had a new exposure to No new exposures   I have had a new exposure to No new exposures   What have you used to treat the skin problem? Antibiotic ointment   If you have used anything for treatment, has it helped the symptoms? No   Other generalized symptoms that you associate with the rash No other symptoms   Provide any information you feel is important to your history not asked above Left big toe has been red for a couple days    At least one photo is required for treatment to be provided. You can upload a maximum of three photos of the affected area.           Recent Labs Obtained:  No visits with results within 7 Day(s) from this visit.   Latest known visit with results is:   Lab Visit on 2023   Component Date Value Ref Range Status    PSA Diagnostic 2023 7.3 (H)  0.00 - 4.00 ng/mL Final    Comment: The testing method is a chemiluminescent microparticle immunoassay   manufactured by [employer] and performed on the [employer]   or   [employer] system. Values obtained with different assay manufacturers   for   methods may be different and cannot be used interchangeably.  PSA Expected levels:  Hormonal Therapy: <0.05 ng/ml  Prostatectomy: <0.01 ng/ml  Radiation Therapy: <1.00 ng/ml         Encounter Diagnosis   Name Primary?    Paronychia of great toe of left foot Yes        No orders of the defined types were placed in this encounter.     Medications Ordered This Encounter   Medications    clindamycin (CLEOCIN) 300 MG capsule     Sig: Take 1 capsule (300 mg total) by mouth every 6 (six) hours. for 7 days     Dispense:  28 capsule     Refill:  0      Treat paronychia with warm water soaks, topical antibiotic therapy and Clindamycin.   Podiatry referral for nail eval if pain persists even after treatment of infection. ER precautions for cellulitis given.     Follow up for return as needed for new concerns.      E-Visit Time Trackin minutes

## 2024-02-06 ENCOUNTER — OFFICE VISIT (OUTPATIENT)
Dept: PAIN MEDICINE | Facility: CLINIC | Age: 69
End: 2024-02-06
Payer: MEDICARE

## 2024-02-06 ENCOUNTER — TELEPHONE (OUTPATIENT)
Dept: PAIN MEDICINE | Facility: CLINIC | Age: 69
End: 2024-02-06
Payer: MEDICARE

## 2024-02-06 VITALS
HEIGHT: 68 IN | BODY MASS INDEX: 31.92 KG/M2 | DIASTOLIC BLOOD PRESSURE: 82 MMHG | HEART RATE: 79 BPM | WEIGHT: 210.63 LBS | SYSTOLIC BLOOD PRESSURE: 129 MMHG

## 2024-02-06 DIAGNOSIS — M54.16 LUMBAR RADICULOPATHY, CHRONIC: Primary | ICD-10-CM

## 2024-02-06 DIAGNOSIS — M48.061 NEUROFORAMINAL STENOSIS OF LUMBAR SPINE: ICD-10-CM

## 2024-02-06 DIAGNOSIS — G89.4 CHRONIC PAIN SYNDROME: ICD-10-CM

## 2024-02-06 PROCEDURE — 99999 PR PBB SHADOW E&M-EST. PATIENT-LVL IV: CPT | Mod: PBBFAC,,, | Performed by: NURSE PRACTITIONER

## 2024-02-06 PROCEDURE — 1160F RVW MEDS BY RX/DR IN RCRD: CPT | Mod: CPTII,S$GLB,, | Performed by: NURSE PRACTITIONER

## 2024-02-06 PROCEDURE — 1159F MED LIST DOCD IN RCRD: CPT | Mod: CPTII,S$GLB,, | Performed by: NURSE PRACTITIONER

## 2024-02-06 PROCEDURE — 99214 OFFICE O/P EST MOD 30 MIN: CPT | Mod: S$GLB,,, | Performed by: NURSE PRACTITIONER

## 2024-02-06 PROCEDURE — 1125F AMNT PAIN NOTED PAIN PRSNT: CPT | Mod: CPTII,S$GLB,, | Performed by: NURSE PRACTITIONER

## 2024-02-06 PROCEDURE — 3079F DIAST BP 80-89 MM HG: CPT | Mod: CPTII,S$GLB,, | Performed by: NURSE PRACTITIONER

## 2024-02-06 PROCEDURE — 3008F BODY MASS INDEX DOCD: CPT | Mod: CPTII,S$GLB,, | Performed by: NURSE PRACTITIONER

## 2024-02-06 PROCEDURE — 3074F SYST BP LT 130 MM HG: CPT | Mod: CPTII,S$GLB,, | Performed by: NURSE PRACTITIONER

## 2024-02-06 NOTE — PROGRESS NOTES
Chronic patient Established Note (Follow up visit)      SUBJECTIVE:  Interval history 02/06/2024-  Pleasant 68-year-old male that presents today with his wife, he is complaining of returning left leg numbness onset 2-3 weeks, numbness presents in the left leg down to the ankle, patient acknowledges intermittent weakness his wife commented on him losing his balance at times.  He is currently taking Lyrica per his PCP/Dr. Ayala which is helping current prescription consists of Lyrica 150 mg b.i.d. however patient acknowledges sometimes he only takes 1 pill because it causes somnolence.  Discussed that he would take a pill right after  dinner and then 1 before bedtime patient verbalized understanding.  Discussed I will update his lumbar MRI as his previous lumbar MRI was in 2020 to rule out progressive pathology I would like to also schedule him for the epidural due to the time lapse in scheduling discussed I will call with lumbar MRI results once I receive the images.  Patient denies any bowel or bladder dysfunction, denies any saddle anesthesia denies any profound weakness.    Interval History   01/12[birth year][id number]-old male presents today for follow-up appoint he was referred to physical therapy per Dr. Lin on his last clinic visit he reports having attended x2.  He does report relief his pain score today is 2/10.  Riley also optimize his Lyrica dose he states he received the prescription just recently and will begin this new prescription of Lyrica 150 mg b.i.d..        12/15/2022:    Patient presents for follow up of his chronic low back pain and left leg numbness.  Patient had an L3/4 and L4/5 TFESI on June 2021 and he reports 85% relief for over a year.  He states the pain starting coming back 4 months after the procedure.  He reports his current pain is 2/10, however his leg numbness is severe.  He is requesting a referral to Physical Therapy as he thinks the therapy was the most helpful in the past.   "Patient denies fever/weight loss/incontinence/progressive weakness.     9/20/21- Mr. Osorio presents via tele med visit he has a hx of clbp with bilateral anterior/lateral thigh numbness, he denies and "pain" he is reporting mild improvement with his increased lyrica dose (75 mg BID) he denies any adverse SEs, he endorses returning to PT. He denies any LBP at this point. Denies an B/B dysfunction, denies any profound weakness or inciting incident/trauma since our last CV.      8/5/2021- Mr. Osorio present virtually for a f/u appt to discuss effectiveness of Lyrica and overall pain level following injection in June. He continues to report bilateral anterior/lateral thigh numbness, he denies and "pain" switched him to Lyrica from Gabapentin no adverse SEs reported, patient reports Lyrica is helping.      7/6/21 - Mr. Osorio returns to clinic for follow up visit reporting improved low back pain.  Patient is s/p  - Left L3-4 and Left L4-5 TFESI on 6/15/21 with 85% continued relief.  Pain intensity is currently 6/10.   He reports continuing with PT x2 per week that has been effective.      HPI:   Masoud Osorio is a 66 y.o. male with CLBP for many years and a more recent Hx of L1 compression fracture last year and newer onset shooting pains traveling into the lateral/anterior thigh and down the front of the leg.  Radicular symptoms started spontaneously one night and he woke up with a numb, tingling left leg.  This was not at the same time as the compression fracture, which was caused by falling off of a ladder.  He has been in PT and is taking gabapentin both of which help, but he has been unable to take larger or more frequent doses of gabapentin due to sedation. + Grocery cart sign.     Location: low back   Onset: about a year  Current Pain Score: 4/10  Daily Pain of Range: 5-8/10  Quality: Aching, Throbbing and Numb  Radiation: down left leg  Worsened by: lifting and standing for more than 20 minutes  Improved " by: heat, medications and physical therapy     Previous Therapies:  PT/OT: Yes, current.  Helped but pain still persists.  HEP: Yes, daily.  Interventions:    06/15/2021 Lumbar Transforaminal Epidural Steroid Injection, Two Levels    - Left L3-4- Left L4-5                                                                Surgery: Denies back surgery  Medications:   - NSAIDS:   - MSK Relaxants:   - TCAs:   - SNRIs:   - Topicals:   - Anticonvulsants:   - Opioids:      Current Pain Medications:  Lyrica 75 mg BID daily    Pain Disability Index Review:      2/6/2024    10:15 AM 1/12/2023     1:19 PM 12/15/2022     2:18 PM   Last 3 PDI Scores   Pain Disability Index (PDI) 16 19 21       Pain Medications:  Lyrica 75mg BID    Opioid Contract: not applicable     report:  Not applicable    Pain Procedures:     June 2021: Left L3/4 and L4/5 TFESI - 85% relief for 4 months    Physical Therapy/Home Exercise: yes    Imaging:     EXAMINATION:  MRI LUMBAR SPINE WITHOUT CONTRAST     CLINICAL HISTORY:  acute left leg parethesias; Wedge compression fracture of first lumbar vertebra, subsequent encounter for fracture with routine healing     TECHNIQUE:  Multiplanar, multisequence MR images were acquired from the thoracolumbar junction to the sacrum without contrast.     COMPARISON:  Thoracolumbar radiograph 02/26/2020     FINDINGS:  Alignment: Mild levo scoliotic curvature.     Vertebrae: Chronic L1 compression fracture with roughly 75% loss of vertebral body height centrally.  There is posterior fracture retropulsion into the anterior epidural space.  Remaining lumbar vertebral body heights appear maintained.  There are multilevel endplate degenerative changes with edema only sparing the L3-L4 level.  No evidence of acute lumbar spine fracture.     Discs: Disc height loss and desiccation throughout the lumbar spine, but most advanced at L4-L5 and L5-S1.     Cord: Abnormal increased cord signal within the distal thoracic cord/conus.   Conus terminates at L1.     Degenerative findings:     T12-L1: Chronic compression fracture.  Posterosuperior fracture retropulsion into the anterior epidural space.  Moderate spinal canal stenosis.  Mild right-sided neural foraminal stenosis.     L1-L2: Mild asymmetric right broad-based disc bulge, facet arthropathy and ligamentum flavum hypertrophy.  Mild spinal canal stenosis and moderate right-sided neural foraminal stenosis.     L2-L3: Broad-based disc bulge, facet arthropathy and ligamentum flavum hypertrophy result in moderate to severe spinal canal stenosis, mild-to-moderate right and mild left-sided neural foraminal stenosis.     L3-L4: Broad-based disc bulge, facet arthropathy and ligamentum flavum hypertrophy results in moderate spinal canal stenosis, moderate right and mild left-sided neural foraminal stenosis.     L4-L5: Broad-based disc bulge, significant facet arthropathy and ligamentum flavum hypertrophy results and moderate to severe spinal canal stenosis, mild-to-moderate right and moderate to severe left-sided neural foraminal stenosis.     L5-S1: Mild broad-based disc bulge and mild bilateral facet arthropathy.  Moderate right and severe left-sided neural foraminal stenosis.  No significant spinal canal stenosis.     Paraspinal muscles & soft tissues: Unremarkable.     Impression:     1. Chronic L1 compression fracture with roughly 75% loss of central vertebral body height.  Posterosuperior fracture retropulsion resulting in moderate spinal canal stenosis.  Abnormal increased signal within the distal cord and conus concerning for myelomalacia or edema.  2. Multilevel degenerative changes of the lumbar spine are most significant at L4-L5 with moderate to severe spinal canal stenosis, mild-to-moderate right and moderate to severe left-sided neural foraminal stenosis.  3. Moderate to severe L2-L3 spinal canal stenosis with mild-to-moderate right and mild left-sided neural foraminal stenosis.  4.  Moderate L3-L4 spinal canal stenosis with moderate right and mild left-sided neural foraminal stenosis.  5. Additional multilevel degenerative changes as detailed in the body of the report  This report was flagged in Epic as abnormal.        Electronically signed by: Wilner Gordon  Date:                                            12/19/2020  Time:                                           10:29    Allergies:   Review of patient's allergies indicates:   Allergen Reactions    Penicillins Hives       Current Medications:   Current Outpatient Medications   Medication Sig Dispense Refill    aspirin (ECOTRIN) 81 MG EC tablet Take 1 tablet (81 mg total) by mouth once daily. 90 tablet 4    atorvastatin (LIPITOR) 80 MG tablet Take 1 tablet (80 mg total) by mouth once daily. 90 tablet 3    azelastine (ASTELIN) 137 mcg (0.1 %) nasal spray Spray 2 sprays in each nostril twice daily 30 mL 5    ciprofloxacin HCl (CIPRO) 500 MG tablet Take 1 tablet by mouth twice daily 14 tablet 0    diazePAM (VALIUM) 5 MG tablet Take 0.5 tablet by mouth every 8 to 12 hours as needed. 24 tablet 0    diazePAM (VALIUM) 5 MG tablet Take 1/2 -1 tablet by mouth every 8 hours. 30 tablet 0    GARLIC EXTRACT ORAL Take by mouth.      hydrOXYzine HCL (ATARAX) 25 MG tablet Take 0.5 to 1 tablet by mouth at bedtime 30 tablet 3    hydrOXYzine HCL (ATARAX) 25 MG tablet Take 1/2 to 1 tablet by mouth at bedtime. 30 tablet 3    ipratropium (ATROVENT) 0.06 % nasal spray INSTILL 2 SPRAYS IN EACH NOSTRIL EVERY 12 HOURS  5    ipratropium (ATROVENT) 21 mcg (0.03 %) nasal spray       multivitamin capsule Take 1 capsule by mouth once daily.      nitroGLYCERIN (NITROSTAT) 0.4 MG SL tablet Place 1 tablet (0.4 mg total) under the tongue every 5 (five) minutes as needed for Chest pain. 25 tablet 11    predniSONE (DELTASONE) 20 MG tablet Take 2 tablets by mouth on day 1, THEN 1 ½ tablets on days 2 and 3, THEN 1 tablet on days 4 and 5, THEN ½ tablet on day 6 8 tablet 0     pregabalin (LYRICA) 150 MG capsule Take 1 capsule (150 mg total) by mouth 2 (two) times daily. 60 capsule 5    sildenafil (REVATIO) 20 mg Tab Take 1 tablet (20 mg total) by mouth once daily. 30 tablet 11    tamsulosin (FLOMAX) 0.4 mg Cap Take 2 capsules (0.8 mg total) by mouth once daily. 180 capsule 3    triamcinolone (NASACORT) 55 mcg nasal inhaler Instill two sprays in each nostril once daily 16.9 mL 5    valsartan (DIOVAN) 40 MG tablet Take 1 tablet (40 mg total) by mouth once daily. 90 tablet 3     No current facility-administered medications for this visit.       REVIEW OF SYSTEMS:    GENERAL:  No weight loss, malaise or fevers.  HEENT:  Negative for frequent or significant headaches.  NECK:  Negative for lumps, goiter, pain and significant neck swelling.  RESPIRATORY:  Negative for cough, wheezing or shortness of breath.  CARDIOVASCULAR:  Negative for chest pain, leg swelling or palpitations.  GI:  Negative for abdominal discomfort, blood in stools or black stools or change in bowel habits.  MUSCULOSKELETAL:  See HPI.  SKIN:  Negative for lesions, rash, and itching.  PSYCH:  +ve for sleep disturbance, mood disorder and recent psychosocial stressors.  HEMATOLOGY/LYMPHOLOGY:  Negative for prolonged bleeding, bruising easily or swollen nodes.  NEURO:   No history of headaches, syncope, paralysis, seizures or tremors.  All other reviewed and negative other than HPI.    Past Medical History:  Past Medical History:   Diagnosis Date    Allergy     Anticoagulant long-term use     Anxiety and depression 09/21/2016    trial of ctialopram and follow up in 8 weeks    Arthritis     Benign prostatic hyperplasia (BPH) with straining on urination 12/05/2018    Coronary artery disease involving native coronary artery of native heart without angina pectoris 07/26/2016 7/26/2016 NSTEMI at [facility]   PCI of RCA with 4.0 x 15, 4.0 x 38, and 3.5 x 38 mm Resolute JACOB   PCI of PDA 2.75 x 15 mm JACOB dilated to 3.0 POBA of  proximal PLB 2.0 x 12 mm balloon   Normal EF with LVEDP 15 mmHg DAPT score of 2               Elevated PSA     Gout     History of coronary artery stent placement 2016    History of heart attack 2016    NSTEMI    Mixed hyperlipidemia 2017    Overweight (BMI 25.0-29.9) 2016    Tobacco dependence in remission 2016       Past Surgical History:  Past Surgical History:   Procedure Laterality Date    COLONOSCOPY N/A 2017    Procedure: COLONOSCOPY Split Prep;  Surgeon: Maykel Carcamo Jr., MD;  Location: [facility] ENDO;  Service: Endoscopy;  Laterality: N/A;    COLONOSCOPY N/A 2022    Procedure: COLONOSCOPY Golytely;  Surgeon: Henrry Nieves MD;  Location: [facility] ENDO;  Service: Endoscopy;  Laterality: N/A;  C    CORONARY ANGIOPLASTY WITH STENT PLACEMENT      ROTATOR CUFF REPAIR      TRANSFORAMINAL EPIDURAL INJECTION OF STEROID Left 06/15/2021    Procedure: Injection,steroid,epidural,transforaminal approach-- Left- L3-4, L4-5;  Surgeon: Enrike Medellin Jr., MD;  Location: [facility] PAIN MGT;  Service: Pain Management;  Laterality: Left;    VASECTOMY         Family History:  Family History   Problem Relation Age of Onset    Heart disease Mother     Arthritis Mother     Parkinsonism Mother     Congenital heart disease Father     No Known Problems Sister     No Known Problems Brother     No Known Problems Daughter     No Known Problems Sister     No Known Problems Sister     Prostate cancer Neg Hx     Kidney disease Neg Hx        Social History:  Social History     Socioeconomic History    Marital status:    Tobacco Use    Smoking status: Former     Current packs/day: 0.00     Types: Cigarettes     Quit date: 2016     Years since quittin.5    Smokeless tobacco: Never   Substance and Sexual Activity    Alcohol use: Yes     Comment: rarely on holidays    Drug use: No    Sexual activity: Yes     Social Determinants of Health     Financial Resource Strain: Low Risk   "(5/9/2023)    Overall Financial Resource Strain (CARDIA)     Difficulty of Paying Living Expenses: Not hard at all   Food Insecurity: No Food Insecurity (5/9/2023)    Hunger Vital Sign     Worried About Running Out of Food in the Last Year: Never true     Ran Out of Food in the Last Year: Never true   Transportation Needs: No Transportation Needs (5/9/2023)    PRAPARE - Transportation     Lack of Transportation (Medical): No     Lack of Transportation (Non-Medical): No   Physical Activity: Insufficiently Active (5/9/2023)    Exercise Vital Sign     Days of Exercise per Week: 2 days     Minutes of Exercise per Session: 20 min   Stress: No Stress Concern Present (5/9/2023)    [place] [employer] - Occupational Stress Questionnaire     Feeling of Stress : Only a little   Social Connections: Unknown (5/9/2023)    Social Connection and Isolation Panel [NHANES]     Frequency of Communication with Friends and Family: More than three times a week     Frequency of Social Gatherings with Friends and Family: Once a week     Active Member of Clubs or Organizations: Yes     Attends Club or Organization Meetings: 1 to 4 times per year     Marital Status:    Housing Stability: Low Risk  (5/9/2023)    Housing Stability Vital Sign     Unable to Pay for Housing in the Last Year: No     Number of Places Lived in the Last Year: 1     Unstable Housing in the Last Year: No       OBJECTIVE:    /82   Pulse 79   Ht 5' 8" (1.727 m)   Wt 95.5 kg (210 lb 10.4 oz)   BMI 32.03 kg/m²     PHYSICAL EXAMINATION:    General appearance: Well appearing, in no acute distress, alert and oriented x3.  Psych:  Mood and affect appropriate.  Skin: Skin color, texture, turgor normal, no rashes or lesions, in both upper and lower body.  Head/face:  Atraumatic, normocephalic. No palpable lymph nodes  Neck: No pain to palpation over the cervical paraspinous muscles. Spurling Negative. No pain with neck flexion, extension, or " lateral flexion. .  Cor: RRR  Pulm: CTA  GI: Abdomen soft and non-tender.  Back: Straight leg raising in the sitting and supine positions is positive for radicular pain. mild pain to palpation over the spine or costovertebral angles. Normal range of motion without pain reproduction.  Extremities: Peripheral joint ROM is full and pain free without obvious instability or laxity in all four extremities. No deformities, edema, or skin discoloration. Good capillary refill.  Musculoskeletal: Bilateral lower extremity strength is normal and symmetric.  No atrophy or tone abnormalities are noted.  Neuro: Bilateral lower extremity coordination and muscle stretch reflexes are physiologic and symmetric.  Plantar response are downgoing. No loss of sensation is noted.  Gait: Normal.    ASSESSMENT: 68 y.o. year old male with low back pain and leg numbness, consistent with the following     1. Lumbar radiculopathy, chronic  MRI Lumbar Spine Without Contrast    Procedure Request Order for Pain Management    CANCELED: Procedure Request Order for Pain Management      2. Neuroforaminal stenosis of lumbar spine  MRI Lumbar Spine Without Contrast    Procedure Request Order for Pain Management    CANCELED: Procedure Request Order for Pain Management      3. Chronic pain syndrome  MRI Lumbar Spine Without Contrast    Procedure Request Order for Pain Management    CANCELED: Procedure Request Order for Pain Management              PLAN:     - I have stressed the importance of physical activity and a home exercise plan to help with pain and improve health.  - Patient can continue with medications for now since they are providing benefits, using them appropriately, and without side effects.( Lyrica 150 BID)   - scheduled for a lumbar MRI to rule out progressive pathology (will call patient with results of lumbar MRI prior to injection)   -scheduled for a  Left L3/4 and L4/5 TFESI scheduled today due to waiting list.   - RTC 2-3 weeks after  injection  - Counseled patient regarding the importance of activity modification, constant sleeping habits, and physical therapy.    The above plan and management options were discussed at length with patient. Patient is in agreement with the above and verbalized understanding.    ABDIRASHID Blankenship  Interventional Pain Management      02/06/2024

## 2024-02-06 NOTE — H&P (VIEW-ONLY)
Chronic patient Established Note (Follow up visit)      SUBJECTIVE:  Interval history 02/06/2024-  Pleasant 68-year-old male that presents today with his wife, he is complaining of returning left leg numbness onset 2-3 weeks, numbness presents in the left leg down to the ankle, patient acknowledges intermittent weakness his wife commented on him losing his balance at times.  He is currently taking Lyrica per his PCP/Dr. Ayala which is helping current prescription consists of Lyrica 150 mg b.i.d. however patient acknowledges sometimes he only takes 1 pill because it causes somnolence.  Discussed that he would take a pill right after  dinner and then 1 before bedtime patient verbalized understanding.  Discussed I will update his lumbar MRI as his previous lumbar MRI was in 2020 to rule out progressive pathology I would like to also schedule him for the epidural due to the time lapse in scheduling discussed I will call with lumbar MRI results once I receive the images.  Patient denies any bowel or bladder dysfunction, denies any saddle anesthesia denies any profound weakness.    Interval History   01/12/20238913-26-uswr-old male presents today for follow-up appoint he was referred to physical therapy per Dr. Lin on his last clinic visit he reports having attended x2.  He does report relief his pain score today is 2/10.  Riley also optimize his Lyrica dose he states he received the prescription just recently and will begin this new prescription of Lyrica 150 mg b.i.d..        12/15/2022:    Patient presents for follow up of his chronic low back pain and left leg numbness.  Patient had an L3/4 and L4/5 TFESI on June 2021 and he reports 85% relief for over a year.  He states the pain starting coming back 4 months after the procedure.  He reports his current pain is 2/10, however his leg numbness is severe.  He is requesting a referral to Physical Therapy as he thinks the therapy was the most helpful in the past.   "Patient denies fever/weight loss/incontinence/progressive weakness.     9/20/21- Mr. Osorio presents via tele med visit he has a hx of clbp with bilateral anterior/lateral thigh numbness, he denies and "pain" he is reporting mild improvement with his increased lyrica dose (75 mg BID) he denies any adverse SEs, he endorses returning to PT. He denies any LBP at this point. Denies an B/B dysfunction, denies any profound weakness or inciting incident/trauma since our last CV.      8/5/2021- Mr. Osorio present virtually for a f/u appt to discuss effectiveness of Lyrica and overall pain level following injection in June. He continues to report bilateral anterior/lateral thigh numbness, he denies and "pain" switched him to Lyrica from Gabapentin no adverse SEs reported, patient reports Lyrica is helping.      7/6/21 - Mr. Osorio returns to clinic for follow up visit reporting improved low back pain.  Patient is s/p  - Left L3-4 and Left L4-5 TFESI on 6/15/21 with 85% continued relief.  Pain intensity is currently 6/10.   He reports continuing with PT x2 per week that has been effective.      HPI:   Masoud Osorio is a 66 y.o. male with CLBP for many years and a more recent Hx of L1 compression fracture last year and newer onset shooting pains traveling into the lateral/anterior thigh and down the front of the leg.  Radicular symptoms started spontaneously one night and he woke up with a numb, tingling left leg.  This was not at the same time as the compression fracture, which was caused by falling off of a ladder.  He has been in PT and is taking gabapentin both of which help, but he has been unable to take larger or more frequent doses of gabapentin due to sedation. + Grocery cart sign.     Location: low back   Onset: about a year  Current Pain Score: 4/10  Daily Pain of Range: 5-8/10  Quality: Aching, Throbbing and Numb  Radiation: down left leg  Worsened by: lifting and standing for more than 20 minutes  Improved " by: heat, medications and physical therapy     Previous Therapies:  PT/OT: Yes, current.  Helped but pain still persists.  HEP: Yes, daily.  Interventions:    06/15/2021 Lumbar Transforaminal Epidural Steroid Injection, Two Levels    - Left L3-4- Left L4-5                                                                Surgery: Denies back surgery  Medications:   - NSAIDS:   - MSK Relaxants:   - TCAs:   - SNRIs:   - Topicals:   - Anticonvulsants:   - Opioids:      Current Pain Medications:  Lyrica 75 mg BID daily    Pain Disability Index Review:      2/6/2024    10:15 AM 1/12/2023     1:19 PM 12/15/2022     2:18 PM   Last 3 PDI Scores   Pain Disability Index (PDI) 16 19 21       Pain Medications:  Lyrica 75mg BID    Opioid Contract: not applicable     report:  Not applicable    Pain Procedures:     June 2021: Left L3/4 and L4/5 TFESI - 85% relief for 4 months    Physical Therapy/Home Exercise: yes    Imaging:     EXAMINATION:  MRI LUMBAR SPINE WITHOUT CONTRAST     CLINICAL HISTORY:  acute left leg parethesias; Wedge compression fracture of first lumbar vertebra, subsequent encounter for fracture with routine healing     TECHNIQUE:  Multiplanar, multisequence MR images were acquired from the thoracolumbar junction to the sacrum without contrast.     COMPARISON:  Thoracolumbar radiograph 02/26/2020     FINDINGS:  Alignment: Mild levo scoliotic curvature.     Vertebrae: Chronic L1 compression fracture with roughly 75% loss of vertebral body height centrally.  There is posterior fracture retropulsion into the anterior epidural space.  Remaining lumbar vertebral body heights appear maintained.  There are multilevel endplate degenerative changes with edema only sparing the L3-L4 level.  No evidence of acute lumbar spine fracture.     Discs: Disc height loss and desiccation throughout the lumbar spine, but most advanced at L4-L5 and L5-S1.     Cord: Abnormal increased cord signal within the distal thoracic cord/conus.   Conus terminates at L1.     Degenerative findings:     T12-L1: Chronic compression fracture.  Posterosuperior fracture retropulsion into the anterior epidural space.  Moderate spinal canal stenosis.  Mild right-sided neural foraminal stenosis.     L1-L2: Mild asymmetric right broad-based disc bulge, facet arthropathy and ligamentum flavum hypertrophy.  Mild spinal canal stenosis and moderate right-sided neural foraminal stenosis.     L2-L3: Broad-based disc bulge, facet arthropathy and ligamentum flavum hypertrophy result in moderate to severe spinal canal stenosis, mild-to-moderate right and mild left-sided neural foraminal stenosis.     L3-L4: Broad-based disc bulge, facet arthropathy and ligamentum flavum hypertrophy results in moderate spinal canal stenosis, moderate right and mild left-sided neural foraminal stenosis.     L4-L5: Broad-based disc bulge, significant facet arthropathy and ligamentum flavum hypertrophy results and moderate to severe spinal canal stenosis, mild-to-moderate right and moderate to severe left-sided neural foraminal stenosis.     L5-S1: Mild broad-based disc bulge and mild bilateral facet arthropathy.  Moderate right and severe left-sided neural foraminal stenosis.  No significant spinal canal stenosis.     Paraspinal muscles & soft tissues: Unremarkable.     Impression:     1. Chronic L1 compression fracture with roughly 75% loss of central vertebral body height.  Posterosuperior fracture retropulsion resulting in moderate spinal canal stenosis.  Abnormal increased signal within the distal cord and conus concerning for myelomalacia or edema.  2. Multilevel degenerative changes of the lumbar spine are most significant at L4-L5 with moderate to severe spinal canal stenosis, mild-to-moderate right and moderate to severe left-sided neural foraminal stenosis.  3. Moderate to severe L2-L3 spinal canal stenosis with mild-to-moderate right and mild left-sided neural foraminal stenosis.  4.  Moderate L3-L4 spinal canal stenosis with moderate right and mild left-sided neural foraminal stenosis.  5. Additional multilevel degenerative changes as detailed in the body of the report  This report was flagged in Epic as abnormal.        Electronically signed by: Wilner Gordon  Date:                                            12/19/2020  Time:                                           10:29    Allergies:   Review of patient's allergies indicates:   Allergen Reactions    Penicillins Hives       Current Medications:   Current Outpatient Medications   Medication Sig Dispense Refill    aspirin (ECOTRIN) 81 MG EC tablet Take 1 tablet (81 mg total) by mouth once daily. 90 tablet 4    atorvastatin (LIPITOR) 80 MG tablet Take 1 tablet (80 mg total) by mouth once daily. 90 tablet 3    azelastine (ASTELIN) 137 mcg (0.1 %) nasal spray Spray 2 sprays in each nostril twice daily 30 mL 5    ciprofloxacin HCl (CIPRO) 500 MG tablet Take 1 tablet by mouth twice daily 14 tablet 0    diazePAM (VALIUM) 5 MG tablet Take 0.5 tablet by mouth every 8 to 12 hours as needed. 24 tablet 0    diazePAM (VALIUM) 5 MG tablet Take 1/2 -1 tablet by mouth every 8 hours. 30 tablet 0    GARLIC EXTRACT ORAL Take by mouth.      hydrOXYzine HCL (ATARAX) 25 MG tablet Take 0.5 to 1 tablet by mouth at bedtime 30 tablet 3    hydrOXYzine HCL (ATARAX) 25 MG tablet Take 1/2 to 1 tablet by mouth at bedtime. 30 tablet 3    ipratropium (ATROVENT) 0.06 % nasal spray INSTILL 2 SPRAYS IN EACH NOSTRIL EVERY 12 HOURS  5    ipratropium (ATROVENT) 21 mcg (0.03 %) nasal spray       multivitamin capsule Take 1 capsule by mouth once daily.      nitroGLYCERIN (NITROSTAT) 0.4 MG SL tablet Place 1 tablet (0.4 mg total) under the tongue every 5 (five) minutes as needed for Chest pain. 25 tablet 11    predniSONE (DELTASONE) 20 MG tablet Take 2 tablets by mouth on day 1, THEN 1 ½ tablets on days 2 and 3, THEN 1 tablet on days 4 and 5, THEN ½ tablet on day 6 8 tablet 0     pregabalin (LYRICA) 150 MG capsule Take 1 capsule (150 mg total) by mouth 2 (two) times daily. 60 capsule 5    sildenafil (REVATIO) 20 mg Tab Take 1 tablet (20 mg total) by mouth once daily. 30 tablet 11    tamsulosin (FLOMAX) 0.4 mg Cap Take 2 capsules (0.8 mg total) by mouth once daily. 180 capsule 3    triamcinolone (NASACORT) 55 mcg nasal inhaler Instill two sprays in each nostril once daily 16.9 mL 5    valsartan (DIOVAN) 40 MG tablet Take 1 tablet (40 mg total) by mouth once daily. 90 tablet 3     No current facility-administered medications for this visit.       REVIEW OF SYSTEMS:    GENERAL:  No weight loss, malaise or fevers.  HEENT:  Negative for frequent or significant headaches.  NECK:  Negative for lumps, goiter, pain and significant neck swelling.  RESPIRATORY:  Negative for cough, wheezing or shortness of breath.  CARDIOVASCULAR:  Negative for chest pain, leg swelling or palpitations.  GI:  Negative for abdominal discomfort, blood in stools or black stools or change in bowel habits.  MUSCULOSKELETAL:  See HPI.  SKIN:  Negative for lesions, rash, and itching.  PSYCH:  +ve for sleep disturbance, mood disorder and recent psychosocial stressors.  HEMATOLOGY/LYMPHOLOGY:  Negative for prolonged bleeding, bruising easily or swollen nodes.  NEURO:   No history of headaches, syncope, paralysis, seizures or tremors.  All other reviewed and negative other than HPI.    Past Medical History:  Past Medical History:   Diagnosis Date    Allergy     Anticoagulant long-term use     Anxiety and depression 09/21/2016    trial of ctialopram and follow up in 8 weeks    Arthritis     Benign prostatic hyperplasia (BPH) with straining on urination 12/05/2018    Coronary artery disease involving native coronary artery of native heart without angina pectoris 07/26/2016 7/26/2016 NSTEMI at [facility]   PCI of RCA with 4.0 x 15, 4.0 x 38, and 3.5 x 38 mm Resolute JACOB   PCI of PDA 2.75 x 15 mm JACOB dilated to 3.0 POBA of  proximal PLB 2.0 x 12 mm balloon   Normal EF with LVEDP 15 mmHg DAPT score of 2               Elevated PSA     Gout     History of coronary artery stent placement 2016    History of heart attack 2016    NSTEMI    Mixed hyperlipidemia 2017    Overweight (BMI 25.0-29.9) 2016    Tobacco dependence in remission 2016       Past Surgical History:  Past Surgical History:   Procedure Laterality Date    COLONOSCOPY N/A 2017    Procedure: COLONOSCOPY Split Prep;  Surgeon: Maykel Carcamo Jr., MD;  Location: [facility] ENDO;  Service: Endoscopy;  Laterality: N/A;    COLONOSCOPY N/A 2022    Procedure: COLONOSCOPY Golytely;  Surgeon: Henrry Nieves MD;  Location: [facility] ENDO;  Service: Endoscopy;  Laterality: N/A;  C    CORONARY ANGIOPLASTY WITH STENT PLACEMENT      ROTATOR CUFF REPAIR      TRANSFORAMINAL EPIDURAL INJECTION OF STEROID Left 06/15/2021    Procedure: Injection,steroid,epidural,transforaminal approach-- Left- L3-4, L4-5;  Surgeon: Enrike Medellin Jr., MD;  Location: [facility] PAIN MGT;  Service: Pain Management;  Laterality: Left;    VASECTOMY         Family History:  Family History   Problem Relation Age of Onset    Heart disease Mother     Arthritis Mother     Parkinsonism Mother     Congenital heart disease Father     No Known Problems Sister     No Known Problems Brother     No Known Problems Daughter     No Known Problems Sister     No Known Problems Sister     Prostate cancer Neg Hx     Kidney disease Neg Hx        Social History:  Social History     Socioeconomic History    Marital status:    Tobacco Use    Smoking status: Former     Current packs/day: 0.00     Types: Cigarettes     Quit date: 2016     Years since quittin.5    Smokeless tobacco: Never   Substance and Sexual Activity    Alcohol use: Yes     Comment: rarely on holidays    Drug use: No    Sexual activity: Yes     Social Determinants of Health     Financial Resource Strain: Low Risk   "(5/9/2023)    Overall Financial Resource Strain (CARDIA)     Difficulty of Paying Living Expenses: Not hard at all   Food Insecurity: No Food Insecurity (5/9/2023)    Hunger Vital Sign     Worried About Running Out of Food in the Last Year: Never true     Ran Out of Food in the Last Year: Never true   Transportation Needs: No Transportation Needs (5/9/2023)    PRAPARE - Transportation     Lack of Transportation (Medical): No     Lack of Transportation (Non-Medical): No   Physical Activity: Insufficiently Active (5/9/2023)    Exercise Vital Sign     Days of Exercise per Week: 2 days     Minutes of Exercise per Session: 20 min   Stress: No Stress Concern Present (5/9/2023)    [employer] - Occupational Stress Questionnaire     Feeling of Stress : Only a little   Social Connections: Unknown (5/9/2023)    Social Connection and Isolation Panel [NHANES]     Frequency of Communication with Friends and Family: More than three times a week     Frequency of Social Gatherings with Friends and Family: Once a week     Active Member of Clubs or Organizations: Yes     Attends Club or Organization Meetings: 1 to 4 times per year     Marital Status:    Housing Stability: Low Risk  (5/9/2023)    Housing Stability Vital Sign     Unable to Pay for Housing in the Last Year: No     Number of Places Lived in the Last Year: 1     Unstable Housing in the Last Year: No       OBJECTIVE:    /82   Pulse 79   Ht 5' 8" (1.727 m)   Wt 95.5 kg (210 lb 10.4 oz)   BMI 32.03 kg/m²     PHYSICAL EXAMINATION:    General appearance: Well appearing, in no acute distress, alert and oriented x3.  Psych:  Mood and affect appropriate.  Skin: Skin color, texture, turgor normal, no rashes or lesions, in both upper and lower body.  Head/face:  Atraumatic, normocephalic. No palpable lymph nodes  Neck: No pain to palpation over the cervical paraspinous muscles. Spurling Negative. No pain with neck flexion, extension, or " lateral flexion. .  Cor: RRR  Pulm: CTA  GI: Abdomen soft and non-tender.  Back: Straight leg raising in the sitting and supine positions is positive for radicular pain. mild pain to palpation over the spine or costovertebral angles. Normal range of motion without pain reproduction.  Extremities: Peripheral joint ROM is full and pain free without obvious instability or laxity in all four extremities. No deformities, edema, or skin discoloration. Good capillary refill.  Musculoskeletal: Bilateral lower extremity strength is normal and symmetric.  No atrophy or tone abnormalities are noted.  Neuro: Bilateral lower extremity coordination and muscle stretch reflexes are physiologic and symmetric.  Plantar response are downgoing. No loss of sensation is noted.  Gait: Normal.    ASSESSMENT: 68 y.o. year old male with low back pain and leg numbness, consistent with the following     1. Lumbar radiculopathy, chronic  MRI Lumbar Spine Without Contrast    Procedure Request Order for Pain Management    CANCELED: Procedure Request Order for Pain Management      2. Neuroforaminal stenosis of lumbar spine  MRI Lumbar Spine Without Contrast    Procedure Request Order for Pain Management    CANCELED: Procedure Request Order for Pain Management      3. Chronic pain syndrome  MRI Lumbar Spine Without Contrast    Procedure Request Order for Pain Management    CANCELED: Procedure Request Order for Pain Management              PLAN:     - I have stressed the importance of physical activity and a home exercise plan to help with pain and improve health.  - Patient can continue with medications for now since they are providing benefits, using them appropriately, and without side effects.( Lyrica 150 BID)   - scheduled for a lumbar MRI to rule out progressive pathology (will call patient with results of lumbar MRI prior to injection)   -scheduled for a  Left L3/4 and L4/5 TFESI scheduled today due to waiting list.   - RTC 2-3 weeks after  injection  - Counseled patient regarding the importance of activity modification, constant sleeping habits, and physical therapy.    The above plan and management options were discussed at length with patient. Patient is in agreement with the above and verbalized understanding.    ABDIRASHID Blankenship  Interventional Pain Management      02/06/2024

## 2024-02-07 ENCOUNTER — TELEPHONE (OUTPATIENT)
Dept: PAIN MEDICINE | Facility: CLINIC | Age: 69
End: 2024-02-07
Payer: MEDICARE

## 2024-02-07 ENCOUNTER — TELEPHONE (OUTPATIENT)
Dept: FAMILY MEDICINE | Facility: CLINIC | Age: 69
End: 2024-02-07
Payer: MEDICARE

## 2024-02-07 DIAGNOSIS — M54.16 LUMBAR RADICULOPATHY, CHRONIC: Primary | ICD-10-CM

## 2024-02-07 NOTE — TELEPHONE ENCOUNTER
----- Message from TEO Barth sent at 2024 11:24 AM CST -----  Regarding: Order for KATIE RAMIREZ    Patient Name: KATIE RAMIREZ([id number])  Sex: Male  : 1955      PCP: JAQUELINE GILL    Center: [facility]     Types of orders made on 2024: Imaging, Procedure Request    Order Date:2024  Ordering User:YASIR BILL [id number]  Encounter Provider:Yasir Bill FNP [[id number]]  Authorizing Provider:   Yasir Edwards FNP [[id number]]  Supervising Provider:CHAVA MOORE [[id number]]  Type of Supervision:Collaborating Physician  Department:[facility] PAIN MANAGEMENT[[id number]]    Common Order Information  Procedure -> Transforaminal Injection (Specify level and laterality) Cmt: LEFT             L3-4 and L4-5    Pre-op Diagnosis -> Spinal stenosis at L4-L5 level       -> Lumbar radiculopathy       -> Chronic pain syndrome     Order Specific   Information  Order: Procedure Request Order for Pain Management [Custom: [id number]]  Order #:          [id number]: 1 FUTURE    Priority: Routine  Class: Clinic Performed    Future Order Information      Expires on:2025            Expected by:2024                   Associated Diagnoses      M54.16 Lumbar radiculopathy, chronic      M48.061 Neuroforaminal stenosis of lumbar spine      G8  9.4 Chronic pain syndrome      Physician -> Viktoria         Is patient on anti-coagulants? -> No         Facility Name: -> [place]         Follow-up: -> 2 weeks           Priority: Routine  Class: Clinic Performed    Future Order Information      Expires on:2025            Expected by:2024                   Associated Diagnoses      M54.16 Lumbar radiculopathy, chronic      M48.061 Neuroforaminal steno  sis of lumbar spine      G89.4 Chronic pain syndrome      Procedure -> Transforaminal Injection (Specify level and laterality) Cmt:                 LEFT L3-4 and L4-5        Physician -> Viktoria         Is patient on anti-coagulants? -> No         Pre-op  Diagnosis -> Spinal stenosis at L4-L5 level           -> Lumbar radiculopathy           -> Chronic pain syndrome         Facility Name: -> [place]         Follow-up: -> 2 weeks

## 2024-02-07 NOTE — TELEPHONE ENCOUNTER
----- Message from Cat Maria sent at 2/7/2024  2:27 PM CST -----  Regarding: Clearance request to stop ASA  Good afternoon,   We have scheduled Mr. Osorio for a Transforaminal CHANO on 2/26 and we are requesting clearance for him to stop he ASA 5 days prior to this injection     Thank you in advance   Cat

## 2024-02-07 NOTE — TELEPHONE ENCOUNTER
----- Message from Cat Maria sent at 2/7/2024  2:27 PM CST -----  Regarding: Clearance request to stop ASA  Good afternoon,   We have scheduled Mr. Osorio for a Transforaminal CHANO on 2/26 and we are requesting clearance for him to stop he ASA 5 days prior to this injection     Thank you in advance   Cat     
----- Message from Cat Maria sent at 2/7/2024  2:27 PM CST -----  Regarding: Clearance request to stop ASA  Good afternoon,   We have scheduled Mr. Osorio for a Transforaminal CHANO on 2/26 and we are requesting clearance for him to stop he ASA 5 days prior to this injection     Thank you in advance   Cat     
I have reviewed MrAllie Masoud GUZMAN Jo's chart and he is clear to stop his aspirin 5 days prior to upcoming injection procedure, ZURDO Ayala, JANELLE.    
To get better and follow your care plan as instructed.

## 2024-02-19 ENCOUNTER — TELEPHONE (OUTPATIENT)
Dept: PAIN MEDICINE | Facility: CLINIC | Age: 69
End: 2024-02-19
Payer: MEDICARE

## 2024-02-20 ENCOUNTER — HOSPITAL ENCOUNTER (OUTPATIENT)
Dept: RADIOLOGY | Facility: HOSPITAL | Age: 69
Discharge: HOME | End: 2024-02-20
Attending: NURSE PRACTITIONER
Payer: MEDICARE

## 2024-02-20 DIAGNOSIS — M54.16 LUMBAR RADICULOPATHY, CHRONIC: ICD-10-CM

## 2024-02-20 DIAGNOSIS — G89.4 CHRONIC PAIN SYNDROME: ICD-10-CM

## 2024-02-20 DIAGNOSIS — M48.061 NEUROFORAMINAL STENOSIS OF LUMBAR SPINE: ICD-10-CM

## 2024-02-20 PROCEDURE — 72148 MRI LUMBAR SPINE W/O DYE: CPT | Mod: 26,,, | Performed by: RADIOLOGY

## 2024-02-20 PROCEDURE — 72148 MRI LUMBAR SPINE W/O DYE: CPT | Mod: TC

## 2024-02-21 ENCOUNTER — TELEPHONE (OUTPATIENT)
Dept: PAIN MEDICINE | Facility: CLINIC | Age: 69
End: 2024-02-21
Payer: MEDICARE

## 2024-02-21 NOTE — TELEPHONE ENCOUNTER
Discussed recent lumbar MRI with patient explaining results in layman's terms patient is scheduled for an upcoming left TF CHANO to help with his current pain symptoms.  Patient verbalized  understanding.    Sushil Rea, NP-C  Interventional Pain Management

## 2024-02-23 NOTE — PRE-PROCEDURE INSTRUCTIONS
Patient reviewed on 2/26/2024.  Pt last does of ASA 2/24, providers office made aware. Waiting for Okay to proceed at [facility]. The following pre-procedure instructions and arrival time have been reviewed with patient via phone and sent to patient portal for review.  Patient verbalized an understanding.  Pt to be accompanied by wife-Josefina day of procedure as responsible .     Dear Masoud ,     You are scheduled for a procedure with Dr. Blum on 2/26/2024.  Your scheduled arrival time is 8:00 am.  This arrival time is roughly 1 hour before your anticipated procedure time to allow sufficient time for pre-op..  Please wear comfortable clothes.  Most patients do not need to change into a gown.  Please do not wear a dress.  This procedure will take place at the [facility] at the corner of [facility] and [facility].  It is in the [facility] [facility] next to [facility].  The address is:     [address] [place] [address].  [place] [address]     After entering the building, you will proceed to the second floor where you can check in with registration. You should take any medications that you routinely take for blood pressure, heart medications, thyroid, cholesterol, etc.      The fasting restrictions are dependent on whether or not you are receiving sedation.  Sedation is not available for all procedures.      Your fasting instructions are as follow:  IV sedation. You should not eat for 8 hours and can only drink clear liquids (water or black coffee without cream/sugar) up until 2 hours before your scheduled time.  You CANNOT drive yourself and must have a .     If you are on blood thinners, you need to follow the anticoagulation instructions that had been discussed previously.  You should only stop the blood thinners if it was approved by your primary care physician or your cardiologist.  In the event that you are not able to stop your blood thinners, a blood thinner was not listed on  your medication list, or we were not able to get clearance from your cardiologist, then the procedure may have to be postponed/canceled.      IF you were told to stop your blood thinners, this is how long you should generally hold some of the more common ones.  Remember that stopping blood thinners is only necessary for certain procedures. If you are unsure of your instructions, please call us.   Aspirin - 5 days  Plavix/Clopidogrel - 7 days  Warfarin / Coumadin - 5 days  Eliquis - 3 days  Pradaxa/Dabigatran - 4 days  Xarelto/Rivaroxaban - 3 days     If you are a diabetic, do not take your medication if you will be fasting, but bring it with you. Please plan on being here for roughly 2 hours.     Please call us if you have been sick (running fever, having any flu-like symptoms) or have been taking antibiotics in the past 2 weeks or had any outpatient procedures other than with us (colonoscopy, endoscopy, OBGYN, dental, etc.). If you have been previously COVID positive, you will need to hold off on your procedure until you are symptom free for 10 days. If you did not have any symptoms, you can have your procedure 10 days from your positive test result.       *HOLD ALL VITAMINS, MINERALS, HERBS (INCLUDING HERBAL TEAS) AND SUPPLEMENTS  *SHOWER WITH ANTIBACTERIAL SOAP (EX. DIAL) NIGHT BEFORE AND MORNING OF PROCEDURE  *DO NOT APPLY ANY LOTIONS, OILS, POWDERS, PERFUME/COLOGNE, OINTMENTS, GELS, CREAMS, MAKEUP OR DEODORANT TO YOUR SKIN MORNING OF PROCEDURE  *LEAVE JEWELRY AND ANY VALUABLES AT HOME  *WEAR LOOSE COMFORTABLE CLOTHING (PREFERABLY A BUTTON UP SHIRT)     Please reply to this message as receipt of delivery.     Thank you,  [facility] &  Melissa, LPN [facility]  Pre-Admit

## 2024-02-26 ENCOUNTER — HOSPITAL ENCOUNTER (OUTPATIENT)
Facility: HOSPITAL | Age: 69
Discharge: HOME | End: 2024-02-26
Attending: STUDENT IN AN ORGANIZED HEALTH CARE EDUCATION/TRAINING PROGRAM | Admitting: STUDENT IN AN ORGANIZED HEALTH CARE EDUCATION/TRAINING PROGRAM
Payer: MEDICARE

## 2024-02-26 VITALS
HEART RATE: 65 BPM | SYSTOLIC BLOOD PRESSURE: 112 MMHG | DIASTOLIC BLOOD PRESSURE: 63 MMHG | TEMPERATURE: 98 F | OXYGEN SATURATION: 99 % | RESPIRATION RATE: 16 BRPM

## 2024-02-26 DIAGNOSIS — G89.29 CHRONIC PAIN: ICD-10-CM

## 2024-02-26 DIAGNOSIS — M54.16 LUMBAR RADICULOPATHY: Primary | ICD-10-CM

## 2024-02-26 DIAGNOSIS — M51.36 DDD (DEGENERATIVE DISC DISEASE), LUMBAR: ICD-10-CM

## 2024-02-26 PROCEDURE — 25000003 PHARM REV CODE 250: Performed by: STUDENT IN AN ORGANIZED HEALTH CARE EDUCATION/TRAINING PROGRAM

## 2024-02-26 PROCEDURE — 64483 NJX AA&/STRD TFRM EPI L/S 1: CPT | Mod: LT | Performed by: STUDENT IN AN ORGANIZED HEALTH CARE EDUCATION/TRAINING PROGRAM

## 2024-02-26 PROCEDURE — 64483 NJX AA&/STRD TFRM EPI L/S 1: CPT | Mod: LT,,, | Performed by: STUDENT IN AN ORGANIZED HEALTH CARE EDUCATION/TRAINING PROGRAM

## 2024-02-26 PROCEDURE — 63600175 PHARM REV CODE 636 W HCPCS: Performed by: STUDENT IN AN ORGANIZED HEALTH CARE EDUCATION/TRAINING PROGRAM

## 2024-02-26 PROCEDURE — 25500020 PHARM REV CODE 255: Performed by: STUDENT IN AN ORGANIZED HEALTH CARE EDUCATION/TRAINING PROGRAM

## 2024-02-26 RX ORDER — MIDAZOLAM HYDROCHLORIDE 1 MG/ML
INJECTION INTRAMUSCULAR; INTRAVENOUS
Status: DISCONTINUED | OUTPATIENT
Start: 2024-02-26 | End: 2024-02-26 | Stop reason: HOSPADM

## 2024-02-26 RX ORDER — FENTANYL CITRATE 50 UG/ML
INJECTION, SOLUTION INTRAMUSCULAR; INTRAVENOUS
Status: DISCONTINUED | OUTPATIENT
Start: 2024-02-26 | End: 2024-02-26 | Stop reason: HOSPADM

## 2024-02-26 RX ORDER — LIDOCAINE HYDROCHLORIDE 20 MG/ML
INJECTION, SOLUTION EPIDURAL; INFILTRATION; INTRACAUDAL; PERINEURAL
Status: DISCONTINUED | OUTPATIENT
Start: 2024-02-26 | End: 2024-02-26 | Stop reason: HOSPADM

## 2024-02-26 RX ORDER — LIDOCAINE HYDROCHLORIDE 10 MG/ML
INJECTION INFILTRATION; PERINEURAL
Status: DISCONTINUED | OUTPATIENT
Start: 2024-02-26 | End: 2024-02-26 | Stop reason: HOSPADM

## 2024-02-26 RX ORDER — DEXAMETHASONE SODIUM PHOSPHATE 10 MG/ML
INJECTION INTRAMUSCULAR; INTRAVENOUS
Status: DISCONTINUED | OUTPATIENT
Start: 2024-02-26 | End: 2024-02-26 | Stop reason: HOSPADM

## 2024-02-26 RX ORDER — SODIUM CHLORIDE 9 MG/ML
INJECTION, SOLUTION INTRAVENOUS CONTINUOUS
Status: DISCONTINUED | OUTPATIENT
Start: 2024-02-26 | End: 2024-02-26 | Stop reason: HOSPADM

## 2024-02-26 NOTE — DISCHARGE SUMMARY
Discharge Note  Short Stay      SUMMARY     Admit Date: 2/26/2024    Attending Physician: Rosita Blum      Discharge Physician: Rosita Blum      Discharge Date: 2/26/2024 9:18 AM    Procedure(s) (LRB):  LT L3-4 and L4-5 TFESI (Left)    Final Diagnosis: Lumbar radiculopathy, chronic [M54.16]    Disposition: Home or self care    Patient Instructions:   Current Discharge Medication List        CONTINUE these medications which have NOT CHANGED    Details   aspirin (ECOTRIN) 81 MG EC tablet Take 1 tablet (81 mg total) by mouth once daily.  Qty: 90 tablet, Refills: 4    Associated Diagnoses: Coronary artery disease involving native coronary artery of native heart without angina pectoris; History of coronary artery stent placement; History of heart attack      atorvastatin (LIPITOR) 80 MG tablet Take 1 tablet (80 mg total) by mouth once daily.  Qty: 90 tablet, Refills: 3    Associated Diagnoses: Coronary artery disease involving native coronary artery of native heart without angina pectoris      azelastine (ASTELIN) 137 mcg (0.1 %) nasal spray Spray 2 sprays in each nostril twice daily  Qty: 30 mL, Refills: 5    Comments: per mario 5/12      GARLIC EXTRACT ORAL Take by mouth.      !! hydrOXYzine HCL (ATARAX) 25 MG tablet Take 0.5 to 1 tablet by mouth at bedtime  Qty: 30 tablet, Refills: 3      multivitamin capsule Take 1 capsule by mouth once daily.      pregabalin (LYRICA) 150 MG capsule Take 1 capsule (150 mg total) by mouth 2 (two) times daily.  Qty: 60 capsule, Refills: 5    Associated Diagnoses: Neuroforaminal stenosis of lumbar spine; Osteoarthritis of spine with radiculopathy, lumbar region      sildenafil (REVATIO) 20 mg Tab Take 1 tablet (20 mg total) by mouth once daily.  Qty: 30 tablet, Refills: 11      tamsulosin (FLOMAX) 0.4 mg Cap Take 2 capsules (0.8 mg total) by mouth once daily.  Qty: 180 capsule, Refills: 3    Associated Diagnoses: BPH with obstruction/lower urinary tract symptoms       triamcinolone (NASACORT) 55 mcg nasal inhaler Instill two sprays in each nostril once daily  Qty: 16.9 mL, Refills: 5      valsartan (DIOVAN) 40 MG tablet Take 1 tablet (40 mg total) by mouth once daily.  Qty: 90 tablet, Refills: 3    Comments: .      ciprofloxacin HCl (CIPRO) 500 MG tablet Take 1 tablet by mouth twice daily  Qty: 14 tablet, Refills: 0      !! diazePAM (VALIUM) 5 MG tablet Take 0.5 tablet by mouth every 8 to 12 hours as needed.  Qty: 24 tablet, Refills: 0      !! diazePAM (VALIUM) 5 MG tablet Take 1/2 -1 tablet by mouth every 8 hours.  Qty: 30 tablet, Refills: 0      !! hydrOXYzine HCL (ATARAX) 25 MG tablet Take 1/2 to 1 tablet by mouth at bedtime.  Qty: 30 tablet, Refills: 3      ipratropium (ATROVENT) 0.06 % nasal spray INSTILL 2 SPRAYS IN EACH NOSTRIL EVERY 12 HOURS  Refills: 5      ipratropium (ATROVENT) 21 mcg (0.03 %) nasal spray       nitroGLYCERIN (NITROSTAT) 0.4 MG SL tablet Place 1 tablet (0.4 mg total) under the tongue every 5 (five) minutes as needed for Chest pain.  Qty: 25 tablet, Refills: 11    Associated Diagnoses: Coronary artery disease involving native coronary artery of native heart without angina pectoris      predniSONE (DELTASONE) 20 MG tablet Take 2 tablets by mouth on day 1, THEN 1 ½ tablets on days 2 and 3, THEN 1 tablet on days 4 and 5, THEN ½ tablet on day 6  Qty: 8 tablet, Refills: 0       !! - Potential duplicate medications found. Please discuss with provider.              Discharge Diagnosis: Lumbar radiculopathy, chronic [M54.16]  Condition on Discharge: Stable with no complications to procedure   Diet on Discharge: Same as before.  Activity: as per instruction sheet.  Discharge to: Home with a responsible adult.  Follow up: 2-4 weeks       Please call my office or pager at [phone] if experienced any weakness or loss of sensation, fever > 101.5, pain uncontrolled with oral medications, persistent nausea/vomiting/or diarrhea, redness or drainage from the  incisions, or any other worrisome concerns. If physician on call was not reached or could not communicate with our office for any reason please go to the nearest emergency department

## 2024-02-26 NOTE — PLAN OF CARE
Pt in preop bay 29, VSS and IV inserted. Pt denies any open wounds on body or the use of any immunizations or antibiotics in the past 2 weeks. Pt ready to roll.

## 2024-02-26 NOTE — OP NOTE
Lumbar Transforaminal Epidural Steroid Injection under Fluoroscopic Guidance    The procedure, risks, benefits, and options were discussed with the patient. There are no contraindications to the procedure. The patent expressed understanding and agreed to the procedure. Informed written consent was obtained prior to the start of the procedure and can be found in the patient's chart.    PATIENT NAME: Masoud Osorio   MRN: [id number]     DATE OF PROCEDURE: 02/26/2024    PROCEDURE:  Left  L3/4 and L4/5 Lumbar Transforaminal Epidural Steroid Injection under Fluoroscopic Guidance    PRE-OP DIAGNOSIS: Lumbar radiculopathy, chronic [M54.16] Lumbar radiculopathy [M54.16]    POST-OP DIAGNOSIS: Same    PHYSICIAN: Rosita Blum DO    ASSISTANTS: None     MEDICATIONS INJECTED: Preservative-free Decadron 10mg with 5cc of Lidocaine 1% MPF     LOCAL ANESTHETIC INJECTED: Xylocaine 2%     SEDATION: None    ESTIMATED BLOOD LOSS: None    COMPLICATIONS: None    TECHNIQUE: Time-out was performed to identify the patient and procedure to be performed. With the patient laying in a prone position, the surgical area was prepped and draped in the usual sterile fashion using ChloraPrep and a fenestrated drape.The levels were determined under fluoroscopy guidance. Skin anesthesia was achieved by injecting Lidocaine 2% over the injection sites. The transforaminal spaces were then approached with a 22 gauge, 3.5 inch spinal quinke needle that was introduced under fluoroscopic guidance in the AP and Lateral views. Once the needle tip was in the area of the transforaminal space, and there was no blood, CSF or paraesthesias, contrast dye Omnipaque (300mg/mL) was injected to confirm placement and there was no vascular runoff. Fluoroscopic imaging in the AP and lateral views revealed a clear outline of the spinal nerve with proximal spread of agent through the neural foramen into the epidural space. 3 mL of the medication mixture listed above was  injected slowly at each site. Displacement of the radio opaque contrast after injection of the medication confirmed that the medication went into the area of the transforaminal spaces. The needles were removed and bleeding was nil. A sterile dressing was applied. No specimens collected. The patient tolerated the procedure well.     The patient was monitored after the procedure in the recovery area. They were given post-procedure and discharge instructions to follow at home. The patient was discharged in a stable condition.        Rosita Blum DO

## 2024-02-26 NOTE — DISCHARGE INSTRUCTIONS
Home Care Instructions Pain Management:    1.  DIET:    You may resume your normal diet today.    2.  BATHING:    You may shower with luke warm water.    3.  DRESSING:    You may remove your bandage today.    4.  ACTIVITY LEVEL:      You may resume your normal activities 24 hours after your procedure.    5.  MEDICATIONS:    You may resume your normal medications today.    6.  SPECIAL INSTRUCTIONS:    No heat to the injection site for 24 hours including bath or shower, heating pad, moist heat or hot tubs.    Use an ice pack to the injection site for any pain or discomfort.  Apply ice packs for 20 minute intervals as needed.    If you have received any sedatives by mouth today, you can not drive for 12 hours.    If you have received sedation through an IV, you can not drive for 24 hours.    PLEASE CALL YOUR DOCTOR FOR THE FOLLOWIN.  Redness or swelling around the injection site.  2.  Fever of 101 degrees.  3.  Drainage (pus) from the injection site.  4.  For any continuous bleeding (some dried blood over the incision is normal.)    FOR EMERGENCIES:    If any unusual problems or difficulties occur during clinic hours, call [phone] or dial [phone].    Follow up with with your physician in 2-3 weeks.

## 2024-02-27 ENCOUNTER — PATIENT MESSAGE (OUTPATIENT)
Dept: FAMILY MEDICINE | Facility: CLINIC | Age: 69
End: 2024-02-27
Payer: MEDICARE

## 2024-02-27 ENCOUNTER — TELEPHONE (OUTPATIENT)
Dept: CARDIOLOGY | Facility: CLINIC | Age: 69
End: 2024-02-27
Payer: MEDICARE

## 2024-02-27 DIAGNOSIS — E78.2 MIXED HYPERLIPIDEMIA: Primary | ICD-10-CM

## 2024-02-27 NOTE — TELEPHONE ENCOUNTER
Orders are in for a fasting Lipid Panel .      Nw                              ----- Message from Cindy Maria sent at 2/27/2024  4:08 PM CST -----        Orders request  Who called: pt  Request of orders for: blood work  Reason for request: 3/4/24  Call back number:  [phone]   Pt said he wants to have this done at the [facility] in [place]

## 2024-02-28 ENCOUNTER — OFFICE VISIT (OUTPATIENT)
Dept: DERMATOLOGY | Facility: CLINIC | Age: 69
End: 2024-02-28
Payer: MEDICARE

## 2024-02-28 DIAGNOSIS — D48.5 NEOPLASM OF UNCERTAIN BEHAVIOR OF SKIN: ICD-10-CM

## 2024-02-28 DIAGNOSIS — D18.01 CHERRY ANGIOMA: ICD-10-CM

## 2024-02-28 DIAGNOSIS — L03.032 ACUTE PARONYCHIA OF TOE, LEFT: Primary | ICD-10-CM

## 2024-02-28 DIAGNOSIS — D22.9 MULTIPLE BENIGN NEVI: ICD-10-CM

## 2024-02-28 DIAGNOSIS — L81.4 LENTIGINES: ICD-10-CM

## 2024-02-28 DIAGNOSIS — L82.1 SK (SEBORRHEIC KERATOSIS): ICD-10-CM

## 2024-02-28 DIAGNOSIS — D23.9 DERMATOFIBROMA: ICD-10-CM

## 2024-02-28 DIAGNOSIS — L72.0 EIC (EPIDERMAL INCLUSION CYST): ICD-10-CM

## 2024-02-28 PROCEDURE — 88305 TISSUE EXAM BY PATHOLOGIST: CPT | Mod: 26,,, | Performed by: DERMATOLOGY

## 2024-02-28 PROCEDURE — 11102 TANGNTL BX SKIN SINGLE LES: CPT | Mod: S$GLB,,, | Performed by: DERMATOLOGY

## 2024-02-28 PROCEDURE — 99203 OFFICE O/P NEW LOW 30 MIN: CPT | Mod: 25,S$GLB,, | Performed by: DERMATOLOGY

## 2024-02-28 PROCEDURE — 99999 PR PBB SHADOW E&M-EST. PATIENT-LVL III: CPT | Mod: PBBFAC,,, | Performed by: DERMATOLOGY

## 2024-02-28 PROCEDURE — 1160F RVW MEDS BY RX/DR IN RCRD: CPT | Mod: CPTII,S$GLB,, | Performed by: DERMATOLOGY

## 2024-02-28 PROCEDURE — 3288F FALL RISK ASSESSMENT DOCD: CPT | Mod: CPTII,S$GLB,, | Performed by: DERMATOLOGY

## 2024-02-28 PROCEDURE — 4010F ACE/ARB THERAPY RXD/TAKEN: CPT | Mod: CPTII,S$GLB,, | Performed by: DERMATOLOGY

## 2024-02-28 PROCEDURE — 1101F PT FALLS ASSESS-DOCD LE1/YR: CPT | Mod: CPTII,S$GLB,, | Performed by: DERMATOLOGY

## 2024-02-28 PROCEDURE — 88305 TISSUE EXAM BY PATHOLOGIST: CPT | Performed by: DERMATOLOGY

## 2024-02-28 PROCEDURE — 1126F AMNT PAIN NOTED NONE PRSNT: CPT | Mod: CPTII,S$GLB,, | Performed by: DERMATOLOGY

## 2024-02-28 PROCEDURE — 1159F MED LIST DOCD IN RCRD: CPT | Mod: CPTII,S$GLB,, | Performed by: DERMATOLOGY

## 2024-02-28 RX ORDER — DOXYCYCLINE 100 MG/1
100 CAPSULE ORAL 2 TIMES DAILY
Qty: 14 CAPSULE | Refills: 0 | Status: ON HOLD | OUTPATIENT
Start: 2024-02-28 | End: 2024-03-19 | Stop reason: HOSPADM

## 2024-02-28 NOTE — PROGRESS NOTES
Subjective:      Patient ID:  Masoud Osorio is a 68 y.o. male who presents for   Chief Complaint   Patient presents with    Skin Check     TBSE      HPI  Patient here for Total Body Skin Exam.    yes - personal history of atypical moles removed  none - personal history of MM   none - family history of MM  yes - childhood blistering sunburns  none - tanning bed use  none - personal history of NMSC    Patient with specific complaint of lesion  Location: scalp, right side   Duration: noticed 1 month ago   Symptoms: flakes and crusts   Relieving factors/Previous treatments: none      Patient with new complaint of lesion(s)  Location: behind left ear   Duration: >5 yrs   Symptoms: yellow discoloration with multiple tiny bumps.   Relieving factors/Previous treatments: none      States he's had redness and tenderness of his L great toe which was previously treated with antibiotics with some improvement. No current purulent drainage - did note something that looked like pus coming out in the past.    Review of Systems   Skin:  Positive for activity-related sunscreen use and wears hat (most of the time). Negative for daily sunscreen use and recent sunburn.   Hematologic/Lymphatic: Does not bruise/bleed easily.       Objective:   Physical Exam   Constitutional: He appears well-developed and well-nourished. No distress.   Neurological: He is alert and oriented to person, place, and time. He is not disoriented.   Psychiatric: He has a normal mood and affect.   Skin:   Areas Examined (abnormalities noted in diagram):   Scalp / Hair Palpated and Inspected  Head / Face Inspection Performed  Neck Inspection Performed  Chest / Axilla Inspection Performed  Abdomen Inspection Performed  Genitals / Buttocks / Groin Inspection Performed  Back Inspection Performed  RUE Inspected  LUE Inspection Performed  RLE Inspected  LLE Inspection Performed  Nails and Digits Inspection Performed                           Diagram Legend      Erythematous scaling macule/papule c/w actinic keratosis       Vascular papule c/w angioma      Pigmented verrucoid papule/plaque c/w seborrheic keratosis      Yellow umbilicated papule c/w sebaceous hyperplasia      Irregularly shaped tan macule c/w lentigo     1-2 mm smooth white papules consistent with Milia      Movable subcutaneous cyst with punctum c/w epidermal inclusion cyst      Subcutaneous movable cyst c/w pilar cyst      Firm pink to brown papule c/w dermatofibroma      Pedunculated fleshy papule(s) c/w skin tag(s)      Evenly pigmented macule c/w junctional nevus     Mildly variegated pigmented, slightly irregular-bordered macule c/w mildly atypical nevus      Flesh colored to evenly pigmented papule c/w intradermal nevus       Pink pearly papule/plaque c/w basal cell carcinoma      Erythematous hyperkeratotic cursted plaque c/w SCC      Surgical scar with no sign of skin cancer recurrence      Open and closed comedones      Inflammatory papules and pustules      Verrucoid papule consistent consistent with wart     Erythematous eczematous patches and plaques     Dystrophic onycholytic nail with subungual debris c/w onychomycosis     Umbilicated papule    Erythematous-base heme-crusted tan verrucoid plaque consistent with inflamed seborrheic keratosis     Erythematous Silvery Scaling Plaque c/w Psoriasis     See annotation      Assessment / Plan:    Neoplasm of uncertain behavior of skin  Shave biopsy procedure note:    Shave biopsy performed after verbal consent including risk of infection, scar, recurrence, need for additional treatment of site. Area prepped with alcohol, anesthetized with approximately 1.0cc of 1% lidocaine with epinephrine. Lesional tissue shaved with razor blade. Hemostasis achieved with application of aluminum chloride followed by hyfrecation. No complications. Dressing applied. Wound care explained.    -     Specimen to Pathology, Dermatology  Pathology Orders:       Normal Orders  This Visit    Specimen to Pathology, Dermatology     Comments:    Number of Specimens:->1  ------------------------->-------------------------  Spec 1 Procedure:->Biopsy  Spec 1 Clinical Impression:->r/o inflamed SK vs pigmented BCC  vs MM vs other  Spec 1 Source:->R mid back    Questions:    Procedure Type: Dermatology and skin neoplasms    Number of Specimens: 1    ------------------------: -------------------------    Spec 1 Procedure: Biopsy    Spec 1 Clinical Impression: r/o inflamed SK vs pigmented BCC vs MM vs other    Spec 1 Source: R mid back    Release to patient:           Acute paronychia of toe, left  -     doxycycline (MONODOX) 100 MG capsule; Take 1 capsule (100 mg total) by mouth 2 (two) times daily. Take with food and water. Remain upright for 1 hour after taking.  Dispense: 14 capsule; Refill: 0  Discussed benefits and risks of doxycyline therapy including but not limited to GI discomfort, esophageal irritation/ulceration, and increased sun sensitivity. Patient was counseled to take medicine with meals and at least 1 hour before lying down.     If no improvement, rec: seeing podiatry for tx of ingrown toenail.    SK (seborrheic keratosis)  These are benign inherited growths without a malignant potential. Reassurance given to patient. No treatment is necessary.   Treatment of benign, asymptomatic lesions may be considered cosmetic.  Warned about risk of hypo- or hyperpigmentation with treatment and risk of recurrence.    Cherry angioma  This is a benign vascular lesion. Reassurance given. No treatment required. Treatment of benign, asymptomatic lesions may be considered cosmetic.    Dermatofibroma  Reassurance given to patient. No treatment is necessary.  This is benign scar tissue from previous minor trauma to the skin such as a bug bite.    Lentigines  These are benign sun spots which should be monitored for changes. Patient instructed in importance of daily broad spectrum sunscreen use with spf  at least 30. Sun avoidance and topical protection/protective clothing discussed.    EIC (epidermal inclusion cyst)  This is a benign cyst of the hair follicle. Reassurance provided. No treatment is necessary unless it is symptomatic.     Multiple benign nevi  Benign-appearing nevi present on exam today. Reassurance provided. Periodically examine moles and return to clinic if any moles change or become symptomatic (bleeding, itching, pain, etc).      Follow up in about 1 year (around 2/28/2025) for skin check or sooner pending biopsy results.

## 2024-02-28 NOTE — PATIENT INSTRUCTIONS
Sun Protection      The [facility] would like to remind you of the importance of sun protection all year round and particularly during the summer when the suns rays are the strongest. It has been proven that both acute and chronic sun exposure damages our cells and leads to skin cancer. Beyond skin cancer, the sun causes 90% of the symptoms of premature skin aging, including wrinkles, lentigines (brown spots), and thin, easily bruised skin. Proper sun protection can help prevent these unwanted conditions.    Many patients report that they dont go in the sun. It has been shown that the average person receives 18 hours of incidental sun exposure per week during activities such as walking through parking lots, driving, or sitting next to windows. This accumulates to several bad sunburns per year!    In choosing sunscreen, you want one that protects against both UVA and UVB rays (broad spectrum). It is recommended that you use one of SPF 30 or higher. It is important to apply the sunscreen about 20 minutes prior to sun exposure. Most sunscreens are chemical sunscreens and a reaction must take place in the skin so that they are effective. If they are applied and then you are immediately exposed to the sun or start sweating, this reaction has not had time to take place and you are therefore unprotected. Sunscreen needs to be reapplied every 2 hours if you are participating in water sports or sweating. We recommend Elta MD or CeraVe sunscreens for daily use; however there are many options and it is most important for you to find one that you will use on a consistent basis.    If you have sensitive skin, you may do best with a sunscreen that contains only physical blockers in the active ingredient section. The only physical blockers available in the [place] currently are titanium dioxide or zinc oxide. These are typically thicker and harder to apply, however they afford very good protection.  Neutrogena Sensitive Skin, Blue Lizard Sensitive Skin (pink top) or Neutrogena Pure and Free are popular ones.     Aside from sunscreen, clothes with UV protection (UPF), wide brimmed hats, and sunglasses are other means of sun protection that we recommend.      Based on a recent study (6/2021) and out of an abundance of caution, we are recommending that you AVOID the following sunscreens as they may contain the carcinogen, benzene:    Spray and gel sunscreens  Any [employer] or [employer] brands as well as Max Block and TopCare brands   Neutrogena Ultra Sheer Dry-touch Water Resistant Sunscreen LOTION SPF 70   Neutrogena Sheer Zinc Dry-touch Face Sunscreen LOTION SPF 50   5.   Aveeno Baby Continuous Protection Sensitive Skin Sunscreen LOTION - Broad Spectrum SPF 50    Please note that Benzene is not an ingredient or the degradation product of any ingredient in any sunscreen. This study suggested that the findings are a result of contamination in the manufacturing process. At this point, we don't know how effectively Benzene gets through the skin, if it gets absorbed systemically, and what effects it may have.     We do know that ultraviolet radiation is a well-established carcinogen. Please use daily sun protection/avoidance and use of at least SPF 30, broad-spectrum sunscreen not listed above.                       [facility] - DERMATOLOGY [address]  [place] [address]  Dept: [phone]  Dept Fax: [phone]                                                                                     Shave Biopsy Wound Care    Your doctor has performed a shave biopsy today.  A band aid and vaseline ointment has been placed over the site.  This should remain in place for NO LONGER THAN 48 hours.  It is fine to remove the bandaid after 24 hours, if the area is no longer bleeding. It is recommended that you keep the area dry (do not wet)) for the first 24 hours.  After 24 hours,  wash the area with warm soap and water and apply Vaseline jelly.  Many patients prefer to use Neosporin or Bacitracin ointment.  This is acceptable; however, know that you can develop an allergy to this medication even if you have used it safely for years.  It is important to keep the area moist.  Letting it dry out and get air slows healing time, and will worsen the scar.        If you notice increasing redness, tenderness, pain, or yellow drainage at the biopsy site, please notify your doctor.  These are signs of an infection.    If your biopsy site is bleeding, apply firm pressure for 15 minutes straight.  Repeat for another 15 minutes, if it is still bleeding.   If the surgical site continues to bleed, then please contact your doctor.      For [facility] users:   You will receive your biopsy results in [facility] as soon as they are available. Please be assured that your physician/provider will review your results and will then determine what further treatment, evaluation, or planning is required. You should be contacted by your physician's/provider's office within 5 business days of receiving your results; If not, please reach out to directly. This is one more way [facility] is putting you first.     [facility] [place] [address]/ [phone] FAX/ [email or web address]

## 2024-03-04 ENCOUNTER — OFFICE VISIT (OUTPATIENT)
Dept: CARDIOLOGY | Facility: CLINIC | Age: 69
End: 2024-03-04
Payer: MEDICARE

## 2024-03-04 VITALS
HEART RATE: 72 BPM | HEIGHT: 68 IN | SYSTOLIC BLOOD PRESSURE: 136 MMHG | DIASTOLIC BLOOD PRESSURE: 83 MMHG | OXYGEN SATURATION: 97 % | BODY MASS INDEX: 31.98 KG/M2 | WEIGHT: 211 LBS

## 2024-03-04 DIAGNOSIS — I44.0 1ST DEGREE AV BLOCK: ICD-10-CM

## 2024-03-04 DIAGNOSIS — I50.22 HEART FAILURE WITH MID-RANGE EJECTION FRACTION (HFMEF): ICD-10-CM

## 2024-03-04 DIAGNOSIS — I42.8 OTHER CARDIOMYOPATHIES: ICD-10-CM

## 2024-03-04 DIAGNOSIS — R94.39 ABNORMAL CARDIOVASCULAR STRESS TEST: Primary | ICD-10-CM

## 2024-03-04 DIAGNOSIS — E78.2 MIXED HYPERLIPIDEMIA: ICD-10-CM

## 2024-03-04 DIAGNOSIS — Z95.5 HISTORY OF CORONARY ARTERY STENT PLACEMENT: ICD-10-CM

## 2024-03-04 DIAGNOSIS — I25.10 CORONARY ARTERY DISEASE INVOLVING NATIVE CORONARY ARTERY OF NATIVE HEART WITHOUT ANGINA PECTORIS: ICD-10-CM

## 2024-03-04 LAB
FINAL PATHOLOGIC DIAGNOSIS: NORMAL
GROSS: NORMAL
Lab: NORMAL
MICROSCOPIC EXAM: NORMAL

## 2024-03-04 PROCEDURE — 99214 OFFICE O/P EST MOD 30 MIN: CPT | Mod: S$GLB,,, | Performed by: INTERNAL MEDICINE

## 2024-03-04 PROCEDURE — 3075F SYST BP GE 130 - 139MM HG: CPT | Mod: CPTII,S$GLB,, | Performed by: INTERNAL MEDICINE

## 2024-03-04 PROCEDURE — 99999 PR PBB SHADOW E&M-EST. PATIENT-LVL IV: CPT | Mod: PBBFAC,,, | Performed by: INTERNAL MEDICINE

## 2024-03-04 PROCEDURE — 1101F PT FALLS ASSESS-DOCD LE1/YR: CPT | Mod: CPTII,S$GLB,, | Performed by: INTERNAL MEDICINE

## 2024-03-04 PROCEDURE — 4010F ACE/ARB THERAPY RXD/TAKEN: CPT | Mod: CPTII,S$GLB,, | Performed by: INTERNAL MEDICINE

## 2024-03-04 PROCEDURE — 3008F BODY MASS INDEX DOCD: CPT | Mod: CPTII,S$GLB,, | Performed by: INTERNAL MEDICINE

## 2024-03-04 PROCEDURE — 3288F FALL RISK ASSESSMENT DOCD: CPT | Mod: CPTII,S$GLB,, | Performed by: INTERNAL MEDICINE

## 2024-03-04 PROCEDURE — 3079F DIAST BP 80-89 MM HG: CPT | Mod: CPTII,S$GLB,, | Performed by: INTERNAL MEDICINE

## 2024-03-04 PROCEDURE — 1126F AMNT PAIN NOTED NONE PRSNT: CPT | Mod: CPTII,S$GLB,, | Performed by: INTERNAL MEDICINE

## 2024-03-04 PROCEDURE — 1159F MED LIST DOCD IN RCRD: CPT | Mod: CPTII,S$GLB,, | Performed by: INTERNAL MEDICINE

## 2024-03-04 RX ORDER — DIPHENHYDRAMINE HCL 50 MG
50 CAPSULE ORAL ONCE
Status: CANCELLED | OUTPATIENT
Start: 2024-03-04 | End: 2024-03-04

## 2024-03-04 RX ORDER — SODIUM CHLORIDE 0.9 % (FLUSH) 0.9 %
10 SYRINGE (ML) INJECTION
Status: DISCONTINUED | OUTPATIENT
Start: 2024-03-04 | End: 2024-03-19 | Stop reason: HOSPADM

## 2024-03-04 RX ORDER — METOPROLOL SUCCINATE 25 MG/1
12.5 TABLET, EXTENDED RELEASE ORAL DAILY
Qty: 15 TABLET | Refills: 11 | Status: SHIPPED | OUTPATIENT
Start: 2024-03-04 | End: 2024-03-22 | Stop reason: SDUPTHER

## 2024-03-04 RX ORDER — SODIUM CHLORIDE 9 MG/ML
INJECTION, SOLUTION INTRAVENOUS CONTINUOUS
Status: CANCELLED | OUTPATIENT
Start: 2024-03-04 | End: 2024-03-04

## 2024-03-04 RX ORDER — CLOPIDOGREL BISULFATE 75 MG/1
75 TABLET ORAL DAILY
Qty: 30 TABLET | Refills: 11 | Status: SHIPPED | OUTPATIENT
Start: 2024-03-04 | End: 2024-04-10

## 2024-03-04 RX ORDER — NEBULIZER AND COMPRESSOR
1 EACH MISCELLANEOUS DAILY
Refills: 0 | COMMUNITY
Start: 2024-03-04

## 2024-03-04 RX ORDER — SPIRONOLACTONE 25 MG/1
12.5 TABLET ORAL DAILY
Qty: 15 TABLET | Refills: 11 | Status: SHIPPED | OUTPATIENT
Start: 2024-03-04 | End: 2024-03-22 | Stop reason: SDUPTHER

## 2024-03-04 NOTE — PROGRESS NOTES
Subjective:   Patient ID:  Masoud Osorio is a 68 y.o. male who presents for follow up of No chief complaint on file.      HPI:        3/2024: Initial visit with me. Former Pt of Dr. Bailey as below.       Masoud Osorio 68 y.o. male is here follow up and feeling well without any new complaints. He was admitted for NSTEMI that required PCI of RCA, PDA, and POBA of PLB with a preserved EF in 7/2016. He quit smoking. He is on aspirin SAPT. Non ischemic stress test in 3/2017 for atypical chest pain revealed no reversible ischemia. Admitted in 10/2017 with CP again. 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 Normal EF. ECG 6/2018: NSR.  He was admitted in March 2023 for atypical chest discomfort.  His EKG and cardiac biomarkers were normal.  Stress test revealed no reversible ischemia.  His ejection fraction 42%.  Historically had not tolerated beta-blockers or ACE inhibitors or angiotensin receptor blockers due to hypotension        Echo 2/2021    Low normal systolic function. The estimated ejection fraction is 50%  Normal left ventricular diastolic function.  Normal right ventricular size with normal right ventricular systolic function.  Normal central venous pressure (3 mmHg).                 Patient Active Problem List    Diagnosis Date Noted    1st degree AV block 03/31/2023    Costochondral pain 03/31/2023    Elevated PSA 03/09/2023    Personal history of COVID-19 03/08/2022    Lipoma of skin and subcutaneous tissue of neck 02/08/2022    Neuroforaminal stenosis of lumbar spine 06/07/2021    DDD (degenerative disc disease), lumbar 06/07/2021    Lumbar radiculopathy 06/07/2021    Osteoarthritis of spine with radiculopathy, lumbar region 06/07/2021    Chest pain 12/20/2019    Compression fracture of L1 lumbar vertebra 12/20/2019    Leukocytosis 12/20/2019    Benign prostatic hyperplasia (BPH) with straining on urination 12/05/2018    Chronic actinic dermatitis  12/05/2018    Chronic bilateral low back pain without sciatica 12/05/2018    Non-seasonal allergic rhinitis 12/05/2018     Managed by outside ENT doctor Raya.  Continue follow-up and regular nasal spray      Pain and swelling of left knee 10/11/2017    Mixed hyperlipidemia 07/17/2017    History of colon polyps 10/24/2016     Noted on colonoscopy 4/27/22- -repeat in 3-5 years pending pathology results       Class 1 obesity due to excess calories with serious comorbidity and body mass index (BMI) of 30.0 to 30.9 in adult 09/21/2016    Long-term use of aspirin therapy 09/21/2016    History of coronary artery stent placement 09/21/2016    Anxiety and depression 09/21/2016     trial of ctialopram and follow up in 8 weeks      Coronary artery disease involving native coronary artery of native heart without angina pectoris 07/26/2016 7/26/2016 [place]STEM[place] [facility] [place]     PCI of RCA with 4.0 x 15, 4.0 x 38, and 3.5 x 38 mm Resolute JACOB    PCI of PDA 2.75 x 15 mm JACOB dilated to 3.0  POBA of proximal PLB 2.0 x 12 mm balloon      Normal EF with LVEDP 15 mmHg  DAPT score of 2                        History of heart attack 07/25/2016     NSTEMI      Tobacco dependence in remission 07/25/2016                    LABS    LAST HbA1c  Lab Results   Component Value Date    HGBA1C 5.3 03/08/2023       Lipid panel  Lab Results   Component Value Date    CHOL 117 (L) 02/28/2024    CHOL 119 (L) 03/28/2023    CHOL 96 (L) 02/27/2023     Lab Results   Component Value Date    HDL 45 02/28/2024    HDL 37 (L) 03/28/2023    HDL 39 (L) 02/27/2023     Lab Results   Component Value Date    LDLCALC 46.2 (L) 02/28/2024    LDLCALC 68.4 03/28/2023    LDLCALC 37.6 (L) 02/27/2023     Lab Results   Component Value Date    TRIG 129 02/28/2024    TRIG 68 03/28/2023    TRIG 97 02/27/2023     Lab Results   Component Value Date    CHOLHDL 38.5 02/28/2024    CHOLHDL 31.1 03/28/2023    CHOLHDL 40.6 02/27/2023            Review of Systems   Constitutional:  Negative for diaphoresis, night sweats, weight gain and weight loss.   HENT:  Negative for congestion.    Eyes:  Negative for blurred vision, discharge and double vision.   Cardiovascular:  Negative for chest pain, claudication, cyanosis, dyspnea on exertion, irregular heartbeat, leg swelling, near-syncope, orthopnea, palpitations, paroxysmal nocturnal dyspnea and syncope.   Respiratory:  Negative for cough, shortness of breath and wheezing.    Endocrine: Negative for cold intolerance, heat intolerance and polyphagia.   Hematologic/Lymphatic: Negative for adenopathy and bleeding problem. Does not bruise/bleed easily.   Skin:  Negative for dry skin and nail changes.   Musculoskeletal:  Negative for arthritis, back pain, falls, joint pain, myalgias and neck pain.   Gastrointestinal:  Negative for bloating, abdominal pain, change in bowel habit and constipation.   Genitourinary:  Negative for bladder incontinence, dysuria, flank pain, genital sores and missed menses.   Neurological:  Negative for aphonia, brief paralysis, difficulty with concentration, dizziness and weakness.   Psychiatric/Behavioral:  Negative for altered mental status and memory loss. The patient does not have insomnia.    Allergic/Immunologic: Negative for environmental allergies.       Objective:   Physical Exam  Constitutional:       Appearance: He is well-developed.      Interventions: He is not intubated.  HENT:      Head: Normocephalic and atraumatic.      Right Ear: External ear normal.      Left Ear: External ear normal.   Eyes:      General: No scleral icterus.        Right eye: No discharge.         Left eye: No discharge.      Conjunctiva/sclera: Conjunctivae normal.      Pupils: Pupils are equal, round, and reactive to light.   Neck:      Thyroid: No thyromegaly.      Vascular: Normal carotid pulses. No carotid bruit, hepatojugular reflux or JVD.      Trachea: No tracheal deviation.   Cardiovascular:      Rate and Rhythm: Normal rate and  regular rhythm. No extrasystoles are present.     Chest Wall: PMI is not displaced.      Pulses:           Carotid pulses are 2+ on the right side and 2+ on the left side.       Radial pulses are 2+ on the right side and 2+ on the left side.        Femoral pulses are 2+ on the right side and 2+ on the left side.       Popliteal pulses are 2+ on the right side and 2+ on the left side.        Dorsalis pedis pulses are 2+ on the right side and 2+ on the left side.        Posterior tibial pulses are 2+ on the right side and 2+ on the left side.      Heart sounds: S1 normal and S2 normal. Heart sounds not distant. No midsystolic click. No murmur heard.     No friction rub. No gallop. No S3 sounds.   Pulmonary:      Effort: Pulmonary effort is normal. No tachypnea, bradypnea, accessory muscle usage or respiratory distress. He is not intubated.      Breath sounds: Normal breath sounds. No stridor. No decreased breath sounds, wheezing or rales.   Chest:      Chest wall: No tenderness.   Abdominal:      General: There is no distension or abdominal bruit.      Palpations: There is no mass or pulsatile mass.      Tenderness: There is no abdominal tenderness. There is no guarding or rebound.   Musculoskeletal:         General: No tenderness. Normal range of motion.      Cervical back: Normal range of motion and neck supple.   Lymphadenopathy:      Cervical: No cervical adenopathy.   Skin:     General: Skin is warm.      Coloration: Skin is not pale.      Findings: No erythema or rash.   Neurological:      Mental Status: He is alert and oriented to person, place, and time.      Cranial Nerves: No cranial nerve deficit.      Coordination: Coordination normal.      Deep Tendon Reflexes: Reflexes are normal and symmetric.   Psychiatric:         Behavior: Behavior normal.         Thought Content: Thought content normal.         Judgment: Judgment normal.               Assessment:     No diagnosis found.      Plan:           Continue  with current medical plan and lifestyle changes.  Return sooner for concerns or questions. If symptoms persist go to the ED  I have reviewed all pertinent data on this patient       Continue with aspirin 81 mg daily.  Intense statin therapy with LDL less than 70.  Start Diovan 40 mg daily for a target blood pressure less than 130/80 mmHg and in view of mildly depressed ejection fraction 42%. Consider adding beta-blocker at a later time if blood pressure can tolerate it. Low-salt diet.  Exercise.    He can take medications for ED safely          Patient can proceed with non cardiac surgery with acceptable risks  Hold aspirin 5 days before the case           Consider sleep study to rule out NATALIE      I have reviewed the patient's medical history in detail and updated the computerized patient record.    No orders of the defined types were placed in this encounter.      Follow up as scheduled. Return sooner for concerns or questions            Patient's Medications   New Prescriptions    VALSARTAN (DIOVAN) 40 MG TABLET    Take 1 tablet (40 mg total) by mouth once daily.   Previous Medications    ASPIRIN (ECOTRIN) 81 MG EC TABLET    Take 1 tablet (81 mg total) by mouth once daily.    ATORVASTATIN (LIPITOR) 80 MG TABLET    Take 1 tablet (80 mg total) by mouth once daily.    AZELASTINE (ASTELIN) 137 MCG (0.1 %) NASAL SPRAY    Spray 2 sprays in each nostril twice daily    DIAZEPAM (VALIUM) 5 MG TABLET    Take 0.5 tablet by mouth every 8 to 12 hours as needed.    GARLIC EXTRACT ORAL    Take by mouth.    HYDROXYZINE HCL (ATARAX) 25 MG TABLET    Take 0.5 to 1 tablet by mouth at bedtime    IPRATROPIUM (ATROVENT) 0.06 % NASAL SPRAY    INSTILL 2 SPRAYS IN EACH NOSTRIL EVERY 12 HOURS    MULTIVITAMIN CAPSULE    Take 1 capsule by mouth once daily.    PREGABALIN (LYRICA) 150 MG CAPSULE    Take 1 capsule (150 mg total) by mouth 2 (two) times daily.    TAMSULOSIN (FLOMAX) 0.4 MG CAP    Take 1 capsule (0.4 mg total) by mouth once daily.     TRIAMCINOLONE (NASACORT) 55 MCG NASAL INHALER    Instill two sprays in each nostril once daily   Modified Medications    Modified Medication Previous Medication    NITROGLYCERIN (NITROSTAT) 0.4 MG SL TABLET nitroGLYCERIN (NITROSTAT) 0.4 MG SL tablet       Place 1 tablet (0.4 mg total) under the tongue every 5 (five) minutes as needed for Chest pain.    Place 1 tablet (0.4 mg total) under the tongue every 5 (five) minutes as needed for Chest pain.    SILDENAFIL (REVATIO) 20 MG TAB sildenafil (REVATIO) 20 mg Tab       Take 1 tablet (20 mg total) by mouth once daily.    Take 1 tablet (20 mg total) by mouth once daily.   Discontinued Medications         '

## 2024-03-04 NOTE — H&P (VIEW-ONLY)
Cardiology Clinic note    Subjective:   Patient ID:  Masoud Osorio is a 68 y.o. male who presents for follow-up of CAD        HPI:   Masoud Osorio  has a past medical history of Allergy, Anticoagulant long-term use, Anxiety and depression (09/21/2016), Arthritis, Benign prostatic hyperplasia (BPH) with straining on urination (12/05/2018), Coronary artery disease involving native coronary artery of native heart without angina pectoris (07/26/2016), Elevated PSA, Gout, History of coronary artery stent placement (09/21/2016), History of heart attack (07/25/2016), Mixed hyperlipidemia (07/17/2017), Overweight (BMI 25.0-29.9) (09/21/2016), and Tobacco dependence in remission (07/25/2016).    Previously seen by Dr. Bailey 5/15/2023 as below    Patient seen in clinic today, reports overall doing well, trying to get back into exercising and eating better.     Patient does endorse some new onset LE edema and some infrequent chest tightness, happening every few months, most recently about 2 months ago. Has not taken any nitroglycerin, quit smoking in 2016. His wife also reports that he has been noticably short of breath at times, frequently taking big deep breaths. Patient denies CP, current SOB/MATTA, orthopnea, PND, syncope, palpitations, LE edema. Reports compliance and tolerance with all medicatons.     Echo/  MPI 04/2023 reviewed with patient - MPI with concerning for reversible ischemia. EF worsening, now 35-40% (04/2023).     Cath 7/26/2016 - NSTEMI at [facility]     PCI of RCA with 4.0 x 15, 4.0 x 38, and 3.5 x 38 mm Resolute JACOB    PCI of PDA 2.75 x 15 mm JACOB dilated to 3.0  POBA of proximal PLB 2.0 x 12 mm balloon      Normal EF with LVEDP 15 mmHg  DAPT score of 2    Cath 10/20/2017   Angiographic Results      Diagnostic:        Patient has a right dominant coronary artery.      - Left Main Coronary Artery:              The LM has luminal irregularities. There is MILAGROS 3 flow.      - Left Anterior Descending  Artery:              The LAD has luminal irregularities. There is MILAGROS 3 flow.      - Left Circumflex Artery:              The LCX has luminal irregularities. 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 There is MILAGROS 3 flow.      - Right Coronary Artery:              The RCA has luminal irregularities. There is MILAGROS 3 flow. Patent stents              The distal RCA has a 60% stenosis. There is MILAGROS 3 flow.      - Posterior Lateral Branch:              The PLB has luminal irregularities. There is MILAGROS 3 flow. The remaining portion of the vessel is of small caliber.      - Posterior Descending Artery:              The PDA has luminal irregularities. There is MILAGROS 3 flow. Patent stent      - Radial:              The radial is normal.     Stress Test 3/28/2023    The ECG portion of the study is negative for ischemia.    The patient reported no chest pain during the stress test.    During stress, occasional PVCs are noted.    The exercise capacity was excellent.    MPI 4/12/2023  1. Moderately sized inferior wall infarct with suspected yadira-infarct reversible ischemia.  2. Associated hypokinesis of the inferior wall.  3. Mildly impaired left ventricular function.  Estimated ejection fraction 42% during stress and 41% during rest.    Echo 4/12/2023  The left ventricle is normal in size with mildly decreased systolic function.  The estimated ejection fraction is 35-40%.  Mild mitral regurgitation.  Grade I left ventricular diastolic dysfunction.  Normal right ventricular size with normal right ventricular systolic function.  Normal central venous pressure (3 mmHg).    Leo CUEVAS 5/15/2023  Masoud Osorio 68 y.o. male is here follow up and feeling well without any new complaints. He was admitted for NSTEMI that required PCI of RCA, PDA, and POBA of PLB with a preserved EF in 7/2016. He quit smoking. He is on aspirin SAPT. Non ischemic stress test in 3/2017 for atypical chest pain revealed no reversible  ischemia. Admitted in 10/2017 with CP again. 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 Normal EF. ECG 6/2018: NSR.  He was admitted in March 2023 for atypical chest discomfort.  His EKG and cardiac biomarkers were normal.  Stress test revealed no reversible ischemia.  His ejection fraction 42%.  Historically had not tolerated beta-blockers or ACE inhibitors or angiotensin receptor blockers due to hypotension         Past Medical History:   Diagnosis Date    Allergy     Anticoagulant long-term use     Anxiety and depression 09/21/2016    trial of ctialopram and follow up in 8 weeks    Arthritis     Benign prostatic hyperplasia (BPH) with straining on urination 12/05/2018    Coronary artery disease involving native coronary artery of native heart without angina pectoris 07/26/2016 7/26/2016 NSTEMI at [facility]   PCI of RCA with 4.0 x 15, 4.0 x 38, and 3.5 x 38 mm Resolute JACOB   PCI of PDA 2.75 x 15 mm JACOB dilated to 3.0 POBA of proximal PLB 2.0 x 12 mm balloon   Normal EF with LVEDP 15 mmHg DAPT score of 2               Elevated PSA     Gout     History of coronary artery stent placement 09/21/2016    History of heart attack 07/25/2016    NSTEMI    Mixed hyperlipidemia 07/17/2017    Overweight (BMI 25.0-29.9) 09/21/2016    Tobacco dependence in remission 07/25/2016          Patient Active Problem List    Diagnosis Date Noted    Heart failure with mid-range ejection fraction (HFmEF) 03/04/2024    Abnormal cardiovascular stress test 03/04/2024    1st degree AV block 03/31/2023    Costochondral pain 03/31/2023    Elevated PSA 03/09/2023    Personal history of COVID-19 03/08/2022    Lipoma of skin and subcutaneous tissue of neck 02/08/2022    Neuroforaminal stenosis of lumbar spine 06/07/2021    DDD (degenerative disc disease), lumbar 06/07/2021    Lumbar radiculopathy 06/07/2021    Osteoarthritis of spine with radiculopathy, lumbar region 06/07/2021    Chest pain  12/20/2019    Compression fracture of L1 lumbar vertebra 12/20/2019    Leukocytosis 12/20/2019    Benign prostatic hyperplasia (BPH) with straining on urination 12/05/2018    Chronic actinic dermatitis 12/05/2018    Chronic bilateral low back pain without sciatica 12/05/2018    Non-seasonal allergic rhinitis 12/05/2018     Managed by outside ENT doctor Raya.  Continue follow-up and regular nasal spray      Pain and swelling of left knee 10/11/2017    Mixed hyperlipidemia 07/17/2017    History of colon polyps 10/24/2016     Noted on colonoscopy 4/27/22- -repeat in 3-5 years pending pathology results       Class 1 obesity due to excess calories with serious comorbidity and body mass index (BMI) of 30.0 to 30.9 in adult 09/21/2016    Long-term use of aspirin therapy 09/21/2016    History of coronary artery stent placement 09/21/2016    Anxiety and depression 09/21/2016     trial of ctialopram and follow up in 8 weeks      Coronary artery disease involving native coronary artery of native heart without angina pectoris 07/26/2016 7/26/2016 NSTEMI at [facility]     PCI of RCA with 4.0 x 15, 4.0 x 38, and 3.5 x 38 mm Resolute JACOB    PCI of PDA 2.75 x 15 mm JACOB dilated to 3.0  POBA of proximal PLB 2.0 x 12 mm balloon      Normal EF with LVEDP 15 mmHg  DAPT score of 2                        History of heart attack 07/25/2016     NSTEMI      Tobacco dependence in remission 07/25/2016       Patient's Medications   New Prescriptions    BLOOD PRESSURE CUFF MISC    1 Units by Misc.(Non-Drug; Combo Route) route once daily.    CLOPIDOGREL (PLAVIX) 75 MG TABLET    Take 1 tablet (75 mg total) by mouth once daily.    METOPROLOL SUCCINATE (TOPROL-XL) 25 MG 24 HR TABLET    Take 0.5 tablets (12.5 mg total) by mouth once daily.    SPIRONOLACTONE (ALDACTONE) 25 MG TABLET    Take 0.5 tablets (12.5 mg total) by mouth once daily.   Previous Medications    ASPIRIN (ECOTRIN) 81 MG EC TABLET    Take 1 tablet (81 mg total) by mouth once  daily.    ATORVASTATIN (LIPITOR) 80 MG TABLET    Take 1 tablet (80 mg total) by mouth once daily.    AZELASTINE (ASTELIN) 137 MCG (0.1 %) NASAL SPRAY    Spray 2 sprays in each nostril twice daily    CIPROFLOXACIN HCL (CIPRO) 500 MG TABLET    Take 1 tablet by mouth twice daily    DIAZEPAM (VALIUM) 5 MG TABLET    Take 0.5 tablet by mouth every 8 to 12 hours as needed.    DIAZEPAM (VALIUM) 5 MG TABLET    Take 1/2 -1 tablet by mouth every 8 hours.    DOXYCYCLINE (MONODOX) 100 MG CAPSULE    Take 1 capsule (100 mg total) by mouth 2 (two) times daily. Take with food and water. Remain upright for 1 hour after taking.    GARLIC EXTRACT ORAL    Take by mouth.    HYDROXYZINE HCL (ATARAX) 25 MG TABLET    Take 0.5 to 1 tablet by mouth at bedtime    HYDROXYZINE HCL (ATARAX) 25 MG TABLET    Take 1/2 to 1 tablet by mouth at bedtime.    IPRATROPIUM (ATROVENT) 0.06 % NASAL SPRAY    INSTILL 2 SPRAYS IN EACH NOSTRIL EVERY 12 HOURS    IPRATROPIUM (ATROVENT) 21 MCG (0.03 %) NASAL SPRAY        MULTIVITAMIN CAPSULE    Take 1 capsule by mouth once daily.    NITROGLYCERIN (NITROSTAT) 0.4 MG SL TABLET    Place 1 tablet (0.4 mg total) under the tongue every 5 (five) minutes as needed for Chest pain.    PREDNISONE (DELTASONE) 20 MG TABLET    Take 2 tablets by mouth on day 1, THEN 1 ½ tablets on days 2 and 3, THEN 1 tablet on days 4 and 5, THEN ½ tablet on day 6    PREGABALIN (LYRICA) 150 MG CAPSULE    Take 1 capsule (150 mg total) by mouth 2 (two) times daily.    SILDENAFIL (REVATIO) 20 MG TAB    Take 1 tablet (20 mg total) by mouth once daily.    TAMSULOSIN (FLOMAX) 0.4 MG CAP    Take 2 capsules (0.8 mg total) by mouth once daily.    TRIAMCINOLONE (NASACORT) 55 MCG NASAL INHALER    Instill two sprays in each nostril once daily    VALSARTAN (DIOVAN) 40 MG TABLET    Take 1 tablet (40 mg total) by mouth once daily.   Modified Medications    No medications on file   Discontinued Medications    No medications on file        Review of Systems    Constitutional: Negative for chills, decreased appetite, diaphoresis, malaise/fatigue, weight gain and weight loss.   Cardiovascular:  Positive for leg swelling. Negative for chest pain, claudication, dyspnea on exertion, irregular heartbeat, near-syncope, orthopnea, palpitations, paroxysmal nocturnal dyspnea and syncope.   Respiratory:  Negative for cough, hemoptysis, shortness of breath and snoring.    Gastrointestinal:  Negative for bloating, abdominal pain, nausea and vomiting.   Neurological:  Negative for light-headedness and weakness.       Family History   Problem Relation Age of Onset    Heart disease Mother     Arthritis Mother     Parkinsonism Mother     Congenital heart disease Father     No Known Problems Sister     No Known Problems Brother     No Known Problems Daughter     No Known Problems Sister     No Known Problems Sister     Prostate cancer Neg Hx     Kidney disease Neg Hx        Social History     Socioeconomic History    Marital status:    Tobacco Use    Smoking status: Former     Current packs/day: 0.00     Types: Cigarettes     Quit date: 2016     Years since quittin.6    Smokeless tobacco: Never   Substance and Sexual Activity    Alcohol use: Yes     Comment: rarely on holidays    Drug use: No    Sexual activity: Yes     Social Determinants of Health     Financial Resource Strain: Low Risk  (2023)    Overall Financial Resource Strain (CARDIA)     Difficulty of Paying Living Expenses: Not hard at all   Food Insecurity: No Food Insecurity (2023)    Hunger Vital Sign     Worried About Running Out of Food in the Last Year: Never true     Ran Out of Food in the Last Year: Never true   Transportation Needs: No Transportation Needs (2023)    PRAPARE - Transportation     Lack of Transportation (Medical): No     Lack of Transportation (Non-Medical): No   Physical Activity: Insufficiently Active (2023)    Exercise Vital Sign     Days of Exercise per Week: 2 days      "Minutes of Exercise per Session: 20 min   Stress: No Stress Concern Present (2023)    [place] [employer] - Occupational Stress Questionnaire     Feeling of Stress : Only a little   Social Connections: Unknown (2023)    Social Connection and Isolation Panel [NHANES]     Frequency of Communication with Friends and Family: More than three times a week     Frequency of Social Gatherings with Friends and Family: Once a week     Active Member of Clubs or Organizations: Yes     Attends Club or Organization Meetings: 1 to 4 times per year     Marital Status:    Housing Stability: Low Risk  (2023)    Housing Stability Vital Sign     Unable to Pay for Housing in the Last Year: No     Number of Places Lived in the Last Year: 1     Unstable Housing in the Last Year: No            Objective:   Vitals  Vitals:    24 1018   BP: 136/83   Pulse: 72   SpO2: 97%   Weight: 95.7 kg (211 lb)   Height: 5' 8" (1.727 m)     Right Arm BP - Sittin/83  Left Arm BP - Sittin/77    Physical Exam  Vitals and nursing note reviewed.   Constitutional:       Appearance: Normal appearance.   Cardiovascular:      Rate and Rhythm: Normal rate and regular rhythm.      Heart sounds: No murmur heard.     No gallop.   Pulmonary:      Effort: Pulmonary effort is normal.      Breath sounds: Normal breath sounds.   Abdominal:      General: Bowel sounds are normal. There is no distension.      Palpations: Abdomen is soft.      Tenderness: There is no abdominal tenderness.   Musculoskeletal:      Right lower leg: Edema present.      Left lower leg: Edema present.   Skin:     General: Skin is warm and dry.   Neurological:      Mental Status: He is alert and oriented to person, place, and time.         Lab Results    Lab Results   Component Value Date    WBC 6.80 2023    HGB 13.9 (L) 2023    HCT 41.5 2023    MCV 86 2023       Lab Results   Component Value Date     2023 "    INR 1.0 10/20/2017       Lab Results   Component Value Date    K 3.9 05/12/2023    MG 2.0 03/28/2023    BUN 18 05/12/2023    CREATININE 0.79 05/12/2023       Lab Results   Component Value Date    GLU 92 05/12/2023    HGBA1C 5.3 03/08/2023       Lab Results   Component Value Date    AST 30 03/27/2023    ALT 26 03/27/2023    ALBUMIN 3.9 03/27/2023    PROT 6.4 03/27/2023       Lab Results   Component Value Date    CHOL 117 (L) 02/28/2024    HDL 45 02/28/2024    LDLCALC 46.2 (L) 02/28/2024    TRIG 129 02/28/2024       Lab Results   Component Value Date    BNP 40 03/27/2023       Assessment:     1. Abnormal cardiovascular stress test    2. Coronary artery disease involving native coronary artery of native heart without angina pectoris    3. History of coronary artery stent placement    4. Mixed hyperlipidemia    5. 1st degree AV block    6. Heart failure with mid-range ejection fraction (HFmEF)    7. Other cardiomyopathies        Plan:   CAD, abnormal CV stress test  -patient with noted infrequent chest pressure, new onset LE swelling, MPI with concerning for reversible ischemia  -given chest pressure, worsening EF, MPI, plan for LHC as an outpatient  -continue assa, will restart plavix    2. HLD  -well controlled, goal LDLc < 70  -continue lipitor    3. HFmEF  -historically has not tolerated BB, ACE/ARB due to hypotension  -/83 today, will trial low dose BB  -will start patient on low dose BB and spirinolactone  -continue valsartan    -Will continue GDMT, medication regimen as above  -Plan for LHC as an outpatient         Orders Placed This Encounter   Procedures    Case Request-Cath Lab: Left heart cath     Standing Status:   Standing     Number of Occurrences:   1     Order Specific Question:   CPT Code:     Answer:   NM CATH PLACE/CORONARY ANGIO, IMG SUPER/INTERP [[id number]]     Order Specific Question:   Medical Necessity:     Answer:   Medically Non-Urgent [100]     Order Specific Question:   Is an on-site  pathologist required for this procedure?     Answer:   N/A       He expressed verbal understanding and agreed with the plan    Pertinent cardiac images and EKG reviewed independently.    Continue with current medical plan and lifestyle changes.  Return sooner for concerns or questions. If symptoms persist go to the ED.  I have reviewed all pertinent data including patient's medical history in detail and updated the computerized patient record.     Total duration of face to face visit time 30 minutes.  Total time spent counseling greater than fifty percent of total visit time.  Counseling included discussion regarding imaging findings, diagnosis, possibilities, treatment options, risks and benefits.    Thank you for the opportunity to care for this patient. Will be available for questions if needed.     Discussed with Dr. Hinton . RTC in 6 months.      Signed:  Jose Lynn DNP  03/04/2024

## 2024-03-04 NOTE — PROGRESS NOTES
Cardiology Clinic note    Subjective:   Patient ID:  Masoud Osorio is a 68 y.o. male who presents for follow-up of CAD        HPI:   Masoud Osorio  has a past medical history of Allergy, Anticoagulant long-term use, Anxiety and depression (09/21/2016), Arthritis, Benign prostatic hyperplasia (BPH) with straining on urination (12/05/2018), Coronary artery disease involving native coronary artery of native heart without angina pectoris (07/26/2016), Elevated PSA, Gout, History of coronary artery stent placement (09/21/2016), History of heart attack (07/25/2016), Mixed hyperlipidemia (07/17/2017), Overweight (BMI 25.0-29.9) (09/21/2016), and Tobacco dependence in remission (07/25/2016).    Previously seen by Dr. Bailey 5/15/2023 as below    Patient seen in clinic today, reports overall doing well, trying to get back into exercising and eating better.     Patient does endorse some new onset LE edema and some infrequent chest tightness, happening every few months, most recently about 2 months ago. Has not taken any nitroglycerin, quit smoking in 2016. His wife also reports that he has been noticably short of breath at times, frequently taking big deep breaths. Patient denies CP, current SOB/MATTA, orthopnea, PND, syncope, palpitations, LE edema. Reports compliance and tolerance with all medicatons.     Echo/  MPI 04/2023 reviewed with patient - MPI with concerning for reversible ischemia. EF worsening, now 35-40% (04/2023).     Cath 7/26/2016 - NSTEMI at [facility]     PCI of RCA with 4.0 x 15, 4.0 x 38, and 3.5 x 38 mm Resolute JACOB    PCI of PDA 2.75 x 15 mm JACOB dilated to 3.0  POBA of proximal PLB 2.0 x 12 mm balloon      Normal EF with LVEDP 15 mmHg  DAPT score of 2    Cath 10/20/2017   Angiographic Results      Diagnostic:        Patient has a right dominant coronary artery.      - Left Main Coronary Artery:              The LM has luminal irregularities. There is MILAGROS 3 flow.      - Left Anterior Descending  Artery:              The LAD has luminal irregularities. There is MILAGROS 3 flow.      - Left Circumflex Artery:              The LCX has luminal irregularities. 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 There is MILAGROS 3 flow.      - Right Coronary Artery:              The RCA has luminal irregularities. There is MILAGROS 3 flow. Patent stents              The distal RCA has a 60% stenosis. There is MILAGROS 3 flow.      - Posterior Lateral Branch:              The PLB has luminal irregularities. There is MILAGROS 3 flow. The remaining portion of the vessel is of small caliber.      - Posterior Descending Artery:              The PDA has luminal irregularities. There is MILAGROS 3 flow. Patent stent      - Radial:              The radial is normal.     Stress Test 3/28/2023    The ECG portion of the study is negative for ischemia.    The patient reported no chest pain during the stress test.    During stress, occasional PVCs are noted.    The exercise capacity was excellent.    MPI 4/12/2023  1. Moderately sized inferior wall infarct with suspected yadira-infarct reversible ischemia.  2. Associated hypokinesis of the inferior wall.  3. Mildly impaired left ventricular function.  Estimated ejection fraction 42% during stress and 41% during rest.    Echo 4/12/2023  The left ventricle is normal in size with mildly decreased systolic function.  The estimated ejection fraction is 35-40%.  Mild mitral regurgitation.  Grade I left ventricular diastolic dysfunction.  Normal right ventricular size with normal right ventricular systolic function.  Normal central venous pressure (3 mmHg).    Leo CUEVAS 5/15/2023  Masoud Osorio 68 y.o. male is here follow up and feeling well without any new complaints. He was admitted for NSTEMI that required PCI of RCA, PDA, and POBA of PLB with a preserved EF in 7/2016. He quit smoking. He is on aspirin SAPT. Non ischemic stress test in 3/2017 for atypical chest pain revealed no reversible  ischemia. Admitted in 10/2017 with CP again. 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 Normal EF. ECG 6/2018: NSR.  He was admitted in March 2023 for atypical chest discomfort.  His EKG and cardiac biomarkers were normal.  Stress test revealed no reversible ischemia.  His ejection fraction 42%.  Historically had not tolerated beta-blockers or ACE inhibitors or angiotensin receptor blockers due to hypotension         Past Medical History:   Diagnosis Date    Allergy     Anticoagulant long-term use     Anxiety and depression 09/21/2016    trial of ctialopram and follow up in 8 weeks    Arthritis     Benign prostatic hyperplasia (BPH) with straining on urination 12/05/2018    Coronary artery disease involving native coronary artery of native heart without angina pectoris 07/26/2016 7/26/2016 NSTEMI at [facility]   PCI of RCA with 4.0 x 15, 4.0 x 38, and 3.5 x 38 mm Resolute JACOB   PCI of PDA 2.75 x 15 mm JACOB dilated to 3.0 POBA of proximal PLB 2.0 x 12 mm balloon   Normal EF with LVEDP 15 mmHg DAPT score of 2               Elevated PSA     Gout     History of coronary artery stent placement 09/21/2016    History of heart attack 07/25/2016    NSTEMI    Mixed hyperlipidemia 07/17/2017    Overweight (BMI 25.0-29.9) 09/21/2016    Tobacco dependence in remission 07/25/2016          Patient Active Problem List    Diagnosis Date Noted    Heart failure with mid-range ejection fraction (HFmEF) 03/04/2024    Abnormal cardiovascular stress test 03/04/2024    1st degree AV block 03/31/2023    Costochondral pain 03/31/2023    Elevated PSA 03/09/2023    Personal history of COVID-19 03/08/2022    Lipoma of skin and subcutaneous tissue of neck 02/08/2022    Neuroforaminal stenosis of lumbar spine 06/07/2021    DDD (degenerative disc disease), lumbar 06/07/2021    Lumbar radiculopathy 06/07/2021    Osteoarthritis of spine with radiculopathy, lumbar region 06/07/2021    Chest pain  12/20/2019    Compression fracture of L1 lumbar vertebra 12/20/2019    Leukocytosis 12/20/2019    Benign prostatic hyperplasia (BPH) with straining on urination 12/05/2018    Chronic actinic dermatitis 12/05/2018    Chronic bilateral low back pain without sciatica 12/05/2018    Non-seasonal allergic rhinitis 12/05/2018     Managed by outside ENT doctor Raya.  Continue follow-up and regular nasal spray      Pain and swelling of left knee 10/11/2017    Mixed hyperlipidemia 07/17/2017    History of colon polyps 10/24/2016     Noted on colonoscopy 4/27/22- -repeat in 3-5 years pending pathology results       Class 1 obesity due to excess calories with serious comorbidity and body mass index (BMI) of 30.0 to 30.9 in adult 09/21/2016    Long-term use of aspirin therapy 09/21/2016    History of coronary artery stent placement 09/21/2016    Anxiety and depression 09/21/2016     trial of ctialopram and follow up in 8 weeks      Coronary artery disease involving native coronary artery of native heart without angina pectoris 07/26/2016 7/26/2016 NSTEMI at [facility]     PCI of RCA with 4.0 x 15, 4.0 x 38, and 3.5 x 38 mm Resolute JACOB    PCI of PDA 2.75 x 15 mm JACOB dilated to 3.0  POBA of proximal PLB 2.0 x 12 mm balloon      Normal EF with LVEDP 15 mmHg  DAPT score of 2                        History of heart attack 07/25/2016     NSTEMI      Tobacco dependence in remission 07/25/2016       Patient's Medications   New Prescriptions    BLOOD PRESSURE CUFF MISC    1 Units by Misc.(Non-Drug; Combo Route) route once daily.    CLOPIDOGREL (PLAVIX) 75 MG TABLET    Take 1 tablet (75 mg total) by mouth once daily.    METOPROLOL SUCCINATE (TOPROL-XL) 25 MG 24 HR TABLET    Take 0.5 tablets (12.5 mg total) by mouth once daily.    SPIRONOLACTONE (ALDACTONE) 25 MG TABLET    Take 0.5 tablets (12.5 mg total) by mouth once daily.   Previous Medications    ASPIRIN (ECOTRIN) 81 MG EC TABLET    Take 1 tablet (81 mg total) by mouth once  daily.    ATORVASTATIN (LIPITOR) 80 MG TABLET    Take 1 tablet (80 mg total) by mouth once daily.    AZELASTINE (ASTELIN) 137 MCG (0.1 %) NASAL SPRAY    Spray 2 sprays in each nostril twice daily    CIPROFLOXACIN HCL (CIPRO) 500 MG TABLET    Take 1 tablet by mouth twice daily    DIAZEPAM (VALIUM) 5 MG TABLET    Take 0.5 tablet by mouth every 8 to 12 hours as needed.    DIAZEPAM (VALIUM) 5 MG TABLET    Take 1/2 -1 tablet by mouth every 8 hours.    DOXYCYCLINE (MONODOX) 100 MG CAPSULE    Take 1 capsule (100 mg total) by mouth 2 (two) times daily. Take with food and water. Remain upright for 1 hour after taking.    GARLIC EXTRACT ORAL    Take by mouth.    HYDROXYZINE HCL (ATARAX) 25 MG TABLET    Take 0.5 to 1 tablet by mouth at bedtime    HYDROXYZINE HCL (ATARAX) 25 MG TABLET    Take 1/2 to 1 tablet by mouth at bedtime.    IPRATROPIUM (ATROVENT) 0.06 % NASAL SPRAY    INSTILL 2 SPRAYS IN EACH NOSTRIL EVERY 12 HOURS    IPRATROPIUM (ATROVENT) 21 MCG (0.03 %) NASAL SPRAY        MULTIVITAMIN CAPSULE    Take 1 capsule by mouth once daily.    NITROGLYCERIN (NITROSTAT) 0.4 MG SL TABLET    Place 1 tablet (0.4 mg total) under the tongue every 5 (five) minutes as needed for Chest pain.    PREDNISONE (DELTASONE) 20 MG TABLET    Take 2 tablets by mouth on day 1, THEN 1 ½ tablets on days 2 and 3, THEN 1 tablet on days 4 and 5, THEN ½ tablet on day 6    PREGABALIN (LYRICA) 150 MG CAPSULE    Take 1 capsule (150 mg total) by mouth 2 (two) times daily.    SILDENAFIL (REVATIO) 20 MG TAB    Take 1 tablet (20 mg total) by mouth once daily.    TAMSULOSIN (FLOMAX) 0.4 MG CAP    Take 2 capsules (0.8 mg total) by mouth once daily.    TRIAMCINOLONE (NASACORT) 55 MCG NASAL INHALER    Instill two sprays in each nostril once daily    VALSARTAN (DIOVAN) 40 MG TABLET    Take 1 tablet (40 mg total) by mouth once daily.   Modified Medications    No medications on file   Discontinued Medications    No medications on file        Review of Systems    Constitutional: Negative for chills, decreased appetite, diaphoresis, malaise/fatigue, weight gain and weight loss.   Cardiovascular:  Positive for leg swelling. Negative for chest pain, claudication, dyspnea on exertion, irregular heartbeat, near-syncope, orthopnea, palpitations, paroxysmal nocturnal dyspnea and syncope.   Respiratory:  Negative for cough, hemoptysis, shortness of breath and snoring.    Gastrointestinal:  Negative for bloating, abdominal pain, nausea and vomiting.   Neurological:  Negative for light-headedness and weakness.       Family History   Problem Relation Age of Onset    Heart disease Mother     Arthritis Mother     Parkinsonism Mother     Congenital heart disease Father     No Known Problems Sister     No Known Problems Brother     No Known Problems Daughter     No Known Problems Sister     No Known Problems Sister     Prostate cancer Neg Hx     Kidney disease Neg Hx        Social History     Socioeconomic History    Marital status:    Tobacco Use    Smoking status: Former     Current packs/day: 0.00     Types: Cigarettes     Quit date: 2016     Years since quittin.6    Smokeless tobacco: Never   Substance and Sexual Activity    Alcohol use: Yes     Comment: rarely on holidays    Drug use: No    Sexual activity: Yes     Social Determinants of Health     Financial Resource Strain: Low Risk  (2023)    Overall Financial Resource Strain (CARDIA)     Difficulty of Paying Living Expenses: Not hard at all   Food Insecurity: No Food Insecurity (2023)    Hunger Vital Sign     Worried About Running Out of Food in the Last Year: Never true     Ran Out of Food in the Last Year: Never true   Transportation Needs: No Transportation Needs (2023)    PRAPARE - Transportation     Lack of Transportation (Medical): No     Lack of Transportation (Non-Medical): No   Physical Activity: Insufficiently Active (2023)    Exercise Vital Sign     Days of Exercise per Week: 2 days      "Minutes of Exercise per Session: 20 min   Stress: No Stress Concern Present (2023)    [employer] [place] [employer] - Occupational Stress Questionnaire     Feeling of Stress : Only a little   Social Connections: Unknown (2023)    Social Connection and Isolation Panel [NHANES]     Frequency of Communication with Friends and Family: More than three times a week     Frequency of Social Gatherings with Friends and Family: Once a week     Active Member of Clubs or Organizations: Yes     Attends Club or Organization Meetings: 1 to 4 times per year     Marital Status:    Housing Stability: Low Risk  (2023)    Housing Stability Vital Sign     Unable to Pay for Housing in the Last Year: No     Number of Places Lived in the Last Year: 1     Unstable Housing in the Last Year: No            Objective:   Vitals  Vitals:    24 1018   BP: 136/83   Pulse: 72   SpO2: 97%   Weight: 95.7 kg (211 lb)   Height: 5' 8" (1.727 m)     Right Arm BP - Sittin/83  Left Arm BP - Sittin/77    Physical Exam  Vitals and nursing note reviewed.   Constitutional:       Appearance: Normal appearance.   Cardiovascular:      Rate and Rhythm: Normal rate and regular rhythm.      Heart sounds: No murmur heard.     No gallop.   Pulmonary:      Effort: Pulmonary effort is normal.      Breath sounds: Normal breath sounds.   Abdominal:      General: Bowel sounds are normal. There is no distension.      Palpations: Abdomen is soft.      Tenderness: There is no abdominal tenderness.   Musculoskeletal:      Right lower leg: Edema present.      Left lower leg: Edema present.   Skin:     General: Skin is warm and dry.   Neurological:      Mental Status: He is alert and oriented to person, place, and time.         Lab Results    Lab Results   Component Value Date    WBC 6.80 2023    HGB 13.9 (L) 2023    HCT 41.5 2023    MCV 86 2023       Lab Results   Component Value Date     2023 "    INR 1.0 10/20/2017       Lab Results   Component Value Date    K 3.9 05/12/2023    MG 2.0 03/28/2023    BUN 18 05/12/2023    CREATININE 0.79 05/12/2023       Lab Results   Component Value Date    GLU 92 05/12/2023    HGBA1C 5.3 03/08/2023       Lab Results   Component Value Date    AST 30 03/27/2023    ALT 26 03/27/2023    ALBUMIN 3.9 03/27/2023    PROT 6.4 03/27/2023       Lab Results   Component Value Date    CHOL 117 (L) 02/28/2024    HDL 45 02/28/2024    LDLCALC 46.2 (L) 02/28/2024    TRIG 129 02/28/2024       Lab Results   Component Value Date    BNP 40 03/27/2023       Assessment:     1. Abnormal cardiovascular stress test    2. Coronary artery disease involving native coronary artery of native heart without angina pectoris    3. History of coronary artery stent placement    4. Mixed hyperlipidemia    5. 1st degree AV block    6. Heart failure with mid-range ejection fraction (HFmEF)    7. Other cardiomyopathies        Plan:   CAD, abnormal CV stress test  -patient with noted infrequent chest pressure, new onset LE swelling, MPI with concerning for reversible ischemia  -given chest pressure, worsening EF, MPI, plan for LHC as an outpatient  -continue assa, will restart plavix    2. HLD  -well controlled, goal LDLc < 70  -continue lipitor    3. HFmEF  -historically has not tolerated BB, ACE/ARB due to hypotension  -/83 today, will trial low dose BB  -will start patient on low dose BB and spirinolactone  -continue valsartan    -Will continue GDMT, medication regimen as above  -Plan for LHC as an outpatient         Orders Placed This Encounter   Procedures    Case Request-Cath Lab: Left heart cath     Standing Status:   Standing     Number of Occurrences:   1     Order Specific Question:   CPT Code:     Answer:   DC CATH PLACE/CORONARY ANGIO, IMG SUPER/INTERP [[id number]]     Order Specific Question:   Medical Necessity:     Answer:   Medically Non-Urgent [100]     Order Specific Question:   Is an on-site  pathologist required for this procedure?     Answer:   N/A       He expressed verbal understanding and agreed with the plan    Pertinent cardiac images and EKG reviewed independently.    Continue with current medical plan and lifestyle changes.  Return sooner for concerns or questions. If symptoms persist go to the ED.  I have reviewed all pertinent data including patient's medical history in detail and updated the computerized patient record.     Total duration of face to face visit time 30 minutes.  Total time spent counseling greater than fifty percent of total visit time.  Counseling included discussion regarding imaging findings, diagnosis, possibilities, treatment options, risks and benefits.    Thank you for the opportunity to care for this patient. Will be available for questions if needed.     Discussed with Dr. Hinton . RTC in 6 months.      Signed:  Jose Lynn DNP  03/04/2024

## 2024-03-12 DIAGNOSIS — M47.26 OSTEOARTHRITIS OF SPINE WITH RADICULOPATHY, LUMBAR REGION: ICD-10-CM

## 2024-03-12 DIAGNOSIS — M48.061 NEUROFORAMINAL STENOSIS OF LUMBAR SPINE: ICD-10-CM

## 2024-03-12 RX ORDER — PREGABALIN 150 MG/1
150 CAPSULE ORAL 2 TIMES DAILY
Qty: 60 CAPSULE | Refills: 5 | OUTPATIENT
Start: 2024-03-12 | End: 2024-09-10

## 2024-03-12 NOTE — TELEPHONE ENCOUNTER
No care due was identified.  [facility] Embedded Care Due Messages. Reference number: [id number].   3/12/2024 11:46:29 AM CDT

## 2024-03-14 ENCOUNTER — TELEPHONE (OUTPATIENT)
Dept: PODIATRY | Facility: CLINIC | Age: 69
End: 2024-03-14
Payer: MEDICARE

## 2024-03-14 ENCOUNTER — PATIENT MESSAGE (OUTPATIENT)
Dept: CARDIOLOGY | Facility: CLINIC | Age: 69
End: 2024-03-14
Payer: MEDICARE

## 2024-03-14 NOTE — TELEPHONE ENCOUNTER
Contacted pt to assist in scheduling an appointment with Dr. Ramírez for bilateral ingrown of great toe.  Pt did not answer call, left vm to contact Gail at [phone] to assist in scheduling.

## 2024-03-18 ENCOUNTER — OFFICE VISIT (OUTPATIENT)
Dept: PAIN MEDICINE | Facility: CLINIC | Age: 69
End: 2024-03-18
Payer: MEDICARE

## 2024-03-18 VITALS
BODY MASS INDEX: 32.14 KG/M2 | HEIGHT: 68 IN | DIASTOLIC BLOOD PRESSURE: 67 MMHG | SYSTOLIC BLOOD PRESSURE: 118 MMHG | WEIGHT: 212.06 LBS | HEART RATE: 74 BPM

## 2024-03-18 DIAGNOSIS — M47.26 OSTEOARTHRITIS OF SPINE WITH RADICULOPATHY, LUMBAR REGION: ICD-10-CM

## 2024-03-18 DIAGNOSIS — M48.061 NEUROFORAMINAL STENOSIS OF LUMBAR SPINE: Primary | ICD-10-CM

## 2024-03-18 DIAGNOSIS — M54.16 LUMBAR RADICULOPATHY: ICD-10-CM

## 2024-03-18 DIAGNOSIS — G89.4 CHRONIC PAIN SYNDROME: ICD-10-CM

## 2024-03-18 PROCEDURE — 1159F MED LIST DOCD IN RCRD: CPT | Mod: CPTII,S$GLB,, | Performed by: NURSE PRACTITIONER

## 2024-03-18 PROCEDURE — 1101F PT FALLS ASSESS-DOCD LE1/YR: CPT | Mod: CPTII,S$GLB,, | Performed by: NURSE PRACTITIONER

## 2024-03-18 PROCEDURE — 3078F DIAST BP <80 MM HG: CPT | Mod: CPTII,S$GLB,, | Performed by: NURSE PRACTITIONER

## 2024-03-18 PROCEDURE — 99999 PR PBB SHADOW E&M-EST. PATIENT-LVL V: CPT | Mod: PBBFAC,,, | Performed by: NURSE PRACTITIONER

## 2024-03-18 PROCEDURE — 3008F BODY MASS INDEX DOCD: CPT | Mod: CPTII,S$GLB,, | Performed by: NURSE PRACTITIONER

## 2024-03-18 PROCEDURE — 4010F ACE/ARB THERAPY RXD/TAKEN: CPT | Mod: CPTII,S$GLB,, | Performed by: NURSE PRACTITIONER

## 2024-03-18 PROCEDURE — 3288F FALL RISK ASSESSMENT DOCD: CPT | Mod: CPTII,S$GLB,, | Performed by: NURSE PRACTITIONER

## 2024-03-18 PROCEDURE — 99214 OFFICE O/P EST MOD 30 MIN: CPT | Mod: S$GLB,,, | Performed by: NURSE PRACTITIONER

## 2024-03-18 PROCEDURE — 3074F SYST BP LT 130 MM HG: CPT | Mod: CPTII,S$GLB,, | Performed by: NURSE PRACTITIONER

## 2024-03-18 PROCEDURE — 1125F AMNT PAIN NOTED PAIN PRSNT: CPT | Mod: CPTII,S$GLB,, | Performed by: NURSE PRACTITIONER

## 2024-03-18 NOTE — PROGRESS NOTES
Chronic patient Established Note (Follow up visit)    Interval update 03/18/24:  60-year-old male presents for a follow-up appointment he is status post a left L3/4 and L4/5 TF CHANO on 02/26/2024 reporting 75- 80% relief for 2 days and then mainly numbness returned.  His pain score today is 2/10 he primarily feels numbness in the left leg versus pain.  He does have a history of multilevel neural foraminal narrowing ranging from L3 through S1 worse at L4-5.  His wife mentioned during our clinic visit that she notices a limp when he walks in can certainly see changes in his gait. Mr. Osorio denies any profound weakness any recent falls denies any bowel bladder dysfunction at this time.        Interval history 02/06/2024-  Pleasant 68-year-old male that presents today with his wife, he is complaining of returning left leg numbness onset 2-3 weeks, numbness presents in the left leg down to the ankle, patient acknowledges intermittent weakness his wife commented on him losing his balance at times.  He is currently taking Lyrica per his PCP/Dr. Ayala which is helping current prescription consists of Lyrica 150 mg b.i.d. however patient acknowledges sometimes he only takes 1 pill because it causes somnolence.  Discussed that he would take a pill right after  dinner and then 1 before bedtime patient verbalized understanding.  Discussed I will update his lumbar MRI as his previous lumbar MRI was in 2020 to rule out progressive pathology I would like to also schedule him for the epidural due to the time lapse in scheduling discussed I will call with lumbar MRI results once I receive the images.  Patient denies any bowel or bladder dysfunction, denies any saddle anesthesia denies any profound weakness.    Interval History   01/12/20232020-18-tevt-old male presents today for follow-up appoint he was referred to physical therapy per Dr. Lin on his last clinic visit he reports having attended x2.  He does report relief his  "pain score today is 2/10.  Riley also optimize his Lyrica dose he states he received the prescription just recently and will begin this new prescription of Lyrica 150 mg b.i.d..        12/15/2022:    Patient presents for follow up of his chronic low back pain and left leg numbness.  Patient had an L3/4 and L4/5 TFESI on June 2021 and he reports 85% relief for over a year.  He states the pain starting coming back 4 months after the procedure.  He reports his current pain is 2/10, however his leg numbness is severe.  He is requesting a referral to Physical Therapy as he thinks the therapy was the most helpful in the past.  Patient denies fever/weight loss/incontinence/progressive weakness.     9/20/21- Mr. Osorio presents via tele med visit he has a hx of clbp with bilateral anterior/lateral thigh numbness, he denies and "pain" he is reporting mild improvement with his increased lyrica dose (75 mg BID) he denies any adverse SEs, he endorses returning to PT. He denies any LBP at this point. Denies an B/B dysfunction, denies any profound weakness or inciting incident/trauma since our last CV.      8/5/2021- Mr. Osorio present virtually for a f/u appt to discuss effectiveness of Lyrica and overall pain level following injection in June. He continues to report bilateral anterior/lateral thigh numbness, he denies and "pain" switched him to Lyrica from Gabapentin no adverse SEs reported, patient reports Lyrica is helping.      7/6/21 - Mr. Osorio returns to clinic for follow up visit reporting improved low back pain.  Patient is s/p  - Left L3-4 and Left L4-5 TFESI on 6/15/21 with 85% continued relief.  Pain intensity is currently 6/10.   He reports continuing with PT x2 per week that has been effective.      HPI:   Masoud Osorio is a 66 y.o. male with CLBP for many years and a more recent Hx of L1 compression fracture last year and newer onset shooting pains traveling into the lateral/anterior thigh and down the " front of the leg.  Radicular symptoms started spontaneously one night and he woke up with a numb, tingling left leg.  This was not at the same time as the compression fracture, which was caused by falling off of a ladder.  He has been in PT and is taking gabapentin both of which help, but he has been unable to take larger or more frequent doses of gabapentin due to sedation. + Grocery cart sign.     Location: low back   Onset: about a year  Current Pain Score: 4/10  Daily Pain of Range: 5-8/10  Quality: Aching, Throbbing and Numb  Radiation: down left leg  Worsened by: lifting and standing for more than 20 minutes  Improved by: heat, medications and physical therapy     Previous Therapies:  PT/OT: Yes, current.  Helped but pain still persists.  HEP: Yes, daily.  Interventions:   - 02/26/2024 Left  L3/4 and L4/5 Lumbar TFESI 75-80% relief for x2 days    06/15/2021 Lumbar Transforaminal Epidural Steroid Injection, Two Levels    - Left L3-4- Left L4-5                                                                Surgery: Denies back surgery  Medications:   - NSAIDS:   - MSK Relaxants:   - TCAs:   - SNRIs:   - Topicals:   - Anticonvulsants:   - Opioids:      Current Pain Medications:  Lyrica 75 mg BID daily    Pain Disability Index Review:      2/6/2024    10:15 AM 1/12/2023     1:19 PM 12/15/2022     2:18 PM   Last 3 PDI Scores   Pain Disability Index (PDI) 16 19 21       Pain Medications:  Lyrica 75mg BID    Opioid Contract: not applicable     report:  Not applicable    Pain Procedures:     June 2021: Left L3/4 and L4/5 TFESI - 85% relief for 4 months    Physical Therapy/Home Exercise: yes    Imaging:     EXAMINATION:  MRI LUMBAR SPINE WITHOUT CONTRAST     CLINICAL HISTORY:  acute left leg parethesias; Wedge compression fracture of first lumbar vertebra, subsequent encounter for fracture with routine healing     TECHNIQUE:  Multiplanar, multisequence MR images were acquired from the thoracolumbar junction to the  sacrum without contrast.     COMPARISON:  Thoracolumbar radiograph 02/26/2020     FINDINGS:  EXAMINATION:  MRI LUMBAR SPINE WITHOUT CONTRAST     CLINICAL HISTORY:  Lumbar radiculopathy, symptoms persist with conservative treatment; Radiculopathy, lumbar region     TECHNIQUE:  Multiplanar, multisequence MR images were acquired from the thoracolumbar junction to the sacrum without the administration of contrast.     COMPARISON:  12/18/2020     FINDINGS:  Alignment: Anterolisthesis of T12 on L1 with chronic retropulsion of the posterosuperior aspect of L1.     Vertebrae: 5 lumbar-type vertebral bodies. No aggressive marrow replacement process or fracture.Nobone marrow edema .  Chronic compression deformity at the superior cortical endplate L1 unchanged from 12/18/2020.     Discs: Multilevel disc space narrowing and dessication with desiccation of disc material and broad-based bulging of disc material.     Cord: Normal. Conus terminates at L1.     Degenerative findings:     T12-L1: Chronic compression deformity with broad-based bulging of disc material effaces the anterior thecal sac margins.  Mild-moderate central canal narrowing . No significant neural foraminal narrowing.     L1-L2: Mild broad-based bulging of disc material.  Mild stable central canal narrowing . No significant neural foraminal narrowing.     L2-L3: Broad-based disc bulge, facet degenerative change and ligamentum flavum thickening which contribute to  moderate central canal narrowing . Mild bilateral neural foraminal narrowing.     L3-L4: Broad-based disc bulge, facet degenerative change and ligamentum flavum thickening which contribute to  moderate central canal narrowing . Mild bilateral neural foraminal narrowing.     L4-L5: Broad-based disc bulge, facet degenerative change and ligamentum flavum thickening which contribute to  moderate central canal narrowing . Mild RIGHT moderate LEFT neural foraminal narrowing.     L5-S1: Broad-based bulging of  disc material.  Mild central canal narrowing . Moderate RIGHT moderate-severe LEFT neural foraminal narrowing.     Paraspinal muscles & soft tissues: Bilateral parapelvic cysts.     Impression:     Chronic L1 compression deformity, with resultant mild-moderate central canal narrowing, stable.     Multilevel degenerative changes as detailed above, not significantly changed from 12/19/2020.        Impression:     1. Chronic L1 compression fracture with roughly 75% loss of central vertebral body height.  Posterosuperior fracture retropulsion resulting in moderate spinal canal stenosis.  Abnormal increased signal within the distal cord and conus concerning for myelomalacia or edema.  2. Multilevel degenerative changes of the lumbar spine are most significant at L4-L5 with moderate to severe spinal canal stenosis, mild-to-moderate right and moderate to severe left-sided neural foraminal stenosis.  3. Moderate to severe L2-L3 spinal canal stenosis with mild-to-moderate right and mild left-sided neural foraminal stenosis.  4. Moderate L3-L4 spinal canal stenosis with moderate right and mild left-sided neural foraminal stenosis.  5. Additional multilevel degenerative changes as detailed in the body of the report  This report was flagged in Epic as abnormal.        Electronically signed by: Wilner Gordon  Date:                                            12/19/2020  Time:                                           10:29    Allergies:   Review of patient's allergies indicates:   Allergen Reactions    Penicillins Hives       Current Medications:   Current Outpatient Medications   Medication Sig Dispense Refill    aspirin (ECOTRIN) 81 MG EC tablet Take 1 tablet (81 mg total) by mouth once daily. 90 tablet 4    atorvastatin (LIPITOR) 80 MG tablet Take 1 tablet (80 mg total) by mouth once daily. 90 tablet 3    azelastine (ASTELIN) 137 mcg (0.1 %) nasal spray Spray 2 sprays in each nostril twice daily 30 mL 5    BLOOD PRESSURE  CUFF Misc 1 Units by Misc.(Non-Drug; Combo Route) route once daily.  0    ciprofloxacin HCl (CIPRO) 500 MG tablet Take 1 tablet by mouth twice daily 14 tablet 0    clopidogreL (PLAVIX) 75 mg tablet Take 1 tablet (75 mg total) by mouth once daily. 30 tablet 11    diazePAM (VALIUM) 5 MG tablet Take 0.5 tablet by mouth every 8 to 12 hours as needed. 24 tablet 0    diazePAM (VALIUM) 5 MG tablet Take 1/2 -1 tablet by mouth every 8 hours. 30 tablet 0    doxycycline (MONODOX) 100 MG capsule Take 1 capsule (100 mg total) by mouth 2 (two) times daily. Take with food and water. Remain upright for 1 hour after taking. 14 capsule 0    GARLIC EXTRACT ORAL Take by mouth.      hydrOXYzine HCL (ATARAX) 25 MG tablet Take 0.5 to 1 tablet by mouth at bedtime 30 tablet 3    hydrOXYzine HCL (ATARAX) 25 MG tablet Take 1/2 to 1 tablet by mouth at bedtime. 30 tablet 3    ipratropium (ATROVENT) 0.06 % nasal spray INSTILL 2 SPRAYS IN EACH NOSTRIL EVERY 12 HOURS  5    ipratropium (ATROVENT) 21 mcg (0.03 %) nasal spray       metoprolol succinate (TOPROL-XL) 25 MG 24 hr tablet Take 0.5 tablets (12.5 mg total) by mouth once daily. 15 tablet 11    multivitamin capsule Take 1 capsule by mouth once daily.      nitroGLYCERIN (NITROSTAT) 0.4 MG SL tablet Place 1 tablet (0.4 mg total) under the tongue every 5 (five) minutes as needed for Chest pain. 25 tablet 11    predniSONE (DELTASONE) 20 MG tablet Take 2 tablets by mouth on day 1, THEN 1 ½ tablets on days 2 and 3, THEN 1 tablet on days 4 and 5, THEN ½ tablet on day 6 8 tablet 0    pregabalin (LYRICA) 150 MG capsule Take 1 capsule (150 mg total) by mouth 2 (two) times daily. 60 capsule 5    sildenafil (REVATIO) 20 mg Tab Take 1 tablet (20 mg total) by mouth once daily. 30 tablet 11    spironolactone (ALDACTONE) 25 MG tablet Take 0.5 tablets (12.5 mg total) by mouth once daily. 15 tablet 11    tamsulosin (FLOMAX) 0.4 mg Cap Take 2 capsules (0.8 mg total) by mouth once daily. 180 capsule 3     triamcinolone (NASACORT) 55 mcg nasal inhaler Instill two sprays in each nostril once daily 16.9 mL 5    valsartan (DIOVAN) 40 MG tablet Take 1 tablet (40 mg total) by mouth once daily. 90 tablet 3     Current Facility-Administered Medications   Medication Dose Route Frequency Provider Last Rate Last Admin    sodium chloride 0.9% flush 10 mL  10 mL Intravenous PRN Cleveland Hinton MD           REVIEW OF SYSTEMS:    GENERAL:  No weight loss, malaise or fevers.  HEENT:  Negative for frequent or significant headaches.  NECK:  Negative for lumps, goiter, pain and significant neck swelling.  RESPIRATORY:  Negative for cough, wheezing or shortness of breath.  CARDIOVASCULAR:  Negative for chest pain, leg swelling or palpitations.  GI:  Negative for abdominal discomfort, blood in stools or black stools or change in bowel habits.  MUSCULOSKELETAL:  See HPI.  SKIN:  Negative for lesions, rash, and itching.  PSYCH:  +ve for sleep disturbance, mood disorder and recent psychosocial stressors.  HEMATOLOGY/LYMPHOLOGY:  Negative for prolonged bleeding, bruising easily or swollen nodes.  NEURO:   No history of headaches, syncope, paralysis, seizures or tremors.  All other reviewed and negative other than HPI.    Past Medical History:  Past Medical History:   Diagnosis Date    Allergy     Anticoagulant long-term use     Anxiety and depression 09/21/2016    trial of ctialopram and follow up in 8 weeks    Arthritis     Benign prostatic hyperplasia (BPH) with straining on urination 12/05/2018    Coronary artery disease involving native coronary artery of native heart without angina pectoris 07/26/2016 7/26/2016 NSTEMI at [facility]   PCI of RCA with 4.0 x 15, 4.0 x 38, and 3.5 x 38 mm Resolute JACOB   PCI of PDA 2.75 x 15 mm JACOB dilated to 3.0 POBA of proximal PLB 2.0 x 12 mm balloon   Normal EF with LVEDP 15 mmHg DAPT score of 2               Elevated PSA     Gout     History of coronary artery stent placement 09/21/2016    History  of heart attack 2016    NSTEMI    Mixed hyperlipidemia 2017    Overweight (BMI 25.0-29.9) 2016    Tobacco dependence in remission 2016       Past Surgical History:  Past Surgical History:   Procedure Laterality Date    COLONOSCOPY N/A 2017    Procedure: COLONOSCOPY Split Prep;  Surgeon: Maykel Carcamo Jr., MD;  Location: [facility] ENDO;  Service: Endoscopy;  Laterality: N/A;    COLONOSCOPY N/A 2022    Procedure: COLONOSCOPY Golytely;  Surgeon: Henrry Nieves MD;  Location: [facility] ENDO;  Service: Endoscopy;  Laterality: N/A;  C    CORONARY ANGIOPLASTY WITH STENT PLACEMENT      EPIDURAL STEROID INJECTION INTO LUMBAR SPINE Left 2024    Procedure: LT L3-4 and L4-5 TFESI;  Surgeon: Rosita Blum DO;  [facility];  Service: Pain Management;  Laterality: Left;  20 mins    ROTATOR CUFF REPAIR      TRANSFORAMINAL EPIDURAL INJECTION OF STEROID Left 06/15/2021    Procedure: Injection,steroid,epidural,transforaminal approach-- Left- L3-4, L4-5;  Surgeon: Enrike Medellin Jr., MD;  Location: [facility] PAIN MGT;  Service: Pain Management;  Laterality: Left;    VASECTOMY         Family History:  Family History   Problem Relation Age of Onset    Heart disease Mother     Arthritis Mother     Parkinsonism Mother     Congenital heart disease Father     No Known Problems Sister     No Known Problems Brother     No Known Problems Daughter     No Known Problems Sister     No Known Problems Sister     Prostate cancer Neg Hx     Kidney disease Neg Hx        Social History:  Social History     Socioeconomic History    Marital status:    Tobacco Use    Smoking status: Former     Current packs/day: 0.00     Types: Cigarettes     Quit date: 2016     Years since quittin.6    Smokeless tobacco: Never   Substance and Sexual Activity    Alcohol use: Yes     Comment: rarely on holidays    Drug use: No    Sexual activity: Yes     Social Determinants of Health  "    Financial Resource Strain: Low Risk  (5/9/2023)    Overall Financial Resource Strain (CARDIA)     Difficulty of Paying Living Expenses: Not hard at all   Food Insecurity: No Food Insecurity (5/9/2023)    Hunger Vital Sign     Worried About Running Out of Food in the Last Year: Never true     Ran Out of Food in the Last Year: Never true   Transportation Needs: No Transportation Needs (5/9/2023)    PRAPARE - Transportation     Lack of Transportation (Medical): No     Lack of Transportation (Non-Medical): No   Physical Activity: Insufficiently Active (5/9/2023)    Exercise Vital Sign     Days of Exercise per Week: 2 days     Minutes of Exercise per Session: 20 min   Stress: No Stress Concern Present (5/9/2023)    [place] [employer] - Occupational Stress Questionnaire     Feeling of Stress : Only a little   Social Connections: Unknown (5/9/2023)    Social Connection and Isolation Panel [NHANES]     Frequency of Communication with Friends and Family: More than three times a week     Frequency of Social Gatherings with Friends and Family: Once a week     Active Member of Clubs or Organizations: Yes     Attends Club or Organization Meetings: 1 to 4 times per year     Marital Status:    Housing Stability: Low Risk  (5/9/2023)    Housing Stability Vital Sign     Unable to Pay for Housing in the Last Year: No     Number of Places Lived in the Last Year: 1     Unstable Housing in the Last Year: No       OBJECTIVE:    /67   Pulse 74   Ht 5' 8" (1.727 m)   Wt 96.2 kg (212 lb 1.3 oz)   BMI 32.25 kg/m²     PHYSICAL EXAMINATION:      General Musculoskeletal Exam   Gait: antalgic     Back (L-Spine & T-Spine) / Neck (C-Spine) Exam     Tenderness Posterior midline palpation reveals tenderness of the Sacrum, Lower L-Spine and Upper L-Spine. Right paramedian tenderness of the Lower L-Spine. Left paramedian tenderness of the Lower L-Spine.     Back (L-Spine & T-Spine) Range of Motion   Lateral " bend right:  normal   Lateral bend left:  abnormal   Rotation right:  normal   Rotation left:  abnormal     Comments:  Positive straight leg raise left       Muscle Strength   Right Lower Extremity   Hip Abduction: 5/5   Hip Extensors: 5/5  Quadriceps:  5/5   Anterior tibial:  5/5   Gastrocsoleus:  5/5   Left Lower Extremity   Hip Abduction: 5/5   Hip Extensors: 5/5  Quadriceps:  5/5   Anterior tibial:  5/5   Gastrocsoleus:  5/5     ASSESSMENT: 68 y.o. year old male with low back pain and leg numbness, consistent with the following     1. Neuroforaminal stenosis of lumbar spine  Ambulatory referral/consult to Neurosurgery    Ambulatory referral/consult to Physical/Occupational Therapy      2. Lumbar radiculopathy  Ambulatory referral/consult to Neurosurgery    Ambulatory referral/consult to Physical/Occupational Therapy      3. Chronic pain syndrome        4. Osteoarthritis of spine with radiculopathy, lumbar region                  PLAN:   -no procedures recommended at this time  -consider repeating left L3/4 and L4/5 TF CHANO or consider a caudal CHANO  -new referral to physical therapy today  - I have stressed the importance of physical activity and a home exercise plan to help with pain and improve health.  - Patient can continue with medications for now since they are providing benefits, using them appropriately, and without side effects.( Lyrica 150 BID)   - new referral to Neurosurgery for further assessment  - Counseled patient regarding the importance of activity modification, constant sleeping habits, and physical therapy.  RTC -2-3 months or sooner if needed    The above plan and management options were discussed at length with patient. Patient is in agreement with the above and verbalized understanding.    ABDIRASHID Blankenship  Interventional Pain Management      03/18/2024

## 2024-03-19 ENCOUNTER — TELEPHONE (OUTPATIENT)
Dept: NEUROSURGERY | Facility: CLINIC | Age: 69
End: 2024-03-19
Payer: MEDICARE

## 2024-03-19 ENCOUNTER — HOSPITAL ENCOUNTER (OUTPATIENT)
Facility: HOSPITAL | Age: 69
Discharge: HOME | End: 2024-03-19
Attending: INTERNAL MEDICINE | Admitting: INTERNAL MEDICINE
Payer: MEDICARE

## 2024-03-19 DIAGNOSIS — R94.39 ABNORMAL CARDIOVASCULAR STRESS TEST: ICD-10-CM

## 2024-03-19 DIAGNOSIS — Z01.818 PRE-OP EVALUATION: ICD-10-CM

## 2024-03-19 DIAGNOSIS — I25.10 CORONARY ARTERY DISEASE INVOLVING NATIVE CORONARY ARTERY OF NATIVE HEART WITHOUT ANGINA PECTORIS: ICD-10-CM

## 2024-03-19 DIAGNOSIS — M54.16 LUMBAR RADICULOPATHY: Primary | ICD-10-CM

## 2024-03-19 DIAGNOSIS — I42.8 OTHER CARDIOMYOPATHY: ICD-10-CM

## 2024-03-19 DIAGNOSIS — Z01.810 PRE-OPERATIVE CARDIOVASCULAR EXAMINATION: Primary | ICD-10-CM

## 2024-03-19 LAB
OHS QRS DURATION: 92 MS
OHS QTC CALCULATION: 412 MS

## 2024-03-19 PROCEDURE — 25500020 PHARM REV CODE 255: Performed by: INTERNAL MEDICINE

## 2024-03-19 PROCEDURE — C1894 INTRO/SHEATH, NON-LASER: HCPCS | Performed by: INTERNAL MEDICINE

## 2024-03-19 PROCEDURE — 93458 L HRT ARTERY/VENTRICLE ANGIO: CPT | Performed by: INTERNAL MEDICINE

## 2024-03-19 PROCEDURE — 93458 L HRT ARTERY/VENTRICLE ANGIO: CPT | Mod: 26,,, | Performed by: INTERNAL MEDICINE

## 2024-03-19 PROCEDURE — C1769 GUIDE WIRE: HCPCS | Performed by: INTERNAL MEDICINE

## 2024-03-19 PROCEDURE — 93005 ELECTROCARDIOGRAM TRACING: CPT

## 2024-03-19 PROCEDURE — C1887 CATHETER, GUIDING: HCPCS | Performed by: INTERNAL MEDICINE

## 2024-03-19 PROCEDURE — 99152 MOD SED SAME PHYS/QHP 5/>YRS: CPT | Performed by: INTERNAL MEDICINE

## 2024-03-19 PROCEDURE — 63600175 PHARM REV CODE 636 W HCPCS: Performed by: INTERNAL MEDICINE

## 2024-03-19 PROCEDURE — 99152 MOD SED SAME PHYS/QHP 5/>YRS: CPT | Mod: ,,, | Performed by: INTERNAL MEDICINE

## 2024-03-19 PROCEDURE — 99153 MOD SED SAME PHYS/QHP EA: CPT | Performed by: INTERNAL MEDICINE

## 2024-03-19 PROCEDURE — 25000003 PHARM REV CODE 250: Performed by: INTERNAL MEDICINE

## 2024-03-19 PROCEDURE — 93010 ELECTROCARDIOGRAM REPORT: CPT | Mod: ,,, | Performed by: INTERNAL MEDICINE

## 2024-03-19 RX ORDER — FENTANYL CITRATE 50 UG/ML
INJECTION, SOLUTION INTRAMUSCULAR; INTRAVENOUS
Status: DISCONTINUED | OUTPATIENT
Start: 2024-03-19 | End: 2024-03-19 | Stop reason: HOSPADM

## 2024-03-19 RX ORDER — LIDOCAINE HYDROCHLORIDE 10 MG/ML
INJECTION, SOLUTION EPIDURAL; INFILTRATION; INTRACAUDAL; PERINEURAL
Status: DISCONTINUED | OUTPATIENT
Start: 2024-03-19 | End: 2024-03-19 | Stop reason: HOSPADM

## 2024-03-19 RX ORDER — DIPHENHYDRAMINE HCL 25 MG
50 CAPSULE ORAL ONCE
Status: DISCONTINUED | OUTPATIENT
Start: 2024-03-19 | End: 2024-03-19 | Stop reason: HOSPADM

## 2024-03-19 RX ORDER — HEPARIN SODIUM 200 [USP'U]/100ML
INJECTION, SOLUTION INTRAVENOUS
Status: DISCONTINUED | OUTPATIENT
Start: 2024-03-19 | End: 2024-03-19 | Stop reason: HOSPADM

## 2024-03-19 RX ORDER — MIDAZOLAM HYDROCHLORIDE 1 MG/ML
INJECTION, SOLUTION INTRAMUSCULAR; INTRAVENOUS
Status: DISCONTINUED | OUTPATIENT
Start: 2024-03-19 | End: 2024-03-19 | Stop reason: HOSPADM

## 2024-03-19 RX ORDER — HEPARIN SODIUM 1000 [USP'U]/ML
INJECTION, SOLUTION INTRAVENOUS; SUBCUTANEOUS
Status: DISCONTINUED | OUTPATIENT
Start: 2024-03-19 | End: 2024-03-19 | Stop reason: HOSPADM

## 2024-03-19 RX ORDER — VERAPAMIL HYDROCHLORIDE 2.5 MG/ML
INJECTION, SOLUTION INTRAVENOUS
Status: DISCONTINUED | OUTPATIENT
Start: 2024-03-19 | End: 2024-03-19 | Stop reason: HOSPADM

## 2024-03-19 RX ORDER — IODIXANOL 320 MG/ML
INJECTION, SOLUTION INTRAVASCULAR
Status: DISCONTINUED | OUTPATIENT
Start: 2024-03-19 | End: 2024-03-19 | Stop reason: HOSPADM

## 2024-03-19 RX ORDER — SODIUM CHLORIDE 9 MG/ML
INJECTION, SOLUTION INTRAVENOUS CONTINUOUS
Status: ACTIVE | OUTPATIENT
Start: 2024-03-19 | End: 2024-03-19

## 2024-03-19 RX ADMIN — SODIUM CHLORIDE: 0.9 INJECTION, SOLUTION INTRAVENOUS at 09:03

## 2024-03-19 NOTE — PLAN OF CARE
Remaining air removed from R radial vasc band. Gauze and tegaderm dressing applied. Site is CDI. No bleeding or hematoma noted around site. Site and surrounding areas soft. +2 lulu radial pulses palpated. Skin normal in color, warm to touch, < 3 sec cap refill. Will continue to monitor pt.

## 2024-03-19 NOTE — PLAN OF CARE
2 cc of air removed from R radial vasc band. No hematoma or bleeding noted. +2 lulu radial pulses palpated. Skin is normal in color, warm to touch, < 3 sec cap refill. VSS. Will continue to monitor pt for safety and needs.

## 2024-03-19 NOTE — Clinical Note
The catheter was inserted into the ostium   right coronary artery. An angiography was performed of the LCx. Multiple views were taken. The angiography was performed via hand injection with 5 mL of contrast.

## 2024-03-19 NOTE — Clinical Note
The catheter was repositioned into the ostium   right coronary artery. An angiography was performed of the LCx. The angiography was performed via hand injection with 5 mL of contrast.  Pictures of LCx

## 2024-03-19 NOTE — Clinical Note
The catheter was inserted into the and insertion attempt was made into the ostium   right coronary artery. An angiography was performed of the right coronary arteries. Multiple views were taken. The angiography was performed via hand injection with 5 mL of contrast.

## 2024-03-19 NOTE — BRIEF OP NOTE
[facility] - Cath Lab (Hospital)  Discharge Note  Short Stay    Procedure(s) (LRB):  Left heart cath (Left)  ANGIOGRAM, CORONARY ARTERY (N/A)  Placement of Closure Device      OUTCOME: Patient tolerated treatment/procedure well without complication and is now ready for discharge.    DISPOSITION: Home or Self Care    FINAL DIAGNOSIS:  <principal problem not specified>    FOLLOWUP: In clinic    DISCHARGE INSTRUCTIONS:    Discharge Procedure Orders   CBC W/ AUTO DIFFERENTIAL   Standing Status: Future Number of Occurrences: 1 Standing Exp. Date: 06/03/25     BASIC METABOLIC PANEL   Standing Status: Future Number of Occurrences: 1 Standing Exp. Date: 06/03/25         Clinical Reference Documents Added to Patient Instructions         Document    CARDIAC CATHETERIZATION DISCHARGE INSTRUCTIONS (ENGLISH)            TIME SPENT ON DISCHARGE: 30 minutes

## 2024-03-19 NOTE — Clinical Note
The catheter was inserted into the ostium   left main. An angiography was performed of the left coronary arteries. Multiple views were taken. The angiography was performed via hand injection with 24 mL of contrast.  117/61 (87)

## 2024-03-19 NOTE — PLAN OF CARE
Patient transfered to cath lab bay # 5 via stretcher with side rails up x2. Pt drowsy but / AAOx4 and able to follow commands. Pt is stable when connecting to cardiac monitors. VSS.   Right radial vasc band in place with 13 cc of air in band. Site is CDI. No redness, bruising, or hematoma noted around site. +2 lulu radial pulses palpated. Palpated lulu pedal pulses. Skin normal in color and warm to touch, <3 sec cap refill.  Fall risk precautions given and patient acknowledges. AIDET completed to pt.  Will continue to monitor patient.

## 2024-03-19 NOTE — DISCHARGE INSTRUCTIONS
Discharge Instructions:     Do not drive a car, operate heavy equipment, care for a young child, etc for the next 12-24 hours.   Avoid drinking alcohol for 24 hours.  Do not make any important decisions for 24 hours.     Drink fluids to keep hydrated. Resume your usual diet as tolerated.      Rest for today then activity as tolerated.   Do not lift anything over 5 pounds for the first 3 days after procedure.     Remove dressing tomorrow after 2 pm then may shower with warm soapy water. Do not scrub site. Pat dry.   May apply bandaid for 2 days.  No tub baths.  Do not submerge wound in water for 3 days.      Call MD for any unrelieved pain, excessive nausea or vomiting, redness around site, bleeding, or pus or foul smelling drainage, or any other questions or concerns.     Go to the ER for any difficulty breathing or chest pain.        If site swells or bleeds, hold direct pressure to area for 10 full minutes.   If site continues to bleed, continue to hold pressure to site and have someone bring you to the ER.       Follow any additional instructions given to you by MD.      Call MD to schedule a follow up appointment, or follow up as instructed.         Last Modified by Laurel Aguilar RN at 6/1/22 [id number]

## 2024-03-19 NOTE — PLAN OF CARE
Patient discharged to home as ordered after 2 hour recovery per Dr. Hinton. Pt is AAOx4, VSS, denies any pain.  All discharge instructions and printed materials reviewed and given to patient.   Patient instructed on follow-up appointment as ordered.   Patient verbalized understanding and agreement with all discharge instructions given.     R wrist gauze and tegaderm dressing c.d.i. No bleeding or hematoma noted around site. Site and surrounding area is soft.    Palpated +2 lulu radial pulses. palpated lulu pedal pulses.     Pt voided without difficulty prior to discharge. Pt ate lunch tray. No n/v.   Pt got dressed and moving around without difficulty and no distress noted.  Pt in w/c. Left hand 20 gauge iv dc'd as ordered with tip intact. Gauze and koban dressing applied c.d.i.  Pt discharged home via wheelchair to private vehicle by pt's wife.

## 2024-03-20 VITALS
OXYGEN SATURATION: 97 % | BODY MASS INDEX: 32.13 KG/M2 | RESPIRATION RATE: 20 BRPM | DIASTOLIC BLOOD PRESSURE: 81 MMHG | TEMPERATURE: 97 F | WEIGHT: 212 LBS | SYSTOLIC BLOOD PRESSURE: 135 MMHG | HEIGHT: 68 IN | HEART RATE: 60 BPM

## 2024-03-20 LAB
OHS QRS DURATION: 86 MS
OHS QTC CALCULATION: 404 MS

## 2024-03-22 RX ORDER — METOPROLOL SUCCINATE 25 MG/1
12.5 TABLET, EXTENDED RELEASE ORAL DAILY
Qty: 15 TABLET | Refills: 11 | Status: SHIPPED | OUTPATIENT
Start: 2024-03-22 | End: 2025-03-22

## 2024-03-22 RX ORDER — SPIRONOLACTONE 25 MG/1
12.5 TABLET ORAL DAILY
Qty: 15 TABLET | Refills: 11 | Status: SHIPPED | OUTPATIENT
Start: 2024-03-22 | End: 2024-04-10

## 2024-04-09 DIAGNOSIS — R07.9 CHEST PAIN, UNSPECIFIED TYPE: Primary | ICD-10-CM

## 2024-04-10 ENCOUNTER — OFFICE VISIT (OUTPATIENT)
Dept: CARDIOLOGY | Facility: CLINIC | Age: 69
End: 2024-04-10
Payer: MEDICARE

## 2024-04-10 VITALS
BODY MASS INDEX: 31.98 KG/M2 | HEART RATE: 69 BPM | OXYGEN SATURATION: 98 % | WEIGHT: 211 LBS | DIASTOLIC BLOOD PRESSURE: 67 MMHG | HEIGHT: 68 IN | SYSTOLIC BLOOD PRESSURE: 117 MMHG

## 2024-04-10 DIAGNOSIS — F17.201 TOBACCO DEPENDENCE IN REMISSION: ICD-10-CM

## 2024-04-10 DIAGNOSIS — I50.22 HEART FAILURE WITH MID-RANGE EJECTION FRACTION (HFMEF): Primary | ICD-10-CM

## 2024-04-10 DIAGNOSIS — I25.10 CORONARY ARTERY DISEASE INVOLVING NATIVE CORONARY ARTERY OF NATIVE HEART WITHOUT ANGINA PECTORIS: ICD-10-CM

## 2024-04-10 DIAGNOSIS — E78.2 MIXED HYPERLIPIDEMIA: ICD-10-CM

## 2024-04-10 DIAGNOSIS — Z95.5 HISTORY OF CORONARY ARTERY STENT PLACEMENT: ICD-10-CM

## 2024-04-10 DIAGNOSIS — I25.2 HISTORY OF HEART ATTACK: ICD-10-CM

## 2024-04-10 PROCEDURE — 1159F MED LIST DOCD IN RCRD: CPT | Mod: CPTII,S$GLB,, | Performed by: INTERNAL MEDICINE

## 2024-04-10 PROCEDURE — 4010F ACE/ARB THERAPY RXD/TAKEN: CPT | Mod: CPTII,S$GLB,, | Performed by: INTERNAL MEDICINE

## 2024-04-10 PROCEDURE — 1101F PT FALLS ASSESS-DOCD LE1/YR: CPT | Mod: CPTII,S$GLB,, | Performed by: INTERNAL MEDICINE

## 2024-04-10 PROCEDURE — 3288F FALL RISK ASSESSMENT DOCD: CPT | Mod: CPTII,S$GLB,, | Performed by: INTERNAL MEDICINE

## 2024-04-10 PROCEDURE — 3008F BODY MASS INDEX DOCD: CPT | Mod: CPTII,S$GLB,, | Performed by: INTERNAL MEDICINE

## 2024-04-10 PROCEDURE — 99999 PR PBB SHADOW E&M-EST. PATIENT-LVL IV: CPT | Mod: PBBFAC,,, | Performed by: INTERNAL MEDICINE

## 2024-04-10 PROCEDURE — 99214 OFFICE O/P EST MOD 30 MIN: CPT | Mod: 25,S$GLB,, | Performed by: INTERNAL MEDICINE

## 2024-04-10 PROCEDURE — 3074F SYST BP LT 130 MM HG: CPT | Mod: CPTII,S$GLB,, | Performed by: INTERNAL MEDICINE

## 2024-04-10 PROCEDURE — 3078F DIAST BP <80 MM HG: CPT | Mod: CPTII,S$GLB,, | Performed by: INTERNAL MEDICINE

## 2024-04-10 RX ORDER — SACUBITRIL AND VALSARTAN 24; 26 MG/1; MG/1
1 TABLET, FILM COATED ORAL 2 TIMES DAILY
Qty: 60 TABLET | Refills: 11 | Status: SHIPPED | OUTPATIENT
Start: 2024-04-10 | End: 2025-04-10

## 2024-04-10 RX ORDER — SPIRONOLACTONE 25 MG/1
25 TABLET ORAL DAILY
Qty: 30 TABLET | Refills: 11 | Status: SHIPPED | OUTPATIENT
Start: 2024-04-10 | End: 2025-04-10

## 2024-04-10 NOTE — PROGRESS NOTES
Cardiology Clinic note    Subjective:   Patient ID:  Masoud Osorio is a 68 y.o. male who presents for follow-up of CAD    4/10/2024: 67 yo M with hx of CAD s/p PCI RCA, PDA (2016), HLD, tobacco use, obesity present to clinic for f/u CAD. Patient with recent LHC for CP/ concerning MPI showing mild non-obstructive CAD and patent RCA stents.       Echo 4/12/2023  The left ventricle is normal in size with mildly decreased systolic function.  The estimated ejection fraction is 35-40%.  Mild mitral regurgitation.  Grade I left ventricular diastolic dysfunction.  Normal right ventricular size with normal right ventricular systolic function.  Normal central venous pressure (3 mmHg).    LHC [id number]    There was mild non-obstructive coronary artery disease..    Patent RCA stents.    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    3/4/2024: Previously seen by Dr. Bailey 5/15/2023 as below.Patient seen in clinic today, reports overall doing well, trying to get back into exercising and eating better. Patient does endorse some new onset LE edema and some infrequent chest tightness, happening every few months, most recently about 2 months ago. Has not taken any nitroglycerin, quit smoking in 2016. His wife also reports that he has been noticably short of breath at times, frequently taking big deep breaths. Patient denies CP, current SOB/MATTA, orthopnea, PND, syncope, palpitations, LE edema. Reports compliance and tolerance with all medicatons.     Echo/  MPI 04/2023 reviewed with patient - MPI with concerning for reversible ischemia. EF worsening, now 35-40% (04/2023).     Cath 7/26/2016 - NSTEMI at [facility]     PCI of RCA with 4.0 x 15, 4.0 x 38, and 3.5 x 38 mm Resolute JACOB    PCI of PDA 2.75 x 15  mm JACOB dilated to 3.0  POBA of proximal PLB 2.0 x 12 mm balloon      Normal EF with LVEDP 15 mmHg  DAPT score of 2    Cath 10/20/2017   Angiographic Results      Diagnostic:        Patient has a right dominant coronary artery.      - Left Main Coronary Artery:              The LM has luminal irregularities. There is MILAGROS 3 flow.      - Left Anterior Descending Artery:              The LAD has luminal irregularities. There is MILAGROS 3 flow.      - Left Circumflex Artery:              The LCX has luminal irregularities. 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 There is MILAGROS 3 flow.      - Right Coronary Artery:              The RCA has luminal irregularities. There is MILAGROS 3 flow. Patent stents              The distal RCA has a 60% stenosis. There is MILAGROS 3 flow.      - Posterior Lateral Branch:              The PLB has luminal irregularities. There is MILAGROS 3 flow. The remaining portion of the vessel is of small caliber.      - Posterior Descending Artery:              The PDA has luminal irregularities. There is MILAGROS 3 flow. Patent stent      - Radial:              The radial is normal.     Stress Test 4/12/2023    The ECG portion of the study is negative for ischemia.    The patient reported no chest pain during the stress test.    During stress, occasional PVCs are noted.    The exercise capacity was excellent.    MPI 4/12/2023  1. Moderately sized inferior wall infarct with suspected yadira-infarct reversible ischemia.  2. Associated hypokinesis of the inferior wall.  3. Mildly impaired left ventricular function.  Estimated ejection fraction 42% during stress and 41% during rest.      Leo UCEVAS 5/15/2023  Masoud Osorio 68 y.o. male is here follow up and feeling well without any new complaints. He was admitted for NSTEMI that required PCI of RCA, PDA, and POBA of PLB with a preserved EF in 7/2016. He quit smoking. He is on aspirin SAPT. Non ischemic stress test in 3/2017 for atypical chest pain  revealed no reversible ischemia. Admitted in 10/2017 with CP again. 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 Normal EF. ECG 6/2018: NSR.  He was admitted in March 2023 for atypical chest discomfort.  His EKG and cardiac biomarkers were normal.  Stress test revealed no reversible ischemia.  His ejection fraction 42%.  Historically had not tolerated beta-blockers or ACE inhibitors or angiotensin receptor blockers due to hypotension         Past Medical History:   Diagnosis Date    Allergy     Anticoagulant long-term use     Anxiety and depression 09/21/2016    trial of ctialopram and follow up in 8 weeks    Arthritis     Benign prostatic hyperplasia (BPH) with straining on urination 12/05/2018    Coronary artery disease involving native coronary artery of native heart without angina pectoris 07/26/2016 7/26/2016 NSTEMI at [facility]   PCI of RCA with 4.0 x 15, 4.0 x 38, and 3.5 x 38 mm Resolute JACOB   PCI of PDA 2.75 x 15 mm JACOB dilated to 3.0 POBA of proximal PLB 2.0 x 12 mm balloon   Normal EF with LVEDP 15 mmHg DAPT score of 2               Elevated PSA     Gout     History of coronary artery stent placement 09/21/2016    History of heart attack 07/25/2016    NSTEMI    Mixed hyperlipidemia 07/17/2017    Overweight (BMI 25.0-29.9) 09/21/2016    Tobacco dependence in remission 07/25/2016          Patient Active Problem List    Diagnosis Date Noted    Heart failure with mid-range ejection fraction (HFmEF) 03/04/2024    Abnormal cardiovascular stress test 03/04/2024    1st degree AV block 03/31/2023    Costochondral pain 03/31/2023    Elevated PSA 03/09/2023    Personal history of COVID-19 03/08/2022    Lipoma of skin and subcutaneous tissue of neck 02/08/2022    Neuroforaminal stenosis of lumbar spine 06/07/2021    DDD (degenerative disc disease), lumbar 06/07/2021    Lumbar radiculopathy 06/07/2021    Osteoarthritis of spine with radiculopathy, lumbar region 06/07/2021     Chest pain 12/20/2019    Compression fracture of L1 lumbar vertebra 12/20/2019    Leukocytosis 12/20/2019    Benign prostatic hyperplasia (BPH) with straining on urination 12/05/2018    Chronic actinic dermatitis 12/05/2018    Chronic bilateral low back pain without sciatica 12/05/2018    Non-seasonal allergic rhinitis 12/05/2018     Managed by outside ENT doctor Raya.  Continue follow-up and regular nasal spray      Pain and swelling of left knee 10/11/2017    Mixed hyperlipidemia 07/17/2017    History of colon polyps 10/24/2016     Noted on colonoscopy 4/27/22- -repeat in 3-5 years pending pathology results       Class 1 obesity due to excess calories with serious comorbidity and body mass index (BMI) of 30.0 to 30.9 in adult 09/21/2016    Long-term use of aspirin therapy 09/21/2016    History of coronary artery stent placement 09/21/2016    Anxiety and depression 09/21/2016     trial of ctialopram and follow up in 8 weeks      Coronary artery disease involving native coronary artery of native heart without angina pectoris 07/26/2016 7/26/2016 NSTEMI at [facility]     PCI of RCA with 4.0 x 15, 4.0 x 38, and 3.5 x 38 mm Resolute JACOB    PCI of PDA 2.75 x 15 mm JACOB dilated to 3.0  POBA of proximal PLB 2.0 x 12 mm balloon      Normal EF with LVEDP 15 mmHg  DAPT score of 2                        History of heart attack 07/25/2016     NSTEMI      Tobacco dependence in remission 07/25/2016       Patient's Medications   New Prescriptions    No medications on file   Previous Medications    ASPIRIN (ECOTRIN) 81 MG EC TABLET    Take 1 tablet (81 mg total) by mouth once daily.    ASPIRIN 81 MG CAP    Take 81 mg by mouth once daily.    ATORVASTATIN (LIPITOR) 80 MG TABLET    Take 1 tablet (80 mg total) by mouth once daily.    AZELASTINE (ASTELIN) 137 MCG (0.1 %) NASAL SPRAY    Spray 2 sprays in each nostril twice daily    BLOOD PRESSURE CUFF MISC    1 Units by Misc.(Non-Drug; Combo Route) route once daily.     CIPROFLOXACIN HCL (CIPRO) 500 MG TABLET    Take 1 tablet by mouth twice daily    CLOPIDOGREL (PLAVIX) 75 MG TABLET    Take 1 tablet (75 mg total) by mouth once daily.    DIAZEPAM (VALIUM) 5 MG TABLET    Take 0.5 tablet by mouth every 8 to 12 hours as needed.    DIAZEPAM (VALIUM) 5 MG TABLET    Take 1/2 -1 tablet by mouth every 8 hours.    GARLIC EXTRACT ORAL    Take by mouth.    HYDROXYZINE HCL (ATARAX) 25 MG TABLET    Take 0.5 to 1 tablet by mouth at bedtime    HYDROXYZINE HCL (ATARAX) 25 MG TABLET    Take 1/2 to 1 tablet by mouth at bedtime.    IPRATROPIUM (ATROVENT) 0.06 % NASAL SPRAY    INSTILL 2 SPRAYS IN EACH NOSTRIL EVERY 12 HOURS    IPRATROPIUM (ATROVENT) 21 MCG (0.03 %) NASAL SPRAY        METOPROLOL SUCCINATE (TOPROL-XL) 25 MG 24 HR TABLET    Take 0.5 tablets (12.5 mg total) by mouth once daily.    MULTIVITAMIN CAPSULE    Take 1 capsule by mouth once daily.    NITROGLYCERIN (NITROSTAT) 0.4 MG SL TABLET    Place 1 tablet (0.4 mg total) under the tongue every 5 (five) minutes as needed for Chest pain.    PREDNISONE (DELTASONE) 20 MG TABLET    Take 2 tablets by mouth on day 1, THEN 1 ½ tablets on days 2 and 3, THEN 1 tablet on days 4 and 5, THEN ½ tablet on day 6    PREGABALIN (LYRICA) 150 MG CAPSULE    Take 1 capsule (150 mg total) by mouth 2 (two) times daily.    SILDENAFIL (REVATIO) 20 MG TAB    Take 1 tablet (20 mg total) by mouth once daily.    SPIRONOLACTONE (ALDACTONE) 25 MG TABLET    Take 0.5 tablets (12.5 mg total) by mouth once daily.    TAMSULOSIN (FLOMAX) 0.4 MG CAP    Take 2 capsules (0.8 mg total) by mouth once daily.    TRIAMCINOLONE (NASACORT) 55 MCG NASAL INHALER    Instill two sprays in each nostril once daily    VALSARTAN (DIOVAN) 40 MG TABLET    Take 1 tablet (40 mg total) by mouth once daily.   Modified Medications    No medications on file   Discontinued Medications    No medications on file        Review of Systems   Constitutional: Negative for chills, decreased appetite, diaphoresis,  malaise/fatigue, weight gain and weight loss.   Cardiovascular:  Positive for leg swelling. Negative for chest pain, claudication, dyspnea on exertion, irregular heartbeat, near-syncope, orthopnea, palpitations, paroxysmal nocturnal dyspnea and syncope.   Respiratory:  Negative for cough, hemoptysis, shortness of breath and snoring.    Gastrointestinal:  Negative for bloating, abdominal pain, nausea and vomiting.   Neurological:  Negative for light-headedness and weakness.       Family History   Problem Relation Age of Onset    Heart disease Mother     Arthritis Mother     Parkinsonism Mother     Congenital heart disease Father     No Known Problems Sister     No Known Problems Brother     No Known Problems Daughter     No Known Problems Sister     No Known Problems Sister     Prostate cancer Neg Hx     Kidney disease Neg Hx        Social History     Socioeconomic History    Marital status:    Tobacco Use    Smoking status: Former     Current packs/day: 0.00     Types: Cigarettes     Quit date: 2016     Years since quittin.7    Smokeless tobacco: Never   Substance and Sexual Activity    Alcohol use: Yes     Comment: rarely on holidays    Drug use: No    Sexual activity: Yes     Social Determinants of Health     Financial Resource Strain: Low Risk  (2024)    Overall Financial Resource Strain (CARDIA)     Difficulty of Paying Living Expenses: Not hard at all   Food Insecurity: No Food Insecurity (2024)    Hunger Vital Sign     Worried About Running Out of Food in the Last Year: Never true     Ran Out of Food in the Last Year: Never true   Transportation Needs: No Transportation Needs (2024)    PRAPARE - Transportation     Lack of Transportation (Medical): No     Lack of Transportation (Non-Medical): No   Physical Activity: Insufficiently Active (2024)    Exercise Vital Sign     Days of Exercise per Week: 2 days     Minutes of Exercise per Session: 30 min   Stress: Stress Concern Present  (4/5/2024)    [employer] - Occupational Stress Questionnaire     Feeling of Stress : To some extent   Social Connections: Unknown (4/5/2024)    Social Connection and Isolation Panel [NHANES]     Frequency of Communication with Friends and Family: More than three times a week     Frequency of Social Gatherings with Friends and Family: Once a week     Active Member of Clubs or Organizations: No     Attends Club or Organization Meetings: Never     Marital Status:    Housing Stability: High Risk (4/5/2024)    Housing Stability Vital Sign     Unable to Pay for Housing in the Last Year: Yes     Number of Places Lived in the Last Year: 1     Unstable Housing in the Last Year: No            Objective:   Vitals  There were no vitals filed for this visit.         Physical Exam  Vitals and nursing note reviewed.   Constitutional:       Appearance: Normal appearance.   Cardiovascular:      Rate and Rhythm: Normal rate and regular rhythm.      Heart sounds: No murmur heard.     No gallop.   Pulmonary:      Effort: Pulmonary effort is normal.      Breath sounds: Normal breath sounds.   Abdominal:      General: Bowel sounds are normal. There is no distension.      Palpations: Abdomen is soft.      Tenderness: There is no abdominal tenderness.   Musculoskeletal:      Right lower leg: Edema present.      Left lower leg: Edema present.   Skin:     General: Skin is warm and dry.   Neurological:      Mental Status: He is alert and oriented to person, place, and time.       Lab Results    Lab Results   Component Value Date    WBC 6.22 03/18/2024    HGB 15.0 03/18/2024    HCT 44.8 03/18/2024    MCV 87 03/18/2024       Lab Results   Component Value Date     03/18/2024    INR 1.0 10/20/2017       Lab Results   Component Value Date    K 4.5 03/18/2024    MG 2.0 03/28/2023    BUN 17 03/18/2024    CREATININE 0.92 03/18/2024       Lab Results   Component Value Date     (H) 03/18/2024    HGBA1C  5.3 03/08/2023       Lab Results   Component Value Date    AST 30 03/27/2023    ALT 26 03/27/2023    ALBUMIN 3.9 03/27/2023    PROT 6.4 03/27/2023       Lab Results   Component Value Date    CHOL 117 (L) 02/28/2024    HDL 45 02/28/2024    LDLCALC 46.2 (L) 02/28/2024    TRIG 129 02/28/2024       Lab Results   Component Value Date    BNP 40 03/27/2023       Assessment:     No diagnosis found.      Plan:   CAD, abnormal CV stress test  -patient with noted infrequent chest pressure, new onset LE swelling, MPI with concerning for reversible ischemia  -given chest pressure, worsening EF, MPI, plan for LHC as an outpatient  -continue assa, will restart plavix    2. HLD  -well controlled, goal LDLc < 70  -continue lipitor    3. HFmEF  -historically has not tolerated BB, ACE/ARB due to hypotension  -/83 today, will trial low dose BB  -will start patient on low dose BB and spirinolactone  -continue valsartan    -Will continue GDMT, medication regimen as above  -Plan for LHC as an outpatient         No orders of the defined types were placed in this encounter.      He expressed verbal understanding and agreed with the plan    Pertinent cardiac images and EKG reviewed independently.    Continue with current medical plan and lifestyle changes.  Return sooner for concerns or questions. If symptoms persist go to the ED.  I have reviewed all pertinent data including patient's medical history in detail and updated the computerized patient record.     Total duration of face to face visit time 30 minutes.  Total time spent counseling greater than fifty percent of total visit time.  Counseling included discussion regarding imaging findings, diagnosis, possibilities, treatment options, risks and benefits.    Thank you for the opportunity to care for this patient. Will be available for questions if needed.     Discussed with Dr. Hinton . RTC in 6 months.      Signed:  Jose Lynn DNP  04/10/2024

## 2024-04-10 NOTE — PROGRESS NOTES
Subjective:   Patient ID:  Masoud Osorio is a 68 y.o. male who presents for follow up of CAD, cardiomyopathy, hyperlipidemia      HPI:   4/10/2024: 67 yo M with hx of CAD s/p PCI RCA, PDA (2016), HLD, tobacco use, obesity present to clinic for f/u CAD. Patient with recent LHC for CP/ concerning MPI showing mild non-obstructive CAD and patent RCA stents.  Last visit to Toprol and spironolactone added in addition to valsartan.  He is tolerating them well.      3/4/2024: Previously seen by Dr. Bailey 5/15/2023 as below.Patient seen in clinic today, reports overall doing well, trying to get back into exercising and eating better. Patient does endorse some new onset LE edema and some infrequent chest tightness, happening every few months, most recently about 2 months ago. Has not taken any nitroglycerin, quit smoking in 2016. His wife also reports that he has been noticably short of breath at times, frequently taking big deep breaths. Patient denies CP, current SOB/MATTA, orthopnea, PND, syncope, palpitations, LE edema. Reports compliance and tolerance with all medicatons.     Leo CUEVAS 5/15/2023  Masoud Osorio 68 y.o. male is here follow up and feeling well without any new complaints. He was admitted for NSTEMI that required PCI of RCA, PDA, and POBA of PLB with a preserved EF in 7/2016. He quit smoking. He is on aspirin SAPT. Non ischemic stress test in 3/2017 for atypical chest pain revealed no reversible ischemia. Admitted in 10/2017 with CP again. 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 Normal EF. ECG 6/2018: NSR.  He was admitted in March 2023 for atypical chest discomfort.  His EKG and cardiac biomarkers were normal.  Stress test revealed no reversible ischemia.  His ejection fraction 42%.  Historically had not tolerated beta-blockers or ACE inhibitors or angiotensin receptor blockers due to hypotension     Echo 4/12/2023  The left ventricle is normal in size  with mildly decreased systolic function.  The estimated ejection fraction is 35-40%.  Mild mitral regurgitation.  Grade I left ventricular diastolic dysfunction.  Normal right ventricular size with normal right ventricular systolic function.  Normal central venous pressure (3 mmHg).    [facility] [id number]    There was mild non-obstructive coronary artery disease..    Patent RCA stents.    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      - Left Main Coronary Artery:              The LM has luminal irregularities. There is MILAGROS 3 flow.      - Left Anterior Descending Artery:              The LAD has luminal irregularities. There is MILAGROS 3 flow.      - Left Circumflex Artery:              The LCX has luminal irregularities. 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 There is MILAGROS 3 flow.      - Right Coronary Artery:              The RCA has luminal irregularities. There is MILAGROS 3 flow. Patent stents              The distal RCA has a 60% stenosis. There is MILAGROS 3 flow.      - Posterior Lateral Branch:              The PLB has luminal irregularities. There is MILAGROS 3 flow. The remaining portion of the vessel is of small caliber.      - Posterior Descending Artery:              The PDA has luminal irregularities. There is MILAGROS 3 flow. Patent stent      - Radial:              The radial is normal.     Stress Test 4/12/2023    The ECG portion of the study is negative for ischemia.    The patient reported no chest pain during the stress test.    During stress, occasional PVCs are noted.    The exercise capacity was  excellent.    MPI 4/12/2023  1. Moderately sized inferior wall infarct with suspected yadira-infarct reversible ischemia.  2. Associated hypokinesis of the inferior wall.  3. Mildly impaired left ventricular function.  Estimated ejection fraction 42% during stress and 41% during rest.                            Patient Active Problem List    Diagnosis Date Noted    Heart failure with mid-range ejection fraction (HFmEF) 03/04/2024    Abnormal cardiovascular stress test 03/04/2024    1st degree AV block 03/31/2023    Costochondral pain 03/31/2023    Elevated PSA 03/09/2023    Personal history of COVID-19 03/08/2022    Lipoma of skin and subcutaneous tissue of neck 02/08/2022    Neuroforaminal stenosis of lumbar spine 06/07/2021    DDD (degenerative disc disease), lumbar 06/07/2021    Lumbar radiculopathy 06/07/2021    Osteoarthritis of spine with radiculopathy, lumbar region 06/07/2021    Chest pain 12/20/2019    Compression fracture of L1 lumbar vertebra 12/20/2019    Leukocytosis 12/20/2019    Benign prostatic hyperplasia (BPH) with straining on urination 12/05/2018    Chronic actinic dermatitis 12/05/2018    Chronic bilateral low back pain without sciatica 12/05/2018    Non-seasonal allergic rhinitis 12/05/2018     Managed by outside ENT doctor Raya.  Continue follow-up and regular nasal spray      Pain and swelling of left knee 10/11/2017    Mixed hyperlipidemia 07/17/2017    History of colon polyps 10/24/2016     Noted on colonoscopy 4/27/22- -repeat in 3-5 years pending pathology results       Class 1 obesity due to excess calories with serious comorbidity and body mass index (BMI) of 30.0 to 30.9 in adult 09/21/2016    Long-term use of aspirin therapy 09/21/2016    History of coronary artery stent placement 09/21/2016    Anxiety and depression 09/21/2016     trial of ctialopram and follow up in 8 weeks      Coronary artery disease involving native coronary artery of native heart without angina pectoris  2016 NSTEMI at [facility]     PCI of RCA with 4.0 x 15, 4.0 x 38, and 3.5 x 38 mm Resolute JACOB    PCI of PDA 2.75 x 15 mm JACOB dilated to 3.0  POBA of proximal PLB 2.0 x 12 mm balloon      Normal EF with LVEDP 15 mmHg  DAPT score of 2                        History of heart attack 2016     NSTEMI      Tobacco dependence in remission 2016           Right Arm BP - Sittin/67  Left Arm BP - Sittin/68        LABS    LAST HbA1c  Lab Results   Component Value Date    HGBA1C 5.3 2023       Lipid panel  Lab Results   Component Value Date    CHOL 117 (L) 2024    CHOL 119 (L) 2023    CHOL 96 (L) 2023     Lab Results   Component Value Date    HDL 45 2024    HDL 37 (L) 2023    HDL 39 (L) 2023     Lab Results   Component Value Date    LDLCALC 46.2 (L) 2024    LDLCALC 68.4 2023    LDLCALC 37.6 (L) 2023     Lab Results   Component Value Date    TRIG 129 2024    TRIG 68 2023    TRIG 97 2023     Lab Results   Component Value Date    CHOLHDL 38.5 2024    CHOLHDL 31.1 2023    CHOLHDL 40.6 2023            Review of Systems   Constitutional: Negative for chills and fever.   HENT:  Negative for hearing loss and nosebleeds.    Eyes:  Negative for blurred vision.   Cardiovascular:         As in HPI   Respiratory:  Negative for hemoptysis and shortness of breath.    Hematologic/Lymphatic: Negative for bleeding problem.   Skin:  Negative for itching.   Musculoskeletal:  Negative for falls.   Gastrointestinal:  Negative for abdominal pain and hematochezia.   Genitourinary:  Negative for hematuria.   Neurological:  Negative for dizziness and loss of balance.   Psychiatric/Behavioral:  Negative for altered mental status and depression.        Objective:   Physical Exam  Constitutional:       Appearance: He is well-developed.   HENT:      Head: Normocephalic and atraumatic.   Eyes:      Conjunctiva/sclera:  Conjunctivae normal.   Neck:      Vascular: No carotid bruit or JVD.   Cardiovascular:      Rate and Rhythm: Normal rate and regular rhythm.      Pulses:           Carotid pulses are 2+ on the right side and 2+ on the left side.       Radial pulses are 2+ on the right side and 2+ on the left side.      Heart sounds: Normal heart sounds. No murmur heard.     No friction rub. No gallop.   Pulmonary:      Effort: Pulmonary effort is normal. No respiratory distress.      Breath sounds: Normal breath sounds. No stridor. No wheezing.   Musculoskeletal:      Cervical back: Neck supple.      Right lower leg: Edema (Trace to 1+) present.      Left lower leg: Edema (trace to 1+) present.   Skin:     General: Skin is warm and dry.   Neurological:      Mental Status: He is alert and oriented to person, place, and time.   Psychiatric:         Behavior: Behavior normal.               Assessment:     1. Heart failure with mid-range ejection fraction (HFmEF)    2. History of coronary artery stent placement    3. Coronary artery disease involving native coronary artery of native heart without angina pectoris    4. Mixed hyperlipidemia    5. History of heart attack    6. Tobacco dependence in remission          Plan:   Nonobstructive coronary artery disease.  Stable.  Continue medical therapy.  LDL at goal    Stop Plavix.  Continue aspirin  Continue high-intensity statin    Cardiomyopathy noted.  We will switch valsartan to Entresto 24-26 mg.  Repeat BMP in 4 weeks.  Up titrate GDMT as tolerated    Increase spironolactone to 25 mg daily  Continue Toprol 12.5 mg daily.  Heart rate is borderline    We will consider adding Jardiance next visit    Limit salt intake.  Not more than 2 g    Goal BP less than 130/80    Check daily weight.  Keep log      Three-month follow up with a repeat echocardiogram before        Continue with current medical plan and lifestyle changes.  Return sooner for concerns or questions. If symptoms persist go to  the ED  I have reviewed all pertinent data on this patient           I have reviewed the patient's medical history in detail and updated the computerized patient record.    Orders Placed This Encounter   Procedures    Basic metabolic panel     Standing Status:   Future     Standing Expiration Date:   7/9/2025    Echo     Standing Status:   Future     Standing Expiration Date:   4/10/2025     Order Specific Question:   Limited Echo?     Answer:   Yes     Comments:   EF     Order Specific Question:   Release to patient     Answer:   Immediate       Follow up as scheduled. Return sooner for concerns or questions            Patient's Medications   New Prescriptions    VALSARTAN (DIOVAN) 40 MG TABLET    Take 1 tablet (40 mg total) by mouth once daily.   Previous Medications    ASPIRIN (ECOTRIN) 81 MG EC TABLET    Take 1 tablet (81 mg total) by mouth once daily.    ATORVASTATIN (LIPITOR) 80 MG TABLET    Take 1 tablet (80 mg total) by mouth once daily.    AZELASTINE (ASTELIN) 137 MCG (0.1 %) NASAL SPRAY    Spray 2 sprays in each nostril twice daily    DIAZEPAM (VALIUM) 5 MG TABLET    Take 0.5 tablet by mouth every 8 to 12 hours as needed.    GARLIC EXTRACT ORAL    Take by mouth.    HYDROXYZINE HCL (ATARAX) 25 MG TABLET    Take 0.5 to 1 tablet by mouth at bedtime    IPRATROPIUM (ATROVENT) 0.06 % NASAL SPRAY    INSTILL 2 SPRAYS IN EACH NOSTRIL EVERY 12 HOURS    MULTIVITAMIN CAPSULE    Take 1 capsule by mouth once daily.    PREGABALIN (LYRICA) 150 MG CAPSULE    Take 1 capsule (150 mg total) by mouth 2 (two) times daily.    TAMSULOSIN (FLOMAX) 0.4 MG CAP    Take 1 capsule (0.4 mg total) by mouth once daily.    TRIAMCINOLONE (NASACORT) 55 MCG NASAL INHALER    Instill two sprays in each nostril once daily   Modified Medications    Modified Medication Previous Medication    NITROGLYCERIN (NITROSTAT) 0.4 MG SL TABLET nitroGLYCERIN (NITROSTAT) 0.4 MG SL tablet       Place 1 tablet (0.4 mg total) under the tongue every 5 (five)  minutes as needed for Chest pain.    Place 1 tablet (0.4 mg total) under the tongue every 5 (five) minutes as needed for Chest pain.    SILDENAFIL (REVATIO) 20 MG TAB sildenafil (REVATIO) 20 mg Tab       Take 1 tablet (20 mg total) by mouth once daily.    Take 1 tablet (20 mg total) by mouth once daily.   Discontinued Medications         '

## 2024-04-11 LAB
OHS QRS DURATION: 92 MS
OHS QTC CALCULATION: 427 MS

## 2024-04-22 NOTE — ASSESSMENT & PLAN NOTE
4/22/2024         RE: Truman Rizzo  [id number] [facility] [id number]        Dear Colleague,    Thank you for referring your patient, Truman Rizzo, to the [facility]. Please see a copy of my visit note below.    Past Medical History:   Diagnosis Date    Diabetes mellitus type 1 (H) 03/14    Medtronic pump and CGM     Patient Active Problem List   Diagnosis    Type 1 diabetes mellitus with hyperglycemia (H)    Chronic serous otitis media, unspecified laterality    Herpes zoster without complication    Insulin pump in place    Long term (current) use of insulin (H)    Lipohypertrophy     Past Surgical History:   Procedure Laterality Date    EXCISE CHALAZION Left     TONSILLECTOMY & ADENOIDECTOMY  07/30/2021     Social History     Socioeconomic History    Marital status: Single     Spouse name: Not on file    Number of children: Not on file    Years of education: Not on file    Highest education level: Not on file   Occupational History    Not on file   Tobacco Use    Smoking status: Never    Smokeless tobacco: Never   Substance and Sexual Activity    Alcohol use: Not on file    Drug use: Not on file    Sexual activity: Not on file   Other Topics Concern    Not on file   Social History Narrative    9/26/23: Currently in the 5th grade for the 0962-3867 school year. He splits time living between mom and dad's home.     Social Determinants of Health     Financial Resource Strain: Not on file   Food Insecurity: Not on file   Transportation Needs: Not on file   Physical Activity: Not on file   Stress: Not on file   Interpersonal Safety: Not on file   Housing Stability: Not on file     Family History   Problem Relation Age of Onset    Thyroid Disease Father         hashimotos    Hypertension Father     Hyperlipidemia Maternal Grandfather     Obesity Maternal Grandfather     Hypertension Paternal Grandmother     No Known Problems Brother        Lab Results  Stable CAD  Continue current therapy      Component Value Date    A1C 7.2 02/06/2024    A1C 7.8 03/22/2022    A1C 7.7 11/02/2021    A1C 6.9 04/15/2021    A1C 6.9 09/15/2020    A1C 7.2 09/15/2020     Subjective findings- 11-year-old returns clinic with mother for plantars warts.  They relate they are not sure if they are gone or not, relates to no problems after the freezing.    Objective findings- Vascular status intact bilaterally.  Has right plantar lateral fifth MPJ hyperkeratotic tissue buildup with minimal ecchymotic appearance.  Has a left plantar 3 through 5 MPJ minimal hyperkeratotic tissue buildup with underlying skin intact.    Assessment and plan- Plantar warts right fifth MPJ and left 3 through 5 MPJs.  Left foot lesion appears resolved.  Right foot lesion appears nearly resolved.  Lesions were debrided with a 10 blade upon consent.  The right fifth MPJ wart lesion was frozen with liquid nitrogen x 3 upon consent.  This is the second time we have frozen this.  Previous notes reviewed.  Return to clinic and see me in 2 to 4 weeks.        Again, thank you for allowing me to participate in the care of your patient.        Sincerely,        Daniel Ribera DPM

## 2024-05-03 DIAGNOSIS — M47.26 OSTEOARTHRITIS OF SPINE WITH RADICULOPATHY, LUMBAR REGION: ICD-10-CM

## 2024-05-03 DIAGNOSIS — M48.061 NEUROFORAMINAL STENOSIS OF LUMBAR SPINE: ICD-10-CM

## 2024-05-03 RX ORDER — PREGABALIN 150 MG/1
150 CAPSULE ORAL 2 TIMES DAILY
Qty: 60 CAPSULE | Refills: 5 | Status: SHIPPED | OUTPATIENT
Start: 2024-05-03 | End: 2024-11-01

## 2024-05-03 NOTE — TELEPHONE ENCOUNTER
No care due was identified.  [facility] Embedded Care Due Messages. Reference number: [id number].   5/03/2024 10:01:39 AM CDT

## 2024-05-06 ENCOUNTER — TELEPHONE (OUTPATIENT)
Dept: NEUROSURGERY | Facility: CLINIC | Age: 69
End: 2024-05-06
Payer: MEDICARE

## 2024-05-06 DIAGNOSIS — M54.16 LUMBAR RADICULOPATHY: Primary | ICD-10-CM

## 2024-05-08 ENCOUNTER — HOSPITAL ENCOUNTER (OUTPATIENT)
Dept: RADIOLOGY | Facility: HOSPITAL | Age: 69
Discharge: HOME | End: 2024-05-08
Attending: NEUROLOGICAL SURGERY
Payer: MEDICARE

## 2024-05-08 ENCOUNTER — OFFICE VISIT (OUTPATIENT)
Dept: NEUROSURGERY | Facility: CLINIC | Age: 69
End: 2024-05-08
Payer: MEDICARE

## 2024-05-08 VITALS
HEIGHT: 68 IN | DIASTOLIC BLOOD PRESSURE: 76 MMHG | BODY MASS INDEX: 31.98 KG/M2 | SYSTOLIC BLOOD PRESSURE: 113 MMHG | WEIGHT: 211 LBS | HEART RATE: 62 BPM

## 2024-05-08 DIAGNOSIS — M54.16 LUMBAR RADICULOPATHY: ICD-10-CM

## 2024-05-08 DIAGNOSIS — M51.36 DEGENERATIVE DISC DISEASE, LUMBAR: Primary | ICD-10-CM

## 2024-05-08 DIAGNOSIS — M48.061 NEUROFORAMINAL STENOSIS OF LUMBAR SPINE: ICD-10-CM

## 2024-05-08 PROCEDURE — 72110 X-RAY EXAM L-2 SPINE 4/>VWS: CPT | Mod: 59,TC,FY

## 2024-05-08 PROCEDURE — 72082 X-RAY EXAM ENTIRE SPI 2/3 VW: CPT | Mod: 26,,, | Performed by: RADIOLOGY

## 2024-05-08 PROCEDURE — 3078F DIAST BP <80 MM HG: CPT | Mod: CPTII,S$GLB,, | Performed by: NEUROLOGICAL SURGERY

## 2024-05-08 PROCEDURE — 4010F ACE/ARB THERAPY RXD/TAKEN: CPT | Mod: CPTII,S$GLB,, | Performed by: NEUROLOGICAL SURGERY

## 2024-05-08 PROCEDURE — 3074F SYST BP LT 130 MM HG: CPT | Mod: CPTII,S$GLB,, | Performed by: NEUROLOGICAL SURGERY

## 2024-05-08 PROCEDURE — 3008F BODY MASS INDEX DOCD: CPT | Mod: CPTII,S$GLB,, | Performed by: NEUROLOGICAL SURGERY

## 2024-05-08 PROCEDURE — 72082 X-RAY EXAM ENTIRE SPI 2/3 VW: CPT | Mod: TC,FY

## 2024-05-08 PROCEDURE — 99999 PR PBB SHADOW E&M-EST. PATIENT-LVL IV: CPT | Mod: PBBFAC,,, | Performed by: NEUROLOGICAL SURGERY

## 2024-05-08 PROCEDURE — 1159F MED LIST DOCD IN RCRD: CPT | Mod: CPTII,S$GLB,, | Performed by: NEUROLOGICAL SURGERY

## 2024-05-08 PROCEDURE — 72110 X-RAY EXAM L-2 SPINE 4/>VWS: CPT | Mod: 26,,, | Performed by: RADIOLOGY

## 2024-05-08 PROCEDURE — 1125F AMNT PAIN NOTED PAIN PRSNT: CPT | Mod: CPTII,S$GLB,, | Performed by: NEUROLOGICAL SURGERY

## 2024-05-08 PROCEDURE — 99213 OFFICE O/P EST LOW 20 MIN: CPT | Mod: S$GLB,,, | Performed by: NEUROLOGICAL SURGERY

## 2024-05-08 NOTE — PROGRESS NOTES
NEUROSURGICAL PROGRESS NOTE    DATE OF SERVICE:  05/08/2024    ATTENDING PHYSICIAN:  Bienvenido Garcia MD    SUBJECTIVE:    INTERIM HISTORY:    This is a very pleasant 69 y.o. male, who is established with pain management.  I have been following him for dorsalgia and left leg numbness symptoms.  He has received recently a left L3-4 and L4-5 transforaminal epidural steroid injection.  The patient reports very low pain level at baseline.  He says he has occasional mild right-sided low back pain.  He does have constant left leg numbness in the L5 distribution with paresthesias.  However he denies having leg pain.  He also denies having claudication symptoms are difficulty walking for long period of time.  Overall he is very active, do a lot of [employer] work.  Takes Lyrica with some improvement in his symptoms.  No new motor weakness.  We put some stiffness in the left leg.  Does not report upper extremity symptoms.  No gait imbalance.  No sphincter dysfunction symptoms              PAST MEDICAL HISTORY:  Active Ambulatory Problems     Diagnosis Date Noted    History of heart attack 07/25/2016    Tobacco dependence in remission 07/25/2016    Coronary artery disease involving native coronary artery of native heart without angina pectoris 07/26/2016    Class 1 obesity due to excess calories with serious comorbidity and body mass index (BMI) of 30.0 to 30.9 in adult 09/21/2016    Long-term use of aspirin therapy 09/21/2016    History of coronary artery stent placement 09/21/2016    Anxiety and depression 09/21/2016    History of colon polyps 10/24/2016    Mixed hyperlipidemia 07/17/2017    Pain and swelling of left knee 10/11/2017    Benign prostatic hyperplasia (BPH) with straining on urination 12/05/2018    Chronic actinic dermatitis 12/05/2018    Chronic bilateral low back pain without sciatica 12/05/2018    Non-seasonal allergic rhinitis 12/05/2018    Chest pain 12/20/2019    Compression fracture of L1 lumbar vertebra  12/20/2019    Leukocytosis 12/20/2019    Neuroforaminal stenosis of lumbar spine 06/07/2021    DDD (degenerative disc disease), lumbar 06/07/2021    Lumbar radiculopathy 06/07/2021    Osteoarthritis of spine with radiculopathy, lumbar region 06/07/2021    Lipoma of skin and subcutaneous tissue of neck 02/08/2022    Personal history of COVID-19 03/08/2022    Impaired mobility and activities of daily living 01/04/2023    Elevated PSA 03/09/2023    1st degree AV block 03/31/2023    Costochondral pain 03/31/2023    Heart failure with mid-range ejection fraction (HFmEF) 03/04/2024    Abnormal cardiovascular stress test 03/04/2024     Resolved Ambulatory Problems     Diagnosis Date Noted    Weakness of trunk musculature 01/04/2023    Weakness of both lower extremities 01/04/2023    Heart failure with mildly reduced ejection fraction 03/04/2024     Past Medical History:   Diagnosis Date    Allergy     Anticoagulant long-term use     Arthritis     Gout     Overweight (BMI 25.0-29.9) 09/21/2016       PAST SURGICAL HISTORY:  Past Surgical History:   Procedure Laterality Date    COLONOSCOPY N/A 08/16/2017    Procedure: COLONOSCOPY Split Prep;  Surgeon: Maykel Carcamo Jr., MD;  Location: [facility] ENDO;  Service: Endoscopy;  Laterality: N/A;    COLONOSCOPY N/A 4/27/2022    Procedure: COLONOSCOPY Golytely;  Surgeon: Henrry Nieves MD;  Location: [facility] ENDO;  Service: Endoscopy;  Laterality: N/A;  C    CORONARY ANGIOGRAPHY N/A 3/19/2024    Procedure: ANGIOGRAM, CORONARY ARTERY;  Surgeon: Cleveland Hinton MD;  Location: [facility] CATH LAB/EP;  Service: Cardiology;  Laterality: N/A;    CORONARY ANGIOPLASTY WITH STENT PLACEMENT      EPIDURAL STEROID INJECTION INTO LUMBAR SPINE Left 2/26/2024    Procedure: LT L3-4 and L4-5 TFESI;  Surgeon: Rosita Blum DO;  Location: [facility] PAIN MANAGEMENT;  Service: Pain Management;  Laterality: Left;  20 mins    LEFT HEART CATHETERIZATION Left 3/19/2024    Procedure: Left heart cath;   Surgeon: Cleveland Hinton MD;  Location: [facility] CATH LAB/EP;  Service: Cardiology;  Laterality: Left;    ROTATOR CUFF REPAIR      TRANSFORAMINAL EPIDURAL INJECTION OF STEROID Left 06/15/2021    Procedure: Injection,steroid,epidural,transforaminal approach-- Left- L3-4, L4-5;  Surgeon: Enrike Medellin Jr., MD;  Location: [facility] PAIN MGT;  Service: Pain Management;  Laterality: Left;    VASECTOMY         SOCIAL HISTORY:   Social History     Socioeconomic History    Marital status:    Tobacco Use    Smoking status: Former     Current packs/day: 0.00     Types: Cigarettes     Quit date: 2016     Years since quittin.7    Smokeless tobacco: Never   Substance and Sexual Activity    Alcohol use: Yes     Comment: rarely on holidays    Drug use: No    Sexual activity: Yes     Social Determinants of Health     Financial Resource Strain: Low Risk  (2024)    Overall Financial Resource Strain (CARDIA)     Difficulty of Paying Living Expenses: Not hard at all   Food Insecurity: No Food Insecurity (2024)    Hunger Vital Sign     Worried About Running Out of Food in the Last Year: Never true     Ran Out of Food in the Last Year: Never true   Transportation Needs: No Transportation Needs (2024)    PRAPARE - Transportation     Lack of Transportation (Medical): No     Lack of Transportation (Non-Medical): No   Physical Activity: Insufficiently Active (2024)    Exercise Vital Sign     Days of Exercise per Week: 2 days     Minutes of Exercise per Session: 30 min   Stress: Stress Concern Present (2024)    [employer] - Occupational Stress Questionnaire     Feeling of Stress : To some extent   Housing Stability: High Risk (2024)    Housing Stability Vital Sign     Unable to Pay for Housing in the Last Year: Yes     Number of Places Lived in the Last Year: 1     Unstable Housing in the Last Year: No       FAMILY HISTORY:  Family History   Problem Relation Name Age of Onset     Heart disease Mother      Arthritis Mother      Parkinsonism Mother      Congenital heart disease Father      No Known Problems Sister      No Known Problems Brother      No Known Problems Daughter      No Known Problems Sister      No Known Problems Sister      Prostate cancer Neg Hx      Kidney disease Neg Hx         CURRENTS MEDICATIONS:  Current Outpatient Medications on File Prior to Visit   Medication Sig Dispense Refill    aspirin 81 mg Cap Take 81 mg by mouth once daily.      atorvastatin (LIPITOR) 80 MG tablet Take 1 tablet (80 mg total) by mouth once daily. 90 tablet 3    azelastine (ASTELIN) 137 mcg (0.1 %) nasal spray Spray 2 sprays in each nostril twice daily 30 mL 5    BLOOD PRESSURE CUFF Misc 1 Units by Misc.(Non-Drug; Combo Route) route once daily.  0    ciprofloxacin HCl (CIPRO) 500 MG tablet Take 1 tablet by mouth twice daily 14 tablet 0    diazePAM (VALIUM) 5 MG tablet Take 0.5 tablet by mouth every 8 to 12 hours as needed. 24 tablet 0    diazePAM (VALIUM) 5 MG tablet Take 1/2 -1 tablet by mouth every 8 hours. 30 tablet 0    GARLIC EXTRACT ORAL Take by mouth.      hydrOXYzine HCL (ATARAX) 25 MG tablet Take 0.5 to 1 tablet by mouth at bedtime 30 tablet 3    hydrOXYzine HCL (ATARAX) 25 MG tablet Take 1/2 to 1 tablet by mouth at bedtime. 30 tablet 3    ipratropium (ATROVENT) 0.06 % nasal spray INSTILL 2 SPRAYS IN EACH NOSTRIL EVERY 12 HOURS  5    ipratropium (ATROVENT) 21 mcg (0.03 %) nasal spray       metoprolol succinate (TOPROL-XL) 25 MG 24 hr tablet Take 0.5 tablets (12.5 mg total) by mouth once daily. 15 tablet 11    multivitamin capsule Take 1 capsule by mouth once daily.      nitroGLYCERIN (NITROSTAT) 0.4 MG SL tablet Place 1 tablet (0.4 mg total) under the tongue every 5 (five) minutes as needed for Chest pain. 25 tablet 11    predniSONE (DELTASONE) 20 MG tablet Take 2 tablets by mouth on day 1, THEN 1 ½ tablets on days 2 and 3, THEN 1 tablet on days 4 and 5, THEN ½ tablet on day 6 8  tablet 0    pregabalin (LYRICA) 150 MG capsule Take 1 capsule (150 mg total) by mouth 2 (two) times daily. 60 capsule 5    sacubitriL-valsartan (ENTRESTO) 24-26 mg per tablet Take 1 tablet by mouth 2 (two) times daily. 60 tablet 11    sildenafil (REVATIO) 20 mg Tab Take 1 tablet (20 mg total) by mouth once daily. 30 tablet 11    spironolactone (ALDACTONE) 25 MG tablet Take 1 tablet (25 mg total) by mouth once daily. 30 tablet 11    tamsulosin (FLOMAX) 0.4 mg Cap Take 2 capsules (0.8 mg total) by mouth once daily. 180 capsule 3    triamcinolone (NASACORT) 55 mcg nasal inhaler Instill two sprays in each nostril once daily 16.9 mL 5     No current facility-administered medications on file prior to visit.       ALLERGIES:  Review of patient's allergies indicates:   Allergen Reactions    Penicillins Hives       REVIEW OF SYSTEMS:  Review of Systems   Constitutional:  Negative for diaphoresis, fever and weight loss.   Respiratory:  Negative for shortness of breath.    Cardiovascular:  Negative for chest pain.   Gastrointestinal:  Negative for blood in stool.   Genitourinary:  Negative for hematuria.   Endo/Heme/Allergies:  Does not bruise/bleed easily.   All other systems reviewed and are negative.        OBJECTIVE:    PHYSICAL EXAMINATION:   Vitals:    05/08/24 1417   BP: 113/76   Pulse: 62       Physical Exam:  Vitals reviewed.    Constitutional: He appears well-developed and well-nourished.     Eyes: Pupils are equal, round, and reactive to light. Conjunctivae and EOM are normal.     Cardiovascular: Normal distal pulses and no edema.     Abdominal: Soft.     Skin: Skin displays no rash on trunk and no rash on extremities. Skin displays no lesions on trunk and no lesions on extremities.     Psych/Behavior: He is alert. He is oriented to person, place, and time. He has a normal mood and affect.     Musculoskeletal:        Neck: Range of motion is full.       Back Exam     Muscle Strength   Right Quadriceps:  5/5   Left  Quadriceps:  5/5   Right Hamstrings:  5/5   Left Hamstrings:  5/5                 Neurologic Exam     Mental Status   Oriented to person, place, and time.   Speech: speech is normal   Level of consciousness: alert    Cranial Nerves   Cranial nerves II through XII intact.     CN III, IV, VI   Pupils are equal, round, and reactive to light.  Extraocular motions are normal.     Motor Exam   Muscle bulk: normal  Overall muscle tone: normal    Strength   Right deltoid: 5/5  Left deltoid: 5/5  Right biceps: 5/5  Left biceps: 5/5  Right triceps: 5/5  Left triceps: 5/5  Right wrist flexion: 5/5  Left wrist flexion: 5/5  Right wrist extension: 5/5  Left wrist extension: 5/5  Right interossei: 5/5  Left interossei: 5/5  Right iliopsoas: 5/5  Left iliopsoas: 5/5  Right quadriceps: 5/5  Left quadriceps: 5/5  Right hamstrin/5  Left hamstrin/5  Right anterior tibial: 5/5  Left anterior tibial: 5/5  Right posterior tibial: 5/5  Left posterior tibial: 5/5  Right peroneal: 5/5  Left peroneal: 5/5  Right gastroc: 5/5  Left gastroc: 5/5Left EHL 4+ out of 5     Sensory Exam   Light touch normal.   Pinprick normal.     Gait, Coordination, and Reflexes     Gait  Gait: normal    Coordination   Finger to nose coordination: normal  Tandem walking coordination: normal    Reflexes   Right plantar: normal  Left plantar: normal  Right Downing: absent  Left Downing: absent  Right ankle clonus: absent  Left ankle clonus: absent        DIAGNOSTIC DATA:  I personally interpreted the following imaging:   Scoliosis film reviewed showing no sagittal plane imbalance, mild right convexity L4-S1 degenerative scoliosis  MRI  compared to  reviewed showing worsening left L4-5 and L5-S1 foraminal stenosis, severe at L5-S1 on the left side, moderate-to-severe at L4-5 on the left side, no significant central canal stenosis, chronic L1 compression fracture without significant stenosis    ASSESSMENT:  This is a 69 y.o. male with     Problem List  Items Addressed This Visit          Neuro    Neuroforaminal stenosis of lumbar spine    Lumbar radiculopathy     Other Visit Diagnoses       Degenerative disc disease, lumbar    -  Primary              PLAN:  Overall the patient is pretty functional.  He denies having any significant pain.  I do not think that a surgical intervention is recommended at this time.  His left leg numbness might not improve with surgical intervention.  Patient will follow up with primary care in pain management.  Follow up with Neurosurgery as needed in the future.  All questions answered.  Continue Lyrica    More than 50% of the time was spent on discussing conservative management treatments (medication, physical therapy exercises) and possible interventions (spinal injections and surgical procedures). Care coordination was discussed.    20 min      Bienvenido Garcia MD  Cell:[phone]

## 2024-06-06 ENCOUNTER — PATIENT MESSAGE (OUTPATIENT)
Dept: CARDIOLOGY | Facility: CLINIC | Age: 69
End: 2024-06-06
Payer: MEDICARE

## 2024-06-06 ENCOUNTER — TELEPHONE (OUTPATIENT)
Dept: FAMILY MEDICINE | Facility: CLINIC | Age: 69
End: 2024-06-06
Payer: MEDICARE

## 2024-06-06 DIAGNOSIS — Z79.899 MEDICATION MANAGEMENT: ICD-10-CM

## 2024-06-06 DIAGNOSIS — S32.010D COMPRESSION FRACTURE OF L1 VERTEBRA WITH ROUTINE HEALING, SUBSEQUENT ENCOUNTER: ICD-10-CM

## 2024-06-06 DIAGNOSIS — I25.10 CORONARY ARTERY DISEASE INVOLVING NATIVE CORONARY ARTERY OF NATIVE HEART WITHOUT ANGINA PECTORIS: ICD-10-CM

## 2024-06-06 DIAGNOSIS — I50.22 HEART FAILURE WITH MID-RANGE EJECTION FRACTION (HFMEF): ICD-10-CM

## 2024-06-06 DIAGNOSIS — R97.20 ELEVATED PSA: Primary | ICD-10-CM

## 2024-06-06 DIAGNOSIS — Z12.5 ENCOUNTER FOR SCREENING FOR MALIGNANT NEOPLASM OF PROSTATE: ICD-10-CM

## 2024-06-06 RX ORDER — ATORVASTATIN CALCIUM 80 MG/1
80 TABLET, FILM COATED ORAL DAILY
Qty: 90 TABLET | Refills: 3 | Status: SHIPPED | OUTPATIENT
Start: 2024-06-06

## 2024-06-06 NOTE — PROGRESS NOTES
Office Visit    Patient Name: Masoud Osorio    : 1955  MRN: [id number]    Subjective:  Masoud is a 69 y.o. male who presents today for:    Annual Exam    Prior physical with me 3/8/23  Most recently seen by me 3/31/23 s/p Obs Admission for CP R/O   EKG, labs with troponin and CXR all unremarkable for cardiovascular event.   ECHO 23: estimated ejection fraction is 35-40% & Grade 1 LV Diastolic Dysfunction  Nuclear Exercise stress test 23: patient exercised for 10 minutes 27 seconds on a Micah protocol, corresponding to a functional capacity of 12 METS-- Excellent Exercise Capacity  1. Moderately sized inferior wall infarct with suspected yadira-infarct reversible ischemia.  2. Associated hypokinesis of the inferior wall.  3. Mildly impaired left ventricular function.  Estimated ejection fraction 42% during stress and 41% during rest    [facility] 3/19/2024    There was mild non-obstructive coronary artery disease.    Patent RCA stents.    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  Continue aspirin, Continue high-intensity statin  Cardiomyopathy noted,.Switch valsartan to Entresto 24-26 mg.  Repeat BMP in 4 weeks.  Up titrate GDMT as tolerated  Increase spironolactone to 25 mg daily  Continue Toprol 12.5 mg daily.  Heart rate is borderline  We will consider adding Jardiance next visit    Urology Dr Carranza 23: continue to trend his PSA with a repeat PSA in 6 months--OVERDUE.  Patient considering de obstructive surgery for BPH   MRI Prostate 23 No concerning Prostate Lesion, +BPH (computed volume of 63 cc )  Bladder emptying currently stable on Flomax 0.4 mg daily-has not had to increase up to 0.8 mg dose.    Neurosurgery Dr Garcia 24: h/o lumbar compression fracture 2019, Lumbar DDD, neural foraminal stenosis of Lumbar spine and Lumbar Radiculopathy, he is currently in PT   Overall  the patient is pretty functional.  He denies having any significant pain.  I do not think that a surgical intervention is recommended at this time.  His left leg numbness might not improve with surgical intervention.  Patient will follow up with primary care in pain management.  Follow up with Neurosurgery as needed.   Diane Meredith is a 69 y.o. male who presents today for: annual wellness exam w/ blood work and review of chronic conditions.   Hx of CAD s/p MI and stent 7/20216, overweight BMI, h/o tobacco abuse, HLD, BPH improved with Flomax, anxiety/ depression currently stable off of medications.   He sustained a lumbar spine compression fracture 12/2019 s/p conservative management with a back brace.    Has been seen by pain management for interventional procedures for treatment of lumbar radiculopathy and seen urology for management of BPH.  2/7/24: left L3-4 and L4-5 transforaminal epidural steroid injection      Had labs ordered prior to office visit but not yet performed-- plans to obtain today including a repeat PSA.    Prior labs per Cardiology w/ LIPID 2/28/24 showing LDL 46, Normal CBC 3/18/24. Most recent CMP 3/27/23 unremarkable with normal EGFR/ LFTs.    Normal vitamin-D of 34 3/8/23  Diag PSA 7.3 7/21/23       He has been feeling overall well.   Mood is stable off of regular medication, occasionally takes Valium but rarely for situational stressors.   Back and leg pain mild and tolerable.  Still experiencing numbness down left thigh and sometimes ankle.   Physical therapy is going well and continues to practice exercises to help improve numbness. Is now taking Lyrica 75 at night as prev Lyrica 75 BID was causing daytime fatigue.   He was having some mild fecal smearing but these symptoms have resolved with regular use of Metamucil.  No concerns on colonoscopy and no overt fecal incontinence.  Bladder emptying continues to be a challenge at times but flomax helps.      General lifestyle  habits are as follows:    Diet is described as heart healthy-- low sodium with plenty of fresh fruits and veggies with enough water  Exercise: recently fair- was walking 1 mile 1-2x/week and bikes for 1 hour 1-2x/week.  Has remained active but he is currently out of his exercise routine.    Sleep: sleeping well and that he takes Atarax rarely to help with sleep. Generally sleeps ok and feels adequately rested  Weight: Recently down about 10 lb.  210-->200 with improvement in BMI to 30.5     Immunizations: Prevnar 20 given 3/8/23, TDaP 8/24/2018, shingles vaccine 2/2 7/17/2017, FLU shot UTD 10/6/23,  COVID-19 Pfizer Booster 11/16/21-- has declined since then     Screening Tests: colonoscopy 4/27/22 repeat 5 years(polyps, 4/2027), Diag PSA 7.3 7/21/23  (had SEBASTIAN w/ cscope 4/27/22), AAA 12/6/2018: negative (smoking history), Hep C (-) 9/28/17     Eye/Dental: up to date with both          PAST MEDICAL HISTORY, SURGICAL/SOCIAL/FAMILY HISTORY REVIEWED AS PER CHART, WITH PERTINENT FINDINGS INCLUDED IN HISTORY SECTION OF NOTE.     Current Medications    Medication List with Changes/Refills   Current Medications    ASPIRIN 81 MG CAP    Take 81 mg by mouth once daily.    ATORVASTATIN (LIPITOR) 80 MG TABLET    Take 1 tablet (80 mg total) by mouth once daily.    AZELASTINE (ASTELIN) 137 MCG (0.1 %) NASAL SPRAY    Spray 2 sprays in each nostril twice daily    BLOOD PRESSURE CUFF MISC    1 Units by Misc.(Non-Drug; Combo Route) route once daily.    DIAZEPAM (VALIUM) 5 MG TABLET    Take 0.5 tablet by mouth every 8 to 12 hours as needed.    DIAZEPAM (VALIUM) 5 MG TABLET    Take 1/2 -1 tablet by mouth every 8 hours.    GARLIC EXTRACT ORAL    Take by mouth.    IPRATROPIUM (ATROVENT) 0.06 % NASAL SPRAY    INSTILL 2 SPRAYS IN EACH NOSTRIL EVERY 12 HOURS    IPRATROPIUM (ATROVENT) 21 MCG (0.03 %) NASAL SPRAY        METOPROLOL SUCCINATE (TOPROL-XL) 25 MG 24 HR TABLET    Take 0.5 tablets (12.5 mg total) by mouth once daily.    MULTIVITAMIN  CAPSULE    Take 1 capsule by mouth once daily.    NITROGLYCERIN (NITROSTAT) 0.4 MG SL TABLET    Place 1 tablet (0.4 mg total) under the tongue every 5 (five) minutes as needed for Chest pain.    PREDNISONE (DELTASONE) 20 MG TABLET    Take 2 tablets by mouth on day 1, THEN 1 ½ tablets on days 2 and 3, THEN 1 tablet on days 4 and 5, THEN ½ tablet on day 6    PREGABALIN (LYRICA) 150 MG CAPSULE    Take 1 capsule (150 mg total) by mouth 2 (two) times daily.    SACUBITRIL-VALSARTAN (ENTRESTO) 24-26 MG PER TABLET    Take 1 tablet by mouth 2 (two) times daily.    SILDENAFIL (REVATIO) 20 MG TAB    Take 1 tablet (20 mg total) by mouth once daily.    SPIRONOLACTONE (ALDACTONE) 25 MG TABLET    Take 1 tablet (25 mg total) by mouth once daily.    TAMSULOSIN (FLOMAX) 0.4 MG CAP    Take 2 capsules (0.8 mg total) by mouth once daily.    TRIAMCINOLONE (NASACORT) 55 MCG NASAL INHALER    Instill two sprays in each nostril once daily   Changed and/or Refilled Medications    Modified Medication Previous Medication    HYDROXYZINE HCL (ATARAX) 25 MG TABLET hydrOXYzine HCL (ATARAX) 25 MG tablet       Take 1/2-1 tablet nightly as needed for anxiety or insomnia    Take 0.5 to 1 tablet by mouth at bedtime   Discontinued Medications    CIPROFLOXACIN HCL (CIPRO) 500 MG TABLET    Take 1 tablet by mouth twice daily    HYDROXYZINE HCL (ATARAX) 25 MG TABLET    Take 1/2 to 1 tablet by mouth at bedtime.       Allergies   Review of patient's allergies indicates:   Allergen Reactions    Penicillins Hives         Review of Systems (Pertinent positives)  Review of Systems   Constitutional:  Negative for activity change and unexpected weight change.   HENT:  Negative for hearing loss, rhinorrhea and trouble swallowing.    Eyes:  Negative for discharge and visual disturbance.   Respiratory:  Negative for chest tightness and wheezing.    Cardiovascular:  Negative for chest pain and palpitations.   Gastrointestinal:  Negative for blood in stool, constipation,  "diarrhea and vomiting.   Endocrine: Negative for polydipsia and polyuria.   Genitourinary:  Positive for difficulty urinating. Negative for hematuria and urgency.   Musculoskeletal:  Negative for arthralgias, joint swelling and neck pain.   Neurological:  Negative for weakness and headaches.   Psychiatric/Behavioral:  Negative for confusion and dysphoric mood.        /70 (BP Location: Right arm, Patient Position: Sitting, BP Method: Large (Manual))   Pulse 63   Temp 97.8 °F (36.6 °C)   Ht 5' 8" (1.727 m)   Wt 91 kg (200 lb 9.9 oz)   SpO2 96%   BMI 30.50 kg/m²     Physical Exam  Vitals reviewed.   Constitutional:       General: He is not in acute distress.     Appearance: He is obese.   HENT:      Head: Normocephalic and atraumatic.      Right Ear: External ear normal.      Left Ear: External ear normal.      Nose: Nose normal.      Mouth/Throat:      Pharynx: No oropharyngeal exudate.   Eyes:      General: No scleral icterus.     Conjunctiva/sclera: Conjunctivae normal.   Cardiovascular:      Rate and Rhythm: Normal rate and regular rhythm.      Heart sounds: Normal heart sounds.   Pulmonary:      Effort: Pulmonary effort is normal.      Breath sounds: Normal breath sounds.   Abdominal:      General: Bowel sounds are normal. There is no distension.      Palpations: Abdomen is soft.      Tenderness: There is no abdominal tenderness. There is no guarding or rebound.      Hernia: A hernia (Ventral/umbilical) is present.   Musculoskeletal:         General: Normal range of motion.      Right lower leg: No edema.      Left lower leg: No edema.   Lymphadenopathy:      Cervical: No cervical adenopathy.   Skin:     General: Skin is warm and dry.      Findings: No rash.   Neurological:      General: No focal deficit present.      Mental Status: He is alert and oriented to person, place, and time.   Psychiatric:         Mood and Affect: Mood normal.         Behavior: Behavior normal. "           Assessment/Plan:  Masoud Osorio is a 69 y.o. male who presents today for :        ICD-10-CM ICD-9-CM    1. Routine general medical examination at a health care facility  Z00.00 V70.0       2. Class 1 obesity due to excess calories with serious comorbidity and body mass index (BMI) of 32.0 to 32.9 in adult  E66.09 278.00     Z68.32 V85.32       3. Coronary artery disease involving native coronary artery of native heart without angina pectoris  I25.10 414.01       4. Tobacco dependence in remission  F17.201 305.1       5. History of heart attack  I25.2 412       6. History of coronary artery stent placement  Z95.5 V45.82       7. Heart failure with mid-range ejection fraction (HFmEF)  I50.22 428.22       8. 1st degree AV block  I44.0 426.11       9. Long-term use of aspirin therapy  Z79.82 V58.66       10. Mixed hyperlipidemia  E78.2 272.2       11. Benign prostatic hyperplasia (BPH) with straining on urination  N40.1 600.01 Ambulatory referral/consult to Urology    R39.16 788.65       12. Elevated PSA  R97.20 790.93       13. Compression fracture of L1 vertebra with routine healing, subsequent encounter  S32.010D V54.17       14. Non-seasonal allergic rhinitis, unspecified trigger  J30.89 477.8       15. History of colon polyps  Z86.010 V12.72       16. Medication management  Z79.899 V58.69       17. Need for RSV vaccination  Z29.11 V04.82       18. Insomnia, unspecified type  G47.00 780.52 hydrOXYzine HCL (ATARAX) 25 MG tablet        ADVISED ON DIET/EXERCISE/SLEEP, ROUTINE EYE/DENTAL EXAMS, AND THE IMPORTANCE OF KEEPING UP WITH APPROPRIATE SCREENING TESTS BASED ON AGE AND RISK FACTORS.  REPEAT PSA PENDING WITH LABS- Urology had advised a repeat in 6 months  colonoscopy 4/27/22 repeat 5 years(polyps, 4/2027  Immunizations UTD except RSV vaccine advised & Declines updated COVID Vaccine    CAD w/ H/O NSTEMI & STENT, HEART FAILURE w/ MID RANGE EF/ Grade 1 DD, 1st Degree AV Block:  Excellent Exercise  tolerance/ capacity as confirmed on most recent Nuclear Stress test 4/12/24  ECHO 4/12/23: estimated ejection fraction is 35-40% & Grade 1 LV Diastolic Dysfunction   Per Cardiology Transitioned to ENTRESTO 24/26, SPIRONOLACTONE 25, TOPROL XL 12.5 mg daily, LDL at goal on LIPITOR 80, Continuing ASA 81 mg daily.   1st Degree AV Block- new finding during obs admit 2023 & most recently visible on EKG 4/11/24.  discussed avoiding/ using caution medications that can exacerbate conduction delay. Normal electrolytes including normal magnesium. ECHO Scheduled 7/10/24 and Cardiology F/U w/ Dr Hinton 8/22/24     LE Pitting Edema: most c/w venous stasis dermatitis. Had US LE Veins to investigate for venous insufficieny. Responding to increased elevation     OBESITY WITH INCREASED A1C:  A1c improved to 5.3. Weight trending down and advised continued attention to diet/exercise and weight loss. Liver enzymes also now WNL.  Repeat A1c, LFTs pending with labs.     LUMBAR DDD w/ H/O COMPRESSION FRACTURE AND LUMBAR RADICULOPATHY: Following with  Neurosurgery (now PRN) and pain management,. Completing a round of PT. most recent Xrays 5/8/24 and MRI 2/20/24. 2/7/24: left L3-4 and L4-5 transforaminal epidural steroid injection . Continuing LYRICA 75 mg nightly with some benefit.     ELEVATED PSA w/ BPH: PSA Elevated and overdue for 6 month repeat advised by urology. Flomax 0.4-- 0.8 daily , has discussed interventional procedures for prostate reduction with urology Dr Dillon hoskins feels his bladder emptying use currently stable on Flomax 0.4 mg daily-will discuss more at urology follow-up visit-referral placed to discuss elevated PSA and BPH concerns         Patient Instructions   Advise RSV vaccine with seasonal flu shot in fall of 2024      Follow up in about 6 months (around 12/7/2024) for to follow up on lab results, return as needed for new concerns.

## 2024-06-07 ENCOUNTER — OFFICE VISIT (OUTPATIENT)
Dept: FAMILY MEDICINE | Facility: CLINIC | Age: 69
End: 2024-06-07
Payer: MEDICARE

## 2024-06-07 ENCOUNTER — PATIENT MESSAGE (OUTPATIENT)
Dept: FAMILY MEDICINE | Facility: CLINIC | Age: 69
End: 2024-06-07

## 2024-06-07 ENCOUNTER — LAB VISIT (OUTPATIENT)
Dept: LAB | Facility: HOSPITAL | Age: 69
End: 2024-06-07
Attending: FAMILY MEDICINE
Payer: MEDICARE

## 2024-06-07 VITALS
SYSTOLIC BLOOD PRESSURE: 116 MMHG | WEIGHT: 200.63 LBS | DIASTOLIC BLOOD PRESSURE: 70 MMHG | TEMPERATURE: 98 F | HEART RATE: 63 BPM | BODY MASS INDEX: 30.41 KG/M2 | HEIGHT: 68 IN | OXYGEN SATURATION: 96 %

## 2024-06-07 DIAGNOSIS — Z79.899 MEDICATION MANAGEMENT: ICD-10-CM

## 2024-06-07 DIAGNOSIS — F17.201 TOBACCO DEPENDENCE IN REMISSION: ICD-10-CM

## 2024-06-07 DIAGNOSIS — Z29.11 NEED FOR RSV VACCINATION: ICD-10-CM

## 2024-06-07 DIAGNOSIS — Z12.5 ENCOUNTER FOR SCREENING FOR MALIGNANT NEOPLASM OF PROSTATE: ICD-10-CM

## 2024-06-07 DIAGNOSIS — Z00.00 ROUTINE GENERAL MEDICAL EXAMINATION AT A HEALTH CARE FACILITY: Primary | ICD-10-CM

## 2024-06-07 DIAGNOSIS — R97.20 ELEVATED PSA: ICD-10-CM

## 2024-06-07 DIAGNOSIS — E78.2 MIXED HYPERLIPIDEMIA: ICD-10-CM

## 2024-06-07 DIAGNOSIS — J30.89 NON-SEASONAL ALLERGIC RHINITIS, UNSPECIFIED TRIGGER: ICD-10-CM

## 2024-06-07 DIAGNOSIS — Z86.010 HISTORY OF COLON POLYPS: ICD-10-CM

## 2024-06-07 DIAGNOSIS — Z79.82 LONG-TERM USE OF ASPIRIN THERAPY: ICD-10-CM

## 2024-06-07 DIAGNOSIS — R39.16 BENIGN PROSTATIC HYPERPLASIA (BPH) WITH STRAINING ON URINATION: ICD-10-CM

## 2024-06-07 DIAGNOSIS — I25.10 CORONARY ARTERY DISEASE INVOLVING NATIVE CORONARY ARTERY OF NATIVE HEART WITHOUT ANGINA PECTORIS: ICD-10-CM

## 2024-06-07 DIAGNOSIS — I25.2 HISTORY OF HEART ATTACK: ICD-10-CM

## 2024-06-07 DIAGNOSIS — I50.22 HEART FAILURE WITH MID-RANGE EJECTION FRACTION (HFMEF): ICD-10-CM

## 2024-06-07 DIAGNOSIS — E66.09 CLASS 1 OBESITY DUE TO EXCESS CALORIES WITH SERIOUS COMORBIDITY AND BODY MASS INDEX (BMI) OF 32.0 TO 32.9 IN ADULT: ICD-10-CM

## 2024-06-07 DIAGNOSIS — N40.1 BENIGN PROSTATIC HYPERPLASIA (BPH) WITH STRAINING ON URINATION: ICD-10-CM

## 2024-06-07 DIAGNOSIS — Z95.5 HISTORY OF CORONARY ARTERY STENT PLACEMENT: ICD-10-CM

## 2024-06-07 DIAGNOSIS — I44.0 1ST DEGREE AV BLOCK: ICD-10-CM

## 2024-06-07 DIAGNOSIS — S32.010D COMPRESSION FRACTURE OF L1 VERTEBRA WITH ROUTINE HEALING, SUBSEQUENT ENCOUNTER: ICD-10-CM

## 2024-06-07 DIAGNOSIS — G47.00 INSOMNIA, UNSPECIFIED TYPE: ICD-10-CM

## 2024-06-07 LAB
25(OH)D3+25(OH)D2 SERPL-MCNC: 39 NG/ML (ref 30–96)
ALBUMIN SERPL BCP-MCNC: 4 G/DL (ref 3.5–5.2)
ALP SERPL-CCNC: 73 U/L (ref 55–135)
ALT SERPL W/O P-5'-P-CCNC: 22 U/L (ref 10–44)
ANION GAP SERPL CALC-SCNC: 8 MMOL/L (ref 8–16)
AST SERPL-CCNC: 49 U/L (ref 10–40)
BILIRUB SERPL-MCNC: 1.2 MG/DL (ref 0.1–1)
BUN SERPL-MCNC: 18 MG/DL (ref 8–23)
CALCIUM SERPL-MCNC: 9.7 MG/DL (ref 8.7–10.5)
CHLORIDE SERPL-SCNC: 109 MMOL/L (ref 95–110)
CO2 SERPL-SCNC: 25 MMOL/L (ref 23–29)
COMPLEXED PSA SERPL-MCNC: 2 NG/ML (ref 0–4)
CREAT SERPL-MCNC: 0.9 MG/DL (ref 0.5–1.4)
EST. GFR  (NO RACE VARIABLE): >60 ML/MIN/1.73 M^2
ESTIMATED AVG GLUCOSE: 111 MG/DL (ref 68–131)
GLUCOSE SERPL-MCNC: 82 MG/DL (ref 70–110)
HBA1C MFR BLD: 5.5 % (ref 4–5.6)
MAGNESIUM SERPL-MCNC: 2.1 MG/DL (ref 1.6–2.6)
POTASSIUM SERPL-SCNC: 4.1 MMOL/L (ref 3.5–5.1)
PROT SERPL-MCNC: 6.5 G/DL (ref 6–8.4)
SODIUM SERPL-SCNC: 142 MMOL/L (ref 136–145)
TSH SERPL DL<=0.005 MIU/L-ACNC: 1.32 UIU/ML (ref 0.4–4)

## 2024-06-07 PROCEDURE — 84443 ASSAY THYROID STIM HORMONE: CPT | Performed by: FAMILY MEDICINE

## 2024-06-07 PROCEDURE — 99397 PER PM REEVAL EST PAT 65+ YR: CPT | Mod: S$GLB,,, | Performed by: FAMILY MEDICINE

## 2024-06-07 PROCEDURE — 82306 VITAMIN D 25 HYDROXY: CPT | Performed by: FAMILY MEDICINE

## 2024-06-07 PROCEDURE — 3074F SYST BP LT 130 MM HG: CPT | Mod: CPTII,S$GLB,, | Performed by: FAMILY MEDICINE

## 2024-06-07 PROCEDURE — 83036 HEMOGLOBIN GLYCOSYLATED A1C: CPT | Performed by: FAMILY MEDICINE

## 2024-06-07 PROCEDURE — 1159F MED LIST DOCD IN RCRD: CPT | Mod: CPTII,S$GLB,, | Performed by: FAMILY MEDICINE

## 2024-06-07 PROCEDURE — 1160F RVW MEDS BY RX/DR IN RCRD: CPT | Mod: CPTII,S$GLB,, | Performed by: FAMILY MEDICINE

## 2024-06-07 PROCEDURE — 4010F ACE/ARB THERAPY RXD/TAKEN: CPT | Mod: CPTII,S$GLB,, | Performed by: FAMILY MEDICINE

## 2024-06-07 PROCEDURE — 3288F FALL RISK ASSESSMENT DOCD: CPT | Mod: CPTII,S$GLB,, | Performed by: FAMILY MEDICINE

## 2024-06-07 PROCEDURE — 99999 PR PBB SHADOW E&M-EST. PATIENT-LVL V: CPT | Mod: PBBFAC,,, | Performed by: FAMILY MEDICINE

## 2024-06-07 PROCEDURE — 36415 COLL VENOUS BLD VENIPUNCTURE: CPT | Performed by: FAMILY MEDICINE

## 2024-06-07 PROCEDURE — 1126F AMNT PAIN NOTED NONE PRSNT: CPT | Mod: CPTII,S$GLB,, | Performed by: FAMILY MEDICINE

## 2024-06-07 PROCEDURE — 80053 COMPREHEN METABOLIC PANEL: CPT | Performed by: FAMILY MEDICINE

## 2024-06-07 PROCEDURE — 3078F DIAST BP <80 MM HG: CPT | Mod: CPTII,S$GLB,, | Performed by: FAMILY MEDICINE

## 2024-06-07 PROCEDURE — 84153 ASSAY OF PSA TOTAL: CPT | Performed by: FAMILY MEDICINE

## 2024-06-07 PROCEDURE — 3008F BODY MASS INDEX DOCD: CPT | Mod: CPTII,S$GLB,, | Performed by: FAMILY MEDICINE

## 2024-06-07 PROCEDURE — 83735 ASSAY OF MAGNESIUM: CPT | Performed by: FAMILY MEDICINE

## 2024-06-07 PROCEDURE — 1101F PT FALLS ASSESS-DOCD LE1/YR: CPT | Mod: CPTII,S$GLB,, | Performed by: FAMILY MEDICINE

## 2024-06-07 RX ORDER — HYDROXYZINE HYDROCHLORIDE 25 MG/1
TABLET, FILM COATED ORAL
Qty: 30 TABLET | Refills: 3 | Status: SHIPPED | OUTPATIENT
Start: 2024-06-07

## 2024-06-08 PROBLEM — R74.01 ELEVATED SGOT (AST): Status: ACTIVE | Noted: 2024-06-08

## 2024-06-30 NOTE — PROGRESS NOTES
[place] - Urology   Clinic Note    SUBJECTIVE:     No chief complaint on file.      Referral from: Rosemarie Ayala MD.    History of Present Illness:  Masoud Osorio is a 69 y.o. male who presents to clinic for evaluation of elevated PSA.      Patient here for evaluation of elevated PSA and prostate MRI.  Patient currently takes tamsulosin and reports that this somewhat controls his symptoms.  He does report that he does have bothersome lower urinary tract symptoms including straining to void, weak stream, hesitancy and intermittency.  He does have a history of elevated PSA and recently had an MRI performed which revealed a size of 63, overall PI-RADS 2 lesions.    MRI Prostate:  Prostate Volume: 63 cc  PSA: 7.3  PSA Density: 0.12  Overall Assessment: PI-RADS 2, 0 targets      PSA:  Lab Results   Component Value Date    PSA 2.0 06/07/2024    PSA 4.8 (H) 03/08/2023    PSA 1.8 01/19/2022    PSA 1.7 06/15/2020    PSA 1.3 12/05/2018    PSA 1.5 09/23/2016    PSADIAG 7.3 (H) 07/21/2023    PSADIAG 7.5 (H) 05/12/2023    PSADIAG 7.7 (H) 04/14/2023       Previously abnormal PSA: yes.   Previous biopsy: no.   Family history of prostate cancer: no.      7/1/24  Doubled flomax dose at last visit, but has only been taking one  Reports no significant changes, not significantly bothered  PSA down to 2.0  IPSS 15/3    Patient endorses no additional complaints at this time.    Anticoagulation/Antiplatelets:  No    Past Medical History:   Diagnosis Date    Allergy     Anticoagulant long-term use     Anxiety and depression 09/21/2016    trial of ctialopram and follow up in 8 weeks    Arthritis     Benign prostatic hyperplasia (BPH) with straining on urination 12/05/2018    Coronary artery disease involving native coronary artery of native heart without angina pectoris 07/26/2016 [place] [facility]   PCI of RCA with 4.0 x 15, 4.0 x 38, and 3.5 x 38 mm Resolute JACOB   PCI of PDA 2.75 x 15 mm JACOB dilated to 3.0 POBA  of proximal PLB 2.0 x 12 mm balloon   Normal EF with LVEDP 15 mmHg DAPT score of 2               Elevated PSA     Gout     History of coronary artery stent placement 09/21/2016    History of heart attack 07/25/2016    NSTEMI    Mixed hyperlipidemia 07/17/2017    Overweight (BMI 25.0-29.9) 09/21/2016    Tobacco dependence in remission 07/25/2016       Past Surgical History:   Procedure Laterality Date    COLONOSCOPY N/A 08/16/2017    Procedure: COLONOSCOPY Split Prep;  Surgeon: Maykel Carcamo Jr., MD;  Location: [facility] ENDO;  Service: Endoscopy;  Laterality: N/A;    COLONOSCOPY N/A 4/27/2022    Procedure: COLONOSCOPY Golytely;  Surgeon: Henrry Nieves MD;  Location: [facility] ENDO;  Service: Endoscopy;  Laterality: N/A;  C    CORONARY ANGIOGRAPHY N/A 3/19/2024    Procedure: ANGIOGRAM, CORONARY ARTERY;  Surgeon: Cleveland Hinton MD;  Location: [facility] CATH LAB/EP;  Service: Cardiology;  Laterality: N/A;    CORONARY ANGIOPLASTY WITH STENT PLACEMENT      EPIDURAL STEROID INJECTION INTO LUMBAR SPINE Left 2/26/2024    Procedure: LT L3-4 and L4-5 TFESI;  Surgeon: Rosita Blum DO;  Location: [facility] PAIN MANAGEMENT;  Service: Pain Management;  Laterality: Left;  20 mins    LEFT HEART CATHETERIZATION Left 3/19/2024    Procedure: Left heart cath;  Surgeon: Cleveland Hinton MD;  Location: [facility] CATH LAB/EP;  Service: Cardiology;  Laterality: Left;    ROTATOR CUFF REPAIR      TRANSFORAMINAL EPIDURAL INJECTION OF STEROID Left 06/15/2021    Procedure: Injection,steroid,epidural,transforaminal approach-- Left- L3-4, L4-5;  Surgeon: Enrike Medellin Jr., MD;  Location: [facility] PAIN MGT;  Service: Pain Management;  Laterality: Left;    VASECTOMY         Family History   Problem Relation Name Age of Onset    Heart disease Mother      Arthritis Mother      Parkinsonism Mother      Congenital heart disease Father      No Known Problems Sister      No Known Problems Brother      No Known Problems Daughter      No Known Problems  Sister      No Known Problems Sister      Prostate cancer Neg Hx      Kidney disease Neg Hx         Social History     Tobacco Use    Smoking status: Former     Current packs/day: 0.00     Types: Cigarettes     Quit date: 2016     Years since quittin.9    Smokeless tobacco: Never   Substance Use Topics    Alcohol use: Yes     Comment: rarely on holidays    Drug use: No       Current Outpatient Medications on File Prior to Visit   Medication Sig Dispense Refill    aspirin 81 mg Cap Take 81 mg by mouth once daily.      atorvastatin (LIPITOR) 80 MG tablet Take 1 tablet (80 mg total) by mouth once daily. 90 tablet 3    azelastine (ASTELIN) 137 mcg (0.1 %) nasal spray Spray 2 sprays in each nostril twice daily 30 mL 5    BLOOD PRESSURE CUFF Misc 1 Units by Misc.(Non-Drug; Combo Route) route once daily. (Patient not taking: Reported on 2024)  0    diazePAM (VALIUM) 5 MG tablet Take 0.5 tablet by mouth every 8 to 12 hours as needed. (Patient not taking: Reported on 2024) 24 tablet 0    diazePAM (VALIUM) 5 MG tablet Take 1/2 -1 tablet by mouth every 8 hours. (Patient not taking: Reported on 2024) 30 tablet 0    GARLIC EXTRACT ORAL Take by mouth.      hydrOXYzine HCL (ATARAX) 25 MG tablet Take 1/2-1 tablet nightly as needed for anxiety or insomnia 30 tablet 3    ipratropium (ATROVENT) 0.06 % nasal spray INSTILL 2 SPRAYS IN EACH NOSTRIL EVERY 12 HOURS (Patient not taking: Reported on 2024)  5    ipratropium (ATROVENT) 21 mcg (0.03 %) nasal spray  (Patient not taking: Reported on 2024)      metoprolol succinate (TOPROL-XL) 25 MG 24 hr tablet Take 0.5 tablets (12.5 mg total) by mouth once daily. 15 tablet 11    multivitamin capsule Take 1 capsule by mouth once daily.      nitroGLYCERIN (NITROSTAT) 0.4 MG SL tablet Place 1 tablet (0.4 mg total) under the tongue every 5 (five) minutes as needed for Chest pain. (Patient not taking: Reported on 2024) 25 tablet 11    predniSONE (DELTASONE) 20 MG  "tablet Take 2 tablets by mouth on day 1, THEN 1 ½ tablets on days 2 and 3, THEN 1 tablet on days 4 and 5, THEN ½ tablet on day 6 (Patient not taking: Reported on 6/7/2024) 8 tablet 0    pregabalin (LYRICA) 150 MG capsule Take 1 capsule (150 mg total) by mouth 2 (two) times daily. 60 capsule 5    sacubitriL-valsartan (ENTRESTO) 24-26 mg per tablet Take 1 tablet by mouth 2 (two) times daily. 60 tablet 11    sildenafil (REVATIO) 20 mg Tab Take 1 tablet (20 mg total) by mouth once daily. (Patient not taking: Reported on 6/7/2024) 30 tablet 11    spironolactone (ALDACTONE) 25 MG tablet Take 1 tablet (25 mg total) by mouth once daily. 30 tablet 11    tamsulosin (FLOMAX) 0.4 mg Cap Take 2 capsules (0.8 mg total) by mouth once daily. 180 capsule 3    triamcinolone (NASACORT) 55 mcg nasal inhaler Instill two sprays in each nostril once daily 16.9 mL 5     No current facility-administered medications on file prior to visit.       Review of patient's allergies indicates:   Allergen Reactions    Penicillins Hives       Review of Systems:  A review of 10+ systems was conducted with pertinent positive and negative findings documented in HPI with all other systems reviewed and negative.    OBJECTIVE:     Estimated body mass index is 30.5 kg/m² as calculated from the following:    Height as of 6/7/24: 5' 8" (1.727 m).    Weight as of 6/7/24: 91 kg (200 lb 9.9 oz).    Vital Signs (Most Recent)  There were no vitals filed for this visit.      Physical Exam:  GENERAL: patient sitting comfortably  HEENT: normocephalic  NECK: supple, no JVD  PULM: normal chest rise, no increased WOB  HEART: non-diaphoretic  ABDO: soft, nondistended, nontender  BACK: no CVA tenderness bilaterally  SKIN: warm, dry, well perfused  EXT: no bruising or edema  NEURO: grossly normal with no focal deficits  PSYCH: appropriate mood and affect    Genitourinary Exam:  deferred    Lab Results   Component Value Date    BUN 18 06/07/2024    CREATININE 0.9 06/07/2024 " "   WBC 6.22 03/18/2024    HGB 15.0 03/18/2024    HCT 44.8 03/18/2024     03/18/2024    AST 49 (H) 06/07/2024    ALT 22 06/07/2024    ALKPHOS 73 06/07/2024    ALBUMIN 4.0 06/07/2024    HGBA1C 5.5 06/07/2024        PSA:  Lab Results   Component Value Date    PSA 2.0 06/07/2024    PSA 4.8 (H) 03/08/2023    PSA 1.8 01/19/2022    PSA 1.7 06/15/2020    PSA 1.3 12/05/2018    PSA 1.5 09/23/2016    PSADIAG 7.3 (H) 07/21/2023    PSADIAG 7.5 (H) 05/12/2023    PSADIAG 7.7 (H) 04/14/2023       Testosterone:  No results found for: "TOTALTESTOST", "TESTOSTERONE"     Imaging:  I have personally reviewed all relevant imaging.    No results found for this or any previous visit (from the past 2160 hour(s)).  No results found for this or any previous visit (from the past 2160 hour(s)).  X-Ray Scoliosis Complete  Narrative: EXAMINATION:  XR SCOLIOSIS COMPLETE    CLINICAL HISTORY:  Radiculopathy, lumbar region    TECHNIQUE:  Standing AP and lateral views of the entire spine    COMPARISON:  X-ray 05/08/2024    FINDINGS:  Biconcave moderate compression fracture of L1 redemonstrated.  The remainder of the vertebral body heights are preserved noting poor visualization of the alignment at the level of the upper thoracic spine.  There is cervical and lumbar multilevel spondylosis and facet arthropathy.  Straightened thoracic kyphosis.  Gentle undulation of the thoracolumbar spinal curvature in straightened lumbar lordosis.  Visualized soft tissues unremarkable.  Impression: See above    Electronically signed by: Bno Nugent Jr  Date:    05/08/2024  Time:    15:56  X-Ray Lumbar Spine Ap Lateral w/Flex Ext  Narrative: EXAMINATION:  XR LUMBAR SPINE AP AND LAT WITH FLEX/EXT    CLINICAL HISTORY:  Radiculopathy, lumbar region    TECHNIQUE:  AP and lateral views as well as lateral flexion and extension images are performed through the lumbar spine.    COMPARISON:  06/02/2021    FINDINGS:  Compression fracture of the L1 vertebrae appears " similar to previous studies.  Narrowing of several intervertebral disc spaces can be seen the L2-L3 and L4-L5 disc spaces in particular.  Degenerative changes seen around the facet joint.  No instability is noted in flexion and extension.  Impression: See above    Electronically signed by: John Love MD  Date:    05/08/2024  Time:    14:20      ASSESSMENT     1. Straining during urination    2. BPH with obstruction/lower urinary tract symptoms    3. Elevated PSA        PLAN:     Can take 0.8mg of flomax daily  Offered finasteride, pt declined  PSA WNL now, can follow once per year  Discussed outlet procedures, including ReZum. Will consider.    Jose Alfredo Hua MD  Urology  [facility] [place] [facility] [place]    Disclaimer: This note has been generated using voice-recognition software. There may be typographical errors that have been missed during proof-reading.

## 2024-07-01 ENCOUNTER — OFFICE VISIT (OUTPATIENT)
Dept: UROLOGY | Facility: CLINIC | Age: 69
End: 2024-07-01
Payer: MEDICARE

## 2024-07-01 VITALS
HEIGHT: 68 IN | BODY MASS INDEX: 29.47 KG/M2 | WEIGHT: 194.44 LBS | SYSTOLIC BLOOD PRESSURE: 105 MMHG | HEART RATE: 62 BPM | DIASTOLIC BLOOD PRESSURE: 72 MMHG

## 2024-07-01 DIAGNOSIS — N13.8 BPH WITH OBSTRUCTION/LOWER URINARY TRACT SYMPTOMS: ICD-10-CM

## 2024-07-01 DIAGNOSIS — N40.1 BENIGN PROSTATIC HYPERPLASIA (BPH) WITH STRAINING ON URINATION: ICD-10-CM

## 2024-07-01 DIAGNOSIS — R39.16 STRAINING DURING URINATION: Primary | ICD-10-CM

## 2024-07-01 DIAGNOSIS — R39.16 BENIGN PROSTATIC HYPERPLASIA (BPH) WITH STRAINING ON URINATION: ICD-10-CM

## 2024-07-01 DIAGNOSIS — N40.1 BPH WITH OBSTRUCTION/LOWER URINARY TRACT SYMPTOMS: ICD-10-CM

## 2024-07-01 DIAGNOSIS — R97.20 ELEVATED PSA: ICD-10-CM

## 2024-07-01 PROCEDURE — 1126F AMNT PAIN NOTED NONE PRSNT: CPT | Mod: CPTII,S$GLB,, | Performed by: UROLOGY

## 2024-07-01 PROCEDURE — 3008F BODY MASS INDEX DOCD: CPT | Mod: CPTII,S$GLB,, | Performed by: UROLOGY

## 2024-07-01 PROCEDURE — 1159F MED LIST DOCD IN RCRD: CPT | Mod: CPTII,S$GLB,, | Performed by: UROLOGY

## 2024-07-01 PROCEDURE — 99999 PR PBB SHADOW E&M-EST. PATIENT-LVL IV: CPT | Mod: PBBFAC,,, | Performed by: UROLOGY

## 2024-07-01 PROCEDURE — 3288F FALL RISK ASSESSMENT DOCD: CPT | Mod: CPTII,S$GLB,, | Performed by: UROLOGY

## 2024-07-01 PROCEDURE — 3044F HG A1C LEVEL LT 7.0%: CPT | Mod: CPTII,S$GLB,, | Performed by: UROLOGY

## 2024-07-01 PROCEDURE — 99214 OFFICE O/P EST MOD 30 MIN: CPT | Mod: S$GLB,,, | Performed by: UROLOGY

## 2024-07-01 PROCEDURE — 3074F SYST BP LT 130 MM HG: CPT | Mod: CPTII,S$GLB,, | Performed by: UROLOGY

## 2024-07-01 PROCEDURE — 3078F DIAST BP <80 MM HG: CPT | Mod: CPTII,S$GLB,, | Performed by: UROLOGY

## 2024-07-01 PROCEDURE — 4010F ACE/ARB THERAPY RXD/TAKEN: CPT | Mod: CPTII,S$GLB,, | Performed by: UROLOGY

## 2024-07-01 PROCEDURE — 1101F PT FALLS ASSESS-DOCD LE1/YR: CPT | Mod: CPTII,S$GLB,, | Performed by: UROLOGY

## 2024-07-03 ENCOUNTER — TELEPHONE (OUTPATIENT)
Dept: CARDIOLOGY | Facility: CLINIC | Age: 69
End: 2024-07-03
Payer: MEDICARE

## 2024-07-03 ENCOUNTER — PATIENT MESSAGE (OUTPATIENT)
Dept: CARDIOLOGY | Facility: CLINIC | Age: 69
End: 2024-07-03
Payer: MEDICARE

## 2024-07-03 NOTE — TELEPHONE ENCOUNTER
Patient was scheduled for 08/22/2024,patient has been moved to 09/17/2024 at 10:20 due to the provider being out of the office/letter/lvm

## 2024-07-10 ENCOUNTER — HOSPITAL ENCOUNTER (OUTPATIENT)
Dept: CARDIOLOGY | Facility: HOSPITAL | Age: 69
Discharge: HOME | End: 2024-07-10
Attending: INTERNAL MEDICINE
Payer: MEDICARE

## 2024-07-10 VITALS — WEIGHT: 194 LBS | BODY MASS INDEX: 29.4 KG/M2 | HEIGHT: 68 IN

## 2024-07-10 DIAGNOSIS — I50.22 HEART FAILURE WITH MID-RANGE EJECTION FRACTION (HFMEF): ICD-10-CM

## 2024-07-10 LAB
APICAL FOUR CHAMBER EJECTION FRACTION: 51 %
APICAL TWO CHAMBER EJECTION FRACTION: 50 %
BSA FOR ECHO PROCEDURE: 2.05 M2
CV ECHO LV RWT: 0.49 CM
DOP CALC LVOT AREA: 3.4 CM2
DOP CALC LVOT DIAMETER: 2.09 CM
ECHO LV POSTERIOR WALL: 1.15 CM (ref 0.6–1.1)
FRACTIONAL SHORTENING: 24 % (ref 28–44)
INTERVENTRICULAR SEPTUM: 1 CM (ref 0.6–1.1)
IVC DIAMETER: 2.13 CM
LA MAJOR: 4.95 CM
LA MINOR: 4.98 CM
LA WIDTH: 3.9 CM
LEFT ATRIUM AREA SYSTOLIC (APICAL 2 CHAMBER): 19.32 CM2
LEFT ATRIUM AREA SYSTOLIC (APICAL 4 CHAMBER): 14.61 CM2
LEFT ATRIUM SIZE: 3.41 CM
LEFT ATRIUM VOLUME INDEX MOD: 21.5 ML/M2
LEFT ATRIUM VOLUME INDEX: 27.8 ML/M2
LEFT ATRIUM VOLUME MOD: 43.41 CM3
LEFT ATRIUM VOLUME: 56.12 CM3
LEFT INTERNAL DIMENSION IN SYSTOLE: 3.6 CM (ref 2.1–4)
LEFT VENTRICLE DIASTOLIC VOLUME INDEX: 50.98 ML/M2
LEFT VENTRICLE DIASTOLIC VOLUME: 102.98 ML
LEFT VENTRICLE END DIASTOLIC VOLUME APICAL 2 CHAMBER: 94.61 ML
LEFT VENTRICLE END DIASTOLIC VOLUME APICAL 4 CHAMBER: 103.53 ML
LEFT VENTRICLE END SYSTOLIC VOLUME APICAL 2 CHAMBER: 56.79 ML
LEFT VENTRICLE END SYSTOLIC VOLUME APICAL 4 CHAMBER: 32.87 ML
LEFT VENTRICLE MASS INDEX: 90 G/M2
LEFT VENTRICLE SYSTOLIC VOLUME INDEX: 27 ML/M2
LEFT VENTRICLE SYSTOLIC VOLUME: 54.57 ML
LEFT VENTRICULAR INTERNAL DIMENSION IN DIASTOLE: 4.71 CM (ref 3.5–6)
LEFT VENTRICULAR MASS: 182.26 G
LVED V (TEICH): 102.98 ML
LVES V (TEICH): 54.57 ML
OHS LV EJECTION FRACTION SIMPSONS BIPLANE MOD: 51 %
RA PRESSURE ESTIMATED: 3 MMHG
SINUS: 3.4 CM
STJ: 3.2 CM
Z-SCORE OF LEFT VENTRICULAR DIMENSION IN END DIASTOLE: -2.34
Z-SCORE OF LEFT VENTRICULAR DIMENSION IN END SYSTOLE: -0.11

## 2024-07-10 PROCEDURE — 93308 TTE F-UP OR LMTD: CPT

## 2024-07-10 PROCEDURE — 93308 TTE F-UP OR LMTD: CPT | Mod: 26,,, | Performed by: INTERNAL MEDICINE

## 2024-07-11 ENCOUNTER — TELEPHONE (OUTPATIENT)
Dept: CARDIOLOGY | Facility: CLINIC | Age: 69
End: 2024-07-11
Payer: MEDICARE

## 2024-07-11 NOTE — PROGRESS NOTES
Please call the patient with the results.  The echocardiogram results is good.  Showed improvement in the heart pumping function almost back to normal which is great.     Sincerely,  Cleveland Hinton MD.   Interventional Cardiologist  [facility] [place]

## 2024-07-11 NOTE — TELEPHONE ENCOUNTER
----- Message from Cleveland Hinton MD sent at 7/11/2024  2:05 PM CDT -----  Please call the patient with the results.  The echocardiogram results is good.  Showed improvement in the heart pumping function almost back to normal which is great.     Sincerely,  Cleveland Hinton MD.   Interventional Cardiologist  [facility] [place]

## 2024-08-05 ENCOUNTER — TELEPHONE (OUTPATIENT)
Dept: UROLOGY | Facility: CLINIC | Age: 69
End: 2024-08-05
Payer: MEDICARE

## 2024-08-06 ENCOUNTER — TELEPHONE (OUTPATIENT)
Dept: UROLOGY | Facility: CLINIC | Age: 69
End: 2024-08-06
Payer: MEDICARE

## 2024-08-08 ENCOUNTER — OFFICE VISIT (OUTPATIENT)
Dept: UROLOGY | Facility: CLINIC | Age: 69
End: 2024-08-08
Payer: MEDICARE

## 2024-08-08 VITALS
BODY MASS INDEX: 28.95 KG/M2 | HEIGHT: 68 IN | HEART RATE: 76 BPM | SYSTOLIC BLOOD PRESSURE: 117 MMHG | DIASTOLIC BLOOD PRESSURE: 67 MMHG | WEIGHT: 191 LBS

## 2024-08-08 DIAGNOSIS — R39.16 STRAINING DURING URINATION: Primary | ICD-10-CM

## 2024-08-08 DIAGNOSIS — N40.1 BPH WITH OBSTRUCTION/LOWER URINARY TRACT SYMPTOMS: ICD-10-CM

## 2024-08-08 DIAGNOSIS — R39.12 WEAK URINARY STREAM: ICD-10-CM

## 2024-08-08 DIAGNOSIS — N13.8 BPH WITH OBSTRUCTION/LOWER URINARY TRACT SYMPTOMS: ICD-10-CM

## 2024-08-08 PROCEDURE — 3008F BODY MASS INDEX DOCD: CPT | Mod: CPTII,S$GLB,, | Performed by: UROLOGY

## 2024-08-08 PROCEDURE — 4010F ACE/ARB THERAPY RXD/TAKEN: CPT | Mod: CPTII,S$GLB,, | Performed by: UROLOGY

## 2024-08-08 PROCEDURE — 3044F HG A1C LEVEL LT 7.0%: CPT | Mod: CPTII,S$GLB,, | Performed by: UROLOGY

## 2024-08-08 PROCEDURE — 99999 PR PBB SHADOW E&M-EST. PATIENT-LVL V: CPT | Mod: PBBFAC,,, | Performed by: UROLOGY

## 2024-08-08 PROCEDURE — 1126F AMNT PAIN NOTED NONE PRSNT: CPT | Mod: CPTII,S$GLB,, | Performed by: UROLOGY

## 2024-08-08 PROCEDURE — 99214 OFFICE O/P EST MOD 30 MIN: CPT | Mod: S$GLB,,, | Performed by: UROLOGY

## 2024-08-08 PROCEDURE — 3074F SYST BP LT 130 MM HG: CPT | Mod: CPTII,S$GLB,, | Performed by: UROLOGY

## 2024-08-08 PROCEDURE — 3078F DIAST BP <80 MM HG: CPT | Mod: CPTII,S$GLB,, | Performed by: UROLOGY

## 2024-08-08 PROCEDURE — 3288F FALL RISK ASSESSMENT DOCD: CPT | Mod: CPTII,S$GLB,, | Performed by: UROLOGY

## 2024-08-08 PROCEDURE — 1159F MED LIST DOCD IN RCRD: CPT | Mod: CPTII,S$GLB,, | Performed by: UROLOGY

## 2024-08-08 PROCEDURE — 1101F PT FALLS ASSESS-DOCD LE1/YR: CPT | Mod: CPTII,S$GLB,, | Performed by: UROLOGY

## 2024-08-08 RX ORDER — CIPROFLOXACIN 2 MG/ML
400 INJECTION, SOLUTION INTRAVENOUS
OUTPATIENT
Start: 2024-08-08

## 2024-08-08 NOTE — H&P (VIEW-ONLY)
[place] - Urology   Clinic Note    SUBJECTIVE:     No chief complaint on file.      Referral from: No ref. provider found.    History of Present Illness:  Masoud Osorio is a 69 y.o. male who presents to clinic for evaluation of elevated PSA.      Patient here for evaluation of elevated PSA and prostate MRI.  Patient currently takes tamsulosin and reports that this somewhat controls his symptoms.  He does report that he does have bothersome lower urinary tract symptoms including straining to void, weak stream, hesitancy and intermittency.  He does have a history of elevated PSA and recently had an MRI performed which revealed a size of 63, overall PI-RADS 2 lesions.    MRI Prostate:  Prostate Volume: 63 cc  PSA: 7.3  PSA Density: 0.12  Overall Assessment: PI-RADS 2, 0 targets      PSA:  Lab Results   Component Value Date    PSA 2.0 06/07/2024    PSA 4.8 (H) 03/08/2023    PSA 1.8 01/19/2022    PSA 1.7 06/15/2020    PSA 1.3 12/05/2018    PSA 1.5 09/23/2016    PSADIAG 7.3 (H) 07/21/2023    PSADIAG 7.5 (H) 05/12/2023    PSADIAG 7.7 (H) 04/14/2023       Previously abnormal PSA: yes.   Previous biopsy: no.   Family history of prostate cancer: no.      7/1/24  Doubled flomax dose at last visit, but has only been taking one  Reports no significant changes, not significantly bothered  PSA down to 2.0  IPSS 15/3    08/08/2024  Would like to proceed with ReZUM    Patient endorses no additional complaints at this time.    Anticoagulation/Antiplatelets:  No    Past Medical History:   Diagnosis Date    Allergy     Anticoagulant long-term use     Anxiety and depression 09/21/2016    trial of ctialopram and follow up in 8 weeks    Arthritis     Benign prostatic hyperplasia (BPH) with straining on urination 12/05/2018    Coronary artery disease involving native coronary artery of native heart without angina pectoris 07/26/2016 7/26/2016 NSTEMI at [facility] [place] [facility]   PCI of RCA with 4.0 x 15, 4.0 x 38, and 3.5 x 38 mm Resolute JACOB    PCI of PDA 2.75 x 15 mm JACOB dilated to 3.0 POBA of proximal PLB 2.0 x 12 mm balloon   Normal EF with LVEDP 15 mmHg DAPT score of 2               Elevated PSA     Gout     History of coronary artery stent placement 09/21/2016    History of heart attack 07/25/2016    NSTEMI    Mixed hyperlipidemia 07/17/2017    Overweight (BMI 25.0-29.9) 09/21/2016    Tobacco dependence in remission 07/25/2016       Past Surgical History:   Procedure Laterality Date    COLONOSCOPY N/A 08/16/2017    Procedure: COLONOSCOPY Split Prep;  Surgeon: Maykel Carcamo Jr., MD;  Location: [facility] ENDO;  Service: Endoscopy;  Laterality: N/A;    COLONOSCOPY N/A 4/27/2022    Procedure: COLONOSCOPY Golytely;  Surgeon: Henrry Nieves MD;  Location: [facility] ENDO;  Service: Endoscopy;  Laterality: N/A;  C    CORONARY ANGIOGRAPHY N/A 3/19/2024    Procedure: ANGIOGRAM, CORONARY ARTERY;  Surgeon: Cleveland Hinton MD;  Location: [facility] CATH LAB/EP;  Service: Cardiology;  Laterality: N/A;    CORONARY ANGIOPLASTY WITH STENT PLACEMENT      EPIDURAL STEROID INJECTION INTO LUMBAR SPINE Left 2/26/2024    Procedure: LT L3-4 and L4-5 TFESI;  Surgeon: Rosita Blum DO;  Location: [facility] PAIN MANAGEMENT;  Service: Pain Management;  Laterality: Left;  20 mins    LEFT HEART CATHETERIZATION Left 3/19/2024    Procedure: Left heart cath;  Surgeon: Cleveland Hinton MD;  Location: [facility] CATH LAB/EP;  Service: Cardiology;  Laterality: Left;    ROTATOR CUFF REPAIR      TRANSFORAMINAL EPIDURAL INJECTION OF STEROID Left 06/15/2021    Procedure: Injection,steroid,epidural,transforaminal approach-- Left- L3-4, L4-5;  Surgeon: Enrike Medellin Jr., MD;  Location: [facility] PAIN MGT;  Service: Pain Management;  Laterality: Left;    VASECTOMY         Family History   Problem Relation Name Age of Onset    Heart disease Mother      Arthritis Mother      Parkinsonism Mother      Congenital heart disease Father      No Known Problems Sister      No Known Problems Brother       No Known Problems Daughter      No Known Problems Sister      No Known Problems Sister      Prostate cancer Neg Hx      Kidney disease Neg Hx         Social History     Tobacco Use    Smoking status: Former     Current packs/day: 0.00     Types: Cigarettes     Quit date: 2016     Years since quittin.0    Smokeless tobacco: Never   Substance Use Topics    Alcohol use: Yes     Comment: rarely on holidays    Drug use: No       Current Outpatient Medications on File Prior to Visit   Medication Sig Dispense Refill    aspirin 81 mg Cap Take 81 mg by mouth once daily.      atorvastatin (LIPITOR) 80 MG tablet Take 1 tablet (80 mg total) by mouth once daily. 90 tablet 3    azelastine (ASTELIN) 137 mcg (0.1 %) nasal spray Spray 2 sprays in each nostril twice daily 30 mL 5    BLOOD PRESSURE CUFF Misc 1 Units by Misc.(Non-Drug; Combo Route) route once daily. (Patient not taking: Reported on 2024)  0    diazePAM (VALIUM) 5 MG tablet Take 0.5 tablet by mouth every 8 to 12 hours as needed. (Patient not taking: Reported on 2024) 24 tablet 0    diazePAM (VALIUM) 5 MG tablet Take 1/2 -1 tablet by mouth every 8 hours. (Patient not taking: Reported on 2024) 30 tablet 0    GARLIC EXTRACT ORAL Take by mouth.      hydrOXYzine HCL (ATARAX) 25 MG tablet Take 1/2-1 tablet nightly as needed for anxiety or insomnia 30 tablet 3    ipratropium (ATROVENT) 0.06 % nasal spray INSTILL 2 SPRAYS IN EACH NOSTRIL EVERY 12 HOURS (Patient not taking: Reported on 2024)  5    ipratropium (ATROVENT) 21 mcg (0.03 %) nasal spray  (Patient not taking: Reported on 2024)      metoprolol succinate (TOPROL-XL) 25 MG 24 hr tablet Take 0.5 tablets (12.5 mg total) by mouth once daily. 15 tablet 11    multivitamin capsule Take 1 capsule by mouth once daily.      nitroGLYCERIN (NITROSTAT) 0.4 MG SL tablet Place 1 tablet (0.4 mg total) under the tongue every 5 (five) minutes as needed for Chest pain. (Patient not taking: Reported on 2024)  "25 tablet 11    predniSONE (DELTASONE) 20 MG tablet Take 2 tablets by mouth on day 1, THEN 1 ½ tablets on days 2 and 3, THEN 1 tablet on days 4 and 5, THEN ½ tablet on day 6 (Patient not taking: Reported on 6/7/2024) 8 tablet 0    pregabalin (LYRICA) 150 MG capsule Take 1 capsule (150 mg total) by mouth 2 (two) times daily. 60 capsule 5    sacubitriL-valsartan (ENTRESTO) 24-26 mg per tablet Take 1 tablet by mouth 2 (two) times daily. 60 tablet 11    sildenafil (REVATIO) 20 mg Tab Take 1 tablet (20 mg total) by mouth once daily. (Patient not taking: Reported on 6/7/2024) 30 tablet 11    spironolactone (ALDACTONE) 25 MG tablet Take 1 tablet (25 mg total) by mouth once daily. 30 tablet 11    tamsulosin (FLOMAX) 0.4 mg Cap Take 2 capsules (0.8 mg total) by mouth once daily. 180 capsule 3    triamcinolone (NASACORT) 55 mcg nasal inhaler Instill two sprays in each nostril once daily 16.9 mL 5     No current facility-administered medications on file prior to visit.       Review of patient's allergies indicates:   Allergen Reactions    Penicillins Hives       Review of Systems:  A review of 10+ systems was conducted with pertinent positive and negative findings documented in HPI with all other systems reviewed and negative.    OBJECTIVE:     Estimated body mass index is 29.5 kg/m² as calculated from the following:    Height as of 7/10/24: 5' 8" (1.727 m).    Weight as of 7/10/24: 88 kg (194 lb).    Vital Signs (Most Recent)  There were no vitals filed for this visit.      Physical Exam:  GENERAL: patient sitting comfortably  HEENT: normocephalic  NECK: supple, no JVD  PULM: normal chest rise, no increased WOB  HEART: non-diaphoretic  ABDO: soft, nondistended, nontender  BACK: no CVA tenderness bilaterally  SKIN: warm, dry, well perfused  EXT: no bruising or edema  NEURO: grossly normal with no focal deficits  PSYCH: appropriate mood and affect    Genitourinary Exam:  deferred    Lab Results   Component Value Date    BUN 18 " "06/07/2024    CREATININE 0.9 06/07/2024    WBC 6.22 03/18/2024    HGB 15.0 03/18/2024    HCT 44.8 03/18/2024     03/18/2024    AST 49 (H) 06/07/2024    ALT 22 06/07/2024    ALKPHOS 73 06/07/2024    ALBUMIN 4.0 06/07/2024    HGBA1C 5.5 06/07/2024        PSA:  Lab Results   Component Value Date    PSA 2.0 06/07/2024    PSA 4.8 (H) 03/08/2023    PSA 1.8 01/19/2022    PSA 1.7 06/15/2020    PSA 1.3 12/05/2018    PSA 1.5 09/23/2016    PSADIAG 7.3 (H) 07/21/2023    PSADIAG 7.5 (H) 05/12/2023    PSADIAG 7.7 (H) 04/14/2023       Testosterone:  No results found for: "TOTALTESTOST", "TESTOSTERONE"     Imaging:  I have personally reviewed all relevant imaging.    No results found for this or any previous visit (from the past 2160 hour(s)).  No results found for this or any previous visit (from the past 2160 hour(s)).  Echo    Left Ventricle: The left ventricle is normal in size. There is   concentric remodeling. There is low normal systolic function with a   visually estimated ejection fraction of 50 - 55%.    Right Ventricle: Normal right ventricular cavity size. Wall thickness   is normal. Systolic function is normal.    IVC/SVC: Normal venous pressure at 3 mmHg.      ASSESSMENT     1. Straining during urination    2. BPH with obstruction/lower urinary tract symptoms    3. Weak urinary stream        PLAN:     Dicussed all medical and surgical options for treating BPH.  Discussed surgical therapies to relieve his obstruction.  Risks benefits and alternatives of all options were discussed  Patient would like to proceed with [facility]ie.  We will plan for surgery on 9/4.    Jose Alfredo Hua MD  Urology  [facility]    Disclaimer: This note has been generated using voice-recognition software. There may be typographical errors that have been missed during proof-reading.       "

## 2024-08-12 ENCOUNTER — ANESTHESIA EVENT (OUTPATIENT)
Dept: SURGERY | Facility: HOSPITAL | Age: 69
End: 2024-08-12
Payer: MEDICARE

## 2024-08-22 ENCOUNTER — PATIENT MESSAGE (OUTPATIENT)
Dept: ANESTHESIOLOGY | Facility: HOSPITAL | Age: 69
End: 2024-08-22
Payer: MEDICARE

## 2024-08-22 ENCOUNTER — HOSPITAL ENCOUNTER (OUTPATIENT)
Dept: PREADMISSION TESTING | Facility: HOSPITAL | Age: 69
Discharge: HOME | End: 2024-08-22
Attending: NURSE PRACTITIONER
Payer: MEDICARE

## 2024-08-22 NOTE — ANESTHESIA PREPROCEDURE EVALUATION
08/22/2024  Masoud Osorio is a 69 y.o., male scheduled for CYSTOSCOPY WITH TRANSURETHRAL DESTRUCTION OF PROSTATE,RFA 9/4/24.    Past Medical History:   Diagnosis Date    Allergy     Anticoagulant long-term use     Anxiety and depression 09/21/2016    trial of ctialopram and follow up in 8 weeks    Arthritis     Benign prostatic hyperplasia (BPH) with straining on urination 12/05/2018    Coronary artery disease involving native coronary artery of native heart without angina pectoris 07/26/2016 7/26/2016 NSTEMI at [facility]   PCI of RCA with 4.0 x 15, 4.0 x 38, and 3.5 x 38 mm Resolute JACOB   PCI of PDA 2.75 x 15 mm JACOB dilated to 3.0 POBA of proximal PLB 2.0 x 12 mm balloon   Normal EF with LVEDP 15 mmHg DAPT score of 2               Elevated PSA     Gout     History of coronary artery stent placement 09/21/2016    History of heart attack 07/25/2016    NSTEMI    Mixed hyperlipidemia 07/17/2017    Overweight (BMI 25.0-29.9) 09/21/2016    Tobacco dependence in remission 07/25/2016     Past Surgical History:   Procedure Laterality Date    COLONOSCOPY N/A 08/16/2017    Procedure: COLONOSCOPY Split Prep;  Surgeon: Maykel Carcamo Jr., MD;  Location: [facility] ENDO;  Service: Endoscopy;  Laterality: N/A;    COLONOSCOPY N/A 4/27/2022    Procedure: COLONOSCOPY Golytely;  Surgeon: Henrry Nieves MD;  Location: [facility] ENDO;  Service: Endoscopy;  Laterality: N/A;  C    CORONARY ANGIOGRAPHY N/A 3/19/2024    Procedure: ANGIOGRAM, CORONARY ARTERY;  Surgeon: Cleveland Hinton MD;  Location: [facility] CATH LAB/EP;  Service: Cardiology;  Laterality: N/A;    CORONARY ANGIOPLASTY WITH STENT PLACEMENT      EPIDURAL STEROID INJECTION INTO LUMBAR SPINE Left 2/26/2024    Procedure: LT L3-4 and L4-5 TFESI;  Surgeon: Rosita Blum DO;  Location: [facility] PAIN MANAGEMENT;  Service: Pain Management;  Laterality: Left;  20 mins     LEFT HEART CATHETERIZATION Left 3/19/2024    Procedure: Left heart cath;  Surgeon: Cleveland Hinton MD;  Location: [facility] CATH LAB/EP;  Service: Cardiology;  Laterality: Left;    ROTATOR CUFF REPAIR      TRANSFORAMINAL EPIDURAL INJECTION OF STEROID Left 06/15/2021    Procedure: Injection,steroid,epidural,transforaminal approach-- Left- L3-4, L4-5;  Surgeon: Enrike Medellin Jr., MD;  Location: [facility] PAIN MGT;  Service: Pain Management;  Laterality: Left;    VASECTOMY       Review of patient's allergies indicates:   Allergen Reactions    Penicillins Hives      Current Outpatient Medications   Medication Instructions    aspirin 81 mg, Oral, Daily    atorvastatin (LIPITOR) 80 mg, Oral, Daily    azelastine (ASTELIN) 137 mcg (0.1 %) nasal spray Spray 2 sprays in each nostril twice daily    BLOOD PRESSURE CUFF Misc 1 Units, Misc.(Non-Drug; Combo Route), Daily    diazePAM (VALIUM) 5 MG tablet Take 0.5 tablet by mouth every 8 to 12 hours as needed.    diazePAM (VALIUM) 5 MG tablet Take 1/2 -1 tablet by mouth every 8 hours.    GARLIC EXTRACT ORAL Oral    hydrOXYzine HCL (ATARAX) 25 MG tablet Take 1/2-1 tablet nightly as needed for anxiety or insomnia    ipratropium (ATROVENT) 0.06 % nasal spray     ipratropium (ATROVENT) 21 mcg (0.03 %) nasal spray No dose, route, or frequency recorded.    metoprolol succinate (TOPROL-XL) 12.5 mg, Oral, Daily    multivitamin capsule 1 capsule, Oral, Daily    nitroGLYCERIN (NITROSTAT) 0.4 mg, Sublingual, Every 5 min PRN    predniSONE (DELTASONE) 20 MG tablet Take 2 tablets by mouth on day 1, THEN 1 ½ tablets on days 2 and 3, THEN 1 tablet on days 4 and 5, THEN ½ tablet on day 6    pregabalin (LYRICA) 150 mg, Oral, 2 times daily    sacubitriL-valsartan (ENTRESTO) 24-26 mg per tablet 1 tablet, Oral, 2 times daily    sildenafil (REVATIO) 20 mg, Oral, Daily    spironolactone (ALDACTONE) 25 mg, Oral, Daily    tamsulosin (FLOMAX) 0.8 mg, Oral, Daily    triamcinolone (NASACORT) 55 mcg nasal  inhaler Instill two sprays in each nostril once daily       Pre-op Assessment    I have reviewed the Patient Summary Reports.     I have reviewed the Nursing Notes.    I have reviewed the Medications.     Review of Systems  Anesthesia Hx:  No problems with previous Anesthesia   History of prior surgery of interest to airway management or planning:            Denies Personal Hx of Anesthesia complications.                    Social:  Former Smoker, Social Alcohol Use       Cardiovascular:  Exercise tolerance: good   Denies Pacemaker.   Past MI (7/26/2016) CAD   CABG/stent (x4 RCA stents) Dysrhythmias (AV block)   Denies Angina. CHF    hyperlipidemia                             Pulmonary:  Pulmonary Normal      Denies Shortness of breath.   Denies Sleep Apnea.                Renal/:    BPH              Hepatic/GI:  Hepatic/GI Normal                 Musculoskeletal:  Arthritis          Spine Disorders: lumbar Degenerative disease and Disc disease           Neurological:  Neurology Normal Denies TIA.  Denies CVA.    Denies Seizures.                                Endocrine:  Endocrine Normal            Psych:   anxiety depression                Physical Exam  General: Cooperative and Oriented    Airway:  Neck ROM: Normal ROM    Dental:  Partial Dentures  Permanent upper partial denture    Lab Results   Component Value Date    WBC 6.22 03/18/2024    HGB 15.0 03/18/2024    HCT 44.8 03/18/2024     03/18/2024    CHOL 117 (L) 02/28/2024    TRIG 129 02/28/2024    HDL 45 02/28/2024    ALT 22 06/07/2024    AST 49 (H) 06/07/2024     06/07/2024    K 4.1 06/07/2024     06/07/2024    CREATININE 0.9 06/07/2024    BUN 18 06/07/2024    CO2 25 06/07/2024    TSH 1.323 06/07/2024    PSA 2.0 06/07/2024    INR 1.0 10/20/2017    HGBA1C 5.5 06/07/2024     Results for orders placed or performed during the hospital encounter of 03/19/24   EKG 12-LEAD on arrival to floor    Collection Time: 04/10/24 11:02 AM   Result Value  Ref Range    QRS Duration 92 ms    OHS QTC Calculation 427 ms    Narrative    Test Reason : I25.10,    Vent. Rate : 066 BPM     Atrial Rate : 066 BPM     P-R Int : 244 ms          QRS Dur : 092 ms      QT Int : 408 ms       P-R-T Axes : 042 006 039 degrees     QTc Int : 427 ms    Sinus rhythm with 1st degree A-V block  Low voltage QRS  Inferior infarct (cited on or before 27-MAR-2023)  Abnormal ECG  When compared with ECG of 19-MAR-2024 14:44,  Borderline criteria for Anterior infarct are no longer Present  Confirmed by Mauricio Vaughan MD ([id number]) on 4/11/2024 1:59:35 PM    Referred By: MAURICIO VAUGHAN           Confirmed By:Mauricio Vaughan MD     Results for orders placed during the hospital encounter of 07/10/24    Echo    Interpretation Summary    Left Ventricle: The left ventricle is normal in size. There is concentric remodeling. There is low normal systolic function with a visually estimated ejection fraction of 50 - 55%.    Right Ventricle: Normal right ventricular cavity size. Wall thickness is normal. Systolic function is normal.    IVC/SVC: Normal venous pressure at 3 mmHg.      Anesthesia Plan  Type of Anesthesia, risks & benefits discussed:    Anesthesia Type: Gen ETT  Intra-op Monitoring Plan: Standard ASA Monitors  Post Op Pain Control Plan: multimodal analgesia  Induction:  IV  Informed Consent: Informed consent signed with the Patient and all parties understand the risks and agree with anesthesia plan.  All questions answered.   ASA Score: 3  Anesthesia Plan Notes: Anesthesia consent to be signed prior to surgery 9/4/24      Ready For Surgery From Anesthesia Perspective.     .

## 2024-09-04 ENCOUNTER — HOSPITAL ENCOUNTER (OUTPATIENT)
Facility: HOSPITAL | Age: 69
Discharge: HOME | End: 2024-09-04
Attending: UROLOGY | Admitting: UROLOGY
Payer: MEDICARE

## 2024-09-04 ENCOUNTER — ANESTHESIA (OUTPATIENT)
Dept: SURGERY | Facility: HOSPITAL | Age: 69
End: 2024-09-04
Payer: MEDICARE

## 2024-09-04 DIAGNOSIS — R39.16 STRAINING DURING URINATION: ICD-10-CM

## 2024-09-04 DIAGNOSIS — N40.1 BPH WITH OBSTRUCTION/LOWER URINARY TRACT SYMPTOMS: Primary | ICD-10-CM

## 2024-09-04 DIAGNOSIS — N13.8 BPH WITH OBSTRUCTION/LOWER URINARY TRACT SYMPTOMS: Primary | ICD-10-CM

## 2024-09-04 PROCEDURE — 25500020 PHARM REV CODE 255: Performed by: UROLOGY

## 2024-09-04 PROCEDURE — 36000706: Performed by: UROLOGY

## 2024-09-04 PROCEDURE — 63600175 PHARM REV CODE 636 W HCPCS: Performed by: NURSE ANESTHETIST, CERTIFIED REGISTERED

## 2024-09-04 PROCEDURE — 71000015 HC POSTOP RECOV 1ST HR: Performed by: UROLOGY

## 2024-09-04 PROCEDURE — 27201423 OPTIME MED/SURG SUP & DEVICES STERILE SUPPLY: Performed by: UROLOGY

## 2024-09-04 PROCEDURE — 37000009 HC ANESTHESIA EA ADD 15 MINS: Performed by: UROLOGY

## 2024-09-04 PROCEDURE — C1769 GUIDE WIRE: HCPCS | Performed by: UROLOGY

## 2024-09-04 PROCEDURE — 63600175 PHARM REV CODE 636 W HCPCS: Performed by: NURSE PRACTITIONER

## 2024-09-04 PROCEDURE — 36000707: Performed by: UROLOGY

## 2024-09-04 PROCEDURE — 71000016 HC POSTOP RECOV ADDL HR: Performed by: UROLOGY

## 2024-09-04 PROCEDURE — 53854 TRURL DSTRJ PRST8 TISS RF WV: CPT | Mod: ,,, | Performed by: UROLOGY

## 2024-09-04 PROCEDURE — 25000003 PHARM REV CODE 250: Performed by: NURSE ANESTHETIST, CERTIFIED REGISTERED

## 2024-09-04 PROCEDURE — 63600175 PHARM REV CODE 636 W HCPCS: Performed by: UROLOGY

## 2024-09-04 PROCEDURE — C1758 CATHETER, URETERAL: HCPCS | Performed by: UROLOGY

## 2024-09-04 PROCEDURE — 37000008 HC ANESTHESIA 1ST 15 MINUTES: Performed by: UROLOGY

## 2024-09-04 RX ORDER — ONDANSETRON HYDROCHLORIDE 2 MG/ML
4 INJECTION, SOLUTION INTRAVENOUS DAILY PRN
OUTPATIENT
Start: 2024-09-04

## 2024-09-04 RX ORDER — OXYCODONE HYDROCHLORIDE 5 MG/1
5 TABLET ORAL
Status: DISCONTINUED | OUTPATIENT
Start: 2024-09-04 | End: 2024-09-04 | Stop reason: HOSPADM

## 2024-09-04 RX ORDER — GLUCAGON 1 MG
1 KIT INJECTION
OUTPATIENT
Start: 2024-09-04

## 2024-09-04 RX ORDER — CIPROFLOXACIN 2 MG/ML
400 INJECTION, SOLUTION INTRAVENOUS
Status: COMPLETED | OUTPATIENT
Start: 2024-09-04 | End: 2024-09-04

## 2024-09-04 RX ORDER — PROPOFOL 10 MG/ML
VIAL (ML) INTRAVENOUS CONTINUOUS PRN
Status: DISCONTINUED | OUTPATIENT
Start: 2024-09-04 | End: 2024-09-04

## 2024-09-04 RX ORDER — ONDANSETRON HYDROCHLORIDE 2 MG/ML
INJECTION, SOLUTION INTRAVENOUS
Status: DISCONTINUED | OUTPATIENT
Start: 2024-09-04 | End: 2024-09-04

## 2024-09-04 RX ORDER — OXYBUTYNIN CHLORIDE 5 MG/1
5 TABLET ORAL 3 TIMES DAILY
Qty: 21 TABLET | Refills: 0 | Status: SHIPPED | OUTPATIENT
Start: 2024-09-04 | End: 2024-09-11

## 2024-09-04 RX ORDER — HYDROMORPHONE HYDROCHLORIDE 2 MG/ML
0.4 INJECTION, SOLUTION INTRAMUSCULAR; INTRAVENOUS; SUBCUTANEOUS EVERY 5 MIN PRN
Status: DISCONTINUED | OUTPATIENT
Start: 2024-09-04 | End: 2024-09-04 | Stop reason: HOSPADM

## 2024-09-04 RX ORDER — PHENAZOPYRIDINE HYDROCHLORIDE 100 MG/1
100 TABLET, FILM COATED ORAL 3 TIMES DAILY PRN
Qty: 21 TABLET | Refills: 0 | Status: SHIPPED | OUTPATIENT
Start: 2024-09-04 | End: 2024-09-11

## 2024-09-04 RX ORDER — SODIUM CHLORIDE, SODIUM LACTATE, POTASSIUM CHLORIDE, CALCIUM CHLORIDE 600; 310; 30; 20 MG/100ML; MG/100ML; MG/100ML; MG/100ML
INJECTION, SOLUTION INTRAVENOUS CONTINUOUS
Status: DISCONTINUED | OUTPATIENT
Start: 2024-09-04 | End: 2024-09-04 | Stop reason: HOSPADM

## 2024-09-04 RX ORDER — LIDOCAINE HYDROCHLORIDE 10 MG/ML
1 INJECTION, SOLUTION EPIDURAL; INFILTRATION; INTRACAUDAL; PERINEURAL ONCE
Status: DISCONTINUED | OUTPATIENT
Start: 2024-09-04 | End: 2024-09-04 | Stop reason: HOSPADM

## 2024-09-04 RX ORDER — IBUPROFEN 600 MG/1
600 TABLET ORAL 3 TIMES DAILY
Qty: 21 TABLET | Refills: 0 | Status: SHIPPED | OUTPATIENT
Start: 2024-09-04 | End: 2024-09-11

## 2024-09-04 RX ORDER — PHENYLEPHRINE HYDROCHLORIDE 10 MG/ML
INJECTION INTRAVENOUS
Status: DISCONTINUED | OUTPATIENT
Start: 2024-09-04 | End: 2024-09-04

## 2024-09-04 RX ORDER — DEXMEDETOMIDINE HYDROCHLORIDE 100 UG/ML
INJECTION, SOLUTION INTRAVENOUS
Status: DISCONTINUED | OUTPATIENT
Start: 2024-09-04 | End: 2024-09-04

## 2024-09-04 RX ORDER — ONDANSETRON HYDROCHLORIDE 2 MG/ML
4 INJECTION, SOLUTION INTRAVENOUS DAILY PRN
Status: DISCONTINUED | OUTPATIENT
Start: 2024-09-04 | End: 2024-09-04 | Stop reason: HOSPADM

## 2024-09-04 RX ORDER — PROPOFOL 10 MG/ML
VIAL (ML) INTRAVENOUS
Status: DISCONTINUED | OUTPATIENT
Start: 2024-09-04 | End: 2024-09-04

## 2024-09-04 RX ORDER — HYDROMORPHONE HYDROCHLORIDE 2 MG/ML
0.5 INJECTION, SOLUTION INTRAMUSCULAR; INTRAVENOUS; SUBCUTANEOUS EVERY 5 MIN PRN
Status: DISCONTINUED | OUTPATIENT
Start: 2024-09-04 | End: 2024-09-04 | Stop reason: HOSPADM

## 2024-09-04 RX ORDER — GLUCAGON 1 MG
1 KIT INJECTION
Status: DISCONTINUED | OUTPATIENT
Start: 2024-09-04 | End: 2024-09-04 | Stop reason: HOSPADM

## 2024-09-04 RX ORDER — OXYCODONE HYDROCHLORIDE 5 MG/1
5 TABLET ORAL
OUTPATIENT
Start: 2024-09-04

## 2024-09-04 RX ORDER — HYDROMORPHONE HYDROCHLORIDE 2 MG/ML
0.5 INJECTION, SOLUTION INTRAMUSCULAR; INTRAVENOUS; SUBCUTANEOUS EVERY 5 MIN PRN
OUTPATIENT
Start: 2024-09-04

## 2024-09-04 RX ORDER — HYDROMORPHONE HYDROCHLORIDE 2 MG/ML
0.4 INJECTION, SOLUTION INTRAMUSCULAR; INTRAVENOUS; SUBCUTANEOUS EVERY 5 MIN PRN
OUTPATIENT
Start: 2024-09-04

## 2024-09-04 RX ORDER — MIDAZOLAM HYDROCHLORIDE 1 MG/ML
INJECTION INTRAMUSCULAR; INTRAVENOUS
Status: DISCONTINUED | OUTPATIENT
Start: 2024-09-04 | End: 2024-09-04

## 2024-09-04 RX ADMIN — MIDAZOLAM HYDROCHLORIDE 2 MG: 1 INJECTION, SOLUTION INTRAMUSCULAR; INTRAVENOUS at 08:09

## 2024-09-04 RX ADMIN — SODIUM CHLORIDE, SODIUM LACTATE, POTASSIUM CHLORIDE, AND CALCIUM CHLORIDE: .6; .31; .03; .02 INJECTION, SOLUTION INTRAVENOUS at 08:09

## 2024-09-04 RX ADMIN — PHENYLEPHRINE HYDROCHLORIDE 100 MCG: 10 INJECTION INTRAVENOUS at 08:09

## 2024-09-04 RX ADMIN — CIPROFLOXACIN 400 MG: 2 INJECTION, SOLUTION INTRAVENOUS at 08:09

## 2024-09-04 RX ADMIN — ONDANSETRON 8 MG: 2 INJECTION, SOLUTION INTRAMUSCULAR; INTRAVENOUS at 08:09

## 2024-09-04 RX ADMIN — DEXMEDETOMIDINE 8 MCG: 200 INJECTION, SOLUTION INTRAVENOUS at 08:09

## 2024-09-04 RX ADMIN — PROPOFOL 150 MCG/KG/MIN: 10 INJECTION, EMULSION INTRAVENOUS at 08:09

## 2024-09-04 RX ADMIN — GLYCOPYRROLATE 0.2 MG: 0.2 INJECTION, SOLUTION INTRAMUSCULAR; INTRAVITREAL at 08:09

## 2024-09-04 RX ADMIN — PROPOFOL 70 MG: 10 INJECTION, EMULSION INTRAVENOUS at 08:09

## 2024-09-04 NOTE — OP NOTE
[facility] ([facility])   Operative Note     Date: 09/04/2024     Pre-Op Diagnosis: BPH with obstructive urinary symptoms    Post-Op Diagnosis: BPH with obstructive urinary symptoms     Procedure(s) Performed:   Transurethral destruction of prostate; radiofrequency thermotherapy (ReZuM)  Cystourethroscopy  Physician placement of powell catheter      Specimen(s): none     Staff Surgeon: Jose Alfredo Hua MD     Assistant Surgeon: None     Anesthesia:  General endotracheal anesthesia     Findings:    1. Bilobar hyperplasia     Estimated Blood Loss: Minimal     Drains:   1.  18 Fr powlel catheter    Indications:   Masoud Osorio is a 69 y.o. male with BPH presenting for surgical intervention with ReZum. Risks, benefits, and alternative treatment options were discussed. All questions were answered. No guarantees as to the outcome of the treatment were made. Consents were obtained.    Procedure in detail:   The patient was brought to the operating suite and placed in the lithotomy position. General anesthesia was administered. SCDs were applied and working prior to induction of anesthesia. That patient was then prepped and draped in the usual sterile fashion. Time out was performed and preoperative antibiotics were confirmed.       We began the case by inserting the ReZum scope into the bladder. No urethral strictures were seen and scope entered easily. Next, we examined the prostate and found bilobar hyperplasia. Radiofrequency was then used to create thermal energy to selectively ablate prostate tissue 1 cm from the bladder neck to the proximal end of the veru spaced 1 cm apart.      After this, 4 total treatments were given. Specifically 2 treatments were given in at the 3 and 9 o'clock positions bilaterally.     At the completion of the procedure the device was removed. There was a careful check that there were no clots in the bladder or injury to the bladder, prostate or urethra. A 18 fr powell catheter was placed  with 10 mL of sterile water in the balloon with return of clear yellow urine.     The patient tolerated the procedure well and was transferred to the PACU in stable condition.       Disposition:    The patient will be discharged home with 1 week of pyridium, 1 week of ditropan, and 2 weeks of NSAIDS. He will follow up with in 3 days to have his powell catheter removed.    Suggested Codes:  Transurethral destruction of prostate tissue: 08192    Jose Alfredo Hua MD  Urology  [facility] [place]

## 2024-09-04 NOTE — TRANSFER OF CARE
"Anesthesia Transfer of Care Note    Patient: Masoud Osorio    Procedure(s) Performed: Procedure(s) (LRB):  CYSTOSCOPY WITH TRANSURETHRAL DESTRUCTION OF PROSTATE,RFA (N/A)    Patient location: [facility]    Anesthesia Type: general    Transport from OR: Transported from OR on 6-10 L/min O2 by face mask with adequate spontaneous ventilation    Post pain: adequate analgesia    Post assessment: no apparent anesthetic complications and tolerated procedure well    Post vital signs: stable    Level of consciousness: awake    Nausea/Vomiting: no nausea/vomiting    Complications: none    Transfer of care protocol was followed      Last vitals: Visit Vitals  /53 (BP Location: Right arm, Patient Position: Lying)   Pulse 64   Temp 37.1 °C (98.8 °F) (Skin)   Resp 18   Ht 5' 8" (1.727 m)   Wt 87.1 kg (192 lb)   SpO2 97%   BMI 29.19 kg/m²     "

## 2024-09-04 NOTE — DISCHARGE SUMMARY
[facility] (Hospital)  Discharge Note  Short Stay    Procedure(s) (LRB):  CYSTOSCOPY WITH TRANSURETHRAL DESTRUCTION OF PROSTATE,RFA (N/A)      OUTCOME: Patient tolerated treatment/procedure well without complication and is now ready for discharge.    DISPOSITION: Home or Self Care    FINAL DIAGNOSIS:  Benign prostatic hyperplasia (BPH) with straining on urination    FOLLOWUP: In clinic    DISCHARGE INSTRUCTIONS:    Discharge Procedure Orders   Lifting restrictions   Order Comments: Do not drive while taking pain medications. No strenuous activity or lifting greater than 10 pounds for 4 weeks.     No dressing needed   Order Comments: Do not soak incisions in tub or bath and do not scrub incisions. You may shower over incisions. If you have surgical glue in place, the glue will come off over the next few weeks.     Notify your health care provider if you experience any of the following:  temperature >100.4     Notify your health care provider if you experience any of the following:  persistent nausea and vomiting or diarrhea     Notify your health care provider if you experience any of the following:  severe uncontrolled pain     Notify your health care provider if you experience any of the following:  redness, tenderness, or signs of infection (pain, swelling, redness, odor or green/yellow discharge around incision site)     Notify your health care provider if you experience any of the following:  difficulty breathing or increased cough     Notify your health care provider if you experience any of the following:  persistent dizziness, light-headedness, or visual disturbances        TIME SPENT ON DISCHARGE: 15 minutes

## 2024-09-04 NOTE — ANESTHESIA POSTPROCEDURE EVALUATION
Anesthesia Post Evaluation    Patient: Masoud Osorio    Procedure(s) Performed: Procedure(s) (LRB):  CYSTOSCOPY WITH TRANSURETHRAL DESTRUCTION OF PROSTATE,RFA (N/A)    Final Anesthesia Type: general      Patient location during evaluation: PACU  Patient participation: Yes- Able to Participate  Level of consciousness: awake and alert  Post-procedure vital signs: reviewed and stable  Pain management: adequate  Airway patency: patent    PONV status at discharge: No PONV  Anesthetic complications: no      Cardiovascular status: blood pressure returned to baseline  Respiratory status: unassisted  Hydration status: euvolemic                Vitals Value Taken Time   /83 09/04/24 1045   Temp 36.5 °C (97.7 °F) 09/04/24 1045   Pulse 62 09/04/24 1045   Resp 17 09/04/24 1045   SpO2 97 % 09/04/24 1045         No case tracking events are documented in the log.      Pain/Luna Score: Luna Score: 10 (9/4/2024 10:58 AM)

## 2024-09-04 NOTE — DISCHARGE INSTRUCTIONS
Postoperative Instructions for Cystoscopy:    1. Hydration:     - Drink plenty of water and stay well-hydrated following the cystoscopy procedure. This helps flush out any residual medications and promotes healthy urinary function.     - Aim to drink at least 8 cups (64 ounces) of water daily, unless otherwise advised by your healthcare provider.    2. Urination:     - It is normal to experience some burning or discomfort during urination for a short period after the cystoscopy. This should gradually improve within a day or two.     - Urinate as needed and avoid holding in urine for prolonged periods.    3. Medications:     - Take any prescribed medications as directed, including pain medications or antibiotics.     - If you have concerns about any medications or experience adverse reactions, contact your healthcare provider.    4. Activity Restrictions:     - Limit physical activities and avoid heavy lifting for a few days following the procedure.     - Gradually resume your regular activities as you feel comfortable, but listen to your body and avoid overexertion.    5. Discomfort and Pain Management:     - You may experience mild discomfort, bladder spasms, or a feeling of urgency to urinate after the cystoscopy.     - Over-the-counter pain relievers, such as acetaminophen (Tylenol) or ibuprofen (Motrin), may help alleviate any discomfort.     6. Diet:     - There are typically no dietary restrictions after a cystoscopy. You can resume your normal diet unless instructed.    7. Bleeding and Urine Color:     - It is common to have some blood in your urine, known as hematuria, after a cystoscopy. This should gradually decrease and resolve within a few days.     - You may notice pink or red-tinged urine, and occasionally small blood clots. If you experience persistent or heavy bleeding, or if you have concerns about the amount of blood in your urine, contact your provider's office or the on call physician.    8.  Follow-Up:     - You will be notified about any follow up appointments, if medically necessary, through a phone call or through the patient portal.    9. Signs of Complications:     - While complications are rare, it is important to be aware of potential signs of infection or other complications after the cystoscopy.     - Contact your provider if you experience symptoms such as severe pain, persistent bleeding, fever, chills, or difficulty urinating.    Signs and Symptoms to Report  Call the [facility] at [phone] if you develop any of the following:  Fever of 101 degrees or higher  Chills or persistent bleeding  Inability to urinate      If you have a catheter, please return to the ER if your catheter stops draining or you are having abdominal pain.       ANESTHESIA  -For the first 24 hours after surgery:  Do not drive, use heavy equipment, make important decisions, or drink alcohol  -It is normal to feel sleepy for several hours.  Rest until you are more awake.  -Have someone stay with you, if needed.  They can watch for problems and help keep you safe.  -Some possible post anesthesia side effects include: nausea and vomiting, sore throat and hoarseness, sleepiness, and dizziness.    PAIN  -If you have pain after surgery, pain medicine will help you feel better.  Take it as directed, before pain becomes severe.  Most pain relievers taken by mouth need at least 20-30 minutes to start working.  -Do not drive or drink alcohol while taking pain medicine.  -Pain medication can upset your stomach.  Taking them with a little food may help.  -Other ways to help control pain: elevation, ice, and relaxation  -Call your surgeon if still having unmanageable pain an hour after taking pain medicine.  -Pain medicine can cause constipation.  Taking an over-the counter stool softener while on prescription pain medicine and drinking plenty of fluids can prevent this side effect.  -Call your surgeon if you have  severe side effects like: breathing problems, trouble waking up, dizziness, confusion, or severe constipation.    NAUSEA  -Some people have nausea after surgery.  This is often because of anesthesia, pain, pain medicine, or the stress of surgery.  -Do not push yourself to eat.  Start off with clear liquids and soup.  Slowly move to solid foods.  Don't eat fatty, rich, spicy foods at first.  Eat smaller amounts.  -If you develop persistent nausea and vomiting please notify your surgeon immediately.    BLEEDING  -Different types of surgery require different types of care and dressing changes.  It is important to follow all instructions and advice from your surgeon.  Change dressing as directed.  Call your surgeon for any concerns regarding postop bleeding.    SIGNS OF INFECTION  -Signs of infection include: fever, swelling, drainage, and redness  -Notify your surgeon if you have a fever of 100.4 F (38.0 C) or higher.  -Notify your surgeon if you notice redness, swelling, increased pain, pus, or a foul smell at the incision site.      ANESTHESIA  -For the first 24 hours after surgery:  Do not drive, use heavy equipment, make important decisions, or drink alcohol  -It is normal to feel sleepy for several hours.  Rest until you are more awake.  -Have someone stay with you, if needed.  They can watch for problems and help keep you safe.  -Some possible post anesthesia side effects include: nausea and vomiting, sore throat and hoarseness, sleepiness, and dizziness.    PAIN  -If you have pain after surgery, pain medicine will help you feel better.  Take it as directed, before pain becomes severe.  Most pain relievers taken by mouth need at least 20-30 minutes to start working.  -Do not drive or drink alcohol while taking pain medicine.  -Pain medication can upset your stomach.  Taking them with a little food may help.  -Other ways to help control pain: elevation, ice, and relaxation  -Call your surgeon if still having  unmanageable pain an hour after taking pain medicine.  -Pain medicine can cause constipation.  Taking an over-the counter stool softener while on prescription pain medicine and drinking plenty of fluids can prevent this side effect.  -Call your surgeon if you have severe side effects like: breathing problems, trouble waking up, dizziness, confusion, or severe constipation.    NAUSEA  -Some people have nausea after surgery.  This is often because of anesthesia, pain, pain medicine, or the stress of surgery.  -Do not push yourself to eat.  Start off with clear liquids and soup.  Slowly move to solid foods.  Don't eat fatty, rich, spicy foods at first.  Eat smaller amounts.  -If you develop persistent nausea and vomiting please notify your surgeon immediately.    BLEEDING  -Different types of surgery require different types of care and dressing changes.  It is important to follow all instructions and advice from your surgeon.  Change dressing as directed.  Call your surgeon for any concerns regarding postop bleeding.    SIGNS OF INFECTION  -Signs of infection include: fever, swelling, drainage, and redness  -Notify your surgeon if you have a fever of 100.4 F (38.0 C) or higher.  -Notify your surgeon if you notice redness, swelling, increased pain, pus, or a foul smell at the incision site.

## 2024-09-05 VITALS
SYSTOLIC BLOOD PRESSURE: 117 MMHG | HEART RATE: 62 BPM | DIASTOLIC BLOOD PRESSURE: 73 MMHG | OXYGEN SATURATION: 97 % | HEIGHT: 68 IN | TEMPERATURE: 98 F | WEIGHT: 192 LBS | BODY MASS INDEX: 29.1 KG/M2 | RESPIRATION RATE: 17 BRPM

## 2024-09-06 ENCOUNTER — CLINICAL SUPPORT (OUTPATIENT)
Dept: UROLOGY | Facility: CLINIC | Age: 69
End: 2024-09-06
Payer: MEDICARE

## 2024-09-06 VITALS
HEART RATE: 61 BPM | DIASTOLIC BLOOD PRESSURE: 71 MMHG | BODY MASS INDEX: 29.68 KG/M2 | SYSTOLIC BLOOD PRESSURE: 116 MMHG | WEIGHT: 195.19 LBS

## 2024-09-06 DIAGNOSIS — N13.8 BPH WITH OBSTRUCTION/LOWER URINARY TRACT SYMPTOMS: Primary | ICD-10-CM

## 2024-09-06 DIAGNOSIS — N40.1 BPH WITH OBSTRUCTION/LOWER URINARY TRACT SYMPTOMS: Primary | ICD-10-CM

## 2024-09-06 PROCEDURE — 99999 PR PBB SHADOW E&M-EST. PATIENT-LVL II: CPT | Mod: PBBFAC,,,

## 2024-09-06 NOTE — PROGRESS NOTES
Patient in clinic for VT 2 day s/p Elizabeth with Dr. Hua. Per Dr. Hua VT performed. Patient roomed, assessed, and in no apparent distress. Educated patient on voiding trial procedure. Patient verbalized understanding. Catheter removed from cath bag. 200 mls of sterile water irrigated in cath. Patient mel well. 10 mls of clear fluid removed from balloon port, cath removed, tip grossly intact. Patient mel cath removal well. Patient did leak scant yellow clear urine after removing catheter. Patient urinated 150 mls or orange colored urine amount. Educated patient to come back to clinic if cannot urinate by 1400. Educated patient to go to ED if any issues arise with urinating after clinic hours.

## 2024-09-17 ENCOUNTER — OFFICE VISIT (OUTPATIENT)
Dept: CARDIOLOGY | Facility: CLINIC | Age: 69
End: 2024-09-17
Payer: MEDICARE

## 2024-09-17 VITALS
DIASTOLIC BLOOD PRESSURE: 66 MMHG | BODY MASS INDEX: 28.95 KG/M2 | WEIGHT: 191 LBS | HEIGHT: 68 IN | SYSTOLIC BLOOD PRESSURE: 107 MMHG | OXYGEN SATURATION: 95 % | HEART RATE: 77 BPM

## 2024-09-17 DIAGNOSIS — E78.2 MIXED HYPERLIPIDEMIA: ICD-10-CM

## 2024-09-17 DIAGNOSIS — I25.10 CORONARY ARTERY DISEASE INVOLVING NATIVE CORONARY ARTERY OF NATIVE HEART WITHOUT ANGINA PECTORIS: ICD-10-CM

## 2024-09-17 DIAGNOSIS — I44.0 1ST DEGREE AV BLOCK: ICD-10-CM

## 2024-09-17 DIAGNOSIS — F17.201 TOBACCO DEPENDENCE IN REMISSION: ICD-10-CM

## 2024-09-17 DIAGNOSIS — I25.2 HISTORY OF HEART ATTACK: ICD-10-CM

## 2024-09-17 DIAGNOSIS — Z95.5 HISTORY OF CORONARY ARTERY STENT PLACEMENT: ICD-10-CM

## 2024-09-17 DIAGNOSIS — I50.22 HEART FAILURE WITH MID-RANGE EJECTION FRACTION (HFMEF): Primary | ICD-10-CM

## 2024-09-17 PROCEDURE — 99214 OFFICE O/P EST MOD 30 MIN: CPT | Mod: S$GLB,,, | Performed by: INTERNAL MEDICINE

## 2024-09-17 PROCEDURE — 4010F ACE/ARB THERAPY RXD/TAKEN: CPT | Mod: CPTII,S$GLB,, | Performed by: INTERNAL MEDICINE

## 2024-09-17 PROCEDURE — 1126F AMNT PAIN NOTED NONE PRSNT: CPT | Mod: CPTII,S$GLB,, | Performed by: INTERNAL MEDICINE

## 2024-09-17 PROCEDURE — 3008F BODY MASS INDEX DOCD: CPT | Mod: CPTII,S$GLB,, | Performed by: INTERNAL MEDICINE

## 2024-09-17 PROCEDURE — 3078F DIAST BP <80 MM HG: CPT | Mod: CPTII,S$GLB,, | Performed by: INTERNAL MEDICINE

## 2024-09-17 PROCEDURE — 3288F FALL RISK ASSESSMENT DOCD: CPT | Mod: CPTII,S$GLB,, | Performed by: INTERNAL MEDICINE

## 2024-09-17 PROCEDURE — 99999 PR PBB SHADOW E&M-EST. PATIENT-LVL IV: CPT | Mod: PBBFAC,,, | Performed by: INTERNAL MEDICINE

## 2024-09-17 PROCEDURE — 1101F PT FALLS ASSESS-DOCD LE1/YR: CPT | Mod: CPTII,S$GLB,, | Performed by: INTERNAL MEDICINE

## 2024-09-17 PROCEDURE — 1159F MED LIST DOCD IN RCRD: CPT | Mod: CPTII,S$GLB,, | Performed by: INTERNAL MEDICINE

## 2024-09-17 PROCEDURE — 3044F HG A1C LEVEL LT 7.0%: CPT | Mod: CPTII,S$GLB,, | Performed by: INTERNAL MEDICINE

## 2024-09-17 PROCEDURE — 3074F SYST BP LT 130 MM HG: CPT | Mod: CPTII,S$GLB,, | Performed by: INTERNAL MEDICINE

## 2024-09-17 PROCEDURE — 1160F RVW MEDS BY RX/DR IN RCRD: CPT | Mod: CPTII,S$GLB,, | Performed by: INTERNAL MEDICINE

## 2024-09-17 NOTE — PROGRESS NOTES
Subjective:   Patient ID:  Masoud Osorio is a 69 y.o. male who presents for follow up of CAD, cardiomyopathy, hyperlipidemia      HPI:   September 2024:  Follow up.  He has been doing well since last visit.  No significant cardiovascular complaints.  No CP or significant MATTA.  Tolerating meds without any issues.    4/10/2024: 67 yo M with hx of CAD s/p PCI RCA, PDA (2016), HLD, tobacco use, obesity present to clinic for f/u CAD. Patient with recent LHC for CP/ concerning MPI showing mild non-obstructive CAD and patent RCA stents.  Last visit to Toprol and spironolactone added in addition to valsartan.  He is tolerating them well.      3/4/2024: Previously seen by Dr. Bailey 5/15/2023 as below.Patient seen in clinic today, reports overall doing well, trying to get back into exercising and eating better. Patient does endorse some new onset LE edema and some infrequent chest tightness, happening every few months, most recently about 2 months ago. Has not taken any nitroglycerin, quit smoking in 2016. His wife also reports that he has been noticably short of breath at times, frequently taking big deep breaths. Patient denies CP, current SOB/MATTA, orthopnea, PND, syncope, palpitations, LE edema. Reports compliance and tolerance with all medicatons.     Leo CUEVAS 5/15/2023  Masoud Osorio 68 y.o. male is here follow up and feeling well without any new complaints. He was admitted for NSTEMI that required PCI of RCA, PDA, and POBA of PLB with a preserved EF in 7/2016. He quit smoking. He is on aspirin SAPT. Non ischemic stress test in 3/2017 for atypical chest pain revealed no reversible ischemia. Admitted in 10/2017 with CP again. 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 Normal EF. ECG 6/2018: NSR.  He was admitted in March 2023 for atypical chest discomfort.  His EKG and cardiac biomarkers were normal.  Stress test revealed no reversible ischemia.  His ejection fraction  42%.  Historically had not tolerated beta-blockers or ACE inhibitors or angiotensin receptor blockers due to hypotension     Echo July 2024:    Left Ventricle: The left ventricle is normal in size. There is concentric remodeling. There is low normal systolic function with a visually estimated ejection fraction of 50 - 55%.    Right Ventricle: Normal right ventricular cavity size. Wall thickness is normal. Systolic function is normal.    IVC/SVC: Normal venous pressure at 3 mmHg.     Echo 4/12/2023  The left ventricle is normal in size with mildly decreased systolic function.  The estimated ejection fraction is 35-40%.  Mild mitral regurgitation.  Grade I left ventricular diastolic dysfunction.  Normal right ventricular size with normal right ventricular systolic function.  Normal central venous pressure (3 mmHg).    [facility] [id number]    There was mild non-obstructive coronary artery disease..    Patent RCA stents.    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      - Left Main Coronary Artery:              The LM has luminal irregularities. There is MILAGROS 3 flow.      - Left Anterior Descending Artery:              The LAD has luminal irregularities. There is MILAGROS 3 flow.      - Left Circumflex Artery:              The LCX has luminal irregularities. 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 There is MILAGROS 3 flow.      - Right Coronary Artery:              The RCA has luminal irregularities. There is MILAGROS 3 flow. Patent stents              The distal RCA has a 60% stenosis. There is MILAGROS 3 flow.      -  Posterior Lateral Branch:              The PLB has luminal irregularities. There is MILAGROS 3 flow. The remaining portion of the vessel is of small caliber.      - Posterior Descending Artery:              The PDA has luminal irregularities. There is MILAGROS 3 flow. Patent stent      - Radial:              The radial is normal.     Stress Test 4/12/2023    The ECG portion of the study is negative for ischemia.    The patient reported no chest pain during the stress test.    During stress, occasional PVCs are noted.    The exercise capacity was excellent.    MPI 4/12/2023  1. Moderately sized inferior wall infarct with suspected yadira-infarct reversible ischemia.  2. Associated hypokinesis of the inferior wall.  3. Mildly impaired left ventricular function.  Estimated ejection fraction 42% during stress and 41% during rest.                            Patient Active Problem List    Diagnosis Date Noted    Elevated SGOT (AST) 06/08/2024    Heart failure with mid-range ejection fraction (HFmEF) 03/04/2024    Abnormal cardiovascular stress test 03/04/2024    1st degree AV block 03/31/2023    Costochondral pain 03/31/2023    Elevated PSA 03/09/2023    Impaired mobility and activities of daily living 01/04/2023    Personal history of COVID-19 03/08/2022    Lipoma of skin and subcutaneous tissue of neck 02/08/2022    Neuroforaminal stenosis of lumbar spine 06/07/2021    DDD (degenerative disc disease), lumbar 06/07/2021    Osteoarthritis of spine with radiculopathy, lumbar region 06/07/2021    Compression fracture of L1 lumbar vertebra 12/20/2019    Benign prostatic hyperplasia (BPH) with straining on urination 12/05/2018    Chronic actinic dermatitis 12/05/2018    Non-seasonal allergic rhinitis 12/05/2018     Managed by outside ENT doctor Raya.  Continue follow-up and regular nasal spray      Pain and swelling of left knee 10/11/2017    Mixed hyperlipidemia 07/17/2017    History of colon polyps 10/24/2016     Noted on  colonoscopy 22- -repeat in 3-5 years pending pathology results       Class 1 obesity due to excess calories with serious comorbidity and body mass index (BMI) of 32.0 to 32.9 in adult 2016    Long-term use of aspirin therapy 2016    History of coronary artery stent placement 2016    Anxiety and depression 2016     trial of ctialopram and follow up in 8 weeks      Coronary artery disease involving native coronary artery of native heart without angina pectoris 2016 NSTEMI at [facility]     PCI of RCA with 4.0 x 15, 4.0 x 38, and 3.5 x 38 mm Resolute JACOB    PCI of PDA 2.75 x 15 mm JACOB dilated to 3.0  POBA of proximal PLB 2.0 x 12 mm balloon      Normal EF with LVEDP 15 mmHg  DAPT score of 2                        History of heart attack 2016     NSTEMI      Tobacco dependence in remission 2016           Right Arm BP - Sittin/66  Left Arm BP - Sittin/73        LABS    LAST HbA1c  Lab Results   Component Value Date    HGBA1C 5.5 2024       Lipid panel  Lab Results   Component Value Date    CHOL 117 (L) 2024    CHOL 119 (L) 2023    CHOL 96 (L) 2023     Lab Results   Component Value Date    HDL 45 2024    HDL 37 (L) 2023    HDL 39 (L) 2023     Lab Results   Component Value Date    LDLCALC 46.2 (L) 2024    LDLCALC 68.4 2023    LDLCALC 37.6 (L) 2023     Lab Results   Component Value Date    TRIG 129 2024    TRIG 68 2023    TRIG 97 2023     Lab Results   Component Value Date    CHOLHDL 38.5 2024    CHOLHDL 31.1 2023    CHOLHDL 40.6 2023            Review of Systems   Constitutional: Negative for chills and fever.   HENT:  Negative for hearing loss and nosebleeds.    Eyes:  Negative for blurred vision.   Cardiovascular:         As in HPI   Respiratory:  Negative for hemoptysis and shortness of breath.    Hematologic/Lymphatic: Negative for bleeding problem.    Skin:  Negative for itching.   Musculoskeletal:  Negative for falls.   Gastrointestinal:  Negative for abdominal pain and hematochezia.   Genitourinary:  Negative for hematuria.   Neurological:  Negative for dizziness and loss of balance.   Psychiatric/Behavioral:  Negative for altered mental status and depression.        Objective:   Physical Exam  Constitutional:       Appearance: He is well-developed.   HENT:      Head: Normocephalic and atraumatic.   Eyes:      Conjunctiva/sclera: Conjunctivae normal.   Neck:      Vascular: No carotid bruit or JVD.   Cardiovascular:      Rate and Rhythm: Normal rate and regular rhythm.      Pulses:           Carotid pulses are 2+ on the right side and 2+ on the left side.       Radial pulses are 2+ on the right side and 2+ on the left side.      Heart sounds: Normal heart sounds. No murmur heard.     No friction rub. No gallop.   Pulmonary:      Effort: Pulmonary effort is normal. No respiratory distress.      Breath sounds: Normal breath sounds. No stridor. No wheezing.   Musculoskeletal:      Cervical back: Neck supple.      Right lower leg: Edema (Trace) present.      Left lower leg: Edema (trace) present.   Skin:     General: Skin is warm and dry.   Neurological:      Mental Status: He is alert and oriented to person, place, and time.   Psychiatric:         Behavior: Behavior normal.               Assessment:     1. Heart failure with mid-range ejection fraction (HFmEF)    2. 1st degree AV block    3. Coronary artery disease involving native coronary artery of native heart without angina pectoris    4. History of coronary artery stent placement    5. Mixed hyperlipidemia    6. Tobacco dependence in remission    7. History of heart attack          Plan:   Nonobstructive coronary artery disease.  Stable.  Continue medical therapy.  LDL at goal  Continue aspirin  Continue high-intensity statin    Cardiomyopathy noted.  EF recovered  Continue Entresto 24-26 mg.  Spironolactone  to 25 mg daily  Continue Toprol 12.5 mg daily.  Heart rate is borderline  Add Jardiance    BMP in 4 weeks    Limit salt intake.  Not more than 2 g    Goal BP less than 130/80    Check daily weight.  Keep log      Six-month follow-up or sooner p.r.n.    Continue with current medical plan and lifestyle changes.  Return sooner for concerns or questions. If symptoms persist go to the ED  I have reviewed all pertinent data on this patient           I have reviewed the patient's medical history in detail and updated the computerized patient record.    Orders Placed This Encounter   Procedures    Basic metabolic panel     Standing Status:   Future     Standing Expiration Date:   12/16/2025     Order Specific Question:   Send normal result to authorizing provider's In Basket if patient is active on MyChart:     Answer:   Yes       Follow up as scheduled. Return sooner for concerns or questions            Patient's Medications   New Prescriptions    VALSARTAN (DIOVAN) 40 MG TABLET    Take 1 tablet (40 mg total) by mouth once daily.   Previous Medications    ASPIRIN (ECOTRIN) 81 MG EC TABLET    Take 1 tablet (81 mg total) by mouth once daily.    ATORVASTATIN (LIPITOR) 80 MG TABLET    Take 1 tablet (80 mg total) by mouth once daily.    AZELASTINE (ASTELIN) 137 MCG (0.1 %) NASAL SPRAY    Spray 2 sprays in each nostril twice daily    DIAZEPAM (VALIUM) 5 MG TABLET    Take 0.5 tablet by mouth every 8 to 12 hours as needed.    GARLIC EXTRACT ORAL    Take by mouth.    HYDROXYZINE HCL (ATARAX) 25 MG TABLET    Take 0.5 to 1 tablet by mouth at bedtime    IPRATROPIUM (ATROVENT) 0.06 % NASAL SPRAY    INSTILL 2 SPRAYS IN EACH NOSTRIL EVERY 12 HOURS    MULTIVITAMIN CAPSULE    Take 1 capsule by mouth once daily.    PREGABALIN (LYRICA) 150 MG CAPSULE    Take 1 capsule (150 mg total) by mouth 2 (two) times daily.    TAMSULOSIN (FLOMAX) 0.4 MG CAP    Take 1 capsule (0.4 mg total) by mouth once daily.    TRIAMCINOLONE (NASACORT) 55 MCG NASAL  INHALER    Instill two sprays in each nostril once daily   Modified Medications    Modified Medication Previous Medication    NITROGLYCERIN (NITROSTAT) 0.4 MG SL TABLET nitroGLYCERIN (NITROSTAT) 0.4 MG SL tablet       Place 1 tablet (0.4 mg total) under the tongue every 5 (five) minutes as needed for Chest pain.    Place 1 tablet (0.4 mg total) under the tongue every 5 (five) minutes as needed for Chest pain.    SILDENAFIL (REVATIO) 20 MG TAB sildenafil (REVATIO) 20 mg Tab       Take 1 tablet (20 mg total) by mouth once daily.    Take 1 tablet (20 mg total) by mouth once daily.   Discontinued Medications         '

## 2024-09-19 NOTE — ANESTHESIA POSTPROCEDURE EVALUATION
Anesthesia Post Evaluation    Patient: Masoud Osorio    Procedure(s) Performed: Procedure(s) (LRB):  COLONOSCOPY Golytely (N/A)    Final Anesthesia Type: MAC      Patient location during evaluation: GI PACU  Patient participation: Yes- Able to Participate  Level of consciousness: awake and alert and oriented  Post-procedure vital signs: reviewed and stable  Pain management: adequate  Airway patency: patent    PONV status at discharge: No PONV  Anesthetic complications: no      Cardiovascular status: blood pressure returned to baseline, hemodynamically stable and stable  Respiratory status: unassisted, spontaneous ventilation and room air  Hydration status: euvolemic  Follow-up not needed.          Vitals Value Taken Time   /88 04/27/22 0953   Temp 37.1 °C (98.8 °F) 04/27/22 0953   Pulse 58 04/27/22 0953   Resp 18 04/27/22 0953   SpO2 97 % 04/27/22 0953         No case tracking events are documented in the log.      Pain/Luna Score: No data recorded       Your upcoming appt is for your Annual exam, which is a WELLNESS visit. If you are sick or have any acute concerns, your Annual exam will have to be rescheduled due to insurance protocol.      Please arrive 5-10 minutes prior to your appointment. If you are late, you may have to reschedule your appt.

## 2024-09-27 ENCOUNTER — PATIENT MESSAGE (OUTPATIENT)
Dept: UROLOGY | Facility: CLINIC | Age: 69
End: 2024-09-27
Payer: MEDICARE

## 2024-10-29 ENCOUNTER — PATIENT MESSAGE (OUTPATIENT)
Dept: OPTOMETRY | Facility: CLINIC | Age: 69
End: 2024-10-29
Payer: MEDICARE

## 2024-11-26 ENCOUNTER — PATIENT MESSAGE (OUTPATIENT)
Dept: CARDIOLOGY | Facility: CLINIC | Age: 69
End: 2024-11-26
Payer: MEDICARE

## 2024-11-26 RX ORDER — SILDENAFIL CITRATE 20 MG/1
20 TABLET ORAL DAILY
Qty: 30 TABLET | Refills: 11 | Status: SHIPPED | OUTPATIENT
Start: 2024-11-26 | End: 2025-11-26

## 2024-12-02 DIAGNOSIS — M47.26 OSTEOARTHRITIS OF SPINE WITH RADICULOPATHY, LUMBAR REGION: ICD-10-CM

## 2024-12-02 DIAGNOSIS — M48.061 NEUROFORAMINAL STENOSIS OF LUMBAR SPINE: ICD-10-CM

## 2024-12-02 RX ORDER — PREGABALIN 150 MG/1
150 CAPSULE ORAL 2 TIMES DAILY
Qty: 60 CAPSULE | Refills: 5 | Status: SHIPPED | OUTPATIENT
Start: 2024-12-02 | End: 2025-06-02

## 2024-12-02 NOTE — TELEPHONE ENCOUNTER
No care due was identified.  Health [facility] Embedded Care Due Messages. Reference number: [id number].   12/02/2024 7:46:58 AM CST

## 2024-12-16 ENCOUNTER — OFFICE VISIT (OUTPATIENT)
Dept: OPTOMETRY | Facility: CLINIC | Age: 69
End: 2024-12-16
Payer: MEDICARE

## 2024-12-16 DIAGNOSIS — H52.03 HYPEROPIA OF BOTH EYES WITH REGULAR ASTIGMATISM AND PRESBYOPIA: ICD-10-CM

## 2024-12-16 DIAGNOSIS — H25.13 SENILE NUCLEAR CATARACT, BILATERAL: Primary | ICD-10-CM

## 2024-12-16 DIAGNOSIS — H52.4 HYPEROPIA OF BOTH EYES WITH REGULAR ASTIGMATISM AND PRESBYOPIA: ICD-10-CM

## 2024-12-16 DIAGNOSIS — H52.223 HYPEROPIA OF BOTH EYES WITH REGULAR ASTIGMATISM AND PRESBYOPIA: ICD-10-CM

## 2024-12-16 PROCEDURE — 99999 PR PBB SHADOW E&M-EST. PATIENT-LVL III: CPT | Mod: PBBFAC,,, | Performed by: OPTOMETRIST

## 2024-12-16 PROCEDURE — 1101F PT FALLS ASSESS-DOCD LE1/YR: CPT | Mod: CPTII,S$GLB,, | Performed by: OPTOMETRIST

## 2024-12-16 PROCEDURE — 4010F ACE/ARB THERAPY RXD/TAKEN: CPT | Mod: CPTII,S$GLB,, | Performed by: OPTOMETRIST

## 2024-12-16 PROCEDURE — 1126F AMNT PAIN NOTED NONE PRSNT: CPT | Mod: CPTII,S$GLB,, | Performed by: OPTOMETRIST

## 2024-12-16 PROCEDURE — 1159F MED LIST DOCD IN RCRD: CPT | Mod: CPTII,S$GLB,, | Performed by: OPTOMETRIST

## 2024-12-16 PROCEDURE — 3288F FALL RISK ASSESSMENT DOCD: CPT | Mod: CPTII,S$GLB,, | Performed by: OPTOMETRIST

## 2024-12-16 PROCEDURE — 92015 DETERMINE REFRACTIVE STATE: CPT | Mod: S$GLB,,, | Performed by: OPTOMETRIST

## 2024-12-16 PROCEDURE — 3044F HG A1C LEVEL LT 7.0%: CPT | Mod: CPTII,S$GLB,, | Performed by: OPTOMETRIST

## 2024-12-16 PROCEDURE — 99204 OFFICE O/P NEW MOD 45 MIN: CPT | Mod: S$GLB,,, | Performed by: OPTOMETRIST

## 2024-12-16 NOTE — PROGRESS NOTES
ZOYA    PAULETTE: 1 year with outside provider  Chief complaint (CC): 68 yo M presents today to establish ocular health   care. Pt reports stable vision. Pt denies eye pain. Occasional floaters   and no flashes.  Glasses? +  Contacts? -  H/o eye surgery, injections or laser: -  H/o eye injury: -  Known eye conditions? -  Family h/o eye conditions? -  Eye gtts? -      (-) Flashes (+) Floaters, occasional (-) Mucous   (-)  Tearing (-) Itching (-) Burning   (-) Headaches (-) Eye Pain/discomfort (-) Irritation   (-)  Redness (-) Double vision (-) Blurry vision    Diabetic? -  A1c? -     Last edited by Denae Wilson MA on 12/16/2024  1:35 PM.            Assessment /Plan     For exam results, see Encounter Report.    Senile nuclear cataract, bilateral    Hyperopia of both eyes with regular astigmatism and presbyopia      MONITOR. ED PT ON ALL EXAM FINDINGS  RX FINAL SPECS   OCULAR HEALTH WNL OD, OS; MILD NS OU; PRESURGICAL; MONITOR.   RTC 1 YR//PRN FOR REE/DFE

## 2025-01-06 ENCOUNTER — OFFICE VISIT (OUTPATIENT)
Dept: UROLOGY | Facility: CLINIC | Age: 70
End: 2025-01-06
Payer: MEDICARE

## 2025-01-06 VITALS
BODY MASS INDEX: 27.6 KG/M2 | HEIGHT: 68 IN | DIASTOLIC BLOOD PRESSURE: 67 MMHG | SYSTOLIC BLOOD PRESSURE: 125 MMHG | HEART RATE: 78 BPM | WEIGHT: 182.13 LBS

## 2025-01-06 DIAGNOSIS — N52.9 ERECTILE DYSFUNCTION, UNSPECIFIED ERECTILE DYSFUNCTION TYPE: Primary | ICD-10-CM

## 2025-01-06 DIAGNOSIS — R39.16 BENIGN PROSTATIC HYPERPLASIA (BPH) WITH STRAINING ON URINATION: ICD-10-CM

## 2025-01-06 DIAGNOSIS — N40.1 BENIGN PROSTATIC HYPERPLASIA (BPH) WITH STRAINING ON URINATION: ICD-10-CM

## 2025-01-06 DIAGNOSIS — Z12.5 ENCOUNTER FOR PROSTATE CANCER SCREENING: ICD-10-CM

## 2025-01-06 DIAGNOSIS — R39.12 WEAK URINARY STREAM: ICD-10-CM

## 2025-01-06 PROCEDURE — 3288F FALL RISK ASSESSMENT DOCD: CPT | Mod: CPTII,S$GLB,, | Performed by: UROLOGY

## 2025-01-06 PROCEDURE — 3008F BODY MASS INDEX DOCD: CPT | Mod: CPTII,S$GLB,, | Performed by: UROLOGY

## 2025-01-06 PROCEDURE — 1160F RVW MEDS BY RX/DR IN RCRD: CPT | Mod: CPTII,S$GLB,, | Performed by: UROLOGY

## 2025-01-06 PROCEDURE — 3078F DIAST BP <80 MM HG: CPT | Mod: CPTII,S$GLB,, | Performed by: UROLOGY

## 2025-01-06 PROCEDURE — 1126F AMNT PAIN NOTED NONE PRSNT: CPT | Mod: CPTII,S$GLB,, | Performed by: UROLOGY

## 2025-01-06 PROCEDURE — 1159F MED LIST DOCD IN RCRD: CPT | Mod: CPTII,S$GLB,, | Performed by: UROLOGY

## 2025-01-06 PROCEDURE — 99214 OFFICE O/P EST MOD 30 MIN: CPT | Mod: S$GLB,,, | Performed by: UROLOGY

## 2025-01-06 PROCEDURE — 99999 PR PBB SHADOW E&M-EST. PATIENT-LVL IV: CPT | Mod: PBBFAC,,, | Performed by: UROLOGY

## 2025-01-06 PROCEDURE — 3074F SYST BP LT 130 MM HG: CPT | Mod: CPTII,S$GLB,, | Performed by: UROLOGY

## 2025-01-06 PROCEDURE — 4010F ACE/ARB THERAPY RXD/TAKEN: CPT | Mod: CPTII,S$GLB,, | Performed by: UROLOGY

## 2025-01-06 PROCEDURE — 1101F PT FALLS ASSESS-DOCD LE1/YR: CPT | Mod: CPTII,S$GLB,, | Performed by: UROLOGY

## 2025-01-06 RX ORDER — TADALAFIL 5 MG/1
5 TABLET ORAL DAILY
Qty: 30 TABLET | Refills: 11 | Status: SHIPPED | OUTPATIENT
Start: 2025-01-06 | End: 2026-01-06

## 2025-01-06 NOTE — PROGRESS NOTES
[place] - Urology   Clinic Note    SUBJECTIVE:     Chief Complaint   Patient presents with    Follow-up     Post rezum        Referral from: No ref. provider found.    History of Present Illness:  Masoud Osorio is a 69 y.o. male who presents to clinic for evaluation of elevated PSA.      Patient here for evaluation of elevated PSA and prostate MRI.  Patient currently takes tamsulosin and reports that this somewhat controls his symptoms.  He does report that he does have bothersome lower urinary tract symptoms including straining to void, weak stream, hesitancy and intermittency.  He does have a history of elevated PSA and recently had an MRI performed which revealed a size of 63, overall PI-RADS 2 lesions.    MRI Prostate:  Prostate Volume: 63 cc  PSA: 7.3  PSA Density: 0.12  Overall Assessment: PI-RADS 2, 0 targets      PSA:  Lab Results   Component Value Date    PSA 2.0 06/07/2024    PSA 4.8 (H) 03/08/2023    PSA 1.8 01/19/2022    PSA 1.7 06/15/2020    PSA 1.3 12/05/2018    PSA 1.5 09/23/2016    PSADIAG 7.3 (H) 07/21/2023    PSADIAG 7.5 (H) 05/12/2023    PSADIAG 7.7 (H) 04/14/2023       Previously abnormal PSA: yes.   Previous biopsy: no.   Family history of prostate cancer: no.      7/1/24  Doubled flomax dose at last visit, but has only been taking one  Reports no significant changes, not significantly bothered  PSA down to 2.0  IPSS 15/3    08/08/2024  Would like to proceed with ReZUM    01/06/2025  Doing very well post rezum  Very happy with how he voids  No issues    Patient endorses no additional complaints at this time.    Anticoagulation/Antiplatelets:  No    Past Medical History:   Diagnosis Date    Allergy     Anticoagulant long-term use     Anxiety and depression 09/21/2016    trial of ctialopram and follow up in 8 weeks    Arthritis     Benign prostatic hyperplasia (BPH) with straining on urination 12/05/2018    Coronary artery disease involving native coronary artery of native heart without angina  pectoris 07/26/2016 7/26/2016 NSTEMI at [facility]   PCI of RCA with 4.0 x 15, 4.0 x 38, and 3.5 x 38 mm Resolute JACOB   PCI of PDA 2.75 x 15 mm JACOB dilated to 3.0 POBA of proximal PLB 2.0 x 12 mm balloon   Normal EF with LVEDP 15 mmHg DAPT score of 2               Elevated PSA     Gout     History of coronary artery stent placement 09/21/2016    History of heart attack 07/25/2016    NSTEMI    Mixed hyperlipidemia 07/17/2017    Overweight (BMI 25.0-29.9) 09/21/2016    Tobacco dependence in remission 07/25/2016       Past Surgical History:   Procedure Laterality Date    COLONOSCOPY N/A 08/16/2017    Procedure: COLONOSCOPY Split Prep;  Surgeon: Maykel Carcamo Jr., MD;  Location: [facility] ENDO;  Service: Endoscopy;  Laterality: N/A;    COLONOSCOPY N/A 4/27/2022    Procedure: COLONOSCOPY Golytely;  Surgeon: Henrry Nieves MD;  Location: [facility] ENDO;  Service: Endoscopy;  Laterality: N/A;  C    CORONARY ANGIOGRAPHY N/A 3/19/2024    Procedure: ANGIOGRAM, CORONARY ARTERY;  Surgeon: Cleveland Hinton MD;  Location: [facility] CATH LAB/EP;  Service: Cardiology;  Laterality: N/A;    CORONARY ANGIOPLASTY WITH STENT PLACEMENT      CYSTOSCOPY WITH TRANSURETHRAL DESTRUCTION OF PROSTATE,RFA N/A 9/4/2024    Procedure: CYSTOSCOPY WITH TRANSURETHRAL DESTRUCTION OF PROSTATE,RFA;  Surgeon: Jose Alfredo Hua MD;  Location: [facility] OR;  Service: Urology;  Laterality: N/A;  rezum generator and at least 2 handpieces  Contact Elier ashby to schedule    EPIDURAL STEROID INJECTION INTO LUMBAR SPINE Left 2/26/2024    Procedure: LT L3-4 and L4-5 TFESI;  Surgeon: Rosita Blum DO;  Location: [facility] PAIN MANAGEMENT;  Service: Pain Management;  Laterality: Left;  20 mins    LEFT HEART CATHETERIZATION Left 3/19/2024    Procedure: Left heart cath;  Surgeon: Cleveland Hinton MD;  Location: [facility] CATH LAB/EP;  Service: Cardiology;  Laterality: Left;    ROTATOR CUFF REPAIR      TRANSFORAMINAL EPIDURAL INJECTION OF STEROID Left 06/15/2021     Procedure: Injection,steroid,epidural,transforaminal approach-- Left- L3-4, L4-5;  Surgeon: Enrike Medellin Jr., MD;  Location: [facility];  Service: Pain Management;  Laterality: Left;    VASECTOMY         Family History   Problem Relation Name Age of Onset    Heart disease Mother      Arthritis Mother      Parkinsonism Mother      Congenital heart disease Father      No Known Problems Sister      No Known Problems Brother      No Known Problems Daughter      No Known Problems Sister      No Known Problems Sister      Prostate cancer Neg Hx      Kidney disease Neg Hx         Social History     Tobacco Use    Smoking status: Former     Current packs/day: 0.00     Types: Cigarettes     Quit date: 2016     Years since quittin.4    Smokeless tobacco: Never   Substance Use Topics    Alcohol use: Yes     Comment: rarely on holidays    Drug use: No       Current Outpatient Medications on File Prior to Visit   Medication Sig Dispense Refill    aspirin 81 mg Cap Take 81 mg by mouth once daily.      atorvastatin (LIPITOR) 80 MG tablet Take 1 tablet (80 mg total) by mouth once daily. 90 tablet 3    azelastine (ASTELIN) 137 mcg (0.1 %) nasal spray Spray 2 sprays in each nostril twice daily 30 mL 5    BLOOD PRESSURE CUFF Misc 1 Units by Misc.(Non-Drug; Combo Route) route once daily.  0    diazePAM (VALIUM) 5 MG tablet Take 0.5 tablet by mouth every 8 to 12 hours as needed. 24 tablet 0    diazePAM (VALIUM) 5 MG tablet Take 1/2 -1 tablet by mouth every 8 hours. 30 tablet 0    empagliflozin (JARDIANCE) 10 mg tablet Take 1 tablet (10 mg total) by mouth once daily. 30 tablet 11    GARLIC EXTRACT ORAL Take by mouth.      hydrOXYzine HCL (ATARAX) 25 MG tablet Take 1/2-1 tablet nightly as needed for anxiety or insomnia 30 tablet 3    ipratropium (ATROVENT) 0.06 % nasal spray   5    ipratropium (ATROVENT) 21 mcg (0.03 %) nasal spray       metoprolol succinate (TOPROL-XL) 25 MG 24 hr tablet Take 0.5 tablets (12.5 mg total)  "by mouth once daily. 15 tablet 11    multivitamin capsule Take 1 capsule by mouth once daily.      predniSONE (DELTASONE) 20 MG tablet Take 2 tablets by mouth on day 1, THEN 1 ½ tablets on days 2 and 3, THEN 1 tablet on days 4 and 5, THEN ½ tablet on day 6 8 tablet 0    pregabalin (LYRICA) 150 MG capsule Take 1 capsule (150 mg total) by mouth 2 (two) times daily. 60 capsule 5    sacubitriL-valsartan (ENTRESTO) 24-26 mg per tablet Take 1 tablet by mouth 2 (two) times daily. 60 tablet 11    sildenafil (REVATIO) 20 mg Tab Take 1 tablet (20 mg total) by mouth once daily. 30 tablet 11    spironolactone (ALDACTONE) 25 MG tablet Take 1 tablet (25 mg total) by mouth once daily. 30 tablet 11    triamcinolone (NASACORT) 55 mcg nasal inhaler Instill two sprays in each nostril once daily 16.9 mL 5    oxybutynin (DITROPAN) 5 MG Tab Take 1 tablet (5 mg total) by mouth 3 (three) times daily. for 7 days 21 tablet 0    tamsulosin (FLOMAX) 0.4 mg Cap Take 2 capsules (0.8 mg total) by mouth once daily. 180 capsule 3    [DISCONTINUED] nitroGLYCERIN (NITROSTAT) 0.4 MG SL tablet Place 1 tablet (0.4 mg total) under the tongue every 5 (five) minutes as needed for Chest pain. 25 tablet 11     No current facility-administered medications on file prior to visit.       Review of patient's allergies indicates:   Allergen Reactions    Penicillins Hives       Review of Systems:  A review of 10+ systems was conducted with pertinent positive and negative findings documented in HPI with all other systems reviewed and negative.    OBJECTIVE:     Estimated body mass index is 27.69 kg/m² as calculated from the following:    Height as of this encounter: 5' 8" (1.727 m).    Weight as of this encounter: 82.6 kg (182 lb 1.6 oz).    Vital Signs (Most Recent)  Vitals:    01/06/25 1328   BP: 125/67   Pulse: 78         Physical Exam:  GENERAL: patient sitting comfortably  HEENT: normocephalic  NECK: supple, no JVD  PULM: normal chest rise, no increased " "WOB  HEART: non-diaphoretic  ABDO: soft, nondistended, nontender  BACK: no CVA tenderness bilaterally  SKIN: warm, dry, well perfused  EXT: no bruising or edema  NEURO: grossly normal with no focal deficits  PSYCH: appropriate mood and affect    Genitourinary Exam:  deferred    Lab Results   Component Value Date    BUN 17 10/15/2024    CREATININE 0.9 10/15/2024    WBC 6.22 03/18/2024    HGB 15.0 03/18/2024    HCT 44.8 03/18/2024     03/18/2024    AST 49 (H) 06/07/2024    ALT 22 06/07/2024    ALKPHOS 73 06/07/2024    ALBUMIN 4.0 06/07/2024    HGBA1C 5.5 06/07/2024        PSA:  Lab Results   Component Value Date    PSA 2.0 06/07/2024    PSA 4.8 (H) 03/08/2023    PSA 1.8 01/19/2022    PSA 1.7 06/15/2020    PSA 1.3 12/05/2018    PSA 1.5 09/23/2016    PSADIAG 7.3 (H) 07/21/2023    PSADIAG 7.5 (H) 05/12/2023    PSADIAG 7.7 (H) 04/14/2023       Testosterone:  No results found for: "TOTALTESTOST", "TESTOSTERONE"     Imaging:  I have personally reviewed all relevant imaging.    No results found for this or any previous visit (from the past 2160 hours).  No results found for this or any previous visit (from the past 2160 hours).  X-Ray Finger 2 or More Views Right  Narrative: EXAMINATION:  XR FINGER 2 OR MORE VIEWS RIGHT    CLINICAL HISTORY:  laceration;    TECHNIQUE:  PA, oblique, and lateral radiographs of the right thumb.    COMPARISON:  None    FINDINGS:  There is no acute fracture or dislocation.  Alignment is normal.  There is a volar soft tissue laceration.  There is no evidence of a radiopaque foreign body within the field of view.  Remaining osseous structures are intact alignment is normal.  Joint spaces are preserved.  Impression: Soft tissue laceration as above.  No acute osseous abnormality or radiopaque foreign body.    Electronically signed by: Jez Mcduffie  Date:    08/08/2024  Time:    20:25      ASSESSMENT     1. Erectile dysfunction, unspecified erectile dysfunction type    2. Encounter for prostate " cancer screening    3. Benign prostatic hyperplasia (BPH) with straining on urination    4. Weak urinary stream        PLAN:     Very happy with how he urinates post Rezum  Overall doing well  Would like to do dialy tadalafil for ED and BPH, prescribed  Can cont Sildenafil as needed  Has stated that he does not take nitroglycerin, has been extensively counseled on the risks of that  Follow up 6 mo    Jose Alfredo Hua MD  Urology  [facility] [place] [facility] [place]    Disclaimer: This note has been generated using voice-recognition software. There may be typographical errors that have been missed during proof-reading.

## 2025-01-09 ENCOUNTER — PATIENT MESSAGE (OUTPATIENT)
Dept: UROLOGY | Facility: CLINIC | Age: 70
End: 2025-01-09
Payer: MEDICARE

## 2025-02-17 ENCOUNTER — TELEPHONE (OUTPATIENT)
Dept: CARDIOLOGY | Facility: CLINIC | Age: 70
End: 2025-02-17
Payer: MEDICARE

## 2025-02-17 ENCOUNTER — PATIENT MESSAGE (OUTPATIENT)
Dept: CARDIOLOGY | Facility: CLINIC | Age: 70
End: 2025-02-17
Payer: MEDICARE

## 2025-02-17 DIAGNOSIS — Z95.5 HISTORY OF CORONARY ARTERY STENT PLACEMENT: ICD-10-CM

## 2025-02-17 DIAGNOSIS — E78.2 MIXED HYPERLIPIDEMIA: Primary | ICD-10-CM

## 2025-02-17 DIAGNOSIS — I44.0 1ST DEGREE AV BLOCK: ICD-10-CM

## 2025-02-17 DIAGNOSIS — I25.10 CORONARY ARTERY DISEASE INVOLVING NATIVE CORONARY ARTERY OF NATIVE HEART WITHOUT ANGINA PECTORIS: ICD-10-CM

## 2025-02-17 DIAGNOSIS — I25.2 HISTORY OF HEART ATTACK: ICD-10-CM

## 2025-02-17 NOTE — TELEPHONE ENCOUNTER
Reached out to in regards to rescheduling appt with Dr Hinton due to hosp service    Pt stated that he will be out of town until 3/17 and request a later date    Appt scheduled

## 2025-02-28 ENCOUNTER — PATIENT MESSAGE (OUTPATIENT)
Dept: CARDIOLOGY | Facility: CLINIC | Age: 70
End: 2025-02-28
Payer: MEDICARE

## 2025-03-03 ENCOUNTER — TELEPHONE (OUTPATIENT)
Dept: CARDIOLOGY | Facility: CLINIC | Age: 70
End: 2025-03-03
Payer: MEDICARE

## 2025-03-03 NOTE — TELEPHONE ENCOUNTER
Reminder call made for labs and Echo. Informed him he could get blood work and echo on 3/21/2025 as long as he di 8 hour fasting for blood work. Verbalized understanding.

## 2025-03-17 RX ORDER — METOPROLOL SUCCINATE 25 MG/1
12.5 TABLET, EXTENDED RELEASE ORAL DAILY
Qty: 15 TABLET | Refills: 11 | Status: SHIPPED | OUTPATIENT
Start: 2025-03-17 | End: 2026-03-17

## 2025-03-21 ENCOUNTER — HOSPITAL ENCOUNTER (OUTPATIENT)
Dept: CARDIOLOGY | Facility: HOSPITAL | Age: 70
Discharge: HOME | End: 2025-03-21
Attending: INTERNAL MEDICINE
Payer: MEDICARE

## 2025-03-21 ENCOUNTER — RESULTS FOLLOW-UP (OUTPATIENT)
Dept: CARDIOLOGY | Facility: CLINIC | Age: 70
End: 2025-03-21

## 2025-03-21 VITALS — HEIGHT: 68 IN | HEART RATE: 73 BPM | WEIGHT: 182 LBS | BODY MASS INDEX: 27.58 KG/M2

## 2025-03-21 DIAGNOSIS — I44.0 1ST DEGREE AV BLOCK: ICD-10-CM

## 2025-03-21 DIAGNOSIS — Z95.5 HISTORY OF CORONARY ARTERY STENT PLACEMENT: ICD-10-CM

## 2025-03-21 DIAGNOSIS — I25.10 CORONARY ARTERY DISEASE INVOLVING NATIVE CORONARY ARTERY OF NATIVE HEART WITHOUT ANGINA PECTORIS: ICD-10-CM

## 2025-03-21 DIAGNOSIS — I25.2 HISTORY OF HEART ATTACK: ICD-10-CM

## 2025-03-21 LAB
APICAL FOUR CHAMBER EJECTION FRACTION: 52 %
APICAL TWO CHAMBER EJECTION FRACTION: 59 %
ASCENDING AORTA: 2.66 CM
AV INDEX (PROSTH): 0.63
AV MEAN GRADIENT: 4 MMHG
AV PEAK GRADIENT: 7 MMHG
AV VALVE AREA BY VELOCITY RATIO: 2.3 CM²
AV VALVE AREA: 2.4 CM²
AV VELOCITY RATIO: 0.62
BSA FOR ECHO PROCEDURE: 1.99 M2
CV ECHO LV RWT: 0.38 CM
DOP CALC AO PEAK VEL: 1.3 M/S
DOP CALC AO VTI: 30.1 CM
DOP CALC LVOT AREA: 3.8 CM2
DOP CALC LVOT DIAMETER: 2.2 CM
DOP CALC LVOT PEAK VEL: 0.8 M/S
DOP CALC LVOT STROKE VOLUME: 71.8 CM3
DOP CALCLVOT PEAK VEL VTI: 18.9 CM
E WAVE DECELERATION TIME: 199 MSEC
E/A RATIO: 0.8
E/E' RATIO: 8 M/S
ECHO LV POSTERIOR WALL: 1 CM (ref 0.6–1.1)
FRACTIONAL SHORTENING: 32.1 % (ref 28–44)
INTERVENTRICULAR SEPTUM: 1 CM (ref 0.6–1.1)
IVRT: 105 MSEC
LEFT ATRIUM AREA SYSTOLIC (APICAL 2 CHAMBER): 21.47 CM2
LEFT ATRIUM AREA SYSTOLIC (APICAL 4 CHAMBER): 21.92 CM2
LEFT ATRIUM VOLUME INDEX MOD: 32 ML/M2
LEFT ATRIUM VOLUME MOD: 62 ML
LEFT INTERNAL DIMENSION IN SYSTOLE: 3.6 CM (ref 2.1–4)
LEFT VENTRICLE DIASTOLIC VOLUME INDEX: 69.39 ML/M2
LEFT VENTRICLE DIASTOLIC VOLUME: 136 ML
LEFT VENTRICLE END DIASTOLIC VOLUME APICAL 2 CHAMBER: 85.8 ML
LEFT VENTRICLE END DIASTOLIC VOLUME APICAL 4 CHAMBER: 112.99 ML
LEFT VENTRICLE END SYSTOLIC VOLUME APICAL 2 CHAMBER: 60.71 ML
LEFT VENTRICLE END SYSTOLIC VOLUME APICAL 4 CHAMBER: 63.63 ML
LEFT VENTRICLE MASS INDEX: 102.2 G/M2
LEFT VENTRICLE SYSTOLIC VOLUME INDEX: 27 ML/M2
LEFT VENTRICLE SYSTOLIC VOLUME: 53 ML
LEFT VENTRICULAR INTERNAL DIMENSION IN DIASTOLE: 5.3 CM (ref 3.5–6)
LEFT VENTRICULAR MASS: 200.4 G
LV LATERAL E/E' RATIO: 6.6 M/S
LV SEPTAL E/E' RATIO: 9.4 M/S
LVED V (TEICH): 136.23 ML
LVES V (TEICH): 53.13 ML
LVOT MG: 1.59 MMHG
LVOT MV: 0.61 CM/S
MV PEAK A VEL: 0.82 M/S
MV PEAK E VEL: 0.66 M/S
MV STENOSIS PRESSURE HALF TIME: 57.62 MS
MV VALVE AREA P 1/2 METHOD: 3.82 CM2
OHS CV RV/LV RATIO: 0.66 CM
PISA TR MAX VEL: 1.6 M/S
QEF: 55 %
RA PRESSURE ESTIMATED: 3 MMHG
RIGHT VENTRICLE DIASTOLIC BASEL DIMENSION: 3.5 CM
RV TB RVSP: 5 MMHG
RV TISSUE DOPPLER FREE WALL SYSTOLIC VELOCITY 1 (APICAL 4 CHAMBER VIEW): 11.58 CM/S
SINUS: 3.18 CM
STJ: 2.73 CM
TDI LATERAL: 0.1 M/S
TDI SEPTAL: 0.07 M/S
TDI: 0.09 M/S
TR MAX PG: 10 MMHG
TRICUSPID ANNULAR PLANE SYSTOLIC EXCURSION: 1.95 CM
TV REST PULMONARY ARTERY PRESSURE: 13 MMHG
Z-SCORE OF LEFT VENTRICULAR DIMENSION IN END DIASTOLE: -0.57
Z-SCORE OF LEFT VENTRICULAR DIMENSION IN END SYSTOLE: 0.34

## 2025-03-21 PROCEDURE — 93306 TTE W/DOPPLER COMPLETE: CPT | Mod: 26,,, | Performed by: INTERNAL MEDICINE

## 2025-03-21 PROCEDURE — 93306 TTE W/DOPPLER COMPLETE: CPT

## 2025-03-21 NOTE — PROGRESS NOTES
Your echocardiogram results is okay.  Normal heart pumping function.  Overall no major abnormalities.  We will discuss in detail during next appointment    Sincerely,  Cleveland Hinton MD.   Interventional Cardiologist  [facility] [place]

## 2025-03-24 PROBLEM — I50.22 HEART FAILURE WITH MID-RANGE EJECTION FRACTION (HFMEF): Status: RESOLVED | Noted: 2024-03-04 | Resolved: 2025-03-24

## 2025-03-24 NOTE — PROGRESS NOTES
Subjective:   Patient ID:  Masoud Osorio is a 69 y.o. male who presents for follow up of CAD, cardiomyopathy, hyperlipidemia      HPI:   03/25/2025:  F/U.  He is doing well.  Tolerating guideline directed medical therapy without any issues.  EF recovered.  LDL well-controlled    September 2024:  Follow up.  He has been doing well since last visit.  No significant cardiovascular complaints.  No CP or significant MATTA.  Tolerating meds without any issues.    4/10/2024: 67 yo M with hx of CAD s/p PCI RCA, PDA (2016), HLD, tobacco use, obesity present to clinic for f/u CAD. Patient with recent LHC for CP/ concerning MPI showing mild non-obstructive CAD and patent RCA stents.  Last visit to Toprol and spironolactone added in addition to valsartan.  He is tolerating them well.      3/4/2024: Previously seen by Dr. Bailey 5/15/2023 as below.Patient seen in clinic today, reports overall doing well, trying to get back into exercising and eating better. Patient does endorse some new onset LE edema and some infrequent chest tightness, happening every few months, most recently about 2 months ago. Has not taken any nitroglycerin, quit smoking in 2016. His wife also reports that he has been noticably short of breath at times, frequently taking big deep breaths. Patient denies CP, current SOB/MATTA, orthopnea, PND, syncope, palpitations, LE edema. Reports compliance and tolerance with all medicatons.     Leo CUEVAS 5/15/2023  Masoud Osorio 68 y.o. male is here follow up and feeling well without any new complaints. He was admitted for NSTEMI that required PCI of RCA, PDA, and POBA of PLB with a preserved EF in 7/2016. He quit smoking. He is on aspirin SAPT. Non ischemic stress test in 3/2017 for atypical chest pain revealed no reversible ischemia. Admitted in 10/2017 with CP again. 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 Normal EF. ECG 6/2018: NSR.  He was admitted in March 2023 for  atypical chest discomfort.  His EKG and cardiac biomarkers were normal.  Stress test revealed no reversible ischemia.  His ejection fraction 42%.  Historically had not tolerated beta-blockers or ACE inhibitors or angiotensin receptor blockers due to hypotension     Echo March 21, 2025    Left Ventricle: The left ventricle is normal in size. Normal wall thickness. Normal wall motion. There is low normal systolic function with a visually estimated ejection fraction of 50 - 55%. There is normal diastolic function.    Right Ventricle: The right ventricle is normal in size. Systolic function is normal.    Left Atrium: Left atrium is at the upper limits of normal in size.    Mitral Valve: There is trace regurgitation.    Pulmonary Artery: No pulmonary hypertension. The estimated pulmonary artery systolic pressure is 13 mmHg.    IVC/SVC: Normal venous pressure at 3 mmHg.    Pericardium: There is no pericardial effusion.       Echo July 2024:    Left Ventricle: The left ventricle is normal in size. There is concentric remodeling. There is low normal systolic function with a visually estimated ejection fraction of 50 - 55%.    Right Ventricle: Normal right ventricular cavity size. Wall thickness is normal. Systolic function is normal.    IVC/SVC: Normal venous pressure at 3 mmHg.     Echo 4/12/2023  The left ventricle is normal in size with mildly decreased systolic function.  The estimated ejection fraction is 35-40%.  Mild mitral regurgitation.  Grade I left ventricular diastolic dysfunction.  Normal right ventricular size with normal right ventricular systolic function.  Normal central venous pressure (3 mmHg).    [facility] [id number]    There was mild non-obstructive coronary artery disease..    Patent RCA stents.    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      - Left Main Coronary Artery:              The LM has luminal irregularities. There is MILAGROS 3 flow.      - Left Anterior Descending Artery:              The LAD has luminal irregularities. There is MILAGROS 3 flow.      - Left Circumflex Artery:              The LCX has luminal irregularities. 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 There is MILAGROS 3 flow.      - Right Coronary Artery:              The RCA has luminal irregularities. There is MILAGROS 3 flow. Patent stents              The distal RCA has a 60% stenosis. There is MILAGROS 3 flow.      - Posterior Lateral Branch:              The PLB has luminal irregularities. There is MILAGROS 3 flow. The remaining portion of the vessel is of small caliber.      - Posterior Descending Artery:              The PDA has luminal irregularities. There is MILAGROS 3 flow. Patent stent      - Radial:              The radial is normal.     Stress Test 4/12/2023    The ECG portion of the study is negative for ischemia.    The patient reported no chest pain during the stress test.    During stress, occasional PVCs are noted.    The exercise capacity was excellent.    MPI 4/12/2023  1. Moderately sized inferior wall infarct with suspected yadira-infarct reversible ischemia.  2. Associated hypokinesis of the inferior wall.  3. Mildly impaired left ventricular function.  Estimated ejection fraction 42% during stress and 41% during rest.                            Patient Active Problem List    Diagnosis Date Noted    Elevated SGOT (AST) 06/08/2024    Heart failure with mid-range ejection fraction (HFmEF) 03/04/2024    Abnormal cardiovascular stress test 03/04/2024    1st degree AV block 03/31/2023    Costochondral pain 03/31/2023    Elevated PSA  03/09/2023    Impaired mobility and activities of daily living 01/04/2023    Personal history of COVID-19 03/08/2022    Lipoma of skin and subcutaneous tissue of neck 02/08/2022    Neuroforaminal stenosis of lumbar spine 06/07/2021    DDD (degenerative disc disease), lumbar 06/07/2021    Osteoarthritis of spine with radiculopathy, lumbar region 06/07/2021    Compression fracture of L1 lumbar vertebra 12/20/2019    Benign prostatic hyperplasia (BPH) with straining on urination 12/05/2018    Chronic actinic dermatitis 12/05/2018    Non-seasonal allergic rhinitis 12/05/2018     Managed by outside ENT doctor Raya.  Continue follow-up and regular nasal spray      Pain and swelling of left knee 10/11/2017    Mixed hyperlipidemia 07/17/2017    History of colon polyps 10/24/2016     Noted on colonoscopy 4/27/22- -repeat in 3-5 years pending pathology results       Class 1 obesity due to excess calories with serious comorbidity and body mass index (BMI) of 32.0 to 32.9 in adult 09/21/2016    Long-term use of aspirin therapy 09/21/2016    History of coronary artery stent placement 09/21/2016    Anxiety and depression 09/21/2016     trial of ctialopram and follow up in 8 weeks      Coronary artery disease involving native coronary artery of native heart without angina pectoris 07/26/2016 7/26/2016 NSTEMI at [facility]     PCI of RCA with 4.0 x 15, 4.0 x 38, and 3.5 x 38 mm Resolute JACOB    PCI of PDA 2.75 x 15 mm JACOB dilated to 3.0  POBA of proximal PLB 2.0 x 12 mm balloon      Normal EF with LVEDP 15 mmHg  DAPT score of 2                        History of heart attack 07/25/2016     NSTEMI      Tobacco dependence in remission 07/25/2016                    LABS    LAST HbA1c  Lab Results   Component Value Date    HGBA1C 5.5 06/07/2024       Lipid panel  Lab Results   Component Value Date    CHOL 114 (L) 03/20/2025    CHOL 117 (L) 02/28/2024    CHOL 119 (L) 03/28/2023     Lab Results   Component Value Date    HDL 49  03/20/2025    HDL 45 02/28/2024    HDL 37 (L) 03/28/2023     Lab Results   Component Value Date    LDLCALC 52.6 (L) 03/20/2025    LDLCALC 46.2 (L) 02/28/2024    LDLCALC 68.4 03/28/2023     Lab Results   Component Value Date    TRIG 62 03/20/2025    TRIG 129 02/28/2024    TRIG 68 03/28/2023     Lab Results   Component Value Date    CHOLHDL 43.0 03/20/2025    CHOLHDL 38.5 02/28/2024    CHOLHDL 31.1 03/28/2023            Review of Systems   Constitutional: Negative for chills and fever.   HENT:  Negative for hearing loss and nosebleeds.    Eyes:  Negative for blurred vision.   Cardiovascular:         As in HPI   Respiratory:  Negative for hemoptysis and shortness of breath.    Hematologic/Lymphatic: Negative for bleeding problem.   Skin:  Negative for itching.   Musculoskeletal:  Negative for falls.   Gastrointestinal:  Negative for abdominal pain and hematochezia.   Genitourinary:  Negative for hematuria.   Neurological:  Negative for dizziness and loss of balance.   Psychiatric/Behavioral:  Negative for altered mental status and depression.        Objective:   Physical Exam  Constitutional:       Appearance: He is well-developed.   HENT:      Head: Normocephalic and atraumatic.   Eyes:      Conjunctiva/sclera: Conjunctivae normal.   Neck:      Vascular: No carotid bruit or JVD.   Cardiovascular:      Rate and Rhythm: Normal rate and regular rhythm.      Pulses:           Carotid pulses are 2+ on the right side and 2+ on the left side.       Radial pulses are 2+ on the right side and 2+ on the left side.      Heart sounds: Normal heart sounds. No murmur heard.     No friction rub. No gallop.   Pulmonary:      Effort: Pulmonary effort is normal. No respiratory distress.      Breath sounds: Normal breath sounds. No stridor. No wheezing.   Musculoskeletal:      Cervical back: Neck supple.      Right lower leg: Edema (Trace) present.      Left lower leg: Edema (trace) present.   Skin:     General: Skin is warm and dry.    Neurological:      Mental Status: He is alert and oriented to person, place, and time.   Psychiatric:         Behavior: Behavior normal.               Assessment:     No diagnosis found.        Plan:   1. Coronary artery disease  Nonobstructive coronary artery disease.  Stable.  Continue medical therapy.  LDL at goal  Continue aspirin  Continue high-intensity statin    2. Cardiomyopathy  Cardiomyopathy noted.  EF recovered  Continue Entresto 24-26 mg.  Spironolactone to 25 mg daily  Continue Toprol 12.5 mg daily.  Heart rate is borderline  Continue Jardiance  Repeat CMP  Limit salt intake.  Not more than 2 g    3. Hypertension  BP well-controlled  Continue current regimen      4. Hyperlipidemia  LDL well-controlled  Continue current regimen    Goal BP less than 130/80    Check daily weight.  Keep log      Six-month follow-up or sooner p.r.n.    Visit today included increased complexity associated with the care of the episodic problem cardiomyopathy, coronary artery disease, hypertension, hyperlipidemia addressed and managing the longitudinal care of the patient due to the serious and/or complex managed problems    Continue with current medical plan and lifestyle changes.  Return sooner for concerns or questions. If symptoms persist go to the ED  I have reviewed all pertinent data on this patient             I have reviewed the patient's medical history in detail and updated the computerized patient record.    No orders of the defined types were placed in this encounter.      Follow up as scheduled. Return sooner for concerns or questions            Patient's Medications   New Prescriptions    VALSARTAN (DIOVAN) 40 MG TABLET    Take 1 tablet (40 mg total) by mouth once daily.   Previous Medications    ASPIRIN (ECOTRIN) 81 MG EC TABLET    Take 1 tablet (81 mg total) by mouth once daily.    ATORVASTATIN (LIPITOR) 80 MG TABLET    Take 1 tablet (80 mg total) by mouth once daily.    AZELASTINE (ASTELIN) 137 MCG (0.1 %)  NASAL SPRAY    Spray 2 sprays in each nostril twice daily    DIAZEPAM (VALIUM) 5 MG TABLET    Take 0.5 tablet by mouth every 8 to 12 hours as needed.    GARLIC EXTRACT ORAL    Take by mouth.    HYDROXYZINE HCL (ATARAX) 25 MG TABLET    Take 0.5 to 1 tablet by mouth at bedtime    IPRATROPIUM (ATROVENT) 0.06 % NASAL SPRAY    INSTILL 2 SPRAYS IN EACH NOSTRIL EVERY 12 HOURS    MULTIVITAMIN CAPSULE    Take 1 capsule by mouth once daily.    PREGABALIN (LYRICA) 150 MG CAPSULE    Take 1 capsule (150 mg total) by mouth 2 (two) times daily.    TAMSULOSIN (FLOMAX) 0.4 MG CAP    Take 1 capsule (0.4 mg total) by mouth once daily.    TRIAMCINOLONE (NASACORT) 55 MCG NASAL INHALER    Instill two sprays in each nostril once daily   Modified Medications    Modified Medication Previous Medication    NITROGLYCERIN (NITROSTAT) 0.4 MG SL TABLET nitroGLYCERIN (NITROSTAT) 0.4 MG SL tablet       Place 1 tablet (0.4 mg total) under the tongue every 5 (five) minutes as needed for Chest pain.    Place 1 tablet (0.4 mg total) under the tongue every 5 (five) minutes as needed for Chest pain.    SILDENAFIL (REVATIO) 20 MG TAB sildenafil (REVATIO) 20 mg Tab       Take 1 tablet (20 mg total) by mouth once daily.    Take 1 tablet (20 mg total) by mouth once daily.   Discontinued Medications         '

## 2025-03-25 ENCOUNTER — LAB VISIT (OUTPATIENT)
Dept: LAB | Facility: HOSPITAL | Age: 70
End: 2025-03-25
Attending: INTERNAL MEDICINE
Payer: MEDICARE

## 2025-03-25 ENCOUNTER — TELEPHONE (OUTPATIENT)
Dept: CARDIOLOGY | Facility: CLINIC | Age: 70
End: 2025-03-25

## 2025-03-25 ENCOUNTER — OFFICE VISIT (OUTPATIENT)
Dept: CARDIOLOGY | Facility: CLINIC | Age: 70
End: 2025-03-25
Payer: MEDICARE

## 2025-03-25 ENCOUNTER — RESULTS FOLLOW-UP (OUTPATIENT)
Dept: CARDIOLOGY | Facility: CLINIC | Age: 70
End: 2025-03-25

## 2025-03-25 ENCOUNTER — PATIENT MESSAGE (OUTPATIENT)
Dept: CARDIOLOGY | Facility: CLINIC | Age: 70
End: 2025-03-25

## 2025-03-25 VITALS
WEIGHT: 178 LBS | OXYGEN SATURATION: 96 % | DIASTOLIC BLOOD PRESSURE: 65 MMHG | HEART RATE: 67 BPM | BODY MASS INDEX: 27.06 KG/M2 | SYSTOLIC BLOOD PRESSURE: 104 MMHG

## 2025-03-25 DIAGNOSIS — E78.2 MIXED HYPERLIPIDEMIA: ICD-10-CM

## 2025-03-25 DIAGNOSIS — I50.22 HEART FAILURE WITH MID-RANGE EJECTION FRACTION (HFMEF): ICD-10-CM

## 2025-03-25 DIAGNOSIS — F17.201 TOBACCO DEPENDENCE IN REMISSION: ICD-10-CM

## 2025-03-25 DIAGNOSIS — I25.10 CORONARY ARTERY DISEASE INVOLVING NATIVE CORONARY ARTERY OF NATIVE HEART WITHOUT ANGINA PECTORIS: Primary | ICD-10-CM

## 2025-03-25 DIAGNOSIS — Z95.5 HISTORY OF CORONARY ARTERY STENT PLACEMENT: ICD-10-CM

## 2025-03-25 LAB
ABSOLUTE EOSINOPHIL (OHS): 0.19 K/UL
ABSOLUTE MONOCYTE (OHS): 0.74 K/UL (ref 0.3–1)
ABSOLUTE NEUTROPHIL COUNT (OHS): 3.23 K/UL (ref 1.8–7.7)
ALBUMIN SERPL BCP-MCNC: 3.7 G/DL (ref 3.5–5.2)
ALP SERPL-CCNC: 65 UNIT/L (ref 40–150)
ALT SERPL W/O P-5'-P-CCNC: 23 UNIT/L (ref 10–44)
ANION GAP (OHS): 8 MMOL/L (ref 8–16)
AST SERPL-CCNC: 33 UNIT/L (ref 11–45)
BASOPHILS # BLD AUTO: 0.04 K/UL
BASOPHILS NFR BLD AUTO: 0.6 %
BILIRUB SERPL-MCNC: 0.7 MG/DL (ref 0.1–1)
BUN SERPL-MCNC: 16 MG/DL (ref 8–23)
CALCIUM SERPL-MCNC: 9.3 MG/DL (ref 8.7–10.5)
CHLORIDE SERPL-SCNC: 110 MMOL/L (ref 95–110)
CO2 SERPL-SCNC: 22 MMOL/L (ref 23–29)
CREAT SERPL-MCNC: 0.8 MG/DL (ref 0.5–1.4)
ERYTHROCYTE [DISTWIDTH] IN BLOOD BY AUTOMATED COUNT: 13.7 % (ref 11.5–14.5)
GFR SERPLBLD CREATININE-BSD FMLA CKD-EPI: >60 ML/MIN/1.73/M2
GLUCOSE SERPL-MCNC: 106 MG/DL (ref 70–110)
HCT VFR BLD AUTO: 46 % (ref 40–54)
HGB BLD-MCNC: 14.8 GM/DL (ref 14–18)
IMM GRANULOCYTES # BLD AUTO: 0.03 K/UL (ref 0–0.04)
IMM GRANULOCYTES NFR BLD AUTO: 0.5 % (ref 0–0.5)
LYMPHOCYTES # BLD AUTO: 2.12 K/UL (ref 1–4.8)
MCH RBC QN AUTO: 28 PG (ref 27–50)
MCHC RBC AUTO-ENTMCNC: 32.2 G/DL (ref 32–36)
MCV RBC AUTO: 87 FL (ref 82–98)
NUCLEATED RBC (/100WBC) (OHS): 0 /100 WBC
PLATELET # BLD AUTO: 237 K/UL (ref 150–450)
PMV BLD AUTO: 10.3 FL (ref 9.2–12.9)
POTASSIUM SERPL-SCNC: 3.8 MMOL/L (ref 3.5–5.1)
PROT SERPL-MCNC: 6.3 GM/DL (ref 6–8.4)
RBC # BLD AUTO: 5.29 M/UL (ref 4.6–6.2)
RELATIVE EOSINOPHIL (OHS): 3 %
RELATIVE LYMPHOCYTE (OHS): 33.4 % (ref 18–48)
RELATIVE MONOCYTE (OHS): 11.7 % (ref 4–15)
RELATIVE NEUTROPHIL (OHS): 50.8 % (ref 38–73)
SODIUM SERPL-SCNC: 140 MMOL/L (ref 136–145)
WBC # BLD AUTO: 6.35 K/UL (ref 3.9–12.7)

## 2025-03-25 PROCEDURE — 1159F MED LIST DOCD IN RCRD: CPT | Mod: CPTII,S$GLB,, | Performed by: INTERNAL MEDICINE

## 2025-03-25 PROCEDURE — G2211 COMPLEX E/M VISIT ADD ON: HCPCS | Mod: S$GLB,,, | Performed by: INTERNAL MEDICINE

## 2025-03-25 PROCEDURE — 99999 PR PBB SHADOW E&M-EST. PATIENT-LVL IV: CPT | Mod: PBBFAC,,, | Performed by: INTERNAL MEDICINE

## 2025-03-25 PROCEDURE — 82040 ASSAY OF SERUM ALBUMIN: CPT

## 2025-03-25 PROCEDURE — 4010F ACE/ARB THERAPY RXD/TAKEN: CPT | Mod: CPTII,S$GLB,, | Performed by: INTERNAL MEDICINE

## 2025-03-25 PROCEDURE — 36415 COLL VENOUS BLD VENIPUNCTURE: CPT

## 2025-03-25 PROCEDURE — 85025 COMPLETE CBC W/AUTO DIFF WBC: CPT

## 2025-03-25 PROCEDURE — 3074F SYST BP LT 130 MM HG: CPT | Mod: CPTII,S$GLB,, | Performed by: INTERNAL MEDICINE

## 2025-03-25 PROCEDURE — 3078F DIAST BP <80 MM HG: CPT | Mod: CPTII,S$GLB,, | Performed by: INTERNAL MEDICINE

## 2025-03-25 PROCEDURE — 1101F PT FALLS ASSESS-DOCD LE1/YR: CPT | Mod: CPTII,S$GLB,, | Performed by: INTERNAL MEDICINE

## 2025-03-25 PROCEDURE — 99214 OFFICE O/P EST MOD 30 MIN: CPT | Mod: S$GLB,,, | Performed by: INTERNAL MEDICINE

## 2025-03-25 PROCEDURE — 3008F BODY MASS INDEX DOCD: CPT | Mod: CPTII,S$GLB,, | Performed by: INTERNAL MEDICINE

## 2025-03-25 PROCEDURE — 3288F FALL RISK ASSESSMENT DOCD: CPT | Mod: CPTII,S$GLB,, | Performed by: INTERNAL MEDICINE

## 2025-03-25 NOTE — TELEPHONE ENCOUNTER
Reached out to patient today and gave him test results of his Blood lab  per Jamaal Carroll  Blood work results are stable.  Patient voiced understanding and appreciates the call.    Nw                            Cleveland Hinton MD to Dharmeshf Cleveland Staff  (Selected Message)      3/25/25 12:24 PM  Note      Blood work results stable     Sincerely,  Cleveland Hniton MD.   Interventional Cardiologist  [facility], [place]

## 2025-03-25 NOTE — PROGRESS NOTES
Blood work results stable    Sincerely,  Cleveland Hinton MD.   Interventional Cardiologist  [facility], [place]

## 2025-04-07 ENCOUNTER — PATIENT MESSAGE (OUTPATIENT)
Dept: CARDIOLOGY | Facility: CLINIC | Age: 70
End: 2025-04-07
Payer: MEDICARE

## 2025-04-07 DIAGNOSIS — I50.22 HEART FAILURE WITH MID-RANGE EJECTION FRACTION (HFMEF): ICD-10-CM

## 2025-04-07 RX ORDER — SACUBITRIL AND VALSARTAN 24; 26 MG/1; MG/1
1 TABLET, FILM COATED ORAL 2 TIMES DAILY
Qty: 60 TABLET | Refills: 11 | Status: SHIPPED | OUTPATIENT
Start: 2025-04-07 | End: 2026-04-07

## 2025-04-09 DIAGNOSIS — I50.22 HEART FAILURE WITH MID-RANGE EJECTION FRACTION (HFMEF): ICD-10-CM

## 2025-04-10 RX ORDER — SPIRONOLACTONE 25 MG/1
25 TABLET ORAL DAILY
Qty: 30 TABLET | Refills: 11 | Status: SHIPPED | OUTPATIENT
Start: 2025-04-10 | End: 2026-04-10

## 2025-04-14 ENCOUNTER — TELEPHONE (OUTPATIENT)
Dept: INTERNAL MEDICINE | Facility: CLINIC | Age: 70
End: 2025-04-14
Payer: MEDICARE

## 2025-04-20 NOTE — TELEPHONE ENCOUNTER
I looked over my schedule for tomorrow and see patient is schedule for annual and was wondering about labs-- he is due for some labs. Some we do not need to repeat yet as they were recently checked by cardiology but I do recommend some NON-fasting labs (cardiology recent checked a fasting cholesterol).    Can he do these TODAY  by chance or maybe 1st thing in the morning so that we can hopefully have results by the time of the visit.    Also, he is overdue for a repeat PSA per urology and I have included this order with the labs.     Please help schedule thanks!   Medications

## 2025-04-23 ENCOUNTER — OFFICE VISIT (OUTPATIENT)
Dept: INTERNAL MEDICINE | Facility: CLINIC | Age: 70
End: 2025-04-23
Payer: MEDICARE

## 2025-04-23 VITALS
BODY MASS INDEX: 27.06 KG/M2 | DIASTOLIC BLOOD PRESSURE: 62 MMHG | OXYGEN SATURATION: 96 % | HEART RATE: 59 BPM | HEIGHT: 68 IN | SYSTOLIC BLOOD PRESSURE: 100 MMHG | WEIGHT: 178.56 LBS | TEMPERATURE: 98 F

## 2025-04-23 DIAGNOSIS — I25.2 HISTORY OF HEART ATTACK: ICD-10-CM

## 2025-04-23 DIAGNOSIS — Z79.82 LONG-TERM USE OF ASPIRIN THERAPY: ICD-10-CM

## 2025-04-23 DIAGNOSIS — F32.A ANXIETY AND DEPRESSION: ICD-10-CM

## 2025-04-23 DIAGNOSIS — E66.3 OVERWEIGHT (BMI 25.0-29.9): ICD-10-CM

## 2025-04-23 DIAGNOSIS — R39.16 BENIGN PROSTATIC HYPERPLASIA (BPH) WITH STRAINING ON URINATION: ICD-10-CM

## 2025-04-23 DIAGNOSIS — F41.9 ANXIETY AND DEPRESSION: ICD-10-CM

## 2025-04-23 DIAGNOSIS — Z98.890 HISTORY OF PROSTATE SURGERY: ICD-10-CM

## 2025-04-23 DIAGNOSIS — Z12.5 SCREENING PSA (PROSTATE SPECIFIC ANTIGEN): ICD-10-CM

## 2025-04-23 DIAGNOSIS — N40.1 BENIGN PROSTATIC HYPERPLASIA (BPH) WITH STRAINING ON URINATION: ICD-10-CM

## 2025-04-23 DIAGNOSIS — Z95.5 HISTORY OF CORONARY ARTERY STENT PLACEMENT: ICD-10-CM

## 2025-04-23 DIAGNOSIS — M79.2 NEUROPATHIC PAIN, LEG, LEFT: ICD-10-CM

## 2025-04-23 DIAGNOSIS — I25.10 CORONARY ARTERY DISEASE INVOLVING NATIVE CORONARY ARTERY OF NATIVE HEART WITHOUT ANGINA PECTORIS: ICD-10-CM

## 2025-04-23 DIAGNOSIS — Z79.899 MEDICATION MANAGEMENT: ICD-10-CM

## 2025-04-23 DIAGNOSIS — M47.26 OSTEOARTHRITIS OF SPINE WITH RADICULOPATHY, LUMBAR REGION: ICD-10-CM

## 2025-04-23 DIAGNOSIS — Z00.00 ROUTINE GENERAL MEDICAL EXAMINATION AT A HEALTH CARE FACILITY: Primary | ICD-10-CM

## 2025-04-23 PROCEDURE — 99999 PR PBB SHADOW E&M-EST. PATIENT-LVL IV: CPT | Mod: PBBFAC,,, | Performed by: FAMILY MEDICINE

## 2025-04-23 RX ORDER — NAPROXEN SODIUM 220 MG/1
81 TABLET, FILM COATED ORAL DAILY
Qty: 365 TABLET | Refills: 1 | Status: SHIPPED | OUTPATIENT
Start: 2025-04-23

## 2025-04-23 RX ORDER — DULOXETIN HYDROCHLORIDE 30 MG/1
30 CAPSULE, DELAYED RELEASE ORAL
Qty: 30 CAPSULE | Refills: 3 | Status: SHIPPED | OUTPATIENT
Start: 2025-04-23 | End: 2026-04-23

## 2025-04-23 NOTE — PROGRESS NOTES
Office Visit    Patient Name: Masoud Osorio    : 1955  MRN: [id number]    Subjective:  Masoud is a 70 y.o. male who presents today for: Annual      Annual Exam      Last seen 2024 for Annual exam.    Today, Masoud is doing well. He has been taking all of his meds prescribed by his cardiologist. Feels soreness in his chest but denies any chest pain. Urologist did rezum near the beginning of the year, and since, he has had no trouble peeing or any other symptoms. Today, he also mentions that his wife has noticed that he still has some trouble with temperament, which he says he might agree with. He was previously on sertraline for this, but cannot remember why he came off it. He mentions though that this is during specific times during the day, such as when he is driving. He denies feeling any symptoms of depression; however notes that he might have some anxiety. Otherwise, he still feels numbness on his leg from his prior spine problems, for which he does use pregabalin 150mg at night which he says works well when he uses it; however, during the day he feels the numbness more. He was told that he can take his pregabalin 2x/day once at night and once during the day, but he does not like the feeling of tiredness during the day.     Past med hx:  CAD w/ history of heart attack, managed with stent and aspirin  1st degree AV Block  BPH with straining on urination  Osteoarthritis of spine with radiculopathy   Anxiety and Depression  Impared mobility and activities of daily living      Health Maintenance:  Exercise: stretches, walks every day, inversion table 2-3 per week.   Diet: 2-3 meals/day, tries to stay on low sodium diet. Drinks lots of water  Sleep: 7 hours, uninterrupted  Covid Vaccine not currently interested  All other immunizations and screening up to date.     PAST MEDICAL HISTORY, SURGICAL/SOCIAL/FAMILY HISTORY REVIEWED AS PER CHART, WITH PERTINENT FINDINGS INCLUDED IN HISTORY SECTION OF NOTE.      Current Medications    Medication List with Changes/Refills   New Medications    DULOXETINE (CYMBALTA) 30 MG CAPSULE    Take 1 capsule (30 mg total) by mouth daily with breakfast.   Current Medications    ATORVASTATIN (LIPITOR) 80 MG TABLET    Take 1 tablet (80 mg total) by mouth once daily.    AZELASTINE (ASTELIN) 137 MCG (0.1 %) NASAL SPRAY    Spray 2 sprays in each nostril twice daily    BLOOD PRESSURE CUFF MISC    1 Units by Misc.(Non-Drug; Combo Route) route once daily.    DIAZEPAM (VALIUM) 5 MG TABLET    Take 0.5 tablet by mouth every 8 to 12 hours as needed.    DIAZEPAM (VALIUM) 5 MG TABLET    Take 1/2 -1 tablet by mouth every 8 hours.    EMPAGLIFLOZIN (JARDIANCE) 10 MG TABLET    Take 1 tablet (10 mg total) by mouth once daily.    GARLIC EXTRACT ORAL    Take by mouth.    IPRATROPIUM (ATROVENT) 0.06 % NASAL SPRAY        IPRATROPIUM (ATROVENT) 21 MCG (0.03 %) NASAL SPRAY        METOPROLOL SUCCINATE (TOPROL-XL) 25 MG 24 HR TABLET    Take 0.5 tablets (12.5 mg total) by mouth once daily.    MULTIVITAMIN CAPSULE    Take 1 capsule by mouth once daily.    OXYBUTYNIN (DITROPAN) 5 MG TAB    Take 1 tablet (5 mg total) by mouth 3 (three) times daily. for 7 days    PREGABALIN (LYRICA) 150 MG CAPSULE    Take 1 capsule (150 mg total) by mouth 2 (two) times daily.    SACUBITRIL-VALSARTAN (ENTRESTO) 24-26 MG PER TABLET    Take 1 tablet by mouth 2 (two) times daily.    SPIRONOLACTONE (ALDACTONE) 25 MG TABLET    Take 1 tablet (25 mg total) by mouth once daily.    TADALAFIL (CIALIS) 5 MG TABLET    Take 1 tablet (5 mg total) by mouth once daily.    TRIAMCINOLONE (NASACORT) 55 MCG NASAL INHALER    Instill two sprays in each nostril once daily   Changed and/or Refilled Medications    Modified Medication Previous Medication    ASPIRIN 81 MG CAP aspirin 81 mg Cap       Take 81 mg by mouth once daily.    Take 81 mg by mouth once daily.   Discontinued Medications    HYDROXYZINE HCL (ATARAX) 25 MG TABLET    Take 1/2-1 tablet nightly  as needed for anxiety or insomnia    HYDROXYZINE HCL (ATARAX) 25 MG TABLET    Take 0.5-1 tablets (12.5-25 mg total) by mouth every evening.    PREDNISONE (DELTASONE) 20 MG TABLET    Take 2 tablets by mouth on day 1, THEN 1 ½ tablets on days 2 and 3, THEN 1 tablet on days 4 and 5, THEN ½ tablet on day 6    PREDNISONE (DELTASONE) 20 MG TABLET    Take 2 tablets by mouth on day 1, then 1.5 tabs on day 2 and 3, then 1 tab on day 4 and 5. Then 1/2 tab on day 6.    PROMETHAZINE-DEXTROMETHORPHAN (PROMETHAZINE-DM) 6.25-15 MG/5 ML SYRP    Take 5 to 10 mL by mouth every 8-12 hours    SILDENAFIL (REVATIO) 20 MG TAB    Take 1 tablet (20 mg total) by mouth once daily.    TAMSULOSIN (FLOMAX) 0.4 MG CAP    Take 2 capsules (0.8 mg total) by mouth once daily.       Allergies   Review of patient's allergies indicates:   Allergen Reactions    Penicillins Hives       Labs  Normal CBC, CMP  Cholesterol low at 114 and LDL low at 52.6    Review of Systems (Pertinent positives)  Review of Systems   Constitutional:  Negative for activity change, chills, diaphoresis, fatigue, fever and unexpected weight change.   HENT:  Negative for hearing loss, rhinorrhea and trouble swallowing.    Eyes:  Negative for discharge and visual disturbance.   Respiratory:  Negative for apnea, cough, choking, chest tightness, shortness of breath and wheezing.    Cardiovascular:  Positive for leg swelling (slight dependent, self resolves with elevation). Negative for chest pain and palpitations.   Gastrointestinal:  Negative for blood in stool, constipation, diarrhea and vomiting.   Endocrine: Negative for polydipsia and polyuria.   Genitourinary:  Negative for difficulty urinating, dysuria, enuresis, flank pain, frequency, hematuria and urgency.   Musculoskeletal:  Negative for arthralgias, joint swelling and neck pain.   Neurological:  Positive for numbness (and tingling of left leg). Negative for weakness and headaches.   Psychiatric/Behavioral:  Negative for  "confusion and dysphoric mood.        /62 (BP Location: Left arm, Patient Position: Sitting)   Pulse (!) 59   Temp 97.8 °F (36.6 °C)   Ht 5' 8" (1.727 m)   Wt 81 kg (178 lb 9.2 oz)   SpO2 96%   BMI 27.15 kg/m²     Physical Exam  Constitutional:       Appearance: Normal appearance.   HENT:      Head: Normocephalic.      Right Ear: Tympanic membrane normal.      Left Ear: Tympanic membrane normal.      Nose: Nose normal.      Mouth/Throat:      Mouth: Mucous membranes are moist.   Eyes:      Extraocular Movements: Extraocular movements intact.      Conjunctiva/sclera: Conjunctivae normal.      Pupils: Pupils are equal, round, and reactive to light.   Cardiovascular:      Rate and Rhythm: Normal rate and regular rhythm.      Pulses: Normal pulses.      Heart sounds: Normal heart sounds.   Pulmonary:      Effort: Pulmonary effort is normal.      Breath sounds: Normal breath sounds.   Abdominal:      General: Abdomen is flat.      Palpations: Abdomen is soft.   Musculoskeletal:      Cervical back: Normal range of motion.      Right lower leg: No edema.      Left lower leg: No edema.   Skin:     General: Skin is warm and dry.   Neurological:      General: No focal deficit present.      Mental Status: He is alert and oriented to person, place, and time.   Psychiatric:         Mood and Affect: Mood normal.         Behavior: Behavior normal.             Assessment/Plan:  Masoud Osorio is a 70 y.o. male who presents today for :        ICD-10-CM ICD-9-CM    1. Routine general medical examination at a health care facility  Z00.00 V70.0       2. Overweight (BMI 25.0-29.9)  E66.3 278.02       3. Coronary artery disease involving native coronary artery of native heart without angina pectoris  I25.10 414.01 Hemoglobin A1C      Comprehensive Metabolic Panel      Lipid Panel      CBC Auto Differential      TSH      Vitamin D      Magnesium      Vitamin B12      aspirin 81 mg Cap      4. History of heart attack  I25.2 412 "       5. Long-term use of aspirin therapy  Z79.82 V58.66 aspirin 81 mg Cap      6. History of coronary artery stent placement  Z95.5 V45.82 Hemoglobin A1C      Comprehensive Metabolic Panel      Lipid Panel      CBC Auto Differential      TSH      Vitamin D      Magnesium      Vitamin B12      aspirin 81 mg Cap      7. Anxiety and depression  F41.9 300.00 DULoxetine (CYMBALTA) 30 MG capsule    F32.A 311       8. Benign prostatic hyperplasia (BPH) with straining on urination  N40.1 600.01     R39.16 788.65       9. Osteoarthritis of spine with radiculopathy, lumbar region  M47.26 721.3       10. Non-seasonal allergic rhinitis, unspecified trigger  J30.89 477.8       11. History of prostate surgery  Z98.890 V45.89     9/4/24:  Cystoscopy with transurethral prostate destruction/RFA, Urology Dr Carranza      12. Screening PSA (prostate specific antigen)  Z12.5 V76.44 PSA, Screening      13. Medication management  Z79.899 V58.69 Hemoglobin A1C      Comprehensive Metabolic Panel      Lipid Panel      CBC Auto Differential      TSH      Vitamin D      Magnesium      Vitamin B12      PSA, Screening      14. Neuropathic pain, leg, left  M79.2 355.8 DULoxetine (CYMBALTA) 30 MG capsule        Routine general medical  - Advised on importance of keeping up diet, exercise, and sleep for maintaining a healthy life. Will continue following all routine screenings, checkups, and eye/dental exams based off of age and risk factors  - Will order routine labs including in advance of next Cardiology visit in September. will include routine screenings like A1c and PSA at that time in addition to labs requested by cardiologist-lipids/CMP    Coronary Artery Disease, history of heart attack, 1st degree AV block, history of coronary artery stents, heart failure with low normal ejection fraction, and cardiomyopathy. Last seen by cardiology (Dr. Hinton) on 3/25/25. Is currently on the following regiment: Entresto 24-26 mg. Spironolactone to 25 mg  daily.Toprol 12.5 mg daily. Jardiance 10mg daily.  Continue daily 81 mg aspirin and statin- Lipitor 80 for goal-directed medical therapy and secondary cardiovascular prevention.  Blood pressure today is at 100/62. He is doing well with no chest pain, shortness of breath, diaphoresis, cough or choking.   - continue medication regiment above.  Will schedule his six-month cardiology follow-up in September    Anxiety, Depression, Osteoarthritis of spine with radiculopathy, and Neuropathic Pain  So will try Cymbalta as an agent for both mood and chronic pain  Start 30 mg dose daily with breakfast in follow-up with virtual visit in 8 weeks to assess effectiveness  Hope this is a good option for him for pain and mood that avoids the sedating effects of taking pregabalin during the day but he can continue the 150 mg dose at night-     Benign Prostatic Hyperplasia and PSA   Recently had the rezum procedure for BPH, and has not had any trouble urinating since.   - continue yearly PSA for prostate cancer screening, ordered with labs to be done September 2025    I hereby acknowledge that I am relying upon documentation authored by a medical student working under my supervision and further I hereby attest that I have verified the student documentation or findings by personally re-performing the physical exam and medical decision making activities of the Evaluation and Management service to be billed.  Rosemarie Ayala     There are no Patient Instructions on file for this visit.      Follow up in about 5 months (around 9/23/2025) for to follow up on lab results, return as needed for new concerns.

## 2025-04-27 ENCOUNTER — PATIENT MESSAGE (OUTPATIENT)
Dept: INTERNAL MEDICINE | Facility: CLINIC | Age: 70
End: 2025-04-27
Payer: MEDICARE

## 2025-04-27 DIAGNOSIS — M79.2 NEUROPATHIC PAIN, LEG, LEFT: Primary | ICD-10-CM

## 2025-04-28 RX ORDER — DULOXETIN HYDROCHLORIDE 20 MG/1
20 CAPSULE, DELAYED RELEASE ORAL DAILY
Qty: 30 CAPSULE | Refills: 2 | Status: SHIPPED | OUTPATIENT
Start: 2025-04-28 | End: 2026-04-28

## 2025-05-26 ENCOUNTER — PATIENT MESSAGE (OUTPATIENT)
Dept: CARDIOLOGY | Facility: CLINIC | Age: 70
End: 2025-05-26
Payer: MEDICARE

## 2025-05-26 DIAGNOSIS — I25.10 CORONARY ARTERY DISEASE INVOLVING NATIVE CORONARY ARTERY OF NATIVE HEART WITHOUT ANGINA PECTORIS: ICD-10-CM

## 2025-05-26 RX ORDER — ATORVASTATIN CALCIUM 80 MG/1
80 TABLET, FILM COATED ORAL DAILY
Qty: 90 TABLET | Refills: 3 | Status: SHIPPED | OUTPATIENT
Start: 2025-05-26

## 2025-06-27 ENCOUNTER — OFFICE VISIT (OUTPATIENT)
Dept: INTERNAL MEDICINE | Facility: CLINIC | Age: 70
End: 2025-06-27
Payer: MEDICARE

## 2025-06-27 ENCOUNTER — TELEPHONE (OUTPATIENT)
Dept: CARDIOLOGY | Facility: CLINIC | Age: 70
End: 2025-06-27
Payer: MEDICARE

## 2025-06-27 DIAGNOSIS — F32.A ANXIETY AND DEPRESSION: ICD-10-CM

## 2025-06-27 DIAGNOSIS — I25.10 CORONARY ARTERY DISEASE INVOLVING NATIVE CORONARY ARTERY OF NATIVE HEART WITHOUT ANGINA PECTORIS: ICD-10-CM

## 2025-06-27 DIAGNOSIS — F41.9 ANXIETY AND DEPRESSION: ICD-10-CM

## 2025-06-27 DIAGNOSIS — M79.2 NEUROPATHIC PAIN, LEG, LEFT: Primary | ICD-10-CM

## 2025-06-27 RX ORDER — DULOXETIN HYDROCHLORIDE 60 MG/1
60 CAPSULE, DELAYED RELEASE ORAL DAILY
Qty: 90 CAPSULE | Refills: 4 | Status: SHIPPED | OUTPATIENT
Start: 2025-06-27 | End: 2026-06-27

## 2025-06-27 NOTE — PROGRESS NOTES
Office Visit    Patient Name: Masoud Osorio    : 1955  MRN: [id number]    Subjective:  Masoud is a 70 y.o. male who presents today for:    No chief complaint on file.    The patient location is: HIS HOME  The chief complaint leading to consultation is: FOLLOW UP CYMBALTA    Visit type: audiovisual    Face to Face time with patient: START 140    21  minutes of total time spent on the encounter, which includes face to face time and non-face to face time preparing to see the patient (eg, review of tests), Obtaining and/or reviewing separately obtained history, Documenting clinical information in the electronic or other health record, Independently interpreting results (not separately reported) and communicating results to the patient/family/caregiver, or Care coordination (not separately reported).       Each patient to whom he or she provides medical services by telemedicine is:  (1) informed of the relationship between the physician and patient and the respective role of any other health care provider with respect to management of the patient; and (2) notified that he or she may decline to receive medical services by telemedicine and may withdraw from such care at any time.    Notes: seen 25 for physical.    At the time of his visit we discussed some ongoing neuropathy concerns, particularly numbness and tingling in his left leg associated with underlying spinal conditions.  The neuropathy is helped by taking Lyrica at night, however, he can not take this during the day due to side effect of sedation and he was interested in a medication to help with some of his daytime neuropathy symptoms.  Additionally, he complained of some increased mood swings and anxiety with being off of Zoloft.     Instead of re trying Zoloft decision was made to try Cymbalta due to potential benefit in terms of both mood and chronic pain management.      He initially started Cymbalta 30 but had some GI side effects so  contacted me and we reduced the dose to Cymbalta 20.  Once he finished a month of the Cymbalta 20 he was able to increase back up to Cymbalta 30 and has done well on that dose for the past month.     With going down to the 20 and then up to the 30 he has not had any GI side effects or other side effects of concern.  He does not yet notice any benefit in terms of neuropathy management or moved.  He would be open to trying a dose increase.     Past med hx:  CAD w/ history of heart attack, Cardiomyopathy with EF RECOVERED: managed with stent and aspirin, Entresto/BBlocker/Aldactone/Jardiance for Cardiomyopathy-- followed by Dr Hinton Cardiology & due for 6-month follow-up September 2025.    Have labs on file ahead of time for September 2nd 1st degree AV Block  BPH with straining on urination-- s/p laser reduction of the prostate with improvement   Osteoarthritis of spine with radiculopathy   Anxiety and Depression           PAST MEDICAL HISTORY, SURGICAL/SOCIAL/FAMILY HISTORY REVIEWED AS PER CHART, WITH PERTINENT FINDINGS INCLUDED IN HISTORY SECTION OF NOTE.     Current Medications    Medication List with Changes/Refills   New Medications    DULOXETINE (CYMBALTA) 60 MG CAPSULE    Take 1 capsule (60 mg total) by mouth once daily.   Current Medications    ASPIRIN 81 MG CHEW    Chew 1 tablet (81 mg total) by mouth once daily.    ATORVASTATIN (LIPITOR) 80 MG TABLET    Take 1 tablet (80 mg total) by mouth once daily.    AZELASTINE (ASTELIN) 137 MCG (0.1 %) NASAL SPRAY    Spray 2 sprays in each nostril twice daily    CLINDAMYCIN (CLEOCIN) 150 MG CAPSULE    Take 2 capsules (300 mg total) by mouth every 6 (six) hours. until gone    DIAZEPAM (VALIUM) 5 MG TABLET    Take 0.5 tablet by mouth every 8 to 12 hours as needed.    EMPAGLIFLOZIN (JARDIANCE) 10 MG TABLET    Take 1 tablet (10 mg total) by mouth once daily.    GARLIC EXTRACT ORAL    Take by mouth.    HYDROCODONE-ACETAMINOPHEN (NORCO) 5-325 MG PER TABLET    Take 1 tablet  by mouth every 4 to 6 hours as needed for pain.    IBUPROFEN (ADVIL,MOTRIN) 600 MG TABLET    Take 1 tablet (600 mg total) by mouth every 6 (six) hours as needed for pain.    IPRATROPIUM (ATROVENT) 0.06 % NASAL SPRAY        IPRATROPIUM (ATROVENT) 21 MCG (0.03 %) NASAL SPRAY        METOPROLOL SUCCINATE (TOPROL-XL) 25 MG 24 HR TABLET    Take 0.5 tablets (12.5 mg total) by mouth once daily.    MULTIVITAMIN CAPSULE    Take 1 capsule by mouth once daily.    OXYBUTYNIN (DITROPAN) 5 MG TAB    Take 1 tablet (5 mg total) by mouth 3 (three) times daily. for 7 days    PREGABALIN (LYRICA) 150 MG CAPSULE    Take 1 capsule (150 mg total) by mouth 2 (two) times daily.    SACUBITRIL-VALSARTAN (ENTRESTO) 24-26 MG PER TABLET    Take 1 tablet by mouth 2 (two) times daily.    SPIRONOLACTONE (ALDACTONE) 25 MG TABLET    Take 1 tablet (25 mg total) by mouth once daily.    TADALAFIL (CIALIS) 5 MG TABLET    Take 1 tablet (5 mg total) by mouth once daily.    TRIAMCINOLONE (NASACORT) 55 MCG NASAL INHALER    Instill two sprays in each nostril once daily   Discontinued Medications    DULOXETINE (CYMBALTA) 20 MG CAPSULE    Take 1 capsule (20 mg total) by mouth once daily.    DULOXETINE (CYMBALTA) 30 MG CAPSULE    Take 1 capsule (30 mg total) by mouth daily with breakfast.       Allergies   Review of patient's allergies indicates:   Allergen Reactions    Penicillins Hives         Review of Systems (Pertinent positives)  Review of Systems   Constitutional:  Positive for activity change. Negative for unexpected weight change.   HENT:  Negative for hearing loss, rhinorrhea and trouble swallowing.    Eyes:  Negative for discharge and visual disturbance.   Respiratory:  Negative for chest tightness and wheezing.    Cardiovascular:  Negative for chest pain and palpitations.   Gastrointestinal:  Negative for blood in stool, constipation, diarrhea and vomiting.   Endocrine: Negative for polydipsia and polyuria.   Genitourinary:  Negative for difficulty  urinating, hematuria and urgency.   Musculoskeletal:  Negative for arthralgias, joint swelling and neck pain.   Neurological:  Negative for weakness and headaches.   Psychiatric/Behavioral:  Negative for confusion and dysphoric mood.        There were no vitals taken for this visit.    Physical Exam  Vitals reviewed.   Constitutional:       General: He is not in acute distress.     Appearance: He is well-developed.   HENT:      Head: Normocephalic and atraumatic.   Eyes:      Conjunctiva/sclera: Conjunctivae normal.   Pulmonary:      Effort: Pulmonary effort is normal.   Neurological:      Mental Status: He is alert and oriented to person, place, and time.   Psychiatric:         Mood and Affect: Mood normal.         Behavior: Behavior normal.           Assessment/Plan:  Masoud Osorio is a 70 y.o. male who presents today for :        ICD-10-CM ICD-9-CM    1. Neuropathic pain, leg, left  M79.2 355.8 DULoxetine (CYMBALTA) 60 MG capsule      2. Anxiety and depression  F41.9 300.00 DULoxetine (CYMBALTA) 60 MG capsule    F32.A 311       3. Coronary artery disease involving native coronary artery of native heart without angina pectoris  I25.10 414.01         CHRONIC NEUROPATHIC PAIN ASSOCIATED WITH ANXIETY AND DEPRESSION:  Has increased up to 30 mg of Cymbalta daily without adverse effects but not yet noticing any benefit in terms of neuropathy or mood.  Since he is tolerating the 30 mg dose well we will try increasing up to Cymbalta 60.  He will 1st start combining some of the Cymbalta 20s and 30s for a total of Cymbalta 50 then increase up to CYMBALTA 60 MG DAILY AS TOLERATED.   Will follow-up in about 2-3 months with how he is doing on the daily Cymbalta 60.  Advised to message sooner if concerns.      CORONARY ARTERY DISEASE:  Has labs scheduled for early September in advance of his cardiology appointment-this has not yet been scheduled and will reach out to Dr. Hinton's staff to assist.      There are no Patient  Instructions on file for this visit.      Follow up in about 3 months (around 9/27/2025) for to follow up on lab results, return as needed for new concerns.

## 2025-06-27 NOTE — TELEPHONE ENCOUNTER
----- Message from Rosemarie Ayala MD sent at 2025  2:03 PM CDT -----  Regardin month follow up  This patient is due to see Dr Hinton for a six-month follow-up in September.  I ordered some labs to be done for him in advance of his appointment that Dr. Hinton can review-cholesterol, etc. but he has not been able to schedule the appointment through the portal.      Would you all be able to assist with scheduling?  His six-month follow-up is due in September, thank you so much! Rosemarie Ayala, PCP for Masoud Osorio

## 2025-07-31 ENCOUNTER — PATIENT MESSAGE (OUTPATIENT)
Dept: CARDIOLOGY | Facility: CLINIC | Age: 70
End: 2025-07-31
Payer: MEDICARE

## 2025-09-05 ENCOUNTER — TELEPHONE (OUTPATIENT)
Dept: INTERNAL MEDICINE | Facility: CLINIC | Age: 70
End: 2025-09-05
Payer: MEDICARE

## 2025-09-05 DIAGNOSIS — R73.03 PREDIABETES: ICD-10-CM

## 2025-09-05 DIAGNOSIS — E78.2 MIXED HYPERLIPIDEMIA: Primary | ICD-10-CM

## 2025-09-05 DIAGNOSIS — Z79.899 MEDICATION MANAGEMENT: ICD-10-CM

## (undated) DEVICE — TOWEL OR XRAY BLUE 17X26IN

## (undated) DEVICE — CATH 5FR MP 125CM 5/BX

## (undated) DEVICE — COVER PROBE US 5.5X58L NON LTX

## (undated) DEVICE — GUIDE WIRE MOTION .035 X 150CM

## (undated) DEVICE — COVER TABLE HVY DTY 60X90IN

## (undated) DEVICE — GLOVE BIOGEL SKINSENSE PI 7.0

## (undated) DEVICE — CATH OPTITORQUE TIGER 5F 100CM

## (undated) DEVICE — GUIDEWIRE INQWIRE SE 3MM JTIP

## (undated) DEVICE — BAG LINGEMAN DRAIN UROLOGY

## (undated) DEVICE — SET IRR URLGY 2LINE UNIV SPIKE

## (undated) DEVICE — SOL IRR NACL .9% 3000ML

## (undated) DEVICE — JELLY LUBRICANT STERILE 4 OZ

## (undated) DEVICE — SCRUB DYNA-HEX LIQ 4% CHG 4OZ

## (undated) DEVICE — DRAPE T CYSTOSCOPY STERILE

## (undated) DEVICE — PAD DEFIB CADENCE ADULT R2

## (undated) DEVICE — KIT LEFT HEART MANIFOLD CUSTOM

## (undated) DEVICE — GOWN POLY REINF X-LONG 2XL

## (undated) DEVICE — Device

## (undated) DEVICE — DRESSING LEUKOPLAST FLEX 1X3IN

## (undated) DEVICE — KIT GLIDESHEATH SLEND 6FR 10CM

## (undated) DEVICE — CATH URTRL OPEN END STR TIP 5F

## (undated) DEVICE — HEMOSTAT VASC BAND REG 24CM

## (undated) DEVICE — CATH IMPULSE 5F 100CM FR4

## (undated) DEVICE — CATH ARI 4FR

## (undated) DEVICE — SPONGE COTTON TRAY 4X4IN

## (undated) DEVICE — SPIKE SHORT LG BORE 1-WAY 2IN

## (undated) DEVICE — CATH ANG PIGTAIL 4FR INFINITY

## (undated) DEVICE — CONTAINER MULTIPURPOSE/SPECIME

## (undated) DEVICE — VISIPAQUE 320 200ML +PK

## (undated) DEVICE — SYR ONLY LUER LOCK 20CC

## (undated) DEVICE — SYS DELIVERY REZUM

## (undated) DEVICE — CATH AL1 5FR